# Patient Record
Sex: MALE | Race: WHITE | NOT HISPANIC OR LATINO | Employment: FULL TIME | URBAN - METROPOLITAN AREA
[De-identification: names, ages, dates, MRNs, and addresses within clinical notes are randomized per-mention and may not be internally consistent; named-entity substitution may affect disease eponyms.]

---

## 2017-02-06 ENCOUNTER — TELEPHONE (OUTPATIENT)
Dept: ORTHOPEDICS | Facility: CLINIC | Age: 37
End: 2017-02-06

## 2017-02-06 NOTE — TELEPHONE ENCOUNTER
"Spoke with pt who states he fell out of 4-curry and felt a "pop" to his left knee. Pt states that his left knee is swollen and that he cannot bear weight on his LLE. Pt states that he cannot fully extend LLE. Pt has been elevating and icing. Pt would like to be seen today. Appt scheduled today with NICO Juarez III, PA/Sports Med at 1:30pm with arrival at 1:00 pm. Message left on pt voicemail notifying him of appt and requesting that he return call to confirm appt. Noted pt has active MyChart.   "

## 2017-02-06 NOTE — TELEPHONE ENCOUNTER
----- Message from Chris Zamorano sent at 2/6/2017  7:43 AM CST -----  Contact: pt  The pt need an appointment for today, the pt felt a pop in his knee after he fell out of a 4 curry. The pt knee is swollen and would like to come in today. Please call the pt at [phone]

## 2019-02-04 ENCOUNTER — HOSPITAL ENCOUNTER (EMERGENCY)
Facility: HOSPITAL | Age: 39
Discharge: HOME | End: 2019-02-04
Attending: EMERGENCY MEDICINE
Payer: COMMERCIAL

## 2019-02-04 VITALS
HEART RATE: 69 BPM | WEIGHT: 315 LBS | TEMPERATURE: 99 F | RESPIRATION RATE: 18 BRPM | SYSTOLIC BLOOD PRESSURE: 146 MMHG | DIASTOLIC BLOOD PRESSURE: 78 MMHG | HEIGHT: 70 IN | BODY MASS INDEX: 45.1 KG/M2 | OXYGEN SATURATION: 94 %

## 2019-02-04 DIAGNOSIS — N23 RENAL COLIC ON RIGHT SIDE: ICD-10-CM

## 2019-02-04 DIAGNOSIS — N20.1 RIGHT URETERAL STONE: Primary | ICD-10-CM

## 2019-02-04 LAB
ALBUMIN SERPL BCP-MCNC: 3.4 G/DL
ALP SERPL-CCNC: 72 U/L
ALT SERPL W/O P-5'-P-CCNC: 27 U/L
ANION GAP SERPL CALC-SCNC: 9 MMOL/L
AST SERPL-CCNC: 19 U/L
BACTERIA #/AREA URNS AUTO: ABNORMAL /HPF
BASOPHILS # BLD AUTO: 0.06 K/UL
BASOPHILS NFR BLD: 0.8 %
BILIRUB SERPL-MCNC: 0.6 MG/DL
BILIRUB UR QL STRIP: NEGATIVE
BUN SERPL-MCNC: 13 MG/DL
CALCIUM SERPL-MCNC: 9.3 MG/DL
CHLORIDE SERPL-SCNC: 108 MMOL/L
CLARITY UR REFRACT.AUTO: CLEAR
CO2 SERPL-SCNC: 22 MMOL/L
COLOR UR AUTO: YELLOW
CREAT SERPL-MCNC: 0.8 MG/DL
DIFFERENTIAL METHOD: NORMAL
EOSINOPHIL # BLD AUTO: 0.2 K/UL
EOSINOPHIL NFR BLD: 2.2 %
ERYTHROCYTE [DISTWIDTH] IN BLOOD BY AUTOMATED COUNT: 13.2 %
EST. GFR  (AFRICAN AMERICAN): >60 ML/MIN/1.73 M^2
EST. GFR  (NON AFRICAN AMERICAN): >60 ML/MIN/1.73 M^2
GLUCOSE SERPL-MCNC: 109 MG/DL
GLUCOSE UR QL STRIP: NEGATIVE
HCT VFR BLD AUTO: 41.5 %
HGB BLD-MCNC: 14 G/DL
HGB UR QL STRIP: ABNORMAL
IMM GRANULOCYTES # BLD AUTO: 0.01 K/UL
IMM GRANULOCYTES NFR BLD AUTO: 0.1 %
KETONES UR QL STRIP: NEGATIVE
LEUKOCYTE ESTERASE UR QL STRIP: NEGATIVE
LYMPHOCYTES # BLD AUTO: 2.1 K/UL
LYMPHOCYTES NFR BLD: 28.4 %
MCH RBC QN AUTO: 30.3 PG
MCHC RBC AUTO-ENTMCNC: 33.7 G/DL
MCV RBC AUTO: 90 FL
MICROSCOPIC COMMENT: ABNORMAL
MONOCYTES # BLD AUTO: 0.5 K/UL
MONOCYTES NFR BLD: 7.2 %
NEUTROPHILS # BLD AUTO: 4.5 K/UL
NEUTROPHILS NFR BLD: 61.3 %
NITRITE UR QL STRIP: NEGATIVE
NRBC BLD-RTO: 0 /100 WBC
PH UR STRIP: 5 [PH] (ref 5–8)
PLATELET # BLD AUTO: 226 K/UL
PMV BLD AUTO: 11 FL
POTASSIUM SERPL-SCNC: 4.2 MMOL/L
PROT SERPL-MCNC: 7.2 G/DL
PROT UR QL STRIP: NEGATIVE
RBC # BLD AUTO: 4.62 M/UL
RBC #/AREA URNS AUTO: 43 /HPF (ref 0–4)
SODIUM SERPL-SCNC: 139 MMOL/L
SP GR UR STRIP: 1.02 (ref 1–1.03)
SQUAMOUS #/AREA URNS AUTO: 0 /HPF
URN SPEC COLLECT METH UR: ABNORMAL
WBC # BLD AUTO: 7.36 K/UL
WBC #/AREA URNS AUTO: 1 /HPF (ref 0–5)

## 2019-02-04 PROCEDURE — 81001 URINALYSIS AUTO W/SCOPE: CPT

## 2019-02-04 PROCEDURE — 63600175 PHARM REV CODE 636 W HCPCS: Performed by: EMERGENCY MEDICINE

## 2019-02-04 PROCEDURE — 96361 HYDRATE IV INFUSION ADD-ON: CPT

## 2019-02-04 PROCEDURE — 25000003 PHARM REV CODE 250: Performed by: EMERGENCY MEDICINE

## 2019-02-04 PROCEDURE — 96375 TX/PRO/DX INJ NEW DRUG ADDON: CPT

## 2019-02-04 PROCEDURE — 85025 COMPLETE CBC W/AUTO DIFF WBC: CPT

## 2019-02-04 PROCEDURE — 96376 TX/PRO/DX INJ SAME DRUG ADON: CPT

## 2019-02-04 PROCEDURE — 96374 THER/PROPH/DIAG INJ IV PUSH: CPT

## 2019-02-04 PROCEDURE — 99284 EMERGENCY DEPT VISIT MOD MDM: CPT | Mod: ,,, | Performed by: EMERGENCY MEDICINE

## 2019-02-04 PROCEDURE — 80053 COMPREHEN METABOLIC PANEL: CPT

## 2019-02-04 PROCEDURE — 99284 EMERGENCY DEPT VISIT MOD MDM: CPT | Mod: 25

## 2019-02-04 PROCEDURE — 99284 PR EMERGENCY DEPT VISIT,LEVEL IV: ICD-10-PCS | Mod: ,,, | Performed by: EMERGENCY MEDICINE

## 2019-02-04 RX ORDER — KETOROLAC TROMETHAMINE 30 MG/ML
15 INJECTION, SOLUTION INTRAMUSCULAR; INTRAVENOUS
Status: COMPLETED | OUTPATIENT
Start: 2019-02-04 | End: 2019-02-04

## 2019-02-04 RX ORDER — HYDROMORPHONE HYDROCHLORIDE 1 MG/ML
0.5 INJECTION, SOLUTION INTRAMUSCULAR; INTRAVENOUS; SUBCUTANEOUS
Status: COMPLETED | OUTPATIENT
Start: 2019-02-04 | End: 2019-02-04

## 2019-02-04 RX ORDER — ONDANSETRON 2 MG/ML
4 INJECTION INTRAMUSCULAR; INTRAVENOUS
Status: COMPLETED | OUTPATIENT
Start: 2019-02-04 | End: 2019-02-04

## 2019-02-04 RX ORDER — OXYCODONE AND ACETAMINOPHEN 5; 325 MG/1; MG/1
1 TABLET ORAL
Status: COMPLETED | OUTPATIENT
Start: 2019-02-04 | End: 2019-02-04

## 2019-02-04 RX ORDER — OXYCODONE AND ACETAMINOPHEN 5; 325 MG/1; MG/1
1 TABLET ORAL EVERY 6 HOURS PRN
Qty: 18 TABLET | Refills: 0 | Status: SHIPPED | OUTPATIENT
Start: 2019-02-04 | End: 2019-09-04

## 2019-02-04 RX ORDER — ONDANSETRON 4 MG/1
4 TABLET, ORALLY DISINTEGRATING ORAL EVERY 6 HOURS PRN
Qty: 12 TABLET | Refills: 0 | Status: SHIPPED | OUTPATIENT
Start: 2019-02-04 | End: 2019-10-06

## 2019-02-04 RX ORDER — TAMSULOSIN HYDROCHLORIDE 0.4 MG/1
0.4 CAPSULE ORAL DAILY
Qty: 10 CAPSULE | Refills: 0 | Status: ON HOLD | OUTPATIENT
Start: 2019-02-04 | End: 2019-10-06

## 2019-02-04 RX ORDER — TAMSULOSIN HYDROCHLORIDE 0.4 MG/1
0.4 CAPSULE ORAL
Status: COMPLETED | OUTPATIENT
Start: 2019-02-04 | End: 2019-02-04

## 2019-02-04 RX ADMIN — TAMSULOSIN HYDROCHLORIDE 0.4 MG: 0.4 CAPSULE ORAL at 03:02

## 2019-02-04 RX ADMIN — OXYCODONE HYDROCHLORIDE AND ACETAMINOPHEN 1 TABLET: 5; 325 TABLET ORAL at 03:02

## 2019-02-04 RX ADMIN — ONDANSETRON 4 MG: 2 INJECTION INTRAMUSCULAR; INTRAVENOUS at 12:02

## 2019-02-04 RX ADMIN — HYDROMORPHONE HYDROCHLORIDE 0.5 MG: 1 INJECTION, SOLUTION INTRAMUSCULAR; INTRAVENOUS; SUBCUTANEOUS at 01:02

## 2019-02-04 RX ADMIN — HYDROMORPHONE HYDROCHLORIDE 0.5 MG: 1 INJECTION, SOLUTION INTRAMUSCULAR; INTRAVENOUS; SUBCUTANEOUS at 12:02

## 2019-02-04 RX ADMIN — SODIUM CHLORIDE, SODIUM LACTATE, POTASSIUM CHLORIDE, AND CALCIUM CHLORIDE 1000 ML: .6; .31; .03; .02 INJECTION, SOLUTION INTRAVENOUS at 12:02

## 2019-02-04 RX ADMIN — ONDANSETRON 4 MG: 2 INJECTION INTRAMUSCULAR; INTRAVENOUS at 01:02

## 2019-02-04 RX ADMIN — KETOROLAC TROMETHAMINE 15 MG: 30 INJECTION, SOLUTION INTRAMUSCULAR at 12:02

## 2019-02-04 NOTE — ED TRIAGE NOTES
Pt presents to ed complaining of lower back pain started this morning. Hx of kidney stones. Pt denies burning or frequency with urination.

## 2019-02-04 NOTE — ED PROVIDER NOTES
"Encounter Date: 2/4/2019    SCRIBE #1 NOTE: I, La Nena Villegas, am scribing for, and in the presence of,  Dr. Mayes. I have scribed the entire note.       History     Chief Complaint   Patient presents with    Flank Pain     hx kidney stones     Time patient was seen by the provider: 11:47 AM      The patient is a 38 y.o. male with co-morbidities including: GAYLE, GERD, kidney stones, who presents to the ED with a complaint of flank pain that began this morning. He describes pain as a sharp shooting pain located on the right flank. He notes of nausea, epigastric pain. He had one episode of vomiting en route to ED. Denies any fever, chills, diarrhea, chest pain, SOB. Patient with history of kidney stones in the past.         The history is provided by the patient and medical records.     Review of patient's allergies indicates:  No Known Allergies  Past Medical History:   Diagnosis Date    GERD without esophagitis     takes OTC nexium PRN    History of kidney stones     Morbid obesity with body mass index of 60.0-69.9 in adult     GAYLE (obstructive sleep apnea)     did not tolerate CPAP     Past Surgical History:   Procedure Laterality Date    APPENDECTOMY  3/2008     Family History   Problem Relation Age of Onset    Heart disease Father     Stroke Father     Hypertension Father     Diabetes Maternal Grandmother     Heart disease Maternal Grandfather     Stroke Paternal Grandmother     Heart disease Paternal Grandfather     Diabetes Mother     Obesity Mother     Obesity Sister     Diabetes Brother     Obesity Brother      Social History     Tobacco Use    Smoking status: Never Smoker    Smokeless tobacco: Current User    Tobacco comment: 2 cans/ week   Substance Use Topics    Alcohol use: Yes     Alcohol/week: 1.8 oz     Types: 3 Cans of beer per week     Comment: "socially but not usually"    Drug use: No     Review of Systems   Constitutional: Negative for fever.   HENT: Negative for sore " throat.    Respiratory: Negative for shortness of breath.    Cardiovascular: Negative for chest pain.   Gastrointestinal: Positive for abdominal pain, nausea and vomiting.   Genitourinary: Positive for flank pain. Negative for dysuria.   Musculoskeletal: Negative for back pain.   Skin: Negative for rash.   Neurological: Negative for weakness.   Hematological: Does not bruise/bleed easily.       Physical Exam     Initial Vitals [02/04/19 1124]   BP Pulse Resp Temp SpO2   (!) 156/79 79 18 98 °F (36.7 °C) 98 %      MAP       --         Physical Exam    Vitals reviewed.  Constitutional: He appears well-developed and well-nourished. He is Obese . No distress.   38 y.o. obsese  in mild discomfort noted.     HENT:   Head: Normocephalic and atraumatic.   redundant soft palate noted without intraoral injury   Eyes: EOM are normal. Pupils are equal, round, and reactive to light.   Neck: No tracheal deviation present. No JVD present.   Cardiovascular: Normal rate, regular rhythm, normal heart sounds and intact distal pulses.   Pulmonary/Chest: Breath sounds normal. No stridor. No respiratory distress.   Abdominal: Soft. He exhibits no distension. There is tenderness.   Obese, mild right flank tenderness   Musculoskeletal: Normal range of motion. He exhibits no edema.   Moving all 4 extremities. No peripheral edema.    Neurological: He is alert and oriented to person, place, and time.   Psychiatric: His behavior is normal. Thought content normal.         ED Course   Procedures  Labs Reviewed   COMPREHENSIVE METABOLIC PANEL - Abnormal; Notable for the following components:       Result Value    CO2 22 (*)     Albumin 3.4 (*)     All other components within normal limits   URINALYSIS, REFLEX TO URINE CULTURE - Abnormal; Notable for the following components:    Occult Blood UA 3+ (*)     All other components within normal limits    Narrative:     Preferred Collection Type->Urine, Clean Catch  yellow and grey   URINALYSIS  MICROSCOPIC - Abnormal; Notable for the following components:    RBC, UA 43 (*)     All other components within normal limits    Narrative:     Preferred Collection Type->Urine, Clean Catch  yellow and grey   CBC W/ AUTO DIFFERENTIAL          Imaging Results          CT Renal Stone Study ABD Pelvis WO (Final result)  Result time 02/04/19 14:58:32    Final result by Oliver Perez MD (02/04/19 14:58:32)                 Impression:      1. Minimal right-sided hydronephrosis secondary to a 5 mm calculus within the proximal right ureter.  2. Additional bilateral nephrolithiasis as above.  3. Hepatomegaly.  4. Few additional findings as above.      Electronically signed by: Oliver Perez MD  Date:    02/04/2019  Time:    14:58             Narrative:    EXAMINATION:  CT RENAL STONE STUDY ABD PELVIS WO    CLINICAL HISTORY:  Flank pain, stone disease suspected;    TECHNIQUE:  Low dose axial images, sagittal and coronal reformations were obtained from the lung bases to the pubic symphysis.  Contrast was not administered.    COMPARISON:  Chest radiograph 10/16/2015 and lumbar spine series 02/15/2006    FINDINGS:  Beam hardening with streak artifact from close proximity to the scanner gantry as well as increased image noise from body habitus somewhat limits evaluation.    Imaged lung bases are clear.  Base of the heart is within normal limits.    Liver is enlarged without focal process seen.  Gallbladder, pancreas, spleen, stomach, duodenum and bilateral adrenal glands are within normal limits.  No biliary ductal dilatation.    Bilateral kidneys are normal in size, shape and location.  Minimal right-sided hydronephrosis secondary to a 5 mm calculus within the proximal right ureter.  Additional few scattered punctate nephroliths at the right interpolar region and lower pole.  6-7 mm calculi noted at the interpolar and lower pole of the left kidney.  No left hydronephrosis.  No significant perinephric stranding.  Urinary bladder  is suboptimally distended.  Prostate and seminal vesicles are within normal limits.    No ascites, free air or lymphadenopathy.  Aorta is nonaneurysmal.  No significant atherosclerosis.    A tiny fat containing umbilical hernia.  Postsurgical changes of appendectomy noted.  Terminal ileum is within normal limits.  No evidence of bowel obstruction or inflammation.  No pneumatosis or portal venous gas.    Included osseous structures show chronic appearing mild anterior wedge deformity of several lower thoracic vertebral bodies and mild degenerative change without acute or destructive process seen.                                 Medical Decision Making:   History:   Old Medical Records: I decided to obtain old medical records.  Differential Diagnosis:   Flank pain, renal colic, ureteral obstruction, UTI.   Clinical Tests:   Lab Tests: Ordered and Reviewed  Radiological Study: Ordered and Reviewed            Scribe Attestation:   Scribe #1: I performed the above scribed service and the documentation accurately describes the services I performed. I attest to the accuracy of the note.    Attending Attestation:             Attending ED Notes:   Emergency department evaluation today does not reveal any evidence of significant hematologic or metabolic derangements in this patient presenting with a right-sided flank/low back pain beginning this morning.  Urinalysis does reveal evidence of hematuria without significant pyuria or nitrite.  Renal stone study/CT abdomen pelvis without contrast does reveal evidence of a right sided 5 mm stone to the proximal ureter with mild hydronephrosis.  The patient reports improvement of his presenting symptoms with emergency department therapy, and he is tolerating oral fluids at the time of disposition.  He will be discharged home in improved condition with instructions to follow up with Urology within the next 10 days and return to the ED as needed for onset of fever, worsening of pain,  intractable vomiting, or other urgent concerns.  He has been provided prescriptions of Percocet 18.  Tablets, Zofran 4 mg ODT, as well as Flomax 0.4 mg to be taken as directed.             Clinical Impression:   The primary encounter diagnosis was Right ureteral stone. A diagnosis of Renal colic on right side was also pertinent to this visit.      Disposition:   Disposition: Discharged  Condition: Stable                        Esa Mayes MD  02/04/19 2489

## 2019-02-04 NOTE — ED NOTES
Discharge information and medications discussed.  IV removed per protocol and pt in NAD with VSS.  Pt understands to follow up with urology within a week.  Pt states no questions at this time.

## 2019-02-04 NOTE — ED NOTES
LOC: The patient is awake, alert, and aware of environment. The patient is oriented x 3 and speaking appropriately.   APPEARANCE: No acute distress noted.   PSYCHOSOCIAL: Patient is calm and cooperative.   SKIN: The skin is warm, dry.   RESPIRATORY: Airway is open and patent. Bilateral chest rise and fall. Respirations are spontaneous, even and unlabored. Normal effort and rate noted. No accessory muscle use noted.   CARDIAC:Denies chest pain or SOB.   ABDOMEN: Soft and non tender to palpation. No distention noted.   URINARY:  Voids independently.   NEUROLOGIC: Eyes open spontaneously. Speech clear. Tolerating saliva secretions well. Able to follow commands, demonstrating ability to actively and appropriately communicate within context of current conversation. Symmetrical facial muscles. Moving all extremities well. Movement is purposeful.   MUSCULOSKELETAL: No obvious deformities noted.

## 2019-02-05 ENCOUNTER — HOSPITAL ENCOUNTER (EMERGENCY)
Facility: HOSPITAL | Age: 39
Discharge: HOME | End: 2019-02-05
Attending: EMERGENCY MEDICINE
Payer: COMMERCIAL

## 2019-02-05 VITALS
OXYGEN SATURATION: 98 % | SYSTOLIC BLOOD PRESSURE: 135 MMHG | TEMPERATURE: 98 F | HEART RATE: 81 BPM | WEIGHT: 315 LBS | HEIGHT: 70 IN | DIASTOLIC BLOOD PRESSURE: 85 MMHG | BODY MASS INDEX: 45.1 KG/M2 | RESPIRATION RATE: 16 BRPM

## 2019-02-05 DIAGNOSIS — R10.9 FLANK PAIN: ICD-10-CM

## 2019-02-05 DIAGNOSIS — N20.1 URETEROLITHIASIS: Primary | ICD-10-CM

## 2019-02-05 LAB
ALBUMIN SERPL BCP-MCNC: 3.5 G/DL
ALP SERPL-CCNC: 69 U/L
ALT SERPL W/O P-5'-P-CCNC: 23 U/L
ANION GAP SERPL CALC-SCNC: 10 MMOL/L
AST SERPL-CCNC: 21 U/L
BACTERIA #/AREA URNS AUTO: ABNORMAL /HPF
BASOPHILS # BLD AUTO: 0.05 K/UL
BASOPHILS NFR BLD: 0.5 %
BILIRUB SERPL-MCNC: 0.5 MG/DL
BILIRUB UR QL STRIP: NEGATIVE
BUN SERPL-MCNC: 14 MG/DL
CALCIUM SERPL-MCNC: 9.3 MG/DL
CHLORIDE SERPL-SCNC: 106 MMOL/L
CLARITY UR REFRACT.AUTO: CLEAR
CO2 SERPL-SCNC: 21 MMOL/L
COLOR UR AUTO: ABNORMAL
CREAT SERPL-MCNC: 1.1 MG/DL
DIFFERENTIAL METHOD: ABNORMAL
EOSINOPHIL # BLD AUTO: 0.1 K/UL
EOSINOPHIL NFR BLD: 1.1 %
ERYTHROCYTE [DISTWIDTH] IN BLOOD BY AUTOMATED COUNT: 13.1 %
EST. GFR  (AFRICAN AMERICAN): >60 ML/MIN/1.73 M^2
EST. GFR  (NON AFRICAN AMERICAN): >60 ML/MIN/1.73 M^2
GLUCOSE SERPL-MCNC: 97 MG/DL
GLUCOSE UR QL STRIP: NEGATIVE
HCT VFR BLD AUTO: 38.9 %
HGB BLD-MCNC: 13 G/DL
HGB UR QL STRIP: ABNORMAL
IMM GRANULOCYTES # BLD AUTO: 0.04 K/UL
IMM GRANULOCYTES NFR BLD AUTO: 0.4 %
KETONES UR QL STRIP: NEGATIVE
LEUKOCYTE ESTERASE UR QL STRIP: NEGATIVE
LYMPHOCYTES # BLD AUTO: 1.8 K/UL
LYMPHOCYTES NFR BLD: 16.3 %
MCH RBC QN AUTO: 29.4 PG
MCHC RBC AUTO-ENTMCNC: 33.4 G/DL
MCV RBC AUTO: 88 FL
MICROSCOPIC COMMENT: ABNORMAL
MONOCYTES # BLD AUTO: 0.9 K/UL
MONOCYTES NFR BLD: 8.1 %
NEUTROPHILS # BLD AUTO: 8 K/UL
NEUTROPHILS NFR BLD: 73.6 %
NITRITE UR QL STRIP: NEGATIVE
NRBC BLD-RTO: 0 /100 WBC
PH UR STRIP: 5 [PH] (ref 5–8)
PLATELET # BLD AUTO: 187 K/UL
PMV BLD AUTO: 11.5 FL
POTASSIUM SERPL-SCNC: 4.2 MMOL/L
PROT SERPL-MCNC: 7.5 G/DL
PROT UR QL STRIP: NEGATIVE
RBC # BLD AUTO: 4.42 M/UL
RBC #/AREA URNS AUTO: 7 /HPF (ref 0–4)
SODIUM SERPL-SCNC: 137 MMOL/L
SP GR UR STRIP: 1.01 (ref 1–1.03)
SQUAMOUS #/AREA URNS AUTO: 0 /HPF
URN SPEC COLLECT METH UR: ABNORMAL
WBC # BLD AUTO: 10.89 K/UL

## 2019-02-05 PROCEDURE — 99284 EMERGENCY DEPT VISIT MOD MDM: CPT | Mod: 25

## 2019-02-05 PROCEDURE — 85025 COMPLETE CBC W/AUTO DIFF WBC: CPT

## 2019-02-05 PROCEDURE — 99285 PR EMERGENCY DEPT VISIT,LEVEL V: ICD-10-PCS | Mod: ,,, | Performed by: EMERGENCY MEDICINE

## 2019-02-05 PROCEDURE — 81001 URINALYSIS AUTO W/SCOPE: CPT

## 2019-02-05 PROCEDURE — 99285 EMERGENCY DEPT VISIT HI MDM: CPT | Mod: ,,, | Performed by: EMERGENCY MEDICINE

## 2019-02-05 PROCEDURE — 25000003 PHARM REV CODE 250: Performed by: EMERGENCY MEDICINE

## 2019-02-05 PROCEDURE — 96361 HYDRATE IV INFUSION ADD-ON: CPT

## 2019-02-05 PROCEDURE — 63600175 PHARM REV CODE 636 W HCPCS: Performed by: EMERGENCY MEDICINE

## 2019-02-05 PROCEDURE — 96375 TX/PRO/DX INJ NEW DRUG ADDON: CPT

## 2019-02-05 PROCEDURE — 96374 THER/PROPH/DIAG INJ IV PUSH: CPT

## 2019-02-05 PROCEDURE — 80053 COMPREHEN METABOLIC PANEL: CPT

## 2019-02-05 RX ORDER — MORPHINE SULFATE 4 MG/ML
4 INJECTION, SOLUTION INTRAMUSCULAR; INTRAVENOUS
Status: COMPLETED | OUTPATIENT
Start: 2019-02-05 | End: 2019-02-05

## 2019-02-05 RX ORDER — NAPROXEN 375 MG/1
375 TABLET ORAL 2 TIMES DAILY WITH MEALS
Qty: 10 TABLET | Refills: 0 | Status: SHIPPED | OUTPATIENT
Start: 2019-02-05 | End: 2019-02-05 | Stop reason: SDUPTHER

## 2019-02-05 RX ORDER — NAPROXEN 375 MG/1
375 TABLET ORAL 2 TIMES DAILY WITH MEALS
Qty: 10 TABLET | Refills: 0 | Status: SHIPPED | OUTPATIENT
Start: 2019-02-05 | End: 2019-02-10

## 2019-02-05 RX ORDER — KETOROLAC TROMETHAMINE 30 MG/ML
15 INJECTION, SOLUTION INTRAMUSCULAR; INTRAVENOUS
Status: COMPLETED | OUTPATIENT
Start: 2019-02-05 | End: 2019-02-05

## 2019-02-05 RX ADMIN — MORPHINE SULFATE 4 MG: 4 INJECTION INTRAVENOUS at 02:02

## 2019-02-05 RX ADMIN — KETOROLAC TROMETHAMINE 15 MG: 30 INJECTION, SOLUTION INTRAMUSCULAR at 02:02

## 2019-02-05 RX ADMIN — SODIUM CHLORIDE 1000 ML: 0.9 INJECTION, SOLUTION INTRAVENOUS at 03:02

## 2019-02-05 NOTE — ED PROVIDER NOTES
"Encounter Date: 2/5/2019    SCRIBE #1 NOTE: I, Kael Partida, am scribing for, and in the presence of,  Dimitrios Tsai DO. I have scribed the entire note.       History     Chief Complaint   Patient presents with    Nephrolithiasis     seen here last night vomiting     38 y.o. male patient who presents to the Emergency Department for right-sided flank pain with onset 1 day ago. Pt was seen yesterday in ED and diagnosed with a 5 mm kidney stone. Pt reports after being discharged he continued to have difficulty urinating and awoke this morning with worse pain. Associated sxs include nausea. Pt denies any fever, vomiting, diarrhea, chills, hematuria, and all other sxs at this time. No further complaints or concerns at this time.      The history is provided by the patient.     Review of patient's allergies indicates:  No Known Allergies  Past Medical History:   Diagnosis Date    GERD without esophagitis     takes OTC nexium PRN    History of kidney stones     Morbid obesity with body mass index of 60.0-69.9 in adult     GAYLE (obstructive sleep apnea)     did not tolerate CPAP     Past Surgical History:   Procedure Laterality Date    APPENDECTOMY  3/2008     Family History   Problem Relation Age of Onset    Heart disease Father     Stroke Father     Hypertension Father     Diabetes Maternal Grandmother     Heart disease Maternal Grandfather     Stroke Paternal Grandmother     Heart disease Paternal Grandfather     Diabetes Mother     Obesity Mother     Obesity Sister     Diabetes Brother     Obesity Brother      Social History     Tobacco Use    Smoking status: Never Smoker    Smokeless tobacco: Current User    Tobacco comment: 2 cans/ week   Substance Use Topics    Alcohol use: Yes     Alcohol/week: 1.8 oz     Types: 3 Cans of beer per week     Comment: "socially but not usually"    Drug use: No     Review of Systems   Constitutional: Negative for chills and fever.   HENT: Negative for congestion " and sore throat.    Eyes: Negative for discharge and itching.   Respiratory: Negative for cough and shortness of breath.    Cardiovascular: Negative for chest pain and leg swelling.   Gastrointestinal: Positive for nausea. Negative for diarrhea and vomiting.   Endocrine: Negative for cold intolerance and heat intolerance.   Genitourinary: Positive for difficulty urinating. Negative for dysuria.   Musculoskeletal: Positive for back pain (right flank). Negative for arthralgias.   Skin: Negative for color change and rash.   Allergic/Immunologic: Negative for environmental allergies and food allergies.   Neurological: Negative for weakness and numbness.   Hematological: Negative for adenopathy. Does not bruise/bleed easily.   Psychiatric/Behavioral: Negative for agitation and behavioral problems.       Physical Exam     Initial Vitals [02/05/19 1358]   BP Pulse Resp Temp SpO2   (!) 145/89 79 18 97.9 °F (36.6 °C) 97 %      MAP       --         Physical Exam    Nursing note and vitals reviewed.    Gen/Constitutional: Interactive. No acute distress  Head: Normocephalic, Atraumatic  Neck: supple, no masses or LAD  Eyes: PERRLA, conjunctiva clear  Ears, Nose and Throat: No rhinorrhea or stridor.  Cardiac: Reg Rhythm, No murmur  Pulmonary: CTA Bilat, no wheezes, rhonchi, rales.  GI: Abdomen soft, non-tender, non-distended; no rebound or guarding  : No CVA tenderness.  Musculoskeletal: Extremities warm, well perfused, no erythema, no edema  Skin: No rashes  Neuro: Alert and Oriented x 3; No focal motor or sensory deficits.    Psych: Normal affect    ED Course   Procedures  Labs Reviewed   CBC W/ AUTO DIFFERENTIAL - Abnormal; Notable for the following components:       Result Value    RBC 4.42 (*)     Hemoglobin 13.0 (*)     Hematocrit 38.9 (*)     Gran # (ANC) 8.0 (*)     Gran% 73.6 (*)     Lymph% 16.3 (*)     All other components within normal limits   COMPREHENSIVE METABOLIC PANEL - Abnormal; Notable for the following  components:    CO2 21 (*)     All other components within normal limits   URINALYSIS, REFLEX TO URINE CULTURE - Abnormal; Notable for the following components:    Occult Blood UA 3+ (*)     All other components within normal limits    Narrative:     Preferred Collection Type->Urine, Clean Catch   URINALYSIS MICROSCOPIC - Abnormal; Notable for the following components:    RBC, UA 7 (*)     All other components within normal limits    Narrative:     Preferred Collection Type->Urine, Clean Catch          Imaging Results          CT Renal Stone Study ABD Pelvis WO (Final result)  Result time 02/05/19 18:23:30    Final result by Messi King MD (02/05/19 18:23:30)                 Impression:      1. Mild to moderate right hydroureteronephrosis secondary to a 0.4 cm calculus within the distal right ureter.  In comparison to examination 02/04/2019, this likely reflects distal migration of previously identified proximal ureteral calculus.  Please note, there has been interval worsening of the hydronephrosis and perinephric/periureteral inflammatory changes.  Superimposed infection is not excluded.  2. The urinary bladder is decompressed, likely accounting for mild wall thickening however correlation with urinalysis is recommended.  3. Findings suggesting hepatic steatosis, correlation with LFTs recommended.  4. Possible gallbladder calculi or sludge.  5. Additional nonobstructive bilateral nephrolithiasis.  6. Additional findings above.      Electronically signed by: Messi King MD  Date:    02/05/2019  Time:    18:23             Narrative:    EXAMINATION:  CT RENAL STONE STUDY ABD PELVIS WO    CLINICAL HISTORY:  Kidney stone, known, follow up;    TECHNIQUE:  Low dose axial images, sagittal and coronal reformations were obtained from the lung bases to the pubic symphysis.  Contrast was not administered.    COMPARISON:  02/04/2019    FINDINGS:  Images of the lower thorax are remarkable for mild dependent  atelectasis.    The liver is hypoattenuating, may reflect steatosis, correlation with LFTs recommended.  The spleen, pancreas and adrenal glands are unremarkable.  There is high attenuating material layering posteriorly within the gallbladder, could reflect calculi or sludge.  There is no biliary dilation or ascites.  There is a prominent lymph node in the region of the right leora hepatis, unchanged since the previous examination.    There is bilateral nonobstructive nephrolithiasis.  There is no left hydronephrosis.  The left ureter is unremarkable without calculi seen.  There is right perinephric and periureteral inflammation.  There is mild to moderate right hydroureteronephrosis, noting a 0.4 cm calculus within the distal right ureter.  The urinary bladder is decompressed, limiting evaluation.  The prostate is not enlarged.    The large bowel is grossly unremarkable as is the terminal ileum.  Postsurgical changes are noted suggesting appendectomy.  The small bowel is grossly unremarkable.  Scattered shotty periaortic and paracaval lymph nodes are noted.  No focal organized pelvic fluid collection.    Degenerative changes are noted of the spine.  No significant inguinal lymphadenopathy.                                 Medical Decision Making:   History:   Old Medical Records: I decided to obtain old medical records.  Old Records Summarized: records from previous admission(s).  Initial Assessment:   38-year-old male with a history of nephrolithiasis presenting with flank pain on the right side.  Differential Diagnosis:   Differential diagnosis includes was not limited to:  Ureterolithiasis, nephrolithiasis, obstructive uropathy, UTI, aortic disease, pyelonephritis.   Independently Interpreted Test(s):   I have ordered and independently interpreted X-rays - see prior notes.  Clinical Tests:   Lab Tests: Ordered and Reviewed  Radiological Study: Ordered and Reviewed    Emergent evaluation of patient presenting with  right-sided flank pain in the setting of recent nephrolithiasis.  Patient states that he had a 5 mm kidney stone is unable to pass it.  Difficulty with urination, and subjective dysuria.  He is afebrile vital signs are stable. Physical exam findings unremarkable, with no CVA tenderness or systemic symptoms. On IV line placed, labs drawn, patient given IV pain medicine, antiemetic, IV fluids with a CT noncontrast flank stone study obtained. On repeat evaluation symptoms have resolved, and patient is able tolerate p.o..  CT scan reveals a now 4 mm stone that is no longer proximal but in the distal ureteral system near the bladder.  I suspect he will be able to pass the stone as he has already passed to the 24 hr from proximal to distal.  Pain patient was given instructions for pain management, anti-inflammatory medication as well as continuing medication that was already prescribed to include Flomax, and fluid intake.  Plan is to have patient follow up with PCP as needed, and strict ED precautions return instructions were discussed.  The remainder of exam unremarkable, no evidence of aortic disease on CT, doubt any obstructive uropathy, no evidence of UTI or pyelonephritis. Patient agreeable to discharge plan. Strict ED precautions and return instructions discussed at length and patient verbalized understanding. All questions were answered and ample time was given for questions.      Complexity:  High          Scribe Attestation:   Scribe #1: I performed the above scribed service and the documentation accurately describes the services I performed. I attest to the accuracy of the note.    I, Dr. Dimitrios Tsai, personally performed the services described in this documentation. All medical record entries made by the scribe were at my direction and in my presence.  I have reviewed the chart and agree that the record reflects my personal performance and is accurate and complete.                Clinical Impression:   The  primary encounter diagnosis was Ureterolithiasis. A diagnosis of Flank pain was also pertinent to this visit.      Disposition:   Disposition: Discharged  Condition: Stable         Dimitrios Tsai DO  Dept of Emergency Medicine   [facility]  Spectralink: [id number]                   Dimitrios Tsai DO  02/06/19 4121

## 2019-02-05 NOTE — ED TRIAGE NOTES
Pt dx with kidney stones yesterday-5mm on right side.  Pt states pain has worsened and is nauseated.  States he is not urinating much.

## 2019-05-22 ENCOUNTER — OCCUPATIONAL HEALTH (OUTPATIENT)
Dept: URGENT CARE | Facility: CLINIC | Age: 39
End: 2019-05-22

## 2019-05-22 DIAGNOSIS — Z02.83 ENCOUNTER FOR DRUG SCREENING: Primary | ICD-10-CM

## 2019-05-22 DIAGNOSIS — Z02.1 ENCOUNTER FOR PRE-EMPLOYMENT HEALTH SCREENING EXAMINATION: Primary | ICD-10-CM

## 2019-05-22 PROCEDURE — 99499 UNLISTED E&M SERVICE: CPT | Mod: S$GLB,,, | Performed by: NURSE PRACTITIONER

## 2019-05-22 PROCEDURE — 99199 OCC MED MRO FEE: ICD-10-PCS | Mod: S$GLB,,, | Performed by: PREVENTIVE MEDICINE

## 2019-05-22 PROCEDURE — 99199 UNLISTED SPECIAL SVC PX/RPRT: CPT | Mod: S$GLB,,, | Performed by: PREVENTIVE MEDICINE

## 2019-05-22 PROCEDURE — 99499 PHYSICAL, BASIC COMPLEXITY: ICD-10-PCS | Mod: S$GLB,,, | Performed by: NURSE PRACTITIONER

## 2019-05-22 PROCEDURE — 80305 OOH COLLECTION ONLY DRUG SCREEN: ICD-10-PCS | Mod: S$GLB,,, | Performed by: NURSE PRACTITIONER

## 2019-05-22 PROCEDURE — 80305 DRUG TEST PRSMV DIR OPT OBS: CPT | Mod: S$GLB,,, | Performed by: NURSE PRACTITIONER

## 2019-10-05 ENCOUNTER — HOSPITAL ENCOUNTER (OUTPATIENT)
Facility: HOSPITAL | Age: 39
Discharge: HOME | End: 2019-10-06
Attending: EMERGENCY MEDICINE | Admitting: UROLOGY

## 2019-10-05 DIAGNOSIS — N20.1 URETEROLITHIASIS: Primary | ICD-10-CM

## 2019-10-05 DIAGNOSIS — D72.829 LEUKOCYTOSIS, UNSPECIFIED TYPE: ICD-10-CM

## 2019-10-05 DIAGNOSIS — N13.30 HYDRONEPHROSIS, UNSPECIFIED HYDRONEPHROSIS TYPE: ICD-10-CM

## 2019-10-05 DIAGNOSIS — N20.0 KIDNEY STONE: ICD-10-CM

## 2019-10-05 LAB
ANION GAP SERPL CALC-SCNC: 11 MMOL/L (ref 8–16)
BACTERIA #/AREA URNS AUTO: NORMAL /HPF
BASOPHILS # BLD AUTO: 0.05 K/UL (ref 0–0.2)
BASOPHILS NFR BLD: 0.3 % (ref 0–1.9)
BILIRUB UR QL STRIP: NEGATIVE
BUN SERPL-MCNC: 17 MG/DL (ref 6–20)
CALCIUM SERPL-MCNC: 9.4 MG/DL (ref 8.7–10.5)
CHLORIDE SERPL-SCNC: 109 MMOL/L (ref 95–110)
CLARITY UR REFRACT.AUTO: CLEAR
CO2 SERPL-SCNC: 22 MMOL/L (ref 23–29)
COLOR UR AUTO: YELLOW
CREAT SERPL-MCNC: 1.2 MG/DL (ref 0.5–1.4)
DIFFERENTIAL METHOD: ABNORMAL
EOSINOPHIL # BLD AUTO: 0.2 K/UL (ref 0–0.5)
EOSINOPHIL NFR BLD: 1.1 % (ref 0–8)
ERYTHROCYTE [DISTWIDTH] IN BLOOD BY AUTOMATED COUNT: 12.9 % (ref 11.5–14.5)
EST. GFR  (AFRICAN AMERICAN): >60 ML/MIN/1.73 M^2
EST. GFR  (NON AFRICAN AMERICAN): >60 ML/MIN/1.73 M^2
GLUCOSE SERPL-MCNC: 114 MG/DL (ref 70–110)
GLUCOSE UR QL STRIP: NEGATIVE
HCT VFR BLD AUTO: 38.8 % (ref 40–54)
HGB BLD-MCNC: 13.6 G/DL (ref 14–18)
HGB UR QL STRIP: ABNORMAL
IMM GRANULOCYTES # BLD AUTO: 0.07 K/UL (ref 0–0.04)
IMM GRANULOCYTES NFR BLD AUTO: 0.5 % (ref 0–0.5)
KETONES UR QL STRIP: NEGATIVE
LEUKOCYTE ESTERASE UR QL STRIP: NEGATIVE
LYMPHOCYTES # BLD AUTO: 2.3 K/UL (ref 1–4.8)
LYMPHOCYTES NFR BLD: 15.7 % (ref 18–48)
MCH RBC QN AUTO: 30.9 PG (ref 27–31)
MCHC RBC AUTO-ENTMCNC: 35.1 G/DL (ref 32–36)
MCV RBC AUTO: 88 FL (ref 82–98)
MICROSCOPIC COMMENT: NORMAL
MONOCYTES # BLD AUTO: 1.2 K/UL (ref 0.3–1)
MONOCYTES NFR BLD: 8.1 % (ref 4–15)
NEUTROPHILS # BLD AUTO: 11 K/UL (ref 1.8–7.7)
NEUTROPHILS NFR BLD: 74.3 % (ref 38–73)
NITRITE UR QL STRIP: NEGATIVE
NRBC BLD-RTO: 0 /100 WBC
PH UR STRIP: 5 [PH] (ref 5–8)
PLATELET # BLD AUTO: 204 K/UL (ref 150–350)
PMV BLD AUTO: 10.8 FL (ref 9.2–12.9)
POTASSIUM SERPL-SCNC: 4.6 MMOL/L (ref 3.5–5.1)
PROT UR QL STRIP: NEGATIVE
RBC # BLD AUTO: 4.4 M/UL (ref 4.6–6.2)
RBC #/AREA URNS AUTO: 2 /HPF (ref 0–4)
SODIUM SERPL-SCNC: 142 MMOL/L (ref 136–145)
SP GR UR STRIP: 1.02 (ref 1–1.03)
SQUAMOUS #/AREA URNS AUTO: 0 /HPF
URN SPEC COLLECT METH UR: ABNORMAL
WBC # BLD AUTO: 14.8 K/UL (ref 3.9–12.7)
WBC #/AREA URNS AUTO: 0 /HPF (ref 0–5)

## 2019-10-05 PROCEDURE — 80048 BASIC METABOLIC PNL TOTAL CA: CPT

## 2019-10-05 PROCEDURE — 99285 EMERGENCY DEPT VISIT HI MDM: CPT | Mod: ,,, | Performed by: EMERGENCY MEDICINE

## 2019-10-05 PROCEDURE — 96375 TX/PRO/DX INJ NEW DRUG ADDON: CPT

## 2019-10-05 PROCEDURE — 81001 URINALYSIS AUTO W/SCOPE: CPT

## 2019-10-05 PROCEDURE — 63600175 PHARM REV CODE 636 W HCPCS: Performed by: EMERGENCY MEDICINE

## 2019-10-05 PROCEDURE — 85025 COMPLETE CBC W/AUTO DIFF WBC: CPT

## 2019-10-05 PROCEDURE — 99285 PR EMERGENCY DEPT VISIT,LEVEL V: ICD-10-PCS | Mod: ,,, | Performed by: EMERGENCY MEDICINE

## 2019-10-05 PROCEDURE — 96376 TX/PRO/DX INJ SAME DRUG ADON: CPT

## 2019-10-05 PROCEDURE — 99285 EMERGENCY DEPT VISIT HI MDM: CPT | Mod: 25

## 2019-10-05 PROCEDURE — 96365 THER/PROPH/DIAG IV INF INIT: CPT

## 2019-10-05 PROCEDURE — 96361 HYDRATE IV INFUSION ADD-ON: CPT

## 2019-10-05 RX ORDER — SCOLOPAMINE TRANSDERMAL SYSTEM 1 MG/1
1 PATCH, EXTENDED RELEASE TRANSDERMAL
Status: DISCONTINUED | OUTPATIENT
Start: 2019-10-06 | End: 2019-10-05

## 2019-10-05 RX ORDER — SCOLOPAMINE TRANSDERMAL SYSTEM 1 MG/1
1 PATCH, EXTENDED RELEASE TRANSDERMAL
Status: DISCONTINUED | OUTPATIENT
Start: 2019-10-05 | End: 2019-10-06 | Stop reason: HOSPADM

## 2019-10-05 RX ORDER — ONDANSETRON 2 MG/ML
4 INJECTION INTRAMUSCULAR; INTRAVENOUS
Status: COMPLETED | OUTPATIENT
Start: 2019-10-05 | End: 2019-10-05

## 2019-10-05 RX ORDER — KETOROLAC TROMETHAMINE 30 MG/ML
15 INJECTION, SOLUTION INTRAMUSCULAR; INTRAVENOUS
Status: COMPLETED | OUTPATIENT
Start: 2019-10-05 | End: 2019-10-05

## 2019-10-05 RX ORDER — MORPHINE SULFATE 2 MG/ML
10 INJECTION, SOLUTION INTRAMUSCULAR; INTRAVENOUS
Status: COMPLETED | OUTPATIENT
Start: 2019-10-05 | End: 2019-10-05

## 2019-10-05 RX ADMIN — SODIUM CHLORIDE 1000 ML: 0.9 INJECTION, SOLUTION INTRAVENOUS at 08:10

## 2019-10-05 RX ADMIN — MORPHINE SULFATE 10 MG: 2 INJECTION, SOLUTION INTRAMUSCULAR; INTRAVENOUS at 09:10

## 2019-10-05 RX ADMIN — ONDANSETRON 4 MG: 2 INJECTION INTRAMUSCULAR; INTRAVENOUS at 08:10

## 2019-10-05 RX ADMIN — ONDANSETRON 4 MG: 2 INJECTION INTRAMUSCULAR; INTRAVENOUS at 10:10

## 2019-10-05 RX ADMIN — KETOROLAC TROMETHAMINE 15 MG: 30 INJECTION, SOLUTION INTRAMUSCULAR; INTRAVENOUS at 08:10

## 2019-10-05 RX ADMIN — PROMETHAZINE HYDROCHLORIDE 12.5 MG: 25 INJECTION INTRAMUSCULAR; INTRAVENOUS at 11:10

## 2019-10-06 ENCOUNTER — ANESTHESIA EVENT (OUTPATIENT)
Dept: SURGERY | Facility: HOSPITAL | Age: 39
End: 2019-10-06

## 2019-10-06 ENCOUNTER — ANESTHESIA (OUTPATIENT)
Dept: SURGERY | Facility: HOSPITAL | Age: 39
End: 2019-10-06

## 2019-10-06 VITALS
DIASTOLIC BLOOD PRESSURE: 63 MMHG | RESPIRATION RATE: 18 BRPM | BODY MASS INDEX: 45.1 KG/M2 | WEIGHT: 315 LBS | OXYGEN SATURATION: 95 % | HEART RATE: 80 BPM | SYSTOLIC BLOOD PRESSURE: 139 MMHG | HEIGHT: 70 IN | TEMPERATURE: 98 F

## 2019-10-06 PROBLEM — N20.0 KIDNEY STONES: Status: ACTIVE | Noted: 2019-10-06

## 2019-10-06 PROBLEM — N20.1 URETEROLITHIASIS: Status: ACTIVE | Noted: 2019-10-06

## 2019-10-06 LAB
ANION GAP SERPL CALC-SCNC: 8 MMOL/L (ref 8–16)
BASOPHILS # BLD AUTO: 0.04 K/UL (ref 0–0.2)
BASOPHILS NFR BLD: 0.5 % (ref 0–1.9)
BUN SERPL-MCNC: 17 MG/DL (ref 6–20)
CALCIUM SERPL-MCNC: 8.7 MG/DL (ref 8.7–10.5)
CHLORIDE SERPL-SCNC: 107 MMOL/L (ref 95–110)
CO2 SERPL-SCNC: 25 MMOL/L (ref 23–29)
CREAT SERPL-MCNC: 1.3 MG/DL (ref 0.5–1.4)
DIFFERENTIAL METHOD: ABNORMAL
EOSINOPHIL # BLD AUTO: 0.2 K/UL (ref 0–0.5)
EOSINOPHIL NFR BLD: 2.1 % (ref 0–8)
ERYTHROCYTE [DISTWIDTH] IN BLOOD BY AUTOMATED COUNT: 13.1 % (ref 11.5–14.5)
EST. GFR  (AFRICAN AMERICAN): >60 ML/MIN/1.73 M^2
EST. GFR  (NON AFRICAN AMERICAN): >60 ML/MIN/1.73 M^2
GLUCOSE SERPL-MCNC: 99 MG/DL (ref 70–110)
HCT VFR BLD AUTO: 38.7 % (ref 40–54)
HGB BLD-MCNC: 12.7 G/DL (ref 14–18)
IMM GRANULOCYTES # BLD AUTO: 0.02 K/UL (ref 0–0.04)
IMM GRANULOCYTES NFR BLD AUTO: 0.2 % (ref 0–0.5)
LYMPHOCYTES # BLD AUTO: 2.5 K/UL (ref 1–4.8)
LYMPHOCYTES NFR BLD: 29.1 % (ref 18–48)
MCH RBC QN AUTO: 30.3 PG (ref 27–31)
MCHC RBC AUTO-ENTMCNC: 32.8 G/DL (ref 32–36)
MCV RBC AUTO: 92 FL (ref 82–98)
MONOCYTES # BLD AUTO: 0.9 K/UL (ref 0.3–1)
MONOCYTES NFR BLD: 10.5 % (ref 4–15)
NEUTROPHILS # BLD AUTO: 4.9 K/UL (ref 1.8–7.7)
NEUTROPHILS NFR BLD: 57.6 % (ref 38–73)
NRBC BLD-RTO: 0 /100 WBC
PLATELET # BLD AUTO: 177 K/UL (ref 150–350)
PMV BLD AUTO: 10.9 FL (ref 9.2–12.9)
POTASSIUM SERPL-SCNC: 4 MMOL/L (ref 3.5–5.1)
RBC # BLD AUTO: 4.19 M/UL (ref 4.6–6.2)
SODIUM SERPL-SCNC: 140 MMOL/L (ref 136–145)
WBC # BLD AUTO: 8.46 K/UL (ref 3.9–12.7)

## 2019-10-06 PROCEDURE — 99219 PR INITIAL OBSERVATION CARE,LEVL II: ICD-10-PCS | Mod: 25,,, | Performed by: UROLOGY

## 2019-10-06 PROCEDURE — 52351 PR CYSTO/URETERO/PYELOSCOPY, DX: ICD-10-PCS | Mod: LT,,, | Performed by: UROLOGY

## 2019-10-06 PROCEDURE — 52351 CYSTOURETERO & OR PYELOSCOPE: CPT | Mod: LT,,, | Performed by: UROLOGY

## 2019-10-06 PROCEDURE — 25000003 PHARM REV CODE 250: Performed by: STUDENT IN AN ORGANIZED HEALTH CARE EDUCATION/TRAINING PROGRAM

## 2019-10-06 PROCEDURE — 76000 PR  FLUOROSCOPE EXAMINATION: ICD-10-PCS | Mod: 26,59,, | Performed by: UROLOGY

## 2019-10-06 PROCEDURE — G0378 HOSPITAL OBSERVATION PER HR: HCPCS

## 2019-10-06 PROCEDURE — 36000707: Performed by: UROLOGY

## 2019-10-06 PROCEDURE — 52332 PR CYSTOSCOPY,INSERT URETERAL STENT: ICD-10-PCS | Mod: 51,LT,, | Performed by: UROLOGY

## 2019-10-06 PROCEDURE — 71000033 HC RECOVERY, INTIAL HOUR: Performed by: UROLOGY

## 2019-10-06 PROCEDURE — 76000 FLUOROSCOPY <1 HR PHYS/QHP: CPT | Mod: 26,59,, | Performed by: UROLOGY

## 2019-10-06 PROCEDURE — 80048 BASIC METABOLIC PNL TOTAL CA: CPT

## 2019-10-06 PROCEDURE — 96366 THER/PROPH/DIAG IV INF ADDON: CPT

## 2019-10-06 PROCEDURE — C1769 GUIDE WIRE: HCPCS | Performed by: UROLOGY

## 2019-10-06 PROCEDURE — 63600175 PHARM REV CODE 636 W HCPCS: Performed by: NURSE ANESTHETIST, CERTIFIED REGISTERED

## 2019-10-06 PROCEDURE — 52332 CYSTOSCOPY AND TREATMENT: CPT | Mod: 51,LT,, | Performed by: UROLOGY

## 2019-10-06 PROCEDURE — D9220A PRA ANESTHESIA: ICD-10-PCS | Mod: ,,, | Performed by: ANESTHESIOLOGY

## 2019-10-06 PROCEDURE — 37000009 HC ANESTHESIA EA ADD 15 MINS: Performed by: UROLOGY

## 2019-10-06 PROCEDURE — 85025 COMPLETE CBC W/AUTO DIFF WBC: CPT

## 2019-10-06 PROCEDURE — C2617 STENT, NON-COR, TEM W/O DEL: HCPCS | Performed by: UROLOGY

## 2019-10-06 PROCEDURE — D9220A PRA ANESTHESIA: Mod: ,,, | Performed by: ANESTHESIOLOGY

## 2019-10-06 PROCEDURE — 36000706: Performed by: UROLOGY

## 2019-10-06 PROCEDURE — 37000008 HC ANESTHESIA 1ST 15 MINUTES: Performed by: UROLOGY

## 2019-10-06 PROCEDURE — 99219 PR INITIAL OBSERVATION CARE,LEVL II: CPT | Mod: 25,,, | Performed by: UROLOGY

## 2019-10-06 PROCEDURE — 63600175 PHARM REV CODE 636 W HCPCS: Performed by: STUDENT IN AN ORGANIZED HEALTH CARE EDUCATION/TRAINING PROGRAM

## 2019-10-06 PROCEDURE — 25500020 PHARM REV CODE 255: Performed by: UROLOGY

## 2019-10-06 PROCEDURE — C1894 INTRO/SHEATH, NON-LASER: HCPCS | Performed by: UROLOGY

## 2019-10-06 PROCEDURE — 36415 COLL VENOUS BLD VENIPUNCTURE: CPT

## 2019-10-06 PROCEDURE — 25000003 PHARM REV CODE 250: Performed by: NURSE ANESTHETIST, CERTIFIED REGISTERED

## 2019-10-06 DEVICE — STENT URETERAL UNIV 6FR 28CM: Type: IMPLANTABLE DEVICE | Site: URETER | Status: FUNCTIONAL

## 2019-10-06 RX ORDER — SODIUM CHLORIDE 0.9 % (FLUSH) 0.9 %
3 SYRINGE (ML) INJECTION
Status: DISCONTINUED | OUTPATIENT
Start: 2019-10-06 | End: 2019-10-06 | Stop reason: HOSPADM

## 2019-10-06 RX ORDER — CEFAZOLIN SODIUM 1 G/3ML
INJECTION, POWDER, FOR SOLUTION INTRAMUSCULAR; INTRAVENOUS
Status: DISCONTINUED | OUTPATIENT
Start: 2019-10-06 | End: 2019-10-06

## 2019-10-06 RX ORDER — SUCCINYLCHOLINE CHLORIDE 20 MG/ML
INJECTION INTRAMUSCULAR; INTRAVENOUS
Status: DISCONTINUED | OUTPATIENT
Start: 2019-10-06 | End: 2019-10-06

## 2019-10-06 RX ORDER — MIDAZOLAM HYDROCHLORIDE 1 MG/ML
INJECTION, SOLUTION INTRAMUSCULAR; INTRAVENOUS
Status: DISCONTINUED | OUTPATIENT
Start: 2019-10-06 | End: 2019-10-06

## 2019-10-06 RX ORDER — ONDANSETRON 2 MG/ML
INJECTION INTRAMUSCULAR; INTRAVENOUS
Status: DISCONTINUED | OUTPATIENT
Start: 2019-10-06 | End: 2019-10-06

## 2019-10-06 RX ORDER — TAMSULOSIN HYDROCHLORIDE 0.4 MG/1
0.4 CAPSULE ORAL DAILY
Status: DISCONTINUED | OUTPATIENT
Start: 2019-10-06 | End: 2019-10-06 | Stop reason: HOSPADM

## 2019-10-06 RX ORDER — ROCURONIUM BROMIDE 10 MG/ML
INJECTION, SOLUTION INTRAVENOUS
Status: DISCONTINUED | OUTPATIENT
Start: 2019-10-06 | End: 2019-10-06

## 2019-10-06 RX ORDER — OXYCODONE HYDROCHLORIDE 5 MG/1
5 TABLET ORAL EVERY 4 HOURS PRN
Status: DISCONTINUED | OUTPATIENT
Start: 2019-10-06 | End: 2019-10-06 | Stop reason: HOSPADM

## 2019-10-06 RX ORDER — SODIUM CHLORIDE 9 MG/ML
INJECTION, SOLUTION INTRAVENOUS CONTINUOUS
Status: DISCONTINUED | OUTPATIENT
Start: 2019-10-06 | End: 2019-10-06 | Stop reason: HOSPADM

## 2019-10-06 RX ORDER — KETOROLAC TROMETHAMINE 30 MG/ML
15 INJECTION, SOLUTION INTRAMUSCULAR; INTRAVENOUS EVERY 6 HOURS
Status: DISCONTINUED | OUTPATIENT
Start: 2019-10-06 | End: 2019-10-06 | Stop reason: HOSPADM

## 2019-10-06 RX ORDER — HEPARIN SODIUM 5000 [USP'U]/ML
7500 INJECTION, SOLUTION INTRAVENOUS; SUBCUTANEOUS EVERY 8 HOURS
Status: DISCONTINUED | OUTPATIENT
Start: 2019-10-06 | End: 2019-10-06 | Stop reason: HOSPADM

## 2019-10-06 RX ORDER — ONDANSETRON 2 MG/ML
4 INJECTION INTRAMUSCULAR; INTRAVENOUS ONCE AS NEEDED
Status: DISCONTINUED | OUTPATIENT
Start: 2019-10-06 | End: 2019-10-06 | Stop reason: HOSPADM

## 2019-10-06 RX ORDER — FENTANYL CITRATE 50 UG/ML
INJECTION, SOLUTION INTRAMUSCULAR; INTRAVENOUS
Status: DISCONTINUED | OUTPATIENT
Start: 2019-10-06 | End: 2019-10-06

## 2019-10-06 RX ORDER — FENTANYL CITRATE 50 UG/ML
25 INJECTION, SOLUTION INTRAMUSCULAR; INTRAVENOUS EVERY 5 MIN PRN
Status: DISCONTINUED | OUTPATIENT
Start: 2019-10-06 | End: 2019-10-06 | Stop reason: HOSPADM

## 2019-10-06 RX ORDER — PROPOFOL 10 MG/ML
VIAL (ML) INTRAVENOUS
Status: DISCONTINUED | OUTPATIENT
Start: 2019-10-06 | End: 2019-10-06

## 2019-10-06 RX ORDER — KETAMINE HCL IN 0.9 % NACL 50 MG/5 ML
SYRINGE (ML) INTRAVENOUS
Status: DISCONTINUED | OUTPATIENT
Start: 2019-10-06 | End: 2019-10-06

## 2019-10-06 RX ORDER — LIDOCAINE HCL/PF 100 MG/5ML
SYRINGE (ML) INTRAVENOUS
Status: DISCONTINUED | OUTPATIENT
Start: 2019-10-06 | End: 2019-10-06

## 2019-10-06 RX ORDER — TAMSULOSIN HYDROCHLORIDE 0.4 MG/1
0.4 CAPSULE ORAL DAILY
Qty: 14 CAPSULE | Refills: 0 | Status: ON HOLD | OUTPATIENT
Start: 2019-10-06 | End: 2019-10-11 | Stop reason: SDUPTHER

## 2019-10-06 RX ORDER — HYDROCODONE BITARTRATE AND ACETAMINOPHEN 5; 325 MG/1; MG/1
1 TABLET ORAL EVERY 4 HOURS PRN
Qty: 10 TABLET | Refills: 0 | Status: ON HOLD | OUTPATIENT
Start: 2019-10-06 | End: 2020-12-06 | Stop reason: HOSPADM

## 2019-10-06 RX ORDER — ACETAMINOPHEN 500 MG
1000 TABLET ORAL
Status: DISCONTINUED | OUTPATIENT
Start: 2019-10-06 | End: 2019-10-06 | Stop reason: HOSPADM

## 2019-10-06 RX ORDER — PANTOPRAZOLE SODIUM 40 MG/1
40 TABLET, DELAYED RELEASE ORAL DAILY
Status: DISCONTINUED | OUTPATIENT
Start: 2019-10-06 | End: 2019-10-06 | Stop reason: HOSPADM

## 2019-10-06 RX ORDER — ONDANSETRON 2 MG/ML
8 INJECTION INTRAMUSCULAR; INTRAVENOUS EVERY 6 HOURS PRN
Status: DISCONTINUED | OUTPATIENT
Start: 2019-10-06 | End: 2019-10-06 | Stop reason: HOSPADM

## 2019-10-06 RX ADMIN — MIDAZOLAM HYDROCHLORIDE 1 MG: 1 INJECTION, SOLUTION INTRAMUSCULAR; INTRAVENOUS at 09:10

## 2019-10-06 RX ADMIN — TAMSULOSIN HYDROCHLORIDE 0.4 MG: 0.4 CAPSULE ORAL at 01:10

## 2019-10-06 RX ADMIN — SODIUM CHLORIDE, SODIUM LACTATE, POTASSIUM CHLORIDE, AND CALCIUM CHLORIDE 1000 ML: .6; .31; .03; .02 INJECTION, SOLUTION INTRAVENOUS at 01:10

## 2019-10-06 RX ADMIN — PROMETHAZINE HYDROCHLORIDE 12.5 MG: 25 INJECTION INTRAMUSCULAR; INTRAVENOUS at 12:10

## 2019-10-06 RX ADMIN — ACETAMINOPHEN 1000 MG: 500 TABLET ORAL at 12:10

## 2019-10-06 RX ADMIN — SUGAMMADEX 320 MG: 100 INJECTION, SOLUTION INTRAVENOUS at 09:10

## 2019-10-06 RX ADMIN — ACETAMINOPHEN 1000 MG: 500 TABLET ORAL at 01:10

## 2019-10-06 RX ADMIN — ONDANSETRON 4 MG: 2 INJECTION INTRAMUSCULAR; INTRAVENOUS at 10:10

## 2019-10-06 RX ADMIN — KETOROLAC TROMETHAMINE 15 MG: 30 INJECTION, SOLUTION INTRAMUSCULAR at 01:10

## 2019-10-06 RX ADMIN — SUCCINYLCHOLINE CHLORIDE 190 MG: 20 INJECTION, SOLUTION INTRAMUSCULAR; INTRAVENOUS at 09:10

## 2019-10-06 RX ADMIN — SCOPALAMINE 1 PATCH: 1 PATCH, EXTENDED RELEASE TRANSDERMAL at 12:10

## 2019-10-06 RX ADMIN — KETOROLAC TROMETHAMINE 15 MG: 30 INJECTION, SOLUTION INTRAMUSCULAR at 12:10

## 2019-10-06 RX ADMIN — KETOROLAC TROMETHAMINE 15 MG: 30 INJECTION, SOLUTION INTRAMUSCULAR at 05:10

## 2019-10-06 RX ADMIN — PANTOPRAZOLE SODIUM 40 MG: 40 TABLET, DELAYED RELEASE ORAL at 12:10

## 2019-10-06 RX ADMIN — HEPARIN SODIUM 7500 UNITS: 5000 INJECTION, SOLUTION INTRAVENOUS; SUBCUTANEOUS at 05:10

## 2019-10-06 RX ADMIN — SODIUM CHLORIDE: 0.9 INJECTION, SOLUTION INTRAVENOUS at 04:10

## 2019-10-06 RX ADMIN — Medication 30 MG: at 09:10

## 2019-10-06 RX ADMIN — ROCURONIUM BROMIDE 5 MG: 10 INJECTION, SOLUTION INTRAVENOUS at 09:10

## 2019-10-06 RX ADMIN — ROCURONIUM BROMIDE 35 MG: 10 INJECTION, SOLUTION INTRAVENOUS at 09:10

## 2019-10-06 RX ADMIN — LIDOCAINE HYDROCHLORIDE 100 MG: 20 INJECTION, SOLUTION INTRAVENOUS at 09:10

## 2019-10-06 RX ADMIN — FENTANYL CITRATE 50 MCG: 50 INJECTION, SOLUTION INTRAMUSCULAR; INTRAVENOUS at 09:10

## 2019-10-06 RX ADMIN — CEFAZOLIN 3 G: 330 INJECTION, POWDER, FOR SOLUTION INTRAMUSCULAR; INTRAVENOUS at 09:10

## 2019-10-06 RX ADMIN — PROPOFOL 200 MG: 10 INJECTION, EMULSION INTRAVENOUS at 09:10

## 2019-10-06 NOTE — NURSING
"Urology Dr Eric notified of patient output dark blood 75cc in urinal and also voided on floor in bathroom . B/p down 139/63. Dr Eric stated, " ok to d/c . Instructed patient to increase fluids,  pain medication and flomax at his pharmacy and follow up appt. With urology  . Nurse instructed patient on Dr Eric orders . Patient stated."ok"  "

## 2019-10-06 NOTE — PROGRESS NOTES
Patient admitted to recovery see [facility] for complete assessment pacu bcg's maintained safety measures verified patient sedated at this time will continue to monitor.

## 2019-10-06 NOTE — NURSING
Nurse instructed c/g and patient on  copy of discharged papers instructions. Pt denies pain at this time. Pt educated on signs and symptoms of when to call the doctor. All belongings with the patient . Pt verbalized understanding. Patient waiting for transport

## 2019-10-06 NOTE — PROGRESS NOTES
Called waiting room and no one there for patient so I called patient's room and his niece answered and I updated her on patient location.

## 2019-10-06 NOTE — ANESTHESIA POSTPROCEDURE EVALUATION
Anesthesia Post Evaluation    Patient: Surjit Valentine    Procedure(s) Performed: Procedure(s) (LRB):  CYSTOSCOPY, WITH URETERAL STENT INSERTION (Left)  URETEROSCOPY (Left)  PYELOGRAM, RETROGRADE (Left)    Final Anesthesia Type: general  Patient location during evaluation: PACU  Patient participation: Yes- Able to Participate  Level of consciousness: awake and alert and oriented  Post-procedure vital signs: reviewed and stable  Pain management: adequate  Airway patency: patent  PONV status at discharge: No PONV  Anesthetic complications: no      Cardiovascular status: blood pressure returned to baseline and hemodynamically stable  Respiratory status: unassisted, spontaneous ventilation and room air  Hydration status: euvolemic  Follow-up not needed.          Vitals Value Taken Time   /73 10/6/2019 10:47 AM   Temp 36.4 °C (97.6 °F) 10/6/2019 10:45 AM   Pulse 72 10/6/2019 10:47 AM   Resp 21 10/6/2019 10:47 AM   SpO2 94 % 10/6/2019 10:47 AM   Vitals shown include unvalidated device data.      No case tracking events are documented in the log.      Pain/Alma Delia Score: Pain Rating Prior to Med Admin: 6 (10/6/2019  5:39 AM)

## 2019-10-06 NOTE — ED PROVIDER NOTES
"Encounter Date: 10/5/2019    SCRIBE #1 NOTE: I, Song Quevedo, am scribing for, and in the presence of,  Dr. Greene. I have scribed the following portions of the note - Other sections scribed: HPI, ROS, PE.       History     Chief Complaint   Patient presents with    Flank Pain     Pt c/o left flank pain that started yesterday. 9/10 on the pain scale. Reports vomiting. Hx Kidney stones     The history is provided by the patient.     Mr. Valentine is a 38 y.o. male with GAYLE, GERD, kidney stones and morbid obesity here today with flank pain. This pain started yesterday and is rated at a 9/10. Pain will radiate to the front of his abdomen occasionally but not to testicles. He also endorses vomiting and decreased UOP. Taken motrin for pain that did not help much. Denies fevers, chills, hematuria, dysuria, diarrhea, constipation, chest pain, SOB.       Review of patient's allergies indicates:  No Known Allergies  Past Medical History:   Diagnosis Date    GERD without esophagitis     takes OTC nexium PRN    History of kidney stones     Morbid obesity with body mass index of 60.0-69.9 in adult     GAYLE (obstructive sleep apnea)     did not tolerate CPAP     Past Surgical History:   Procedure Laterality Date    APPENDECTOMY  3/2008     Family History   Problem Relation Age of Onset    Heart disease Father     Stroke Father     Hypertension Father     Diabetes Maternal Grandmother     Heart disease Maternal Grandfather     Stroke Paternal Grandmother     Heart disease Paternal Grandfather     Diabetes Mother     Obesity Mother     Obesity Sister     Diabetes Brother     Obesity Brother      Social History     Tobacco Use    Smoking status: Never Smoker    Smokeless tobacco: Current User    Tobacco comment: 2 cans/ week   Substance Use Topics    Alcohol use: Yes     Alcohol/week: 3.0 standard drinks     Types: 3 Cans of beer per week     Comment: "socially but not usually"    Drug use: No     Review of " Systems   Constitutional: Negative for chills and fever.   HENT: Negative for sore throat and trouble swallowing.    Respiratory: Negative for cough and shortness of breath.    Cardiovascular: Negative for chest pain and palpitations.   Gastrointestinal: Positive for abdominal pain, nausea and vomiting. Negative for abdominal distention, constipation and diarrhea.   Genitourinary: Positive for difficulty urinating and flank pain (left sided). Negative for dysuria.   Musculoskeletal: Negative for back pain and neck pain.   Skin: Negative for rash.   Neurological: Negative for weakness, light-headedness and headaches.   Hematological: Does not bruise/bleed easily.   Psychiatric/Behavioral: Negative for behavioral problems and confusion.       Physical Exam     Initial Vitals [10/05/19 1921]   BP Pulse Resp Temp SpO2   (!) 172/108 76 18 98.2 °F (36.8 °C) 98 %      MAP       --         Physical Exam    Nursing note and vitals reviewed.  Constitutional: He appears well-developed and well-nourished. He is not diaphoretic. No distress.   Morbidly obese   HENT:   Head: Normocephalic and atraumatic.   Right Ear: External ear normal.   Left Ear: External ear normal.   Neck: Neck supple.   Cardiovascular: Normal rate, regular rhythm, normal heart sounds and intact distal pulses.   Pulmonary/Chest: Breath sounds normal. No respiratory distress. He has no wheezes. He has no rhonchi. He has no rales.   Abdominal: Soft. He exhibits no distension. There is no tenderness. There is no rebound and no guarding.   No CVA TTP. abd exam limited due to habitus.   Neurological: He is alert and oriented to person, place, and time. GCS score is 15. GCS eye subscore is 4. GCS verbal subscore is 5. GCS motor subscore is 6.   Skin: Skin is warm. Capillary refill takes less than 2 seconds. No rash noted.   Psychiatric: He has a normal mood and affect.         ED Course   Procedures  Labs Reviewed   CBC W/ AUTO DIFFERENTIAL - Abnormal; Notable for  the following components:       Result Value    WBC 14.80 (*)     RBC 4.40 (*)     Hemoglobin 13.6 (*)     Hematocrit 38.8 (*)     Gran # (ANC) 11.0 (*)     Immature Grans (Abs) 0.07 (*)     Mono # 1.2 (*)     Gran% 74.3 (*)     Lymph% 15.7 (*)     All other components within normal limits   BASIC METABOLIC PANEL - Abnormal; Notable for the following components:    CO2 22 (*)     Glucose 114 (*)     All other components within normal limits   URINALYSIS, REFLEX TO URINE CULTURE - Abnormal; Notable for the following components:    Occult Blood UA 2+ (*)     All other components within normal limits    Narrative:     Preferred Collection Type->Urine, Clean Catch   URINALYSIS MICROSCOPIC    Narrative:     Preferred Collection Type->Urine, Clean Catch          Imaging Results           CT Renal Stone Study ABD Pelvis WO (Final result)  Result time 10/05/19 21:25:17    Final result by Jeramie Tripp MD (10/05/19 21:25:17)                 Impression:      6 mm left proximal ureteral stone with mild obstructive uropathy.    Bilateral nephrolithiasis, left side greater than right.    Hepatosplenomegaly.    This report was flagged in Epic as abnormal.    Electronically signed by resident: Bety Matos MD  Date:    10/05/2019  Time:    21:01    Electronically signed by: Jeramie Tripp MD  Date:    10/05/2019  Time:    21:25             Narrative:    EXAMINATION:  CT RENAL STONE STUDY ABD PELVIS WO    CLINICAL HISTORY:  Flank pain, stone disease suspected;left flank pain;    TECHNIQUE:  Low dose axial images, sagittal and coronal reformations were obtained from the lung bases to the pubic symphysis.  Contrast was not administered.    COMPARISON:  CT renal stone study 02/05/2019, 02/04/2019.    FINDINGS:  Exam quality is limited by extensive beam hardening and truncation artifact related to body habitus and soft tissue attenuation of the x-ray beam.    Heart: Normal in size. No pericardial effusion.    Lung Bases: Well  aerated, without consolidation or pleural fluid.    Liver: Enlarged in size, measuring 23 cm in craniocaudal diameter.  No focal hepatic lesions.    Gallbladder: No calcified gallstones.    Bile Ducts: No evidence of dilated ducts.    Pancreas: No mass or peripancreatic fat stranding.    Spleen: Enlarged, measuring 16 cm in length.    Adrenals: Unremarkable.    Kidneys/ Ureters: Both kidneys are normal in size and location.  Punctate right renal nonobstructing stone.    7 mm nonobstructing stone in the lower pole of the left kidney.  Punctate nonobstructing stone in the mid left kidney.  8 mm left proximal ureteral stone resulting in mild left hydronephrosis.  Mild asymmetric left perinephric and periureteral fat stranding.    Bladder: No evidence of wall thickening.    Reproductive organs: Unremarkable.    GI Tract/Mesentery: No evidence of bowel obstruction or inflammation.  The appendix is surgically absent.    Peritoneal Space: No ascites. No free air.    Retroperitoneum: No significant adenopathy.    Abdominal wall: Unremarkable.    Vasculature: No significant atherosclerosis or aneurysm.    Bones: No acute fracture.                                 Medical Decision Making:   History:   Old Medical Records: I decided to obtain old medical records.  Clinical Tests:   Lab Tests: Ordered and Reviewed  Radiological Study: Ordered and Reviewed  ED Management:  Vitals normal except hypertensive. Afebrile. Here w/ left flank pain, similar to prior renal stones with associated n/v. Will check CBC, BMP, UA and CT renal stone protocol. Will give IV toradol for pain, NS bolus and IV zofran for nausea.    Labs reviewed; grossly WNL w/o actionable values except WBC 14K. Creatinine at baseline. UA w/o evidence of infection.  CT w/ 6 mm mildly obstructing stone with mild hydronephrosis.  Given repeat dose of zofran; pt vomited after.  Given phenergan with similar result.     Discussed with urology who evaluated pt as he has  failed PO challenge.    Patient signed out to Dr. Webster at shift change to follow up urology dispo and overall disposition. If he continues to not tolerate PO, I anticipate admission.    Other:   I have discussed this case with another health care provider.       <> Summary of the Discussion: Urology and oncoming ED physician            Scribe Attestation:   Scribe #1: I performed the above scribed service and the documentation accurately describes the services I performed. I attest to the accuracy of the note.               Clinical Impression:       ICD-10-CM ICD-9-CM   1. Ureterolithiasis N20.1 592.1   2. Leukocytosis, unspecified type D72.829 288.60   3. Hydronephrosis, unspecified hydronephrosis type N13.30 591                                Drew Greene MD  10/06/19 0014

## 2019-10-06 NOTE — ANESTHESIA PREPROCEDURE EVALUATION
[facility]  Anesthesia Pre-Operative Evaluation         Patient Name: Surjit Valentine  YOB: 1980  MRN: [id number]    SUBJECTIVE:     Pre-operative evaluation for Procedure(s) (LRB):  CYSTOSCOPY, WITH URETERAL STENT INSERTION (Left)     10/06/2019    Surjit Valentine is a 38 y.o. male w/ a significant PMHx GERD and kidney stones who presents to [facility] with left flank pain and nausea since yesterda.    Patient now presents for the above procedure(s).      LDA: None documented.       Prev airway: None documented    Drips:    sodium chloride 0.9% 125 mL/hr at 10/06/19 0417       Patient Active Problem List   Diagnosis    GERD without esophagitis    GAYLE (obstructive sleep apnea)    Morbid obesity with body mass index of 60.0-69.9 in adult    Kidney stones    Ureterolithiasis       Review of patient's allergies indicates:  No Known Allergies    Current Inpatient Medications:   acetaminophen  1,000 mg Oral Q6H    heparin (porcine)  7,500 Units Subcutaneous Q8H    ketorolac  15 mg Intravenous Q6H    pantoprazole  40 mg Oral Daily    scopolamine  1 patch Transdermal Q3 Days    tamsulosin  0.4 mg Oral Daily       No current facility-administered medications on file prior to encounter.      Current Outpatient Medications on File Prior to Encounter   Medication Sig Dispense Refill    esomeprazole (NEXIUM) 20 MG capsule Take 20 mg by mouth before breakfast.      ergocalciferol (VITAMIN D2) 50,000 unit Cap Take 1 capsule (50,000 Units total) by mouth twice a week. 8 capsule 3    tamsulosin (FLOMAX) 0.4 mg Cap Take 1 capsule (0.4 mg total) by mouth once daily. for 10 doses 10 capsule 0       Past Surgical History:   Procedure Laterality Date    APPENDECTOMY  3/2008       Social History     Socioeconomic History    Marital status: Single     Spouse name: Not on file    Number of children: Not on file    Years of education: Not on file    Highest education level: Not on file  "  Occupational History    Not on file   Social Needs    Financial resource strain: Not on file    Food insecurity:     Worry: Not on file     Inability: Not on file    Transportation needs:     Medical: Not on file     Non-medical: Not on file   Tobacco Use    Smoking status: Never Smoker    Smokeless tobacco: Current User    Tobacco comment: 2 cans/ week   Substance and Sexual Activity    Alcohol use: Yes     Alcohol/week: 3.0 standard drinks     Types: 3 Cans of beer per week     Comment: "socially but not usually"    Drug use: No    Sexual activity: Not on file   Lifestyle    Physical activity:     Days per week: Not on file     Minutes per session: Not on file    Stress: Not on file   Relationships    Social connections:     Talks on phone: Not on file     Gets together: Not on file     Attends [employer] service: Not on file     Active member of club or organization: Not on file     Attends meetings of clubs or organizations: Not on file     Relationship status: Not on file   Other Topics Concern    Not on file   Social History Narrative    Works full time: [employer] : [employer].  .  1 son, 14 years old.        OBJECTIVE:     Vital Signs Range (Last 24H):  Temp:  [36.4 °C (97.5 °F)-37.3 °C (99.1 °F)]   Pulse:  [65-78]   Resp:  [18-20]   BP: (118-172)/()   SpO2:  [94 %-98 %]       Significant Labs:  Lab Results   Component Value Date    WBC 14.80 (H) 10/05/2019    HGB 13.6 (L) 10/05/2019    HCT 38.8 (L) 10/05/2019     10/05/2019    CHOL 174 10/14/2015    TRIG 84 10/14/2015    HDL 54 10/14/2015    ALT 23 02/05/2019    AST 21 02/05/2019     10/05/2019    K 4.6 10/05/2019     10/05/2019    CREATININE 1.2 10/05/2019    BUN 17 10/05/2019    CO2 22 (L) 10/05/2019    TSH 3.163 10/14/2015    INR 1.0 04/02/2015    HGBA1C 5.2 10/14/2015       Diagnostic Studies: No relevant studies.    EKG:   Normal sinus rhythm  Normal ECG    2D " ECHO:  CONCLUSIONS     1 - Normal left ventricular systolic function (EF 55-60%).     2 - Normal left ventricular diastolic function.     3 - Right ventricle is upper limit of normal in size with normal systolic function.   [place]:  No results found for this or any previous visit.    ASSESSMENT/PLAN:         Anesthesia Evaluation    I have reviewed the Patient Summary Reports.     I have reviewed the Medications.     Review of Systems  Anesthesia Hx:  Denies Family Hx of Anesthesia complications.    Social:  Alcohol Use, Non-Smoker    Hematology/Oncology:  Hematology Normal   Oncology Normal     EENT/Dental:EENT/Dental Normal   Cardiovascular:  Cardiovascular Normal     Pulmonary:   Sleep Apnea    Renal/:   renal calculi    Hepatic/GI:   GERD    Neurological:  Neurology Normal    Endocrine:  Endocrine Normal        Physical Exam  General:  Well nourished, Morbid Obesity    Airway/Jaw/Neck:  Airway Findings: Mouth Opening: Normal Tongue: Normal  General Airway Assessment: Adult, Possible difficult mask airway, Possible difficult intubation, Average  Mallampati: III  Improves to II with phonation.  TM Distance: Normal, at least 6 cm  Jaw/Neck Findings:  Neck ROM: Normal ROM  Neck Findings:  Girth Increased, Short Neck      Dental:  Dental Findings: In tact   Chest/Lungs:  Chest/Lungs Clear    Heart/Vascular:  Heart Findings: Normal Heart murmur: negative       Mental Status:  Mental Status Findings:  Cooperative, Alert and Oriented         Anesthesia Plan  Type of Anesthesia, risks & benefits discussed:  Anesthesia Type:  general, MAC  Patient's Preference:   Intra-op Monitoring Plan: standard ASA monitors  Intra-op Monitoring Plan Comments:   Post Op Pain Control Plan: per primary service following discharge from PACU, IV/PO Opioids PRN and multimodal analgesia  Post Op Pain Control Plan Comments:   Induction:   IV  Beta Blocker:         Informed Consent: Patient understands risks and agrees with Anesthesia plan.   Questions answered. Anesthesia consent signed with patient.  ASA Score: 3     Day of Surgery Review of History & Physical:            Ready For Surgery From Anesthesia Perspective.

## 2019-10-06 NOTE — SUBJECTIVE & OBJECTIVE
Interval History:   Pain and nausea has improved. Creatinine is 1.3 from 1.2. Has not passed his stone    Review of Systems  Objective:     Temp:  [97.5 °F (36.4 °C)-99.1 °F (37.3 °C)] 97.6 °F (36.4 °C)  Pulse:  [62-78] 62  Resp:  [18-20] 18  SpO2:  [94 %-98 %] 94 %  BP: (118-172)/() 122/64     Body mass index is 50.22 kg/m².           Drains     None                 Physical Exam   Nursing note and vitals reviewed.  Constitutional: He is oriented to person, place, and time. He appears well-developed and well-nourished.  Non-toxic appearance. He does not have a sickly appearance. He does not appear ill. No distress.   Morbidly obese   Neck: No tracheal deviation present. No thyromegaly present.   Cardiovascular: Normal rate and intact distal pulses.    Pulmonary/Chest: Effort normal. No accessory muscle usage. No respiratory distress.   Abdominal: Soft. He exhibits no distension and no mass. There is no splenomegaly or hepatomegaly. There is no tenderness. There is CVA tenderness (left). No hernia.   Musculoskeletal: He exhibits no edema or tenderness.   Neurological: He is alert and oriented to person, place, and time.   Skin: Skin is warm and dry. No lesion and no rash noted. No cyanosis.     Psychiatric: He has a normal mood and affect.       Significant Labs:    BMP:  Recent Labs   Lab 10/05/19  2010 10/06/19  0640    140   K 4.6 4.0    107   CO2 22* 25   BUN 17 17   CREATININE 1.2 1.3   CALCIUM 9.4 8.7       CBC:   Recent Labs   Lab 10/05/19  2010 10/06/19  0641   WBC 14.80* 8.46   HGB 13.6* 12.7*   HCT 38.8* 38.7*    177       All pertinent labs results from the past 24 hours have been reviewed.    Significant Imaging:  All pertinent imaging results/findings from the past 24 hours have been reviewed.

## 2019-10-06 NOTE — PROGRESS NOTES
[facility]  Urology  Progress Note    Patient Name: Surjit Valentine  MRN: [id number]  Admission Date: 10/5/2019  Hospital Length of Stay: 0 days  Code Status: Full Code   Attending Provider: Gamal Sosa MD   Primary Care Physician: BHAVESH Ramos    Subjective:     HPI:  Surjit Valentine is a 38 y.o. male with history of kidney stones who [place] [facility] [place] and nausea since yesterday. The pain comes and goes up to 9/10 pain, currently 6/10. He has had nausea since yesterday with emesis which has progressively worsened. He has had 5 episodes of emesis since arrival to the ED despite zofran and phenergan. He denies fevers or chills. He reports decreased urine output over the last 24 hours. He has had 2 episodes of stones in the past but have passed them spontaneously without intervention.  Vitals are stable; he is hypertensive. WBC count is 14.8 and creatinine is 1.2 from baseline of 0.9. UA 2+blood otherwise negative.  CTRSS shows a left 8.6 x 7 mm proximal ureteral stone with minimal hydronephrosis and a 7 mm left lower pole. No right sided stones.    Interval History:   Pain and nausea has improved. Creatinine is 1.3 from 1.2. Has not passed his stone    Review of Systems  Objective:     Temp:  [97.5 °F (36.4 °C)-99.1 °F (37.3 °C)] 97.6 °F (36.4 °C)  Pulse:  [62-78] 62  Resp:  [18-20] 18  SpO2:  [94 %-98 %] 94 %  BP: (118-172)/() 122/64     Body mass index is 50.22 kg/m².           Drains     None                 Physical Exam   Nursing note and vitals reviewed.  Constitutional: He is oriented to person, place, and time. He appears well-developed and well-nourished.  Non-toxic appearance. He does not have a sickly appearance. He does not appear ill. No distress.   Morbidly obese   Neck: No tracheal deviation present. No thyromegaly present.   Cardiovascular: Normal rate and intact distal pulses.    Pulmonary/Chest: Effort normal. No accessory muscle  usage. No respiratory distress.   Abdominal: Soft. He exhibits no distension and no mass. There is no splenomegaly or hepatomegaly. There is no tenderness. There is CVA tenderness (left). No hernia.   Musculoskeletal: He exhibits no edema or tenderness.   Neurological: He is alert and oriented to person, place, and time.   Skin: Skin is warm and dry. No lesion and no rash noted. No cyanosis.     Psychiatric: He has a normal mood and affect.       Significant Labs:    BMP:  Recent Labs   Lab 10/05/19  2010 10/06/19  0640    140   K 4.6 4.0    107   CO2 22* 25   BUN 17 17   CREATININE 1.2 1.3   CALCIUM 9.4 8.7       CBC:   Recent Labs   Lab 10/05/19  2010 10/06/19  0641   WBC 14.80* 8.46   HGB 13.6* 12.7*   HCT 38.8* 38.7*    177       All pertinent labs results from the past 24 hours have been reviewed.    Significant Imaging:  All pertinent imaging results/findings from the past 24 hours have been reviewed.                  Assessment/Plan:     * Kidney stones  38M with left ureteral and renal stones with persistent pain and emesis    NPO  MIVF  To OR today for left ureteroscopy with laser lithotripsy, cystoscopy with left ureteral stent, possible retrograde pyelogram  Pain control  Zofran, phenergan, scopolamine for nausea        VTE Risk Mitigation (From admission, onward)         Ordered     heparin (porcine) injection 7,500 Units  Every 8 hours      10/06/19 0104     IP VTE HIGH RISK PATIENT  Once      10/06/19 0104     Place CHAPARRO hose  Until discontinued      10/06/19 0104     Place sequential compression device  Until discontinued      10/06/19 0104                Sourav Eric MD  Urology  [facility]

## 2019-10-06 NOTE — ASSESSMENT & PLAN NOTE
38M with left ureteral and renal stones with persistent pain and emesis    Place in observation for urology  NPO now  Bolus and MIVF  BMP in the AM  Tentatively add on for cystoscopy with left ureteral stent later this morning pending persistence/resolution of symptoms  Pain control  Zofran, phenergan, scopolamine for nausea  Strain all urine

## 2019-10-06 NOTE — TRANSFER OF CARE
"Anesthesia Transfer of Care Note    Patient: Surjit Valentine    Procedure(s) Performed: Procedure(s) (LRB):  CYSTOSCOPY, WITH URETERAL STENT INSERTION (Left)  URETEROSCOPY (Left)  PYELOGRAM, RETROGRADE (Left)    Patient location: PACU    Anesthesia Type: general    Transport from OR: Transported from OR on 6-10 L/min O2 by face mask with adequate spontaneous ventilation    Post pain: adequate analgesia    Post assessment: no apparent anesthetic complications    Post vital signs: stable    Level of consciousness: awake    Nausea/Vomiting: no nausea/vomiting    Complications: none    Transfer of care protocol was followed      Last vitals:   Visit Vitals  BP (!) 154/73 (BP Location: Left arm, Patient Position: Lying)   Pulse 80   Temp 36.5 °C (97.7 °F) (Temporal)   Resp (!) 22   Ht 5' 10" (1.778 m)   Wt (!) 158.8 kg (350 lb)   SpO2 98%   BMI 50.22 kg/m²     "

## 2019-10-06 NOTE — CONSULTS
[facility]  Urology  Consult Note    Patient Name: Surjit Valentine  MRN: [id number]  Admission Date: 10/5/2019  Hospital Length of Stay: 0   Code Status: No Order   Attending Provider: Drew Greene MD   Consulting Provider: Sourav Eric MD  Primary Care Physician: BHAVESH Ramos  Principal Problem:<principal problem not specified>    Inpatient consult to Urology  Consult performed by: Sourav Eric MD  Consult ordered by: Drew Greene MD          Subjective:     HPI:  Surjit Valentine is a 38 y.o. male with history of kidney stones who presents to [facility] with left flank pain and nausea since yesterday. The pain comes and goes up to 9/10 pain, currently 6/10. He has had nausea since yesterday with emesis which has progressively worsened. He has had 5 episodes of emesis since arrival to the ED despite zofran and phenergan. He denies fevers or chills. He reports decreased urine output over the last 24 hours. He has had 2 episodes of stones in the past but have passed them spontaneously without intervention.  Vitals are stable; he is hypertensive. WBC count is 14.8 and creatinine is 1.2 from baseline of 0.9. UA 2+blood otherwise negative.  CTRSS shows a left 8.6 x 7 mm proximal ureteral stone with minimal hydronephrosis and a 7 mm left lower pole. No right sided stones.    Past Medical History:   Diagnosis Date    GERD without esophagitis     takes OTC nexium PRN    History of kidney stones     Morbid obesity with body mass index of 60.0-69.9 in adult     GAYLE (obstructive sleep apnea)     did not tolerate CPAP       Past Surgical History:   Procedure Laterality Date    APPENDECTOMY  3/2008       Review of patient's allergies indicates:  No Known Allergies    Family History     Problem Relation (Age of Onset)    Diabetes Maternal Grandmother, Mother, Brother    Heart disease Father, Maternal Grandfather, Paternal Grandfather    Hypertension Father    Obesity Mother,  "Sister, Brother    Stroke Father, Paternal Grandmother          Tobacco Use    Smoking status: Never Smoker    Smokeless tobacco: Current User    Tobacco comment: 2 cans/ week   Substance and Sexual Activity    Alcohol use: Yes     Alcohol/week: 3.0 standard drinks     Types: 3 Cans of beer per week     Comment: "socially but not usually"    Drug use: No    Sexual activity: Not on file       Review of Systems   Constitutional: Negative for activity change, fatigue, fever and unexpected weight change.   HENT: Negative for sore throat and trouble swallowing.    Eyes: Negative for pain and discharge.   Respiratory: Negative for chest tightness and shortness of breath.    Cardiovascular: Negative for chest pain and palpitations.   Gastrointestinal: Positive for nausea and vomiting. Negative for abdominal pain, constipation and diarrhea.   Genitourinary:        As per HPI   Musculoskeletal: Negative for back pain and gait problem.   Skin: Negative for rash and wound.   Neurological: Negative for seizures and numbness.   Psychiatric/Behavioral: Negative for hallucinations. The patient is not nervous/anxious.        Objective:     Temp:  [98.2 °F (36.8 °C)-99.1 °F (37.3 °C)] 99 °F (37.2 °C)  Pulse:  [74-78] 78  Resp:  [18-20] 18  SpO2:  [98 %] 98 %  BP: (132-172)/() 132/73     Body mass index is 57.78 kg/m².           Drains     None                 Physical Exam   Nursing note and vitals reviewed.  Constitutional: He is oriented to person, place, and time. He appears well-developed and well-nourished.  Non-toxic appearance. He does not have a sickly appearance. He does not appear ill. No distress.   Morbidly obese   Neck: No tracheal deviation present. No thyromegaly present.   Cardiovascular: Normal rate and intact distal pulses.    Pulmonary/Chest: Effort normal. No accessory muscle usage. No respiratory distress.   Abdominal: Soft. He exhibits no distension and no mass. There is no splenomegaly or " hepatomegaly. There is no tenderness. There is CVA tenderness (left). No hernia.   Musculoskeletal: He exhibits no edema or tenderness.   Neurological: He is alert and oriented to person, place, and time.   Skin: Skin is warm and dry. No lesion and no rash noted. No cyanosis.     Psychiatric: He has a normal mood and affect.       Significant Labs:    BMP:  Recent Labs   Lab 10/05/19  2010      K 4.6      CO2 22*   BUN 17   CREATININE 1.2   CALCIUM 9.4       CBC:  Recent Labs   Lab 10/05/19  2010   WBC 14.80*   HGB 13.6*   HCT 38.8*          All pertinent labs results from the past 24 hours have been reviewed.    Significant Imaging:  All pertinent imaging results/findings from the past 24 hours have been reviewed.                    Assessment and Plan:     Kidney stones  38M with left ureteral and renal stones with persistent pain and emesis    Place in observation for urology  NPO now  Bolus and MIVF  BMP in the AM  Tentatively add on for cystoscopy with left ureteral stent later this morning pending persistence/resolution of symptoms  Pain control  Zofran, phenergan, scopolamine for nausea  Strain all urine        VTE Risk Mitigation (From admission, onward)    None          Thank you for your consult. I will follow-up with patient. Please contact us if you have any additional questions.    Sourav Eric MD  Urology  [facility]    I have reviewed the notes, assessments, and/or procedures performed by Dr. Eric, I concur with her/his documentation of Surjit Valentine.  I have explained the indication, risks, benefits, and alternatives of the procedure in detail.  Risks including but not limited to bleeding, infection, injury to the urethra, bladder, ureter, need for additional treatments, inability or incomplete removal of kidney stones, pain, and discomfort related to the stent were discussed in depth with the patient.  The patient voices understanding and all  questions have been answered.  The patient agrees to proceed as planned with left ureteroscopy, laser lithotripsy, and ureteral stent placement.

## 2019-10-06 NOTE — SUBJECTIVE & OBJECTIVE
"Past Medical History:   Diagnosis Date    GERD without esophagitis     takes OTC nexium PRN    History of kidney stones     Morbid obesity with body mass index of 60.0-69.9 in adult     GAYLE (obstructive sleep apnea)     did not tolerate CPAP       Past Surgical History:   Procedure Laterality Date    APPENDECTOMY  3/2008       Review of patient's allergies indicates:  No Known Allergies    Family History     Problem Relation (Age of Onset)    Diabetes Maternal Grandmother, Mother, Brother    Heart disease Father, Maternal Grandfather, Paternal Grandfather    Hypertension Father    Obesity Mother, Sister, Brother    Stroke Father, Paternal Grandmother          Tobacco Use    Smoking status: Never Smoker    Smokeless tobacco: Current User    Tobacco comment: 2 cans/ week   Substance and Sexual Activity    Alcohol use: Yes     Alcohol/week: 3.0 standard drinks     Types: 3 Cans of beer per week     Comment: "socially but not usually"    Drug use: No    Sexual activity: Not on file       Review of Systems   Constitutional: Negative for activity change, fatigue, fever and unexpected weight change.   HENT: Negative for sore throat and trouble swallowing.    Eyes: Negative for pain and discharge.   Respiratory: Negative for chest tightness and shortness of breath.    Cardiovascular: Negative for chest pain and palpitations.   Gastrointestinal: Positive for nausea and vomiting. Negative for abdominal pain, constipation and diarrhea.   Genitourinary:        As per HPI   Musculoskeletal: Negative for back pain and gait problem.   Skin: Negative for rash and wound.   Neurological: Negative for seizures and numbness.   Psychiatric/Behavioral: Negative for hallucinations. The patient is not nervous/anxious.        Objective:     Temp:  [98.2 °F (36.8 °C)-99.1 °F (37.3 °C)] 99 °F (37.2 °C)  Pulse:  [74-78] 78  Resp:  [18-20] 18  SpO2:  [98 %] 98 %  BP: (132-172)/() 132/73     Body mass index is 57.78 kg/m².       "     Drains     None                 Physical Exam   Nursing note and vitals reviewed.  Constitutional: He is oriented to person, place, and time. He appears well-developed and well-nourished.  Non-toxic appearance. He does not have a sickly appearance. He does not appear ill. No distress.   Morbidly obese   Neck: No tracheal deviation present. No thyromegaly present.   Cardiovascular: Normal rate and intact distal pulses.    Pulmonary/Chest: Effort normal. No accessory muscle usage. No respiratory distress.   Abdominal: Soft. He exhibits no distension and no mass. There is no splenomegaly or hepatomegaly. There is no tenderness. There is CVA tenderness (left). No hernia.   Musculoskeletal: He exhibits no edema or tenderness.   Neurological: He is alert and oriented to person, place, and time.   Skin: Skin is warm and dry. No lesion and no rash noted. No cyanosis.     Psychiatric: He has a normal mood and affect.       Significant Labs:    BMP:  Recent Labs   Lab 10/05/19  2010      K 4.6      CO2 22*   BUN 17   CREATININE 1.2   CALCIUM 9.4       CBC:  Recent Labs   Lab 10/05/19  2010   WBC 14.80*   HGB 13.6*   HCT 38.8*          All pertinent labs results from the past 24 hours have been reviewed.    Significant Imaging:  All pertinent imaging results/findings from the past 24 hours have been reviewed.

## 2019-10-06 NOTE — HPI
Surjit Valentine is a 38 y.o. male with history of kidney stones who presents to [facility] with left flank pain and nausea since yesterday. The pain comes and goes up to 9/10 pain, currently 6/10. He has had nausea since yesterday with emesis which has progressively worsened. He has had 5 episodes of emesis since arrival to the ED despite zofran and phenergan. He denies fevers or chills. He reports decreased urine output over the last 24 hours. He has had 2 episodes of stones in the past but have passed them spontaneously without intervention.  Vitals are stable; he is hypertensive. WBC count is 14.8 and creatinine is 1.2 from baseline of 0.9. UA 2+blood otherwise negative.  CTRSS shows a left 8.6 x 7 mm proximal ureteral stone with minimal hydronephrosis and a 7 mm left lower pole. No right sided stones.

## 2019-10-06 NOTE — H&P (VIEW-ONLY)
[facility]  Urology  Progress Note    Patient Name: Surjit Valentine  MRN: [id number]  Admission Date: 10/5/2019  Hospital Length of Stay: 0 days  Code Status: Full Code   Attending Provider: Gamal Sosa MD   Primary Care Physician: BHAVESH Ramos    Subjective:     HPI:  Surjit Valentine is a 38 y.o. male with history of kidney stones who presents to [facility] with left flank pain and nausea since yesterday. The pain comes and goes up to 9/10 pain, currently 6/10. He has had nausea since yesterday with emesis which has progressively worsened. He has had 5 episodes of emesis since arrival to the ED despite zofran and phenergan. He denies fevers or chills. He reports decreased urine output over the last 24 hours. He has had 2 episodes of stones in the past but have passed them spontaneously without intervention.  Vitals are stable; he is hypertensive. WBC count is 14.8 and creatinine is 1.2 from baseline of 0.9. UA 2+blood otherwise negative.  CTRSS shows a left 8.6 x 7 mm proximal ureteral stone with minimal hydronephrosis and a 7 mm left lower pole. No right sided stones.    Interval History:   Pain and nausea has improved. Creatinine is 1.3 from 1.2. Has not passed his stone    Review of Systems  Objective:     Temp:  [97.5 °F (36.4 °C)-99.1 °F (37.3 °C)] 97.6 °F (36.4 °C)  Pulse:  [62-78] 62  Resp:  [18-20] 18  SpO2:  [94 %-98 %] 94 %  BP: (118-172)/() 122/64     Body mass index is 50.22 kg/m².           Drains     None                 Physical Exam   Nursing note and vitals reviewed.  Constitutional: He is oriented to person, place, and time. He appears well-developed and well-nourished.  Non-toxic appearance. He does not have a sickly appearance. He does not appear ill. No distress.   Morbidly obese   Neck: No tracheal deviation present. No thyromegaly present.   Cardiovascular: Normal rate and intact distal pulses.    Pulmonary/Chest: Effort normal. No accessory muscle  usage. No respiratory distress.   Abdominal: Soft. He exhibits no distension and no mass. There is no splenomegaly or hepatomegaly. There is no tenderness. There is CVA tenderness (left). No hernia.   Musculoskeletal: He exhibits no edema or tenderness.   Neurological: He is alert and oriented to person, place, and time.   Skin: Skin is warm and dry. No lesion and no rash noted. No cyanosis.     Psychiatric: He has a normal mood and affect.       Significant Labs:    BMP:  Recent Labs   Lab 10/05/19  2010 10/06/19  0640    140   K 4.6 4.0    107   CO2 22* 25   BUN 17 17   CREATININE 1.2 1.3   CALCIUM 9.4 8.7       CBC:   Recent Labs   Lab 10/05/19  2010 10/06/19  0641   WBC 14.80* 8.46   HGB 13.6* 12.7*   HCT 38.8* 38.7*    177       All pertinent labs results from the past 24 hours have been reviewed.    Significant Imaging:  All pertinent imaging results/findings from the past 24 hours have been reviewed.                  Assessment/Plan:     * Kidney stones  38M with left ureteral and renal stones with persistent pain and emesis    NPO  MIVF  To OR today for left ureteroscopy with laser lithotripsy, cystoscopy with left ureteral stent, possible retrograde pyelogram  Pain control  Zofran, phenergan, scopolamine for nausea        VTE Risk Mitigation (From admission, onward)         Ordered     heparin (porcine) injection 7,500 Units  Every 8 hours      10/06/19 0104     IP VTE HIGH RISK PATIENT  Once      10/06/19 0104     Place CHAPARRO hose  Until discontinued      10/06/19 0104     Place sequential compression device  Until discontinued      10/06/19 0104                Sourav Eric MD  Urology  [facility]

## 2019-10-06 NOTE — PLAN OF CARE
Pt AAOx4, VSS, No falls noted, fall precautions remain. Pain assessed, no pain noted, pt comfortable, frequent rounds made for safety and patient care. Call light within reach, bed wheels locked, bed in lowest position, side rails ^x2, safety maintained. NADN, Will continue monitor.

## 2019-10-06 NOTE — NURSING TRANSFER
Nursing Transfer Note      10/6/2019     Transfer To: 525a    Transfer via stretcher    Transfer with ticket to ride    Transported by escort with ticket to ride    Medicines sent: none    Chart send with patient: Yes    Notified: reported to floor nurse also patient sent with his bag of belongings that came with him    Patient reassessed at: see epic (date, time)    Upon arrival to floor: to room no complaints no distress noted.

## 2019-10-06 NOTE — DISCHARGE SUMMARY
[facility]  Discharge Note  Short Stay    Admit Date: 10/5/2019    Discharge Date and Time: 10/06/2019 9:49 AM      Attending Physician: Gmaal Sosa MD     Discharge Provider: Sourav Eric MD    Diagnoses:  Active Hospital Problems    Diagnosis  POA    *Kidney stones [N20.0]  Yes    Ureterolithiasis [N20.1]  Yes    GAYLE (obstructive sleep apnea) [G47.33]  Yes     Chronic     did not tolerate CPAP      Morbid obesity with body mass index of 60.0-69.9 in adult [E66.01, Z68.44]  Not Applicable     Chronic      Resolved Hospital Problems   No resolved problems to display.       Discharged Condition: stable    Hospital Course: Patient was admitted for left ureteral stone with mild ANU and inability to tolerate PO. He underwent left ureteral stent insertion on 10/6 and tolerated the procedure well with no complications. The patient was discharged home in good condition on the same day.       Final Diagnoses: Same as principal problem.    Disposition: Home or Self Care    Follow up/Patient Instructions:    Medications:  Reconciled Home Medications:   Current Discharge Medication List      START taking these medications    Details   HYDROcodone-acetaminophen (NORCO) 5-325 mg per tablet Take 1 tablet by mouth every 4 (four) hours as needed for Pain.  Qty: 10 tablet, Refills: 0         CONTINUE these medications which have CHANGED    Details   tamsulosin (FLOMAX) 0.4 mg Cap Take 1 capsule (0.4 mg total) by mouth once daily. for 14 doses  Qty: 14 capsule, Refills: 0         CONTINUE these medications which have NOT CHANGED    Details   esomeprazole (NEXIUM) 20 MG capsule Take 20 mg by mouth before breakfast.      ergocalciferol (VITAMIN D2) 50,000 unit Cap Take 1 capsule (50,000 Units total) by mouth twice a week.  Qty: 8 capsule, Refills: 3    Associated Diagnoses: Vitamin D deficiency         STOP taking these medications       ondansetron (ZOFRAN-ODT) 4 MG TbDL Comments:   Reason for Stopping:              Discharge Procedure Orders   Call MD for:  temperature >100.4     Call MD for:  persistent nausea and vomiting or diarrhea     Call MD for:  severe uncontrolled pain     Call MD for:  difficulty breathing or increased cough     Call MD for:  severe persistent headache     Call MD for:  persistent dizziness, light-headedness, or visual disturbances     Call MD for:  increased confusion or weakness     No dressing needed     Activity as tolerated     Follow-up Information     Gamal Sosa MD.    Specialty:  Urology  Why:  For surgery  Contact information:  [address]  [facility] [id number] [phone]

## 2019-10-06 NOTE — OP NOTE
[facility]  Operative Note    Date: 10/06/2019    Pre-Op Diagnosis:   - left ureteral and renal stones    Patient Active Problem List    Diagnosis Date Noted    Kidney stones 10/06/2019    Ureterolithiasis 10/06/2019    GERD without esophagitis     GAYLE (obstructive sleep apnea)     Morbid obesity with body mass index of 60.0-69.9 in adult      Post-Op Diagnosis: same    Procedure(s) Performed:   1.  Left ureteroscopy  2.  Cystoscopy with left JJ ureteral stent insertion  3.  Left retrograde pyelogram  4.  Fluoro < 1 h    Specimen(s): none    Staff Surgeon: Gamal Sosa MD    Assistant Surgeon: Sourav Eric MD    Anesthesia: General endotracheal anesthesia    Indications: Surjit Valentine is a 38 y.o. male with a left ureteral stone, presenting for definitive stone management.  He currently does not have a JJ ureteral stent in place.    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  SCDs were applied and working.  Anesthesia was administered.  The patient was then placed in the dorsal lithotomy position and prepped and draped in the usual sterile fashion.      A rigid cystoscope in a 22 Fr sheath was introduced into the patient's urethra.  This passed easily.  The entire urethra was visualized which showed a mild bulbar stricture which was passively dilated with the scope. No urethral masses. Formal cystoscopy was performed which revealed no masses or lesions suspicious for malignancy, no bladder stones, no bladder diverticuli, no trabeculations.  The ureteral orifices were visualized  in the normal anatomic position bilaterally.     A motion wire was passed up the left ureteral orifice and up into the kidney.  This passed easily and placement was confirmed using fluoro.  The cystoscope was removed keeping the wire in place.     A semirigid ureteroscope was passed into the patient's bladder alongside the wire under direct vision. It was then passed through the left ureteral orifice alongside the wire. The was significant narrowing in the distal ureter. A stiff glide wire was inserted through the ureteroscope into the renal pelvis. The ureteroscope was removed, leaving both wires in place.     A 10.7/12.7 ureteral access sheath was then passed over the stiff glide wire first with the inner dilator then both dilator and sheath. Neither would pass proximal to the narrow distal ureter. The access sheath and stiff glide were then removed.    The cystoscope was then re-inserted over the motion wire. A 6 Fr x 28 cm JJ ureteral stent without strings was passed over the wire and up into the renal pelvis using fluoro.  When the coil appeared to be in good position in the kidney, the wire was removed under continuous fluoro.  Good coils were seen in the kidney and the bladder using fluoro.      The patient tolerated the procedure well and was transferred to the recovery room in stable condition.      Disposition:  The patient will follow up with Dr. Sosa in 1 week for ureteroscopy.     Sourav Eric MD

## 2019-10-06 NOTE — ASSESSMENT & PLAN NOTE
38M with left ureteral and renal stones with persistent pain and emesis    NPO  MIVF  To OR today for left ureteroscopy with laser lithotripsy, cystoscopy with left ureteral stent, possible retrograde pyelogram  Pain control  Zofran, phenergan, scopolamine for nausea

## 2019-10-06 NOTE — ED TRIAGE NOTES
Surjit Valentine, a 38 y.o. male presents to the ED w/ complaint of L flank pain. Hx kidney stones. States has not urinated at all today - does not feel like he needs to. Actively vomiting at this time.   Triage note:  Chief Complaint   Patient presents with    Flank Pain     Pt c/o left flank pain that started yesterday. 9/10 on the pain scale. Reports vomiting. Hx Kidney stones     Review of patient's allergies indicates:  No Known Allergies  Past Medical History:   Diagnosis Date    GERD without esophagitis     takes OTC nexium PRN    History of kidney stones     Morbid obesity with body mass index of 60.0-69.9 in adult     GAYLE (obstructive sleep apnea)     did not tolerate CPAP     Pt identifiers Surjit Valentine checked and correct  LOC: The patient is awake, alert, aware of environment with an appropriate affect. Oriented x4, speaking appropriately  APPEARANCE: pt is clean and well groomed, clothing properly fastened  SKIN: Skin warm, dry and intact, normal skin turgor, moist mucus membranes  RESPIRATORY: Airway is open and patent, respirations are spontaneous, even and unlabored, normal effort and rate  CARDIAC: Normal rate and rhythm, no peripheral edema noted, capillary refill < 3 seconds, bilateral radial pulses 2+  ABDOMEN: Soft, nontender, nondistended. + vomiting   NEUROLOGIC: PERRL, facial expression is symmetrical, patient moving all extremities spontaneously, normal sensation in all extremities when touched with a finger.  Follows all commands appropriately  MUSCULOSKELETAL: No obvious deformities.

## 2019-10-07 ENCOUNTER — TELEPHONE (OUTPATIENT)
Dept: UROLOGY | Facility: CLINIC | Age: 39
End: 2019-10-07

## 2019-10-07 DIAGNOSIS — N20.0 KIDNEY STONES: Primary | ICD-10-CM

## 2019-10-10 ENCOUNTER — TELEPHONE (OUTPATIENT)
Dept: UROLOGY | Facility: CLINIC | Age: 39
End: 2019-10-10

## 2019-10-10 NOTE — TELEPHONE ENCOUNTER
Called pt to confirm arrival time of 515a for procedure on 10/11/2019. Gave pt NPO instructions and gave pt opportunity to ask questions. Pt verbalized understanding.

## 2019-10-11 ENCOUNTER — HOSPITAL ENCOUNTER (OUTPATIENT)
Facility: HOSPITAL | Age: 39
Discharge: HOME | End: 2019-10-11
Attending: UROLOGY | Admitting: UROLOGY

## 2019-10-11 ENCOUNTER — ANESTHESIA (OUTPATIENT)
Dept: SURGERY | Facility: HOSPITAL | Age: 39
End: 2019-10-11

## 2019-10-11 ENCOUNTER — ANESTHESIA EVENT (OUTPATIENT)
Dept: SURGERY | Facility: HOSPITAL | Age: 39
End: 2019-10-11

## 2019-10-11 VITALS
SYSTOLIC BLOOD PRESSURE: 154 MMHG | HEART RATE: 71 BPM | TEMPERATURE: 98 F | BODY MASS INDEX: 45.1 KG/M2 | HEIGHT: 70 IN | OXYGEN SATURATION: 95 % | WEIGHT: 315 LBS | RESPIRATION RATE: 18 BRPM | DIASTOLIC BLOOD PRESSURE: 98 MMHG

## 2019-10-11 DIAGNOSIS — N20.9 UROLITHIASIS: Primary | ICD-10-CM

## 2019-10-11 DIAGNOSIS — N20.0 KIDNEY STONE: ICD-10-CM

## 2019-10-11 PROCEDURE — 52356 CYSTO/URETERO W/LITHOTRIPSY: CPT | Mod: LT,,, | Performed by: UROLOGY

## 2019-10-11 PROCEDURE — 36000707: Performed by: UROLOGY

## 2019-10-11 PROCEDURE — 74420 UROGRAPHY RTRGR +-KUB: CPT | Mod: 26,,, | Performed by: UROLOGY

## 2019-10-11 PROCEDURE — D9220A PRA ANESTHESIA: ICD-10-PCS | Mod: ,,, | Performed by: ANESTHESIOLOGY

## 2019-10-11 PROCEDURE — C1758 CATHETER, URETERAL: HCPCS | Performed by: UROLOGY

## 2019-10-11 PROCEDURE — 74420 PR  X-RAY RETROGRADE PYELOGRAM: ICD-10-PCS | Mod: 26,,, | Performed by: UROLOGY

## 2019-10-11 PROCEDURE — 71000015 HC POSTOP RECOV 1ST HR: Performed by: UROLOGY

## 2019-10-11 PROCEDURE — 25000003 PHARM REV CODE 250: Performed by: NURSE ANESTHETIST, CERTIFIED REGISTERED

## 2019-10-11 PROCEDURE — C2617 STENT, NON-COR, TEM W/O DEL: HCPCS | Performed by: UROLOGY

## 2019-10-11 PROCEDURE — 71000044 HC DOSC ROUTINE RECOVERY FIRST HOUR: Performed by: UROLOGY

## 2019-10-11 PROCEDURE — 63600175 PHARM REV CODE 636 W HCPCS

## 2019-10-11 PROCEDURE — 82365 CALCULUS SPECTROSCOPY: CPT

## 2019-10-11 PROCEDURE — 36000706: Performed by: UROLOGY

## 2019-10-11 PROCEDURE — 37000009 HC ANESTHESIA EA ADD 15 MINS: Performed by: UROLOGY

## 2019-10-11 PROCEDURE — C1894 INTRO/SHEATH, NON-LASER: HCPCS | Performed by: UROLOGY

## 2019-10-11 PROCEDURE — 63600175 PHARM REV CODE 636 W HCPCS: Performed by: NURSE ANESTHETIST, CERTIFIED REGISTERED

## 2019-10-11 PROCEDURE — 25000003 PHARM REV CODE 250: Performed by: STUDENT IN AN ORGANIZED HEALTH CARE EDUCATION/TRAINING PROGRAM

## 2019-10-11 PROCEDURE — 52356 PR CYSTO/URETERO W/LITHOTRIPSY: ICD-10-PCS | Mod: LT,,, | Performed by: UROLOGY

## 2019-10-11 PROCEDURE — 37000008 HC ANESTHESIA 1ST 15 MINUTES: Performed by: UROLOGY

## 2019-10-11 PROCEDURE — 63600175 PHARM REV CODE 636 W HCPCS: Performed by: ANESTHESIOLOGY

## 2019-10-11 PROCEDURE — 25500020 PHARM REV CODE 255: Performed by: UROLOGY

## 2019-10-11 PROCEDURE — 71000045 HC DOSC ROUTINE RECOVERY EA ADD'L HR: Performed by: UROLOGY

## 2019-10-11 PROCEDURE — 63600175 PHARM REV CODE 636 W HCPCS: Performed by: STUDENT IN AN ORGANIZED HEALTH CARE EDUCATION/TRAINING PROGRAM

## 2019-10-11 PROCEDURE — 27201423 OPTIME MED/SURG SUP & DEVICES STERILE SUPPLY: Performed by: UROLOGY

## 2019-10-11 PROCEDURE — D9220A PRA ANESTHESIA: Mod: ,,, | Performed by: ANESTHESIOLOGY

## 2019-10-11 PROCEDURE — C1769 GUIDE WIRE: HCPCS | Performed by: UROLOGY

## 2019-10-11 DEVICE — STENT URETERAL UNIV 6FR 28CM: Type: IMPLANTABLE DEVICE | Site: URETER | Status: FUNCTIONAL

## 2019-10-11 RX ORDER — ONDANSETRON 2 MG/ML
INJECTION INTRAMUSCULAR; INTRAVENOUS
Status: DISCONTINUED | OUTPATIENT
Start: 2019-10-11 | End: 2019-10-11

## 2019-10-11 RX ORDER — TAMSULOSIN HYDROCHLORIDE 0.4 MG/1
0.4 CAPSULE ORAL DAILY
Qty: 30 CAPSULE | Refills: 0 | Status: SHIPPED | OUTPATIENT
Start: 2019-10-11 | End: 2023-03-23

## 2019-10-11 RX ORDER — SUCCINYLCHOLINE CHLORIDE 20 MG/ML
INJECTION INTRAMUSCULAR; INTRAVENOUS
Status: DISCONTINUED | OUTPATIENT
Start: 2019-10-11 | End: 2019-10-11

## 2019-10-11 RX ORDER — HYDROCODONE BITARTRATE AND ACETAMINOPHEN 5; 325 MG/1; MG/1
1 TABLET ORAL EVERY 4 HOURS PRN
Status: DISCONTINUED | OUTPATIENT
Start: 2019-10-11 | End: 2019-10-11 | Stop reason: HOSPADM

## 2019-10-11 RX ORDER — MIDAZOLAM HYDROCHLORIDE 1 MG/ML
INJECTION, SOLUTION INTRAMUSCULAR; INTRAVENOUS
Status: DISCONTINUED | OUTPATIENT
Start: 2019-10-11 | End: 2019-10-11

## 2019-10-11 RX ORDER — PROPOFOL 10 MG/ML
VIAL (ML) INTRAVENOUS
Status: DISCONTINUED | OUTPATIENT
Start: 2019-10-11 | End: 2019-10-11

## 2019-10-11 RX ORDER — ROCURONIUM BROMIDE 10 MG/ML
INJECTION, SOLUTION INTRAVENOUS
Status: DISCONTINUED | OUTPATIENT
Start: 2019-10-11 | End: 2019-10-11

## 2019-10-11 RX ORDER — LIDOCAINE HYDROCHLORIDE 20 MG/ML
INJECTION, SOLUTION EPIDURAL; INFILTRATION; INTRACAUDAL; PERINEURAL
Status: DISCONTINUED | OUTPATIENT
Start: 2019-10-11 | End: 2019-10-11

## 2019-10-11 RX ORDER — OXYBUTYNIN CHLORIDE 5 MG/1
5 TABLET ORAL 3 TIMES DAILY PRN
Qty: 90 TABLET | Refills: 0 | Status: SHIPPED | OUTPATIENT
Start: 2019-10-11 | End: 2023-03-23

## 2019-10-11 RX ORDER — SODIUM CHLORIDE 9 MG/ML
INJECTION, SOLUTION INTRAVENOUS CONTINUOUS
Status: DISCONTINUED | OUTPATIENT
Start: 2019-10-11 | End: 2019-10-11 | Stop reason: HOSPADM

## 2019-10-11 RX ORDER — CEFAZOLIN SODIUM 1 G/3ML
2 INJECTION, POWDER, FOR SOLUTION INTRAMUSCULAR; INTRAVENOUS
Status: COMPLETED | OUTPATIENT
Start: 2019-10-11 | End: 2019-10-11

## 2019-10-11 RX ORDER — FENTANYL CITRATE 50 UG/ML
INJECTION, SOLUTION INTRAMUSCULAR; INTRAVENOUS
Status: DISCONTINUED | OUTPATIENT
Start: 2019-10-11 | End: 2019-10-11

## 2019-10-11 RX ORDER — HYDROMORPHONE HYDROCHLORIDE 1 MG/ML
0.2 INJECTION, SOLUTION INTRAMUSCULAR; INTRAVENOUS; SUBCUTANEOUS EVERY 5 MIN PRN
Status: DISCONTINUED | OUTPATIENT
Start: 2019-10-11 | End: 2019-10-11 | Stop reason: HOSPADM

## 2019-10-11 RX ORDER — FENTANYL CITRATE 50 UG/ML
25 INJECTION, SOLUTION INTRAMUSCULAR; INTRAVENOUS EVERY 5 MIN PRN
Status: COMPLETED | OUTPATIENT
Start: 2019-10-11 | End: 2019-10-11

## 2019-10-11 RX ORDER — FENTANYL CITRATE 50 UG/ML
INJECTION, SOLUTION INTRAMUSCULAR; INTRAVENOUS
Status: COMPLETED
Start: 2019-10-11 | End: 2019-10-11

## 2019-10-11 RX ORDER — OXYCODONE AND ACETAMINOPHEN 5; 325 MG/1; MG/1
1 TABLET ORAL EVERY 4 HOURS PRN
Qty: 10 TABLET | Refills: 0 | Status: ON HOLD | OUTPATIENT
Start: 2019-10-11 | End: 2020-12-06 | Stop reason: HOSPADM

## 2019-10-11 RX ORDER — DEXMEDETOMIDINE HYDROCHLORIDE 100 UG/ML
INJECTION, SOLUTION INTRAVENOUS
Status: DISCONTINUED | OUTPATIENT
Start: 2019-10-11 | End: 2019-10-11

## 2019-10-11 RX ADMIN — SODIUM CHLORIDE: 0.9 INJECTION, SOLUTION INTRAVENOUS at 05:10

## 2019-10-11 RX ADMIN — DEXMEDETOMIDINE HYDROCHLORIDE 8 MCG: 100 INJECTION, SOLUTION, CONCENTRATE INTRAVENOUS at 08:10

## 2019-10-11 RX ADMIN — FENTANYL CITRATE 25 MCG: 50 INJECTION INTRAMUSCULAR; INTRAVENOUS at 09:10

## 2019-10-11 RX ADMIN — HYDROCODONE BITARTRATE AND ACETAMINOPHEN 1 TABLET: 5; 325 TABLET ORAL at 08:10

## 2019-10-11 RX ADMIN — SODIUM CHLORIDE: 0.9 INJECTION, SOLUTION INTRAVENOUS at 08:10

## 2019-10-11 RX ADMIN — MIDAZOLAM HYDROCHLORIDE 2 MG: 1 INJECTION, SOLUTION INTRAMUSCULAR; INTRAVENOUS at 07:10

## 2019-10-11 RX ADMIN — ONDANSETRON 4 MG: 2 INJECTION INTRAMUSCULAR; INTRAVENOUS at 08:10

## 2019-10-11 RX ADMIN — FENTANYL CITRATE 100 MCG: 50 INJECTION, SOLUTION INTRAMUSCULAR; INTRAVENOUS at 07:10

## 2019-10-11 RX ADMIN — CEFAZOLIN 3 G: 330 INJECTION, POWDER, FOR SOLUTION INTRAMUSCULAR; INTRAVENOUS at 07:10

## 2019-10-11 RX ADMIN — DEXMEDETOMIDINE HYDROCHLORIDE 4 MCG: 100 INJECTION, SOLUTION, CONCENTRATE INTRAVENOUS at 08:10

## 2019-10-11 RX ADMIN — PROPOFOL 50 MG: 10 INJECTION, EMULSION INTRAVENOUS at 08:10

## 2019-10-11 RX ADMIN — SUCCINYLCHOLINE CHLORIDE 120 MG: 20 INJECTION, SOLUTION INTRAMUSCULAR; INTRAVENOUS at 07:10

## 2019-10-11 RX ADMIN — PROPOFOL 250 MG: 10 INJECTION, EMULSION INTRAVENOUS at 07:10

## 2019-10-11 RX ADMIN — ROCURONIUM BROMIDE 50 MG: 10 INJECTION, SOLUTION INTRAVENOUS at 07:10

## 2019-10-11 RX ADMIN — SUGAMMADEX 100 MG: 100 INJECTION, SOLUTION INTRAVENOUS at 08:10

## 2019-10-11 RX ADMIN — LIDOCAINE HYDROCHLORIDE 100 MG: 20 INJECTION, SOLUTION EPIDURAL; INFILTRATION; INTRACAUDAL; PERINEURAL at 07:10

## 2019-10-11 NOTE — DISCHARGE SUMMARY
[facility]  Discharge Note  Short Stay    Admit Date: 10/11/2019    Discharge Date and Time: 10/11/2019 8:27 AM      Attending Physician: Gamal Sosa MD     Discharge Provider: Camacho Ulrich    Diagnoses:  Active Hospital Problems    Diagnosis  POA    *Urolithiasis [N20.9]  Yes      Resolved Hospital Problems   No resolved problems to display.       Discharged Condition: good    Hospital Course: Patient was admitted for left ureteroscopy, stone extraction and ureteral stent exchange and tolerated the procedure well with no complications. The patient was discharged home in good condition on the same day.   He was given instructions to pull his ureteral stent with strings on Wednesday, 10/16. He will follow up in 1 month with renal US and KUB.    Final Diagnoses: Same as principal problem.    Disposition: Home or Self Care    Follow up/Patient Instructions:    Medications:  Reconciled Home Medications:   Current Discharge Medication List      START taking these medications    Details   oxybutynin (DITROPAN) 5 MG Tab Take 1 tablet (5 mg total) by mouth 3 (three) times daily as needed (bladder spasms).  Qty: 90 tablet, Refills: 0      oxyCODONE-acetaminophen (PERCOCET) 5-325 mg per tablet Take 1 tablet by mouth every 4 (four) hours as needed for Pain.  Qty: 10 tablet, Refills: 0    Comments: Quantity prescribed more than 7 day supply? No         CONTINUE these medications which have CHANGED    Details   tamsulosin (FLOMAX) 0.4 mg Cap Take 1 capsule (0.4 mg total) by mouth once daily. for 30 doses  Qty: 30 capsule, Refills: 0         CONTINUE these medications which have NOT CHANGED    Details   HYDROcodone-acetaminophen (NORCO) 5-325 mg per tablet Take 1 tablet by mouth every 4 (four) hours as needed for Pain.  Qty: 10 tablet, Refills: 0      ergocalciferol (VITAMIN D2) 50,000 unit Cap Take 1 capsule (50,000 Units total) by mouth twice a week.  Qty: 8 capsule, Refills: 3    Associated Diagnoses:  Vitamin D deficiency      esomeprazole (NEXIUM) 20 MG capsule Take 20 mg by mouth before breakfast.           Discharge Procedure Orders   X-Ray KUB   Standing Status: Future Standing Exp. Date: 10/11/20     Order Specific Question Answer Comments   May the Radiologist modify the order per protocol to meet the clinical needs of the patient? Yes      US Kidney   Standing Status: Future Standing Exp. Date: 10/11/20     Order Specific Question Answer Comments   May the Radiologist modify the order per protocol to meet the clinical needs of the patient? Yes      Diet general     Call MD for:  extreme fatigue     Call MD for:  persistent dizziness or light-headedness     Call MD for:  hives     Call MD for:  redness, tenderness, or signs of infection (pain, swelling, redness, odor or green/yellow discharge around incision site)     Call MD for:  difficulty breathing, headache or visual disturbances     Call MD for:  severe uncontrolled pain     Call MD for:  persistent nausea and vomiting     Call MD for:  temperature >100.4     Follow-up Information     Gamal Sosa MD In 1 month.    Specialty:  Urology  Contact information:  [address] [place] [id number]  [phone]                   Discharge Procedure Orders (must include Diet, Follow-up, Activity):   Discharge Procedure Orders (must include Diet, Follow-up, Activity)   X-Ray KUB   Standing Status: Future Standing Exp. Date: 10/11/20     Order Specific Question Answer Comments   May the Radiologist modify the order per protocol to meet the clinical needs of the patient? Yes      US Kidney   Standing Status: Future Standing Exp. Date: 10/11/20     Order Specific Question Answer Comments   May the Radiologist modify the order per protocol to meet the clinical needs of the patient? Yes      Diet general     Call MD for:  extreme fatigue     Call MD for:  persistent dizziness or light-headedness     Call MD for:  hives     Call MD for:  redness,  tenderness, or signs of infection (pain, swelling, redness, odor or green/yellow discharge around incision site)     Call MD for:  difficulty breathing, headache or visual disturbances     Call MD for:  severe uncontrolled pain     Call MD for:  persistent nausea and vomiting     Call MD for:  temperature >100.4

## 2019-10-11 NOTE — TRANSFER OF CARE
"Anesthesia Transfer of Care Note    Patient: Surjit Valentine    Procedure(s) Performed: Procedure(s) (LRB):  URETEROSCOPY (Left)  LITHOTRIPSY, USING LASER (Left)  PLACEMENT-STENT (Left)  PYELOGRAM, RETROGRADE (Left)  CYSTOSCOPY  REMOVAL, CALCULUS, URETER, URETEROSCOPIC  REMOVAL-STENT (Left)    Patient location: PACU    Anesthesia Type: general    Transport from OR: Transported from OR on 6-10 L/min O2 by face mask with adequate spontaneous ventilation    Post pain: adequate analgesia    Post assessment: no apparent anesthetic complications and tolerated procedure well    Post vital signs: stable    Level of consciousness: awake    Nausea/Vomiting: no nausea/vomiting    Complications: none    Transfer of care protocol was followed      Last vitals:   Visit Vitals  /76 (BP Location: Left arm, Patient Position: Lying)   Pulse 77   Temp 36.5 °C (97.7 °F) (Oral)   Resp 20   Ht 5' 10" (1.778 m)   Wt (!) 172.4 kg (380 lb)   SpO2 97%   BMI 54.52 kg/m²     "

## 2019-10-11 NOTE — PLAN OF CARE
Pt stable, tolerating po liquids, pain is tolerable at a 4.  Discharge instructions given to pt.  Pt ready for discharge.

## 2019-10-11 NOTE — INTERVAL H&P NOTE
The patient has been examined and the H&P has been reviewed:    I concur with the findings and changes have been noted since the H&P was written: Attempted ureteroscopy on 10/6; unable to advance past ureteral stricture. Stent placed. Urine dipstick today positive for blood, negative for nitrites and LE, positive for blood.    Anesthesia/Surgery risks, benefits and alternative options discussed and understood by patient/family.          Active Hospital Problems    Diagnosis  POA    Urolithiasis [N20.9]  Yes      Resolved Hospital Problems   No resolved problems to display.

## 2019-10-11 NOTE — DISCHARGE INSTRUCTIONS
Treating Kidney Stones: Ureteroscopic Stone Removal    Ureteroscopic stone removal may be done before, after, or instead of other treatments. If you need this procedure, your healthcare provider will discuss its risks and possible complications. You will be told how to prepare. And you will be told about anesthesia that will keep you pain-free during treatment.     A ureteroscope lets your doctor see your stone before removing it.   Removing the stone through the ureter  Ureteroscopic stone removal extracts a small stone in your ureter without an incision. Your doctor places a viewing tube (ureteroscope) in your ureter. A wire basket inserted through the tube removes the stone. Sometimes, a laser or a mechanical device is used to break up the stone. A soft tube may be left in your ureter briefly to drain urine.     The stone may be fragmented. The stone is then withdrawn or passed.   Your recovery  This is an outpatient or overnight procedure. For a few days after surgery, you may feel some pain when you urinate. Or you may need to urinate more often, or have bloody urine. You may have a ureteral stent. This is a soft tube that prevents blockage from swelling after the procedure. The stent is removed when the swelling goes down, often within days. Follow up as instructed to check for any new stones.  When to call your healthcare provider  Call your healthcare provider right away if:  · You have sudden pain or flank pain  · You have a fever over 100.4°F (38°C)  · You have nausea that lasts for days  · You have heavy bleeding when you urinate  · You have heavy bleeding through your drainage tube  · You have swelling or redness around your incision   Date Last Reviewed: 2/1/2017 © 2000-2017 [employer] [address] [place] [address], [place] [address]. All rights reserved. This information is not intended as a substitute for professional medical care. Always follow your healthcare professional's  instructions.      Use the strings on your ureteral stent to remove the stent on Wednesday, 10/16.

## 2019-10-11 NOTE — ANESTHESIA PREPROCEDURE EVALUATION
[facility]  Anesthesia Pre-Operative Evaluation         Patient Name: Surjit Valentine  YOB: 1980  MRN: [id number]  [facility]: [id number]        Date of Procedure: 10/11/2019  Procedure: Procedure(s) (LRB):  URETEROSCOPY (Left)  LITHOTRIPSY, USING LASER (Left)  PLACEMENT-STENT (Left)  PYELOGRAM, RETROGRADE (Left)  Anesthesia: General  Pre-Operative Diagnosis: <principal problem not specified>   Proceduralist/Surgeon(s) and Role:     * Gamal Sosa MD - Primary    SUBJECTIVE:     Surjit Valentine is a 38 y.o. male with history of kidney stones   and significant PMHx listed below.   Patient now presents for the above procedure(s). Pt appropriately NPO.        Anesthesia Evaluation      Airway   Mallampati: III  TM distance: Normal, at least 6 cm  Neck ROM: Normal ROM  Dental    (+) In tact    Pulmonary    (+) sleep apnea,   Cardiovascular     Neuro/Psych      GI/Hepatic/Renal    (+) GERD, chronic renal disease,     Endo/Other    Abdominal                   Relevant Problems   No relevant active problems      ALLERGIES:   Review of patient's allergies indicates:  No Known Allergies  MEDICATIONS:     Prior to Admission medications    Medication Sig Start Date End Date Taking? Authorizing Provider   HYDROcodone-acetaminophen (NORCO) 5-325 mg per tablet Take 1 tablet by mouth every 4 (four) hours as needed for Pain. 10/6/19  Yes Sourav Eric MD   ergocalciferol (VITAMIN D2) 50,000 unit Cap Take 1 capsule (50,000 Units total) by mouth twice a week. 3/7/16   Jenny Cody PA-C   esomeprazole (NEXIUM) 20 MG capsule Take 20 mg by mouth before breakfast.    Historical Provider, MD   tamsulosin (FLOMAX) 0.4 mg Cap Take 1 capsule (0.4 mg total) by mouth once daily. for 14 doses 10/6/19 10/20/19  Sourav Eric MD     History:     Patient Active Problem List   Diagnosis    GERD without esophagitis    GAYLE (obstructive sleep apnea)    Morbid obesity with body mass index of 60.0-69.9  "in adult    Kidney stones    Ureterolithiasis    Urolithiasis      Past Medical History:   Diagnosis Date    GERD without esophagitis     takes OTC nexium PRN    History of kidney stones     Morbid obesity with body mass index of 60.0-69.9 in adult     GAYLE (obstructive sleep apnea)     did not tolerate CPAP     Past Surgical History:   Procedure Laterality Date    APPENDECTOMY  3/2008    CYSTOSCOPY W/ URETERAL STENT PLACEMENT Left 10/6/2019    Procedure: CYSTOSCOPY, WITH URETERAL STENT INSERTION;  Surgeon: Gamal Sosa MD;  Location: [facility] OR [address] [place] [address];  Service: Urology;  Laterality: Left;    RETROGRADE PYELOGRAPHY Left 10/6/2019    Procedure: PYELOGRAM, RETROGRADE;  Surgeon: Gamal Sosa MD;  [facility];  Service: Urology;  Laterality: Left;    URETEROSCOPY Left 10/6/2019    Procedure: URETEROSCOPY;  Surgeon: Gamal Sosa MD;  Location: [facility] OR [address] [place] [address];  Service: Urology;  Laterality: Left;     OBJECTIVE:   Last 3 sets of Vitals    Vitals - 1 value per visit 2/5/2019 10/6/2019 10/11/2019   SYSTOLIC 135 139 121   DIASTOLIC 85 63 76   PULSE 81 80 77   TEMPERATURE 97.9 97.5 97.7   RESPIRATIONS 16 18 20   SPO2 98 95 97   Weight (lb) 390 350 380   Weight (kg) 176.903 158.759 172.367   HEIGHT 5' 10" 5' 10" 5' 10"   BODY MASS INDEX 55.96 50.22 54.52   VISIT REPORT - - -   Pain Score  - - -       Significant Labs:  Lab Results   Component Value Date    WBC 8.46 10/06/2019    HGB 12.7 (L) 10/06/2019    HCT 38.7 (L) 10/06/2019     10/06/2019    CHOL 174 10/14/2015    TRIG 84 10/14/2015    HDL 54 10/14/2015    ALT 23 02/05/2019    AST 21 02/05/2019     10/06/2019    K 4.0 10/06/2019     10/06/2019    CREATININE 1.3 10/06/2019    BUN 17 10/06/2019    CO2 25 10/06/2019    TSH 3.163 10/14/2015    INR 1.0 04/02/2015    HGBA1C 5.2 10/14/2015     No results found for: PREGTESTUR, PREGSERUM, HCG, HCGQUANT   No LMP for male patient.    EKG:   Normal sinus rhythm  Normal " ECG    2D ECHO:  CONCLUSIONS     1 - Normal left ventricular systolic function (EF 55-60%).     2 - Normal left ventricular diastolic function.     3 - Right ventricle is upper limit of normal in size with normal systolic function.   [place]:  No results found for this or any previous visit.    ASSESSMENT/PLAN:         Pre-op Assessment    I have reviewed the Patient Summary Reports.     I have reviewed the Medications.     Review of Systems  Anesthesia Hx:  Denies Family Hx of Anesthesia complications.    Social:  Alcohol Use, Non-Smoker    Hematology/Oncology:  Hematology Normal   Oncology Normal     EENT/Dental:EENT/Dental Normal   Cardiovascular:  Cardiovascular Normal     Pulmonary:   Sleep Apnea    Renal/:   Chronic Renal Disease renal calculi    Hepatic/GI:   GERD    Neurological:  Neurology Normal    Endocrine:  Endocrine Normal        Physical Exam  General:  Well nourished, Morbid Obesity    Airway/Jaw/Neck:  Airway Findings: Mouth Opening: Normal Tongue: Normal  General Airway Assessment: Adult, Possible difficult mask airway, Possible difficult intubation, Average  Mallampati: III  Improves to II with phonation.  TM Distance: Normal, at least 6 cm  Jaw/Neck Findings:  Neck ROM: Normal ROM  Neck Findings:  Girth Increased, Short Neck      Dental:  Dental Findings: In tact   Chest/Lungs:  Chest/Lungs Clear    Heart/Vascular:  Heart Findings: Normal Heart murmur: negative       Mental Status:  Mental Status Findings:  Cooperative, Alert and Oriented         Anesthesia Plan  Type of Anesthesia, risks & benefits discussed:  Anesthesia Type:  general, MAC  Patient's Preference:   Intra-op Monitoring Plan: standard ASA monitors  Intra-op Monitoring Plan Comments:   Post Op Pain Control Plan: per primary service following discharge from PACU, IV/PO Opioids PRN and multimodal analgesia  Post Op Pain Control Plan Comments:   Induction:   IV  Beta Blocker:  Patient is not currently on a Beta-Blocker (No further  documentation required).       Informed Consent: Patient understands risks and agrees with Anesthesia plan.  Questions answered. Anesthesia consent signed with patient.  ASA Score: 3     Day of Surgery Review of History & Physical:     H&P completed by Anesthesiologist.   Anesthesia Plan Notes: Chart reviewed, patient interviewed and examined.  The anesthetic plan was explained.  Risks, benefits, and alternatives were discussed. Questions were answered and the consent was signed.        JENNI Mccabe M.D.         Ready For Surgery From Anesthesia Perspective.

## 2019-10-11 NOTE — ANESTHESIA POSTPROCEDURE EVALUATION
Anesthesia Post Evaluation    Patient: Surjit Valentine    Procedure(s) Performed: Procedure(s) (LRB):  URETEROSCOPY (Left)  LITHOTRIPSY, USING LASER (Left)  PLACEMENT-STENT (Left)  PYELOGRAM, RETROGRADE (Left)  CYSTOSCOPY  REMOVAL, CALCULUS, URETER, URETEROSCOPIC  REMOVAL-STENT (Left)    Final Anesthesia Type: general  Patient location during evaluation: PACU  Patient participation: Yes- Able to Participate  Level of consciousness: awake and alert  Post-procedure vital signs: reviewed and stable  Pain management: adequate  Airway patency: patent  PONV status at discharge: No PONV  Anesthetic complications: no      Cardiovascular status: blood pressure returned to baseline  Respiratory status: unassisted  Hydration status: euvolemic  Follow-up not needed.          Vitals Value Taken Time   /94 10/11/2019  9:52 AM   Temp 36.6 °C (97.9 °F) 10/11/2019  9:45 AM   Pulse 69 10/11/2019  9:52 AM   Resp 18 10/11/2019  9:45 AM   SpO2 93 % 10/11/2019  9:52 AM   Vitals shown include unvalidated device data.      No case tracking events are documented in the log.      Pain/Alma Delia Score: Pain Rating Prior to Med Admin: 6 (10/11/2019  9:55 AM)  Pain Rating Post Med Admin: 4 (10/11/2019  9:55 AM)  Alma Delia Score: 10 (10/11/2019  9:55 AM)  Modified Alma Delia Score: 19 (10/11/2019  9:55 AM)

## 2019-10-11 NOTE — OP NOTE
[facility]  Operative Note    Date: 10/11/2019    Pre-Op Diagnosis: left ureteral and renal stones  Patient Active Problem List    Diagnosis Date Noted    Urolithiasis 10/11/2019    Kidney stones 10/06/2019    Ureterolithiasis 10/06/2019    GERD without esophagitis     GAYLE (obstructive sleep apnea)     Morbid obesity with body mass index of 60.0-69.9 in adult         Post-Op Diagnosis: same    Procedure(s) Performed:   1.  Left ureteroscopy  2.  Cystoscopy  3.  Left retrograde pyelogram  4.  Laser lithotripsy and stone basket extraction  5. Left ureteral stent exchange  6. Fluoro < 1 hr     Specimen(s): renal stone for analysis    Staff Surgeon:  Gamal Sosa MD     Assistant Surgeon: Camacho Ulrich MD     Anesthesia: General endotracheal anesthesia    Indications: Surjit Valentine is a 38 y.o. male  with a left ureteral stone and left renal stones, presenting for definitive stone management.  He currently does have a JJ ureteral stent in place.          Findings:     Multiple stones encountered in renal collecting system, dusted with Vamsi laser fiber and removed with basket.    1 baskets were used throughout the case.      Estimated Blood Loss: min    Drains: 6 Fr x 28 cm JJ ureteral stent with strings    Procedure in detail:  After informed consent was obtained, the patient was brought the the cystoscopy suite and placed in the supine position.  SCDs were applied and working.  Anesthesia was administered.  The patient was then placed in the dorsal lithotomy position and prepped and draped in the usual sterile fashion.      A rigid cystoscope in a 22 Fr sheath was introduced into the patient's urethra.  This passed easily.  The entire urethra was visualized which showed no strictures or masses.  Formal cystoscopy was performed which revealed no masses or lesions suspicious for malignancy, no bladder stones, no bladder diverticuli, no trabeculations.  The ureteral orifices were visualized  in the normal anatomic position bilaterally.      A motion wire was passed up the left ureteral orifice and up into the kidney alongside the stent.  This passed easily and placement was confirmed using fluoro.  The stent was grasped with a pair of stent graspers and removed to the level of the urethral meatus. A second stiff glide wire was then passed up the stent to the kidney,  again confirmed using fluoro.      A 10.7-12.7-45 Fr ureteral access sheath was then passed over the free wire under fluoroscopic guidance.  Subsequently, the flexible ureteroscope was passed into the patient's ureter through the access sheath under direct vision. Flexible pyeloscopy was performed systematically.  Stones was encountered at the level of the mid and lower poles.      A 272 micron Gemidis laser fiber was passed through the ureteroscope.  The stone was dusted using the laser.  The laser fiber was removed and a Nitinol tipless basket was introduced through the ureteroscope.  Stone fragments were removed and collected for specimen analysis.  A retrograde pyelogram was performed through the ureteroscope.  There were no filling defects noted and the renal pelvis was opacified.  The ureteroscope was removed keeping the motionwire in place.  The entire course of the ureter was visualized as the ureteroscope and access sheath were simultaneously removed.  There were no significant ureteral fragments left behind.     A 6 Fr x 28 cm JJ ureteral stent with strings was passed over the wire and up into the renal pelvis using fluoro.  When the coil appeared to be in good position in the kidney and the radio-opaque marker of the pusher was at the inferior pubis, the wire was removed under continuous fluoro.  Good coils were seen in the kidney and the bladder using fluoro.      The patient tolerated the procedure well and was transferred to the recovery room in stable condition.      Disposition:  The patient will follow up with Dr. Sosa in 4  weeks with a KUB and renal US. He was given prescriptions for Flomax, Ditropan, and Percocet, and instructed to pull his ureteral stent with strings on 10/16.    Camacho Ulrich

## 2019-10-17 LAB
COMPN STONE: NORMAL
SPECIMEN SOURCE: NORMAL
STONE ANALYSIS IR-IMP: NORMAL

## 2020-12-01 ENCOUNTER — OFFICE VISIT (OUTPATIENT)
Dept: URGENT CARE | Facility: CLINIC | Age: 40
End: 2020-12-01
Payer: OTHER GOVERNMENT

## 2020-12-01 VITALS
HEIGHT: 70 IN | SYSTOLIC BLOOD PRESSURE: 123 MMHG | RESPIRATION RATE: 14 BRPM | OXYGEN SATURATION: 95 % | WEIGHT: 315 LBS | BODY MASS INDEX: 45.1 KG/M2 | TEMPERATURE: 97 F | DIASTOLIC BLOOD PRESSURE: 84 MMHG | HEART RATE: 83 BPM

## 2020-12-01 DIAGNOSIS — U07.1 COVID-19 VIRUS DETECTED: ICD-10-CM

## 2020-12-01 DIAGNOSIS — U07.1 COVID-19: Primary | ICD-10-CM

## 2020-12-01 LAB
CTP QC/QA: YES
SARS-COV-2 RDRP RESP QL NAA+PROBE: POSITIVE

## 2020-12-01 PROCEDURE — 99203 OFFICE O/P NEW LOW 30 MIN: CPT | Mod: S$GLB,,, | Performed by: PHYSICIAN ASSISTANT

## 2020-12-01 PROCEDURE — U0002: ICD-10-PCS | Mod: QW,S$GLB,, | Performed by: PHYSICIAN ASSISTANT

## 2020-12-01 PROCEDURE — U0002 COVID-19 LAB TEST NON-CDC: HCPCS | Mod: QW,S$GLB,, | Performed by: PHYSICIAN ASSISTANT

## 2020-12-01 PROCEDURE — 99203 PR OFFICE/OUTPT VISIT, NEW, LEVL III, 30-44 MIN: ICD-10-PCS | Mod: S$GLB,,, | Performed by: PHYSICIAN ASSISTANT

## 2020-12-01 NOTE — PROGRESS NOTES
"Subjective:       Patient ID: Surjit Valentine is a 40 y.o. male.    Vitals:  height is 5' 10" (1.778 m) and weight is 172.4 kg (380 lb) (abnormal). His temperature is 97.2 °F (36.2 °C). His blood pressure is 123/84 and his pulse is 83. His respiration is 14 and oxygen saturation is 95%.     Chief Complaint: Sinusitis and COVID-19 Concerns (before patient goes back to work)    Sinusitis  This is a new problem. Episode onset: 4 days ago. The problem has been gradually worsening since onset. There has been no fever. His pain is at a severity of 10/10. Associated symptoms include congestion, coughing and sinus pressure. Pertinent negatives include no chills, diaphoresis, ear pain, shortness of breath or sore throat. Past treatments include nothing. The treatment provided no relief.       Constitution: Negative for chills, sweating, fatigue and fever.   HENT: Positive for congestion, sinus pain and sinus pressure. Negative for ear pain, sore throat and voice change.    Neck: Negative for painful lymph nodes.   Eyes: Negative for eye redness.   Respiratory: Positive for cough. Negative for chest tightness, sputum production, bloody sputum, COPD, shortness of breath, stridor, wheezing and asthma.    Gastrointestinal: Negative for nausea and vomiting.   Musculoskeletal: Negative for muscle ache.   Skin: Negative for rash.   Allergic/Immunologic: Negative for seasonal allergies and asthma.   Hematologic/Lymphatic: Negative for swollen lymph nodes.       Objective:      Physical Exam   Constitutional: He is oriented to person, place, and time. He appears well-developed. He is cooperative. He does not appear ill. No distress.   HENT:   Head: Normocephalic and atraumatic.   Ears:   Right Ear: External ear normal.   Left Ear: External ear normal.   Nose: Nose normal.   Mouth/Throat: Oropharynx is clear and moist.   Eyes: Conjunctivae, EOM and lids are normal.   Neck: Trachea normal, full passive range of motion without pain " and phonation normal. Neck supple.   Cardiovascular: Normal rate.   Pulmonary/Chest: Effort normal. No respiratory distress.   Musculoskeletal: Normal range of motion.   Neurological: He is alert and oriented to person, place, and time.   Skin: Skin is warm, dry, intact and not diaphoretic. Psychiatric: His speech is normal and behavior is normal. Judgment and thought content normal.   Nursing note and vitals reviewed.        Assessment:       1. COVID-19        Office Visit on 12/01/2020   Component Date Value Ref Range Status    POC Rapid COVID 12/01/2020 Positive* Negative Final     Acceptable 12/01/2020 Yes   Final     Plan:       Called patient with positive COVID results. Patient denies shortness of breath. Offered to examine patient using PPE in exam room or to complete visit over the phone. Patient agrees with visit over the phone to limit exposure. Observed patient from afar when walking in and out of the clinic. ER and quarantine precautions discussed.    COVID-19  -     POCT COVID-19 Rapid Screening      Patient Instructions   You have tested positive for COVID-19 today.  Per the CDC, you are now in a 10 day isolation.      This isolation starts from the day you first developed symptoms, not the day of your positive test. For example, if your symptoms began on a Monday, and you waited until Friday of the same week to get tested, and it was positive, your 10 day isolation begins from that Monday, not the Friday you tested positive.       However, if you are asymptomatic (a person who does not have any symptoms), and received a COVID-19 test that was positive, your 10 day isolation begins on the day you tested positive.  This is regardless if you were exposed to a known positive days earlier.  So for example, if you test positive as an asymptomatic on day 7 from exposure, you have now extended your 14 day quarantine to a 17 day quarantine.        Also, per the CDC guidelines, once your 10  days have passed, and you have not had fever greater than 100.4F in the last 24 hours without taking any fever reducers such as Tylenol (Acetaminophen) or Motrin (Ibuprofen), you may return to your normal activities including social distancing, wearing masks, and frequent handwashing - YOU DO NOT NEED ANOTHER TEST, OR TO TEST NEGATIVE, IN ORDER TO END YOUR QUARANTINE!

## 2020-12-01 NOTE — PATIENT INSTRUCTIONS
You have tested positive for COVID-19 today.  Per the CDC, you are now in a 10 day isolation.      This isolation starts from the day you first developed symptoms, not the day of your positive test. For example, if your symptoms began on a Monday, and you waited until Friday of the same week to get tested, and it was positive, your 10 day isolation begins from that Monday, not the Friday you tested positive.       However, if you are asymptomatic (a person who does not have any symptoms), and received a COVID-19 test that was positive, your 10 day isolation begins on the day you tested positive.  This is regardless if you were exposed to a known positive days earlier.  So for example, if you test positive as an asymptomatic on day 7 from exposure, you have now extended your 14 day quarantine to a 17 day quarantine.        Also, per the CDC guidelines, once your 10 days have passed, and you have not had fever greater than 100.4F in the last 24 hours without taking any fever reducers such as Tylenol (Acetaminophen) or Motrin (Ibuprofen), you may return to your normal activities including social distancing, wearing masks, and frequent handwashing - YOU DO NOT NEED ANOTHER TEST, OR TO TEST NEGATIVE, IN ORDER TO END YOUR QUARANTINE!

## 2020-12-02 ENCOUNTER — HOSPITAL ENCOUNTER (INPATIENT)
Facility: HOSPITAL | Age: 40
LOS: 3 days | Discharge: HOME | DRG: 177 | End: 2020-12-06
Attending: EMERGENCY MEDICINE | Admitting: INTERNAL MEDICINE
Payer: OTHER GOVERNMENT

## 2020-12-02 DIAGNOSIS — U07.1 ACUTE RESPIRATORY DISEASE DUE TO COVID-19 VIRUS: ICD-10-CM

## 2020-12-02 DIAGNOSIS — J96.01 ACUTE HYPOXEMIC RESPIRATORY FAILURE DUE TO COVID-19: ICD-10-CM

## 2020-12-02 DIAGNOSIS — U07.1 COVID-19: Primary | ICD-10-CM

## 2020-12-02 DIAGNOSIS — U07.1 ACUTE HYPOXEMIC RESPIRATORY FAILURE DUE TO COVID-19: ICD-10-CM

## 2020-12-02 DIAGNOSIS — J06.9 ACUTE RESPIRATORY DISEASE DUE TO COVID-19 VIRUS: ICD-10-CM

## 2020-12-02 LAB
25(OH)D3+25(OH)D2 SERPL-MCNC: 16 NG/ML (ref 30–96)
ALBUMIN SERPL BCP-MCNC: 3.4 G/DL (ref 3.5–5.2)
ALP SERPL-CCNC: 62 U/L (ref 55–135)
ALT SERPL W/O P-5'-P-CCNC: 31 U/L (ref 10–44)
ANION GAP SERPL CALC-SCNC: 11 MMOL/L (ref 8–16)
AST SERPL-CCNC: 34 U/L (ref 10–40)
BASOPHILS # BLD AUTO: 0.01 K/UL (ref 0–0.2)
BASOPHILS NFR BLD: 0.2 % (ref 0–1.9)
BILIRUB SERPL-MCNC: 0.3 MG/DL (ref 0.1–1)
BNP SERPL-MCNC: <10 PG/ML (ref 0–99)
BUN SERPL-MCNC: 9 MG/DL (ref 6–20)
CALCIUM SERPL-MCNC: 8.4 MG/DL (ref 8.7–10.5)
CHLORIDE SERPL-SCNC: 103 MMOL/L (ref 95–110)
CHOLEST SERPL-MCNC: 138 MG/DL (ref 120–199)
CHOLEST/HDLC SERPL: 2.9 {RATIO} (ref 2–5)
CK SERPL-CCNC: 114 U/L (ref 20–200)
CO2 SERPL-SCNC: 24 MMOL/L (ref 23–29)
CREAT SERPL-MCNC: 0.9 MG/DL (ref 0.5–1.4)
CRP SERPL-MCNC: 66 MG/L (ref 0–8.2)
D DIMER PPP IA.FEU-MCNC: 0.28 MG/L FEU
DIFFERENTIAL METHOD: ABNORMAL
EOSINOPHIL # BLD AUTO: 0 K/UL (ref 0–0.5)
EOSINOPHIL NFR BLD: 0 % (ref 0–8)
ERYTHROCYTE [DISTWIDTH] IN BLOOD BY AUTOMATED COUNT: 12.7 % (ref 11.5–14.5)
ERYTHROCYTE [SEDIMENTATION RATE] IN BLOOD BY WESTERGREN METHOD: 33 MM/HR (ref 0–23)
EST. GFR  (AFRICAN AMERICAN): >60 ML/MIN/1.73 M^2
EST. GFR  (NON AFRICAN AMERICAN): >60 ML/MIN/1.73 M^2
FERRITIN SERPL-MCNC: 1724 NG/ML (ref 20–300)
GLUCOSE SERPL-MCNC: 130 MG/DL (ref 70–110)
HCT VFR BLD AUTO: 41.7 % (ref 40–54)
HDLC SERPL-MCNC: 47 MG/DL (ref 40–75)
HDLC SERPL: 34.1 % (ref 20–50)
HGB BLD-MCNC: 14.1 G/DL (ref 14–18)
IMM GRANULOCYTES # BLD AUTO: 0.02 K/UL (ref 0–0.04)
IMM GRANULOCYTES NFR BLD AUTO: 0.4 % (ref 0–0.5)
LACTATE SERPL-SCNC: 0.9 MMOL/L (ref 0.5–2.2)
LDH SERPL L TO P-CCNC: 304 U/L (ref 110–260)
LDLC SERPL CALC-MCNC: 77.4 MG/DL (ref 63–159)
LYMPHOCYTES # BLD AUTO: 1.2 K/UL (ref 1–4.8)
LYMPHOCYTES NFR BLD: 24.4 % (ref 18–48)
MCH RBC QN AUTO: 30.3 PG (ref 27–31)
MCHC RBC AUTO-ENTMCNC: 33.8 G/DL (ref 32–36)
MCV RBC AUTO: 90 FL (ref 82–98)
MONOCYTES # BLD AUTO: 0.5 K/UL (ref 0.3–1)
MONOCYTES NFR BLD: 10 % (ref 4–15)
NEUTROPHILS # BLD AUTO: 3.2 K/UL (ref 1.8–7.7)
NEUTROPHILS NFR BLD: 65 % (ref 38–73)
NONHDLC SERPL-MCNC: 91 MG/DL
NRBC BLD-RTO: 0 /100 WBC
PLATELET # BLD AUTO: 130 K/UL (ref 150–350)
PMV BLD AUTO: 11.4 FL (ref 9.2–12.9)
POTASSIUM SERPL-SCNC: 3.9 MMOL/L (ref 3.5–5.1)
PROCALCITONIN SERPL IA-MCNC: 0.07 NG/ML
PROT SERPL-MCNC: 7.5 G/DL (ref 6–8.4)
RBC # BLD AUTO: 4.66 M/UL (ref 4.6–6.2)
SODIUM SERPL-SCNC: 138 MMOL/L (ref 136–145)
TRIGL SERPL-MCNC: 68 MG/DL (ref 30–150)
TROPONIN I SERPL DL<=0.01 NG/ML-MCNC: <0.006 NG/ML (ref 0–0.03)
WBC # BLD AUTO: 4.88 K/UL (ref 3.9–12.7)

## 2020-12-02 PROCEDURE — 99291 CRITICAL CARE FIRST HOUR: CPT

## 2020-12-02 PROCEDURE — G0378 HOSPITAL OBSERVATION PER HR: HCPCS

## 2020-12-02 PROCEDURE — 85025 COMPLETE CBC W/AUTO DIFF WBC: CPT

## 2020-12-02 PROCEDURE — 99291 PR CRITICAL CARE, E/M 30-74 MINUTES: ICD-10-PCS | Mod: ,,, | Performed by: EMERGENCY MEDICINE

## 2020-12-02 PROCEDURE — 83880 ASSAY OF NATRIURETIC PEPTIDE: CPT

## 2020-12-02 PROCEDURE — 27100098 HC SPACER

## 2020-12-02 PROCEDURE — 99291 CRITICAL CARE FIRST HOUR: CPT | Mod: ,,, | Performed by: EMERGENCY MEDICINE

## 2020-12-02 PROCEDURE — 99223 1ST HOSP IP/OBS HIGH 75: CPT | Mod: ,,, | Performed by: INTERNAL MEDICINE

## 2020-12-02 PROCEDURE — 93005 ELECTROCARDIOGRAM TRACING: CPT

## 2020-12-02 PROCEDURE — 84145 PROCALCITONIN (PCT): CPT

## 2020-12-02 PROCEDURE — 25000242 PHARM REV CODE 250 ALT 637 W/ HCPCS: Performed by: PHYSICIAN ASSISTANT

## 2020-12-02 PROCEDURE — 99223 PR INITIAL HOSPITAL CARE,LEVL III: ICD-10-PCS | Mod: ,,, | Performed by: INTERNAL MEDICINE

## 2020-12-02 PROCEDURE — 85379 FIBRIN DEGRADATION QUANT: CPT

## 2020-12-02 PROCEDURE — 36415 COLL VENOUS BLD VENIPUNCTURE: CPT

## 2020-12-02 PROCEDURE — 86140 C-REACTIVE PROTEIN: CPT

## 2020-12-02 PROCEDURE — 94761 N-INVAS EAR/PLS OXIMETRY MLT: CPT

## 2020-12-02 PROCEDURE — 93010 ELECTROCARDIOGRAM REPORT: CPT | Mod: CS,,, | Performed by: STUDENT IN AN ORGANIZED HEALTH CARE EDUCATION/TRAINING PROGRAM

## 2020-12-02 PROCEDURE — 94640 AIRWAY INHALATION TREATMENT: CPT

## 2020-12-02 PROCEDURE — 63600175 PHARM REV CODE 636 W HCPCS: Performed by: STUDENT IN AN ORGANIZED HEALTH CARE EDUCATION/TRAINING PROGRAM

## 2020-12-02 PROCEDURE — 83605 ASSAY OF LACTIC ACID: CPT

## 2020-12-02 PROCEDURE — 25000003 PHARM REV CODE 250: Performed by: STUDENT IN AN ORGANIZED HEALTH CARE EDUCATION/TRAINING PROGRAM

## 2020-12-02 PROCEDURE — 83615 LACTATE (LD) (LDH) ENZYME: CPT

## 2020-12-02 PROCEDURE — 93010 EKG 12-LEAD: ICD-10-PCS | Mod: CS,,, | Performed by: STUDENT IN AN ORGANIZED HEALTH CARE EDUCATION/TRAINING PROGRAM

## 2020-12-02 PROCEDURE — 96375 TX/PRO/DX INJ NEW DRUG ADDON: CPT | Performed by: EMERGENCY MEDICINE

## 2020-12-02 PROCEDURE — 84484 ASSAY OF TROPONIN QUANT: CPT

## 2020-12-02 PROCEDURE — 87040 BLOOD CULTURE FOR BACTERIA: CPT

## 2020-12-02 PROCEDURE — 82306 VITAMIN D 25 HYDROXY: CPT

## 2020-12-02 PROCEDURE — 82728 ASSAY OF FERRITIN: CPT

## 2020-12-02 PROCEDURE — 25000003 PHARM REV CODE 250: Performed by: PHYSICIAN ASSISTANT

## 2020-12-02 PROCEDURE — 80053 COMPREHEN METABOLIC PANEL: CPT

## 2020-12-02 PROCEDURE — 63600175 PHARM REV CODE 636 W HCPCS: Performed by: EMERGENCY MEDICINE

## 2020-12-02 PROCEDURE — 82550 ASSAY OF CK (CPK): CPT

## 2020-12-02 PROCEDURE — 96374 THER/PROPH/DIAG INJ IV PUSH: CPT | Performed by: EMERGENCY MEDICINE

## 2020-12-02 PROCEDURE — 96372 THER/PROPH/DIAG INJ SC/IM: CPT | Mod: 59 | Performed by: EMERGENCY MEDICINE

## 2020-12-02 PROCEDURE — 85652 RBC SED RATE AUTOMATED: CPT

## 2020-12-02 PROCEDURE — 63600175 PHARM REV CODE 636 W HCPCS: Performed by: PHYSICIAN ASSISTANT

## 2020-12-02 PROCEDURE — 80061 LIPID PANEL: CPT

## 2020-12-02 RX ORDER — ACETAMINOPHEN 500 MG
1000 TABLET ORAL
Status: COMPLETED | OUTPATIENT
Start: 2020-12-02 | End: 2020-12-02

## 2020-12-02 RX ORDER — SODIUM CHLORIDE 0.9 % (FLUSH) 0.9 %
10 SYRINGE (ML) INJECTION
Status: CANCELLED | OUTPATIENT
Start: 2020-12-02

## 2020-12-02 RX ORDER — ASCORBIC ACID 250 MG
500 TABLET ORAL 2 TIMES DAILY
Status: CANCELLED | OUTPATIENT
Start: 2020-12-02

## 2020-12-02 RX ORDER — DEXAMETHASONE SODIUM PHOSPHATE 4 MG/ML
6 INJECTION, SOLUTION INTRA-ARTICULAR; INTRALESIONAL; INTRAMUSCULAR; INTRAVENOUS; SOFT TISSUE
Status: COMPLETED | OUTPATIENT
Start: 2020-12-02 | End: 2020-12-02

## 2020-12-02 RX ORDER — LOPERAMIDE HYDROCHLORIDE 2 MG/1
2 CAPSULE ORAL EVERY 6 HOURS PRN
Status: DISCONTINUED | OUTPATIENT
Start: 2020-12-02 | End: 2020-12-06 | Stop reason: HOSPADM

## 2020-12-02 RX ORDER — ACETAMINOPHEN 325 MG/1
650 TABLET ORAL EVERY 4 HOURS PRN
Status: CANCELLED | OUTPATIENT
Start: 2020-12-02

## 2020-12-02 RX ORDER — KETOROLAC TROMETHAMINE 30 MG/ML
10 INJECTION, SOLUTION INTRAMUSCULAR; INTRAVENOUS
Status: COMPLETED | OUTPATIENT
Start: 2020-12-02 | End: 2020-12-02

## 2020-12-02 RX ORDER — SODIUM CHLORIDE 0.9 % (FLUSH) 0.9 %
10 SYRINGE (ML) INJECTION
Status: DISCONTINUED | OUTPATIENT
Start: 2020-12-02 | End: 2020-12-06 | Stop reason: HOSPADM

## 2020-12-02 RX ORDER — POLYETHYLENE GLYCOL 3350 17 G/17G
17 POWDER, FOR SOLUTION ORAL DAILY
Status: CANCELLED | OUTPATIENT
Start: 2020-12-03

## 2020-12-02 RX ORDER — ONDANSETRON 8 MG/1
8 TABLET, ORALLY DISINTEGRATING ORAL EVERY 8 HOURS PRN
Status: CANCELLED | OUTPATIENT
Start: 2020-12-02

## 2020-12-02 RX ORDER — IBUPROFEN 200 MG
24 TABLET ORAL
Status: CANCELLED | OUTPATIENT
Start: 2020-12-02

## 2020-12-02 RX ORDER — GLUCAGON 1 MG
1 KIT INJECTION
Status: CANCELLED | OUTPATIENT
Start: 2020-12-02

## 2020-12-02 RX ORDER — ONDANSETRON 2 MG/ML
4 INJECTION INTRAMUSCULAR; INTRAVENOUS EVERY 8 HOURS PRN
Status: DISCONTINUED | OUTPATIENT
Start: 2020-12-02 | End: 2020-12-06 | Stop reason: HOSPADM

## 2020-12-02 RX ORDER — TAMSULOSIN HYDROCHLORIDE 0.4 MG/1
0.4 CAPSULE ORAL DAILY
Status: CANCELLED | OUTPATIENT
Start: 2020-12-03

## 2020-12-02 RX ORDER — ENOXAPARIN SODIUM 100 MG/ML
40 INJECTION SUBCUTANEOUS EVERY 12 HOURS
Status: CANCELLED | OUTPATIENT
Start: 2020-12-02

## 2020-12-02 RX ORDER — GLUCAGON 1 MG
1 KIT INJECTION
Status: DISCONTINUED | OUTPATIENT
Start: 2020-12-02 | End: 2020-12-06 | Stop reason: HOSPADM

## 2020-12-02 RX ORDER — ALBUTEROL SULFATE 90 UG/1
6 AEROSOL, METERED RESPIRATORY (INHALATION)
Status: COMPLETED | OUTPATIENT
Start: 2020-12-02 | End: 2020-12-02

## 2020-12-02 RX ORDER — ASCORBIC ACID 500 MG
500 TABLET ORAL 2 TIMES DAILY
Status: DISCONTINUED | OUTPATIENT
Start: 2020-12-02 | End: 2020-12-06 | Stop reason: HOSPADM

## 2020-12-02 RX ORDER — TALC
6 POWDER (GRAM) TOPICAL NIGHTLY PRN
Status: DISCONTINUED | OUTPATIENT
Start: 2020-12-02 | End: 2020-12-06 | Stop reason: HOSPADM

## 2020-12-02 RX ORDER — CHOLECALCIFEROL (VITAMIN D3) 25 MCG
1000 TABLET ORAL DAILY
Status: CANCELLED | OUTPATIENT
Start: 2020-12-02

## 2020-12-02 RX ORDER — IBUPROFEN 200 MG
16 TABLET ORAL
Status: DISCONTINUED | OUTPATIENT
Start: 2020-12-02 | End: 2020-12-06 | Stop reason: HOSPADM

## 2020-12-02 RX ORDER — LOPERAMIDE HYDROCHLORIDE 2 MG/1
2 CAPSULE ORAL EVERY 6 HOURS PRN
Status: CANCELLED | OUTPATIENT
Start: 2020-12-02

## 2020-12-02 RX ORDER — IBUPROFEN 200 MG
24 TABLET ORAL
Status: DISCONTINUED | OUTPATIENT
Start: 2020-12-02 | End: 2020-12-06 | Stop reason: HOSPADM

## 2020-12-02 RX ORDER — GUAIFENESIN/DEXTROMETHORPHAN 100-10MG/5
10 SYRUP ORAL EVERY 4 HOURS PRN
Status: CANCELLED | OUTPATIENT
Start: 2020-12-02

## 2020-12-02 RX ORDER — OXYBUTYNIN CHLORIDE 5 MG/1
5 TABLET ORAL 3 TIMES DAILY PRN
Status: CANCELLED | OUTPATIENT
Start: 2020-12-02

## 2020-12-02 RX ORDER — IBUPROFEN 200 MG
16 TABLET ORAL
Status: CANCELLED | OUTPATIENT
Start: 2020-12-02

## 2020-12-02 RX ORDER — ALBUTEROL SULFATE 90 UG/1
2 AEROSOL, METERED RESPIRATORY (INHALATION) EVERY 6 HOURS
Status: CANCELLED | OUTPATIENT
Start: 2020-12-02

## 2020-12-02 RX ORDER — TALC
6 POWDER (GRAM) TOPICAL NIGHTLY PRN
Status: CANCELLED | OUTPATIENT
Start: 2020-12-02

## 2020-12-02 RX ORDER — ACETAMINOPHEN 325 MG/1
650 TABLET ORAL EVERY 4 HOURS PRN
Status: DISCONTINUED | OUTPATIENT
Start: 2020-12-02 | End: 2020-12-06 | Stop reason: HOSPADM

## 2020-12-02 RX ORDER — GUAIFENESIN/DEXTROMETHORPHAN 100-10MG/5
10 SYRUP ORAL EVERY 4 HOURS PRN
Status: DISCONTINUED | OUTPATIENT
Start: 2020-12-02 | End: 2020-12-06 | Stop reason: HOSPADM

## 2020-12-02 RX ORDER — ENOXAPARIN SODIUM 100 MG/ML
40 INJECTION SUBCUTANEOUS EVERY 12 HOURS
Status: DISCONTINUED | OUTPATIENT
Start: 2020-12-02 | End: 2020-12-03

## 2020-12-02 RX ADMIN — ACETAMINOPHEN 1000 MG: 500 TABLET ORAL at 08:12

## 2020-12-02 RX ADMIN — THERA TABS 1 TABLET: TAB at 03:12

## 2020-12-02 RX ADMIN — GUAIFENESIN AND DEXTROMETHORPHAN 10 ML: 100; 10 SYRUP ORAL at 10:12

## 2020-12-02 RX ADMIN — DEXAMETHASONE SODIUM PHOSPHATE 6 MG: 4 INJECTION, SOLUTION INTRA-ARTICULAR; INTRALESIONAL; INTRAMUSCULAR; INTRAVENOUS; SOFT TISSUE at 03:12

## 2020-12-02 RX ADMIN — MELATONIN TAB 3 MG 6 MG: 3 TAB at 08:12

## 2020-12-02 RX ADMIN — REMDESIVIR 200 MG: 100 INJECTION, POWDER, LYOPHILIZED, FOR SOLUTION INTRAVENOUS at 04:12

## 2020-12-02 RX ADMIN — ALBUTEROL SULFATE 6 PUFF: 90 AEROSOL, METERED RESPIRATORY (INHALATION) at 10:12

## 2020-12-02 RX ADMIN — OXYCODONE HYDROCHLORIDE AND ACETAMINOPHEN 500 MG: 500 TABLET ORAL at 08:12

## 2020-12-02 RX ADMIN — ENOXAPARIN SODIUM 40 MG: 40 INJECTION SUBCUTANEOUS at 08:12

## 2020-12-02 RX ADMIN — KETOROLAC TROMETHAMINE 10 MG: 30 INJECTION, SOLUTION INTRAMUSCULAR at 11:12

## 2020-12-02 NOTE — ASSESSMENT & PLAN NOTE
COVID-19 Virus Infection  Viral Pneumonia due to COVID-19  - COVID-19 testing:   - Isolation: Airborne/Droplet. Surgical mask on patient. Notify Infection Control  - Diagnostics: Trend Q48hrs if stable, more frequently if patient decompensating        - CBC (q24)       - CMP (q24)       - Mg  (q24)       - D-dimer        - Ferritin       - CRP       - LDH       - Vitamin D (once)       - BNP (once)       - Troponin (negative)       - Procalcitonin (negative)       - ECG       - CXR  Lymphopenia, hyponatremia, hyperferritinemia, elevated troponin, elevated d-dimer, age, and medical comorbidities are significant predictors of poor clinical outcome    - Management: Per [facility] COVID Treatment Protocol    - Telemetry & Continuous Pulse Oximetry    - Nutrition:        - Multivitamin PO daily       - Vitamin D 1000IU daily if deficient       - Ascorbic acid 500mg PO bid    - Supportive Care:       - acetaminophen 650mg PO Q6hr PRN fever/headache       - loperamide PRN viral diarrhea       - IVF if indicated, restrictive strategy preferred, no maintenance IV if able       - VTE PPx: enoxaparin or heparin SQ unless contraindicated    - Antibiotics: none, negative procal, clear chest x-ray     - Remdesivir ordered     - Dexamethasone 6 mg PO     - Convalescent Plasma none    Acute Hypoxemic Respiratory Failure    - Order RT consult via Respiratory Communication for COVID Protocols    - Incentive Spirometer Q4h, Flutter Valve Q4h    - Continuous Pulse Oximetry, goal SpO2 92-96%    - Supplemental O2 via LFNC, VentiMask, or HFNC (see Respiratory Support Oxygen Therapies)    - If wheezing, albuterol INH Q6h scheduled & PRN    - Proning Protocol if patient is a candidate (see  Proning Protocol)   - GCS >13, able to self-prone    - If deterioration, may warrant trial of NIPPV in neg pressure room or immediate ICU consult

## 2020-12-02 NOTE — SUBJECTIVE & OBJECTIVE
Past Medical History:   Diagnosis Date    GERD without esophagitis     takes OTC nexium PRN    History of kidney stones     Morbid obesity with body mass index of 60.0-69.9 in adult     GAYLE (obstructive sleep apnea)     did not tolerate CPAP       Past Surgical History:   Procedure Laterality Date    APPENDECTOMY  3/2008    CYSTOSCOPY  10/11/2019    Procedure: CYSTOSCOPY;  Surgeon: Gamal Sosa MD;  Location: [facility] OR [address] [place] [address];  Service: Urology;;    CYSTOSCOPY W/ URETERAL STENT PLACEMENT Left 10/6/2019    Procedure: CYSTOSCOPY, WITH URETERAL STENT INSERTION;  Surgeon: Gamal Sosa MD;  Location: [facility] OR [address] [place] [address];  Service: Urology;  Laterality: Left;    LASER LITHOTRIPSY Left 10/11/2019    Procedure: LITHOTRIPSY, USING LASER;  Surgeon: Gamal Sosa MD;  Location: [facility] OR [address] [place] [address];  Service: Urology;  Laterality: Left;    RETROGRADE PYELOGRAPHY Left 10/6/2019    Procedure: PYELOGRAM, RETROGRADE;  Surgeon: Gamal Sosa MD;  Location: [facility] OR [address] [place] [address];  Service: Urology;  Laterality: Left;    RETROGRADE PYELOGRAPHY Left 10/11/2019    Procedure: PYELOGRAM, RETROGRADE;  Surgeon: Gamal Sosa MD;  Location: [facility] OR [address] [place] [address];  Service: Urology;  Laterality: Left;    URETEROSCOPIC REMOVAL OF URETERIC CALCULUS  10/11/2019    Procedure: REMOVAL, CALCULUS, URETER, URETEROSCOPIC;  Surgeon: Gamal Sosa MD;  Location: [facility] OR [address] [place] [address];  Service: Urology;;    URETEROSCOPY Left 10/6/2019    Procedure: URETEROSCOPY;  Surgeon: Gamal Sosa MD;  Location: [address];  Service: Urology;  Laterality: Left;    URETEROSCOPY Left 10/11/2019    Procedure: URETEROSCOPY;  Surgeon: Gamal Sosa MD;  Location: [facility] OR [address] [place] [address];  Service: Urology;  Laterality: Left;  1hr       Review of patient's allergies indicates:  No Known Allergies    No current facility-administered medications on file prior to encounter.      Current Outpatient Medications on File Prior to Encounter   Medication  "Sig    ergocalciferol (VITAMIN D2) 50,000 unit Cap Take 1 capsule (50,000 Units total) by mouth twice a week. (Patient not taking: Reported on 12/1/2020)    esomeprazole (NEXIUM) 20 MG capsule Take 20 mg by mouth before breakfast.    HYDROcodone-acetaminophen (NORCO) 5-325 mg per tablet Take 1 tablet by mouth every 4 (four) hours as needed for Pain. (Patient not taking: Reported on 12/1/2020)    oxybutynin (DITROPAN) 5 MG Tab Take 1 tablet (5 mg total) by mouth 3 (three) times daily as needed (bladder spasms).    oxyCODONE-acetaminophen (PERCOCET) 5-325 mg per tablet Take 1 tablet by mouth every 4 (four) hours as needed for Pain. (Patient not taking: Reported on 12/1/2020)    tamsulosin (FLOMAX) 0.4 mg Cap Take 1 capsule (0.4 mg total) by mouth once daily. for 30 doses     Family History     Problem Relation (Age of Onset)    Diabetes Maternal Grandmother, Mother, Brother    Heart disease Father, Maternal Grandfather, Paternal Grandfather    Hypertension Father    Obesity Mother, Sister, Brother    Stroke Father, Paternal Grandmother        Tobacco Use    Smoking status: Never Smoker    Smokeless tobacco: Current User     Types: Chew    Tobacco comment: 2 cans/ week   Substance and Sexual Activity    Alcohol use: Yes     Alcohol/week: 3.0 standard drinks     Types: 3 Cans of beer per week     Comment: "socially but not usually"    Drug use: No    Sexual activity: Not on file     Review of Systems   Constitutional: Positive for activity change, chills, fatigue and fever. Negative for appetite change.   HENT: Positive for congestion and sore throat.    Eyes: Negative for visual disturbance.   Respiratory: Positive for cough and shortness of breath. Negative for chest tightness and wheezing.    Cardiovascular: Positive for chest pain. Negative for palpitations and leg swelling.   Gastrointestinal: Negative for abdominal pain, constipation, diarrhea, nausea and vomiting.   Genitourinary: Negative for dysuria " and flank pain.   Musculoskeletal: Negative for arthralgias, back pain and myalgias.   Skin: Negative for color change and pallor.   Neurological: Positive for headaches. Negative for dizziness and weakness.   Psychiatric/Behavioral: Negative for agitation and confusion.     Objective:     Vital Signs (Most Recent):  Temp: 99.1 °F (37.3 °C) (12/02/20 1340)  Pulse: 88 (12/02/20 1655)  Resp: (!) 27 (12/02/20 1655)  BP: 134/81 (12/02/20 1340)  SpO2: (!) 93 % (12/02/20 1655) Vital Signs (24h Range):  Temp:  [99.1 °F (37.3 °C)-101.5 °F (38.6 °C)] 99.1 °F (37.3 °C)  Pulse:  [85-98] 88  Resp:  [16-27] 27  SpO2:  [90 %-95 %] 93 %  BP: (120-167)/(72-81) 134/81     Weight: (!) 172.4 kg (380 lb)  Body mass index is 54.52 kg/m².    Physical Exam  Vitals signs reviewed.   Constitutional:       Appearance: Normal appearance. He is well-developed. He is obese. He is not toxic-appearing.   HENT:      Head: Normocephalic and atraumatic.      Right Ear: External ear normal.      Left Ear: External ear normal.      Nose: Congestion present.      Mouth/Throat:      Mouth: Mucous membranes are moist.      Pharynx: Oropharynx is clear. No oropharyngeal exudate.   Eyes:      General: No scleral icterus.     Conjunctiva/sclera: Conjunctivae normal.   Neck:      Musculoskeletal: Normal range of motion and neck supple.   Cardiovascular:      Rate and Rhythm: Normal rate and regular rhythm.      Heart sounds: Normal heart sounds.   Pulmonary:      Effort: Pulmonary effort is normal.      Breath sounds: Rales (minor, bilateral lung base) present.   Abdominal:      General: Bowel sounds are normal.      Palpations: Abdomen is soft.      Tenderness: There is no abdominal tenderness.   Musculoskeletal: Normal range of motion.      Right lower leg: No edema.      Left lower leg: No edema.   Skin:     General: Skin is warm and dry.   Neurological:      Mental Status: He is alert and oriented to person, place, and time.   Psychiatric:          Behavior: Behavior normal.             Significant Labs: All pertinent labs within the past 24 hours have been reviewed.    Significant Imaging: I have reviewed all pertinent imaging results/findings within the past 24 hours.

## 2020-12-02 NOTE — PROVIDER PROGRESS NOTES - EMERGENCY DEPT.
Encounter Date: 12/2/2020    ED Physician Progress Notes         EKG - STEMI Decision  Initial Reading: No STEMI present.

## 2020-12-02 NOTE — ED PROVIDER NOTES
Encounter Date: 12/2/2020       History     Chief Complaint   Patient presents with    COVID-19 Concerns     Patient is covid + as of yesterday.  Patient having worse symptoms today!     40-year-old male with past medical history of GERD, GAYLE presents to the emergency department with complaints of headache, nasal congestion, shortness of breath, dry cough, nausea, vomiting that began 5 days ago.  He has evaluated yesterday at urgent care where he tested positive for COVID-19.  He reports shortness of breath has progressively worsened which is what prompted his visit today.  He denies vision changes, sore throat, anosmia, chest pain, abdominal pain, diarrhea, dysuria. Reports taking ibuprofen at home with some relief. Denies other worsening or alleviating factors.         Review of patient's allergies indicates:  No Known Allergies  Past Medical History:   Diagnosis Date    GERD without esophagitis     takes OTC nexium PRN    History of kidney stones     Morbid obesity with body mass index of 60.0-69.9 in adult     GAYLE (obstructive sleep apnea)     did not tolerate CPAP     Past Surgical History:   Procedure Laterality Date    APPENDECTOMY  3/2008    CYSTOSCOPY  10/11/2019    Procedure: CYSTOSCOPY;  Surgeon: Gamal Sosa MD;  Location: [address];  Service: Urology;;    CYSTOSCOPY W/ URETERAL STENT PLACEMENT Left 10/6/2019    Procedure: CYSTOSCOPY, WITH URETERAL STENT INSERTION;  Surgeon: Gamal Sosa MD;  Location: [facility] OR [address] [place] [address];  Service: Urology;  Laterality: Left;    LASER LITHOTRIPSY Left 10/11/2019    Procedure: LITHOTRIPSY, USING LASER;  Surgeon: Gamal Sosa MD;  Location: [address];  Service: Urology;  Laterality: Left;    RETROGRADE PYELOGRAPHY Left 10/6/2019    Procedure: PYELOGRAM, RETROGRADE;  Surgeon: Gamal Sosa MD;  Location: [address];  Service: Urology;  Laterality: Left;    RETROGRADE PYELOGRAPHY Left 10/11/2019    Procedure: PYELOGRAM,  "RETROGRADE;  Surgeon: Gamal Sosa MD;  [facility];  Service: Urology;  Laterality: Left;    URETEROSCOPIC REMOVAL OF URETERIC CALCULUS  10/11/2019    Procedure: REMOVAL, CALCULUS, URETER, URETEROSCOPIC;  Surgeon: Gamal Sosa MD;  Location: [facility];  Service: Urology;;    URETEROSCOPY Left 10/6/2019    Procedure: URETEROSCOPY;  Surgeon: Gamal Sosa MD;  [facility];  Service: Urology;  Laterality: Left;    URETEROSCOPY Left 10/11/2019    Procedure: URETEROSCOPY;  Surgeon: Gamal Sosa MD;  [facility];  Service: Urology;  Laterality: Left;  1hr     Family History   Problem Relation Age of Onset    Heart disease Father     Stroke Father     Hypertension Father     Diabetes Maternal Grandmother     Heart disease Maternal Grandfather     Stroke Paternal Grandmother     Heart disease Paternal Grandfather     Diabetes Mother     Obesity Mother     Obesity Sister     Diabetes Brother     Obesity Brother      Social History     Tobacco Use    Smoking status: Never Smoker    Smokeless tobacco: Current User     Types: Chew    Tobacco comment: 2 cans/ week   Substance Use Topics    Alcohol use: Yes     Alcohol/week: 3.0 standard drinks     Types: 3 Cans of beer per week     Comment: "socially but not usually"    Drug use: No     Review of Systems   Constitutional: Positive for fatigue and fever.   HENT: Positive for congestion. Negative for sore throat.    Respiratory: Positive for cough and shortness of breath.    Cardiovascular: Negative for chest pain.   Gastrointestinal: Positive for nausea and vomiting.   Genitourinary: Negative for dysuria.   Musculoskeletal: Negative for back pain.   Skin: Negative for rash.   Neurological: Positive for headaches. Negative for weakness.   Hematological: Does not bruise/bleed easily.       Physical Exam     Initial Vitals [12/02/20 0758]   BP Pulse Resp Temp SpO2   (!) 167/78 98 16 (!) 101.5 " °F (38.6 °C) (!) 94 %      MAP       --         Physical Exam    Nursing note and vitals reviewed.  Constitutional: He appears well-developed and well-nourished. He is not diaphoretic. No distress.   Morbidly obese    HENT:   Head: Normocephalic and atraumatic.   Mouth/Throat: Oropharynx is clear and moist.   Eyes: EOM are normal. Pupils are equal, round, and reactive to light.   Neck: Normal range of motion. Neck supple.   Cardiovascular: Normal rate, regular rhythm, normal heart sounds and intact distal pulses. Exam reveals no gallop and no friction rub.    No murmur heard.  Pulmonary/Chest: Breath sounds normal. Tachypnea noted. He has no wheezes. He has no rhonchi. He has no rales.   Speaking in short sentences  Auscultation limited to body habitus.    Abdominal: Soft. Bowel sounds are normal. There is no abdominal tenderness.   Musculoskeletal: Normal range of motion.   Neurological: He is alert and oriented to person, place, and time. He has normal strength. GCS score is 15. GCS eye subscore is 4. GCS verbal subscore is 5. GCS motor subscore is 6.   Skin: Skin is warm and dry. Capillary refill takes less than 2 seconds.   Psychiatric: He has a normal mood and affect. His behavior is normal. Judgment and thought content normal.         ED Course   Critical Care    Date/Time: 12/2/2020 9:12 AM  Performed by: Dimitrios Tsai DO  Authorized by: Dimitrios Tsai DO   Direct patient critical care time: 10 minutes  Additional history critical care time: 10 minutes  Ordering / reviewing critical care time: 15 minutes  Documentation critical care time: 5 minutes  Consulting other physicians critical care time: 5 minutes  Total critical care time (exclusive of procedural time) : 45 minutes  Critical care was necessary to treat or prevent imminent or life-threatening deterioration of the following conditions: respiratory failure.  Critical care was time spent personally by me on the following activities: discussions  with consultants, ordering and performing treatments and interventions, ordering and review of laboratory studies, ordering and review of radiographic studies, pulse oximetry, re-evaluation of patient's condition, review of old charts, obtaining history from patient or surrogate, examination of patient, evaluation of patient's response to treatment and development of treatment plan with patient or surrogate.        Labs Reviewed   CBC W/ AUTO DIFFERENTIAL - Abnormal; Notable for the following components:       Result Value    Platelets 130 (*)     All other components within normal limits   COMPREHENSIVE METABOLIC PANEL - Abnormal; Notable for the following components:    Glucose 130 (*)     Calcium 8.4 (*)     Albumin 3.4 (*)     All other components within normal limits   C-REACTIVE PROTEIN - Abnormal; Notable for the following components:    CRP 66.0 (*)     All other components within normal limits   FERRITIN - Abnormal; Notable for the following components:    Ferritin 1,724 (*)     All other components within normal limits   LACTATE DEHYDROGENASE - Abnormal; Notable for the following components:     (*)     All other components within normal limits   CK   LACTIC ACID, PLASMA   TROPONIN I     EKG Readings: (Independently Interpreted)   Initial Reading: No STEMI. Rhythm: Normal Sinus Rhythm. Heart Rate: 88.     ECG Results          EKG 12-lead (Final result)  Result time 12/02/20 11:44:56    Final result by Interface, Lab In [facility] (12/02/20 11:44:56)                 Narrative:    Test Reason : Z20.828,    Vent. Rate : 088 BPM     Atrial Rate : 088 BPM     P-R Int : 152 ms          QRS Dur : 086 ms      QT Int : 352 ms       P-R-T Axes : 035 -01 032 degrees     QTc Int : 425 ms    Normal sinus rhythm  Normal ECG  When compared with ECG of 16-OCT-2015 07:53,  No significant change was found  Confirmed by Gricelda Richey MD ([id number]) on 12/2/2020 11:44:44 AM    Referred By: ALEX   SELF           Confirmed  By:Gricelda Richey MD                            Imaging Results          X-Ray Chest AP Portable (Final result)  Result time 12/02/20 09:44:48    Final result by Shae Pradhan MD (12/02/20 09:44:48)                 Impression:      No pulmonary disease definitely identified in this 40-year-old man with recent positive test for COVID-19 via rapid screening study.  However the examination is limited by bedside technique and the patient's large body habitus.      Electronically signed by: Shae Pradhan MD  Date:    12/02/2020  Time:    09:44             Narrative:    EXAMINATION:  XR CHEST AP PORTABLE    CLINICAL HISTORY:  Suspected Covid-19 Virus Infection;    TECHNIQUE:  Single frontal view of the chest was performed.    COMPARISON:  10/16/2015.  04/02/2015.  05/15/2011.  11/25/2007.    FINDINGS:  Additional history: Positive for COVID-19 infection by rapid screening test on 12/01/2020 at 1557 hours.    Lung volumes are small, likely related to large body habitus and bedside technique.  Mediastinal structures are midline.  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    Allowing for technical limitations I detect no convincing evidence of pulmonary disease, pleural fluid, cardiac decompensation, pneumothorax, pneumomediastinum, pneumoperitoneum or significant osseous abnormality.                              X-Rays:   Independently Interpreted Readings:   Chest X-Ray: Normal heart size.  No infiltrates.  No acute abnormalities.     Medical Decision Making:   History:   Old Medical Records: I decided to obtain old medical records.  Old Records Summarized: records from clinic visits.       <> Summary of Records: Tested positive for COVID at urgent care 12/1  Initial Assessment:   Emergent evaluation of a 40-year-old male who presents the emergency department with complaints of headache, nasal congestion, dry cough, shortness of breath, nausea, vomiting that began 5 days ago.  He tested positive for COVID-19 at an urgent  care yesterday.  Patient is febrile, hypoxic, and nontoxic appearing.  Will order labs, EKG, chest x-ray, ambulatory saturation.   Differential Diagnosis:   DDx includes but is not limited to COVID, pneumonia, hypoxia, URI.   Independently Interpreted Test(s):   I have ordered and independently interpreted X-rays - see prior notes.  I have ordered and independently interpreted EKG Reading(s) - see prior notes  Clinical Tests:   Lab Tests: Ordered and Reviewed  Radiological Study: Ordered and Reviewed  Medical Tests: Ordered and Reviewed  ED Management:  Pulse oximetry decreases to 90% on room air.  No severe metabolic or hematologic derangements.  Hyperglycemia of 130.  CRP, ferritin, LDH elevated.  Normal lactate.  Negative troponin.  Chest x-ray without acute abnormality.  Given Tylenol, albuterol, Toradol.  Will admit to Hospital Medicine for hypoxia in the setting on known COVID 19 infection.  I have discussed this plan with the patient who is agreeable.  All questions answered. The patient's history, physical exam, and plan of care was discussed with and agreed upon with my supervising physician.     Other:   I have discussed this case with another health care provider.       <> Summary of the Discussion: Hosp Med              Attending Attestation:     Physician Attestation Statement for NP/PA:   I have conducted a face to face encounter with this patient in addition to the NP/PA, due to          I have reviewed and concur with the advanced practice clinician's history, physical, assessment, and plan.  I have personally interviewed and examined the patient at bedside.  See below addendum for my evaluation and additional findings. I did supervise any and all procedures and was present for any critical portion, and was always immediately available for help and as a resource.     Briefly,  this is a 40 y.o. male with a medical history of GERD, GAYLE, obesity presenting with headache, nasal congestion shortness of  breath and vomiting where he was diagnosed COVID positive yesterday.  He is now hypoxemic to 90% at rest and with ambulation.  He is ill-appearing on my exam with increased work of breathing and tachypnea with any ambulation and movement.  He has a BMI a greater than 60 with a diagnosed obstructive sleep apnea and he does not use CPAP.  The remainder of his physical exam with diminished breath sounds, increased work of breathing and tachypnea with exertion.  Placed on oxygen therapy for support.  Please see critical care note for hypoxemia and respiratory failure secondary to COVID-19 requiring oxygen support.  Patient was given dexamethasone 6 mg in the ED, in case was discussed with hospital medicine will place in observation for COVID-19 management and treatment.  Will defer remdesivir to admitting team.  Patient agreeable with observation plan.    Complexity:  Critical care level    Final diagnoses:  [U07.1] COVID-19 (Primary)  [U07.1, J96.01] Acute hypoxemic respiratory failure due to COVID-19  [U07.1, J06.9] Acute respiratory disease due to COVID-19 virus      Dimitrios Tsai DO, FAAEM    Emergency Medicine Physician  Dept of Emergency Medicine   [facility]  Spectralink: [id number]          ED Course as of Dec 03 0826   Wed Dec 02, 2020   0954 WBC: 4.88 [JM]   0954 Hemoglobin: 14.1 [JM]   0954 Hematocrit: 41.7 [JM]   0954 Platelets(!): 130 [JM]   1020 Lactate, Wilber: 0.9 [JM]   1021 LD(!): 304 [JM]   1029 Sodium: 138 [JM]   1029 Potassium: 3.9 [JM]   1029 Glucose(!): 130 [JM]   1029 BUN: 9 [JM]   1029 Creatinine: 0.9 [JM]   1029 CRP(!): 66.0 [JM]   1029 CPK: 114 [JM]      ED Course User Index  [JM] Celia Cabrera PA-C            Clinical Impression:       ICD-10-CM ICD-9-CM   1. COVID-19  U07.1 079.89   2. Acute hypoxemic respiratory failure due to COVID-19  U07.1 518.81    J96.01 079.89     799.02   3. Acute respiratory disease due to COVID-19 virus  U07.1 465.9    J06.9 079.89                       Disposition:   Disposition: Placed in Observation  Condition: Fair     ED Disposition Condition    Observation                             Celia Cabrera PA-C  12/02/20 1601       Dimitrios Tsai DO  12/03/20 0853

## 2020-12-02 NOTE — HPI
Patient is a 40 year old male with history of GERD, GAYLE, and kidney stones who presents to hospital via emergency department for SOB, cough, nausea, vomiting, and congestion. He reports that about 5 days ago he started experiencing symptoms of congestion and runny nose. 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 He went to urgent care yesterday where he tested positive for the virus. He tried going home and self isolating but felt that his symptoms were not manageable at home as nothing he did alleviated his symptoms. 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 At the time of my exam, patient endorses mild headache, chills, dyspnea on exertion, and persistent cough. He denies any further chest pain, abdominal pain, anosmia, diarrhea, or dysuria.

## 2020-12-02 NOTE — H&P
[facility] - ICU 14 [facility] Medicine  History & Physical    Patient Name: Surjit Valentine  MRN: [id number]  Admission Date: 12/2/2020  Attending Physician: Yamilet Mcknight MD   Primary Care Provider: BHAVESH Ramos         Patient information was obtained from patient and ER records.     Subjective:     Principal Problem:COVID-19    Chief Complaint:   Chief Complaint   Patient presents with    COVID-19 Concerns     Patient is covid + as of yesterday.  Patient having worse symptoms today!        HPI: Patient is a 40 year old male with history of GERD, GAYLE, and kidney stones who presents to hospital via emergency department for SOB, cough, nausea, vomiting, and congestion. He reports that about 5 days ago he started experiencing symptoms of congestion and runny nose. 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 He went to urgent care yesterday where he tested positive for the virus. He tried going home and self isolating but felt that his symptoms were not manageable at home as nothing he did alleviated his symptoms. 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 At the time of my exam, patient endorses mild headache, chills, dyspnea on exertion, and persistent cough. He denies any further chest pain, abdominal pain, anosmia, diarrhea, or dysuria.       Past Medical History:   Diagnosis Date    GERD without esophagitis     takes OTC nexium PRN    History of kidney stones     Morbid obesity with body mass index of 60.0-69.9 in adult     GAYLE (obstructive sleep apnea)     did not tolerate CPAP       Past Surgical History:   Procedure Laterality  Date    APPENDECTOMY  3/2008    CYSTOSCOPY  10/11/2019    Procedure: CYSTOSCOPY;  Surgeon: Gamal Sosa MD;  Location: [facility] OR [facility];  Service: Urology;;    CYSTOSCOPY W/ URETERAL STENT PLACEMENT Left 10/6/2019    Procedure: CYSTOSCOPY, WITH URETERAL STENT INSERTION;  Surgeon: Gamal Sosa MD;  [facility];  Service: Urology;  Laterality: Left;    LASER LITHOTRIPSY Left 10/11/2019    Procedure: LITHOTRIPSY, USING LASER;  Surgeon: Gamal Sosa MD;  Location: [facility] OR [facility] FLR;  Service: Urology;  Laterality: Left;    RETROGRADE PYELOGRAPHY Left 10/6/2019    Procedure: PYELOGRAM, RETROGRADE;  Surgeon: Gamal Sosa MD;  Location: [facility] OR [facility] FLR;  Service: Urology;  Laterality: Left;    RETROGRADE PYELOGRAPHY Left 10/11/2019    Procedure: PYELOGRAM, RETROGRADE;  Surgeon: Gamal Sosa MD;  [facility];  Service: Urology;  Laterality: Left;    URETEROSCOPIC REMOVAL OF URETERIC CALCULUS  10/11/2019    Procedure: REMOVAL, CALCULUS, URETER, URETEROSCOPIC;  Surgeon: Gamal Sosa MD;  [facility];  Service: Urology;;    URETEROSCOPY Left 10/6/2019    Procedure: URETEROSCOPY;  Surgeon: Gamal Sosa MD;  [facility];  Service: Urology;  Laterality: Left;    URETEROSCOPY Left 10/11/2019    Procedure: URETEROSCOPY;  Surgeon: Gamal Sosa MD;  [facility];  Service: Urology;  Laterality: Left;  1hr       Review of patient's allergies indicates:  No Known Allergies    No current facility-administered medications on file prior to encounter.      Current Outpatient Medications on File Prior to Encounter   Medication Sig    ergocalciferol (VITAMIN D2) 50,000 unit Cap Take 1 capsule (50,000 Units total) by mouth twice a week. (Patient not taking: Reported on 12/1/2020)    esomeprazole (NEXIUM) 20 MG capsule Take 20 mg by mouth before breakfast.    HYDROcodone-acetaminophen (NORCO) 5-325 mg per tablet Take 1  "tablet by mouth every 4 (four) hours as needed for Pain. (Patient not taking: Reported on 12/1/2020)    oxybutynin (DITROPAN) 5 MG Tab Take 1 tablet (5 mg total) by mouth 3 (three) times daily as needed (bladder spasms).    oxyCODONE-acetaminophen (PERCOCET) 5-325 mg per tablet Take 1 tablet by mouth every 4 (four) hours as needed for Pain. (Patient not taking: Reported on 12/1/2020)    tamsulosin (FLOMAX) 0.4 mg Cap Take 1 capsule (0.4 mg total) by mouth once daily. for 30 doses     Family History     Problem Relation (Age of Onset)    Diabetes Maternal Grandmother, Mother, Brother    Heart disease Father, Maternal Grandfather, Paternal Grandfather    Hypertension Father    Obesity Mother, Sister, Brother    Stroke Father, Paternal Grandmother        Tobacco Use    Smoking status: Never Smoker    Smokeless tobacco: Current User     Types: Chew    Tobacco comment: 2 cans/ week   Substance and Sexual Activity    Alcohol use: Yes     Alcohol/week: 3.0 standard drinks     Types: 3 Cans of beer per week     Comment: "socially but not usually"    Drug use: No    Sexual activity: Not on file     Review of Systems   Constitutional: Positive for activity change, chills, fatigue and fever. Negative for appetite change.   HENT: Positive for congestion and sore throat.    Eyes: Negative for visual disturbance.   Respiratory: Positive for cough and shortness of breath. Negative for chest tightness and wheezing.    Cardiovascular: Positive for chest pain. Negative for palpitations and leg swelling.   Gastrointestinal: Negative for abdominal pain, constipation, diarrhea, nausea and vomiting.   Genitourinary: Negative for dysuria and flank pain.   Musculoskeletal: Negative for arthralgias, back pain and myalgias.   Skin: Negative for color change and pallor.   Neurological: Positive for headaches. Negative for dizziness and weakness.   Psychiatric/Behavioral: Negative for agitation and confusion.     Objective:     Vital " Signs (Most Recent):  Temp: 99.1 °F (37.3 °C) (12/02/20 1340)  Pulse: 88 (12/02/20 1655)  Resp: (!) 27 (12/02/20 1655)  BP: 134/81 (12/02/20 1340)  SpO2: (!) 93 % (12/02/20 1655) Vital Signs (24h Range):  Temp:  [99.1 °F (37.3 °C)-101.5 °F (38.6 °C)] 99.1 °F (37.3 °C)  Pulse:  [85-98] 88  Resp:  [16-27] 27  SpO2:  [90 %-95 %] 93 %  BP: (120-167)/(72-81) 134/81     Weight: (!) 172.4 kg (380 lb)  Body mass index is 54.52 kg/m².    Physical Exam  Vitals signs reviewed.   Constitutional:       Appearance: Normal appearance. He is well-developed. He is obese. He is not toxic-appearing.   HENT:      Head: Normocephalic and atraumatic.      Right Ear: External ear normal.      Left Ear: External ear normal.      Nose: Congestion present.      Mouth/Throat:      Mouth: Mucous membranes are moist.      Pharynx: Oropharynx is clear. No oropharyngeal exudate.   Eyes:      General: No scleral icterus.     Conjunctiva/sclera: Conjunctivae normal.   Neck:      Musculoskeletal: Normal range of motion and neck supple.   Cardiovascular:      Rate and Rhythm: Normal rate and regular rhythm.      Heart sounds: Normal heart sounds.   Pulmonary:      Effort: Pulmonary effort is normal.      Breath sounds: Rales (minor, bilateral lung base) present. (on room air at time of exam)   Abdominal:      General: Bowel sounds are normal.      Palpations: Abdomen is soft.      Tenderness: There is no abdominal tenderness.   Musculoskeletal: Normal range of motion.      Right lower leg: No edema.      Left lower leg: No edema.   Skin:     General: Skin is warm and dry.   Neurological:      Mental Status: He is alert and oriented to person, place, and time.   Psychiatric:         Behavior: Behavior normal.             Significant Labs: All pertinent labs within the past 24 hours have been reviewed.    Significant Imaging: I have reviewed all pertinent imaging results/findings within the past 24 hours.    Assessment/Plan:     * COVID-19  COVID-19  Virus Infection  Viral Pneumonia due to COVID-19  - COVID-19 testing:   - Isolation: Airborne/Droplet. Surgical mask on patient. Notify Infection Control  - Diagnostics: Trend Q48hrs if stable, more frequently if patient decompensating        - CBC (q24)       - CMP (q24)       - Mg  (q24)       - D-dimer        - Ferritin       - CRP       - LDH       - Vitamin D (once)       - BNP (once)       - Troponin (negative)       - Procalcitonin (negative)       - ECG       - CXR  Lymphopenia, hyponatremia, hyperferritinemia, elevated troponin, elevated d-dimer, age, and medical comorbidities are significant predictors of poor clinical outcome    - Management: [facility]    - Telemetry & Continuous Pulse Oximetry    - Nutrition:        - Multivitamin PO daily       - Vitamin D 1000IU daily if deficient       - Ascorbic acid 500mg PO bid    - Supportive Care:       - acetaminophen 650mg PO Q6hr PRN fever/headache       - loperamide PRN viral diarrhea       - IVF if indicated, restrictive strategy preferred, no maintenance IV if able       - VTE PPx: enoxaparin or heparin SQ unless contraindicated    - Antibiotics: none, negative procal, clear chest x-ray     - Remdesivir ordered     - Dexamethasone 6 mg PO     - Convalescent Plasma none    Acute Hypoxemic Respiratory Failure    - Order RT consult via Respiratory Communication for COVID Protocols    - Incentive Spirometer Q4h, Flutter Valve Q4h    - Continuous Pulse Oximetry, goal SpO2 92-96%    - Supplemental O2 via LFNC, VentiMask, or HFNC (see Respiratory Support Oxygen Therapies)    - If wheezing, albuterol INH Q6h scheduled & PRN    - Proning Protocol if patient is a candidate (see  Proning Protocol)   - GCS >13, able to self-prone    - If deterioration, may warrant trial of NIPPV in neg pressure room or immediate ICU consult          Morbid obesity with body mass index of 60.0-69.9 in adult  - Enoxaparin BID for increased BMI      GAYLE  (obstructive sleep apnea)  - continue to monitor      GERD without esophagitis  - History of GERD, will add nexium if needed        VTE Risk Mitigation (From admission, onward)         Ordered     enoxaparin injection 40 mg  Every 12 hours      12/02/20 1305     IP VTE HIGH RISK PATIENT  Once      12/02/20 1305     Place sequential compression device  Until discontinued      12/02/20 1305                   Deven Joe MD  Department of Hospital Medicine   [facility] - ICU 14 [facility]

## 2020-12-02 NOTE — ED TRIAGE NOTES
Surjit Valentine, a 40 y.o. male presents to the ED COVID+ since yesterday complaint of worsening COVID+ symptoms, cough, fever, shortness of breath.    Triage note:  Chief Complaint   Patient presents with    COVID-19 Concerns     Patient is covid + as of yesterday.  Patient having worse symptoms today!     Review of patient's allergies indicates:  No Known Allergies  Past Medical History:   Diagnosis Date    GERD without esophagitis     takes OTC nexium PRN    History of kidney stones     Morbid obesity with body mass index of 60.0-69.9 in adult     GAYLE (obstructive sleep apnea)     did not tolerate CPAP

## 2020-12-03 LAB
ALBUMIN SERPL BCP-MCNC: 3.3 G/DL (ref 3.5–5.2)
ALP SERPL-CCNC: 62 U/L (ref 55–135)
ALT SERPL W/O P-5'-P-CCNC: 29 U/L (ref 10–44)
ANION GAP SERPL CALC-SCNC: 9 MMOL/L (ref 8–16)
APTT BLDCRRT: 33.4 SEC (ref 21–32)
AST SERPL-CCNC: 31 U/L (ref 10–40)
BASOPHILS # BLD AUTO: 0 K/UL (ref 0–0.2)
BASOPHILS NFR BLD: 0 % (ref 0–1.9)
BILIRUB SERPL-MCNC: 0.3 MG/DL (ref 0.1–1)
BUN SERPL-MCNC: 12 MG/DL (ref 6–20)
CALCIUM SERPL-MCNC: 8.9 MG/DL (ref 8.7–10.5)
CHLORIDE SERPL-SCNC: 104 MMOL/L (ref 95–110)
CO2 SERPL-SCNC: 25 MMOL/L (ref 23–29)
CREAT SERPL-MCNC: 0.8 MG/DL (ref 0.5–1.4)
DIFFERENTIAL METHOD: ABNORMAL
EOSINOPHIL # BLD AUTO: 0 K/UL (ref 0–0.5)
EOSINOPHIL NFR BLD: 0 % (ref 0–8)
ERYTHROCYTE [DISTWIDTH] IN BLOOD BY AUTOMATED COUNT: 12.3 % (ref 11.5–14.5)
EST. GFR  (AFRICAN AMERICAN): >60 ML/MIN/1.73 M^2
EST. GFR  (NON AFRICAN AMERICAN): >60 ML/MIN/1.73 M^2
GLUCOSE SERPL-MCNC: 124 MG/DL (ref 70–110)
HCT VFR BLD AUTO: 41.4 % (ref 40–54)
HGB BLD-MCNC: 13.6 G/DL (ref 14–18)
IMM GRANULOCYTES # BLD AUTO: 0.01 K/UL (ref 0–0.04)
IMM GRANULOCYTES NFR BLD AUTO: 0.3 % (ref 0–0.5)
INR PPP: 1 (ref 0.8–1.2)
LYMPHOCYTES # BLD AUTO: 0.9 K/UL (ref 1–4.8)
LYMPHOCYTES NFR BLD: 25.9 % (ref 18–48)
MAGNESIUM SERPL-MCNC: 2 MG/DL (ref 1.6–2.6)
MCH RBC QN AUTO: 29.7 PG (ref 27–31)
MCHC RBC AUTO-ENTMCNC: 32.9 G/DL (ref 32–36)
MCV RBC AUTO: 90 FL (ref 82–98)
MONOCYTES # BLD AUTO: 0.4 K/UL (ref 0.3–1)
MONOCYTES NFR BLD: 10.4 % (ref 4–15)
NEUTROPHILS # BLD AUTO: 2.1 K/UL (ref 1.8–7.7)
NEUTROPHILS NFR BLD: 63.4 % (ref 38–73)
NRBC BLD-RTO: 0 /100 WBC
PHOSPHATE SERPL-MCNC: 3.4 MG/DL (ref 2.7–4.5)
PLATELET # BLD AUTO: 148 K/UL (ref 150–350)
PMV BLD AUTO: 11.6 FL (ref 9.2–12.9)
POTASSIUM SERPL-SCNC: 4 MMOL/L (ref 3.5–5.1)
PROT SERPL-MCNC: 7.3 G/DL (ref 6–8.4)
PROTHROMBIN TIME: 11.1 SEC (ref 9–12.5)
RBC # BLD AUTO: 4.58 M/UL (ref 4.6–6.2)
SODIUM SERPL-SCNC: 138 MMOL/L (ref 136–145)
WBC # BLD AUTO: 3.36 K/UL (ref 3.9–12.7)

## 2020-12-03 PROCEDURE — 99232 SBSQ HOSP IP/OBS MODERATE 35: CPT | Mod: ,,, | Performed by: INTERNAL MEDICINE

## 2020-12-03 PROCEDURE — 85025 COMPLETE CBC W/AUTO DIFF WBC: CPT

## 2020-12-03 PROCEDURE — 99232 PR SUBSEQUENT HOSPITAL CARE,LEVL II: ICD-10-PCS | Mod: ,,, | Performed by: INTERNAL MEDICINE

## 2020-12-03 PROCEDURE — 94761 N-INVAS EAR/PLS OXIMETRY MLT: CPT

## 2020-12-03 PROCEDURE — 63600175 PHARM REV CODE 636 W HCPCS: Performed by: STUDENT IN AN ORGANIZED HEALTH CARE EDUCATION/TRAINING PROGRAM

## 2020-12-03 PROCEDURE — 85610 PROTHROMBIN TIME: CPT

## 2020-12-03 PROCEDURE — 25000003 PHARM REV CODE 250: Performed by: STUDENT IN AN ORGANIZED HEALTH CARE EDUCATION/TRAINING PROGRAM

## 2020-12-03 PROCEDURE — 25000003 PHARM REV CODE 250: Performed by: INTERNAL MEDICINE

## 2020-12-03 PROCEDURE — 84100 ASSAY OF PHOSPHORUS: CPT

## 2020-12-03 PROCEDURE — 11000001 HC ACUTE MED/SURG PRIVATE ROOM

## 2020-12-03 PROCEDURE — 36415 COLL VENOUS BLD VENIPUNCTURE: CPT

## 2020-12-03 PROCEDURE — 85730 THROMBOPLASTIN TIME PARTIAL: CPT

## 2020-12-03 PROCEDURE — 96372 THER/PROPH/DIAG INJ SC/IM: CPT | Mod: 59 | Performed by: EMERGENCY MEDICINE

## 2020-12-03 PROCEDURE — 80053 COMPREHEN METABOLIC PANEL: CPT

## 2020-12-03 PROCEDURE — 83735 ASSAY OF MAGNESIUM: CPT

## 2020-12-03 RX ORDER — ENOXAPARIN SODIUM 100 MG/ML
1.5 INJECTION SUBCUTANEOUS EVERY 24 HOURS
Status: DISCONTINUED | OUTPATIENT
Start: 2020-12-04 | End: 2020-12-03

## 2020-12-03 RX ORDER — MUPIROCIN 20 MG/G
OINTMENT TOPICAL 2 TIMES DAILY
Status: DISCONTINUED | OUTPATIENT
Start: 2020-12-03 | End: 2020-12-06 | Stop reason: HOSPADM

## 2020-12-03 RX ORDER — ENOXAPARIN SODIUM 100 MG/ML
1 INJECTION SUBCUTANEOUS EVERY 12 HOURS
Status: DISCONTINUED | OUTPATIENT
Start: 2020-12-03 | End: 2020-12-06 | Stop reason: HOSPADM

## 2020-12-03 RX ADMIN — MELATONIN TAB 3 MG 6 MG: 3 TAB at 09:12

## 2020-12-03 RX ADMIN — GUAIFENESIN AND DEXTROMETHORPHAN 10 ML: 100; 10 SYRUP ORAL at 09:12

## 2020-12-03 RX ADMIN — REMDESIVIR 100 MG: 100 INJECTION, POWDER, LYOPHILIZED, FOR SOLUTION INTRAVENOUS at 05:12

## 2020-12-03 RX ADMIN — THERA TABS 1 TABLET: TAB at 08:12

## 2020-12-03 RX ADMIN — DEXAMETHASONE 6 MG: 4 TABLET ORAL at 08:12

## 2020-12-03 RX ADMIN — ENOXAPARIN SODIUM 170 MG: 100 INJECTION SUBCUTANEOUS at 10:12

## 2020-12-03 RX ADMIN — ENOXAPARIN SODIUM 40 MG: 40 INJECTION SUBCUTANEOUS at 08:12

## 2020-12-03 RX ADMIN — OXYCODONE HYDROCHLORIDE AND ACETAMINOPHEN 500 MG: 500 TABLET ORAL at 08:12

## 2020-12-03 RX ADMIN — ACETAMINOPHEN 650 MG: 325 TABLET ORAL at 09:12

## 2020-12-03 RX ADMIN — MUPIROCIN: 20 OINTMENT TOPICAL at 09:12

## 2020-12-03 RX ADMIN — OXYCODONE HYDROCHLORIDE AND ACETAMINOPHEN 500 MG: 500 TABLET ORAL at 09:12

## 2020-12-03 NOTE — PLAN OF CARE
12/2/2020  8:00 AM   COVID-19 [U07.1]  COVID-19 [U07.1]   Covid+ 12/1    PMHX: GERD, GAYLE, and kidney stones who presents to hospital via emergency department for SOB, cough, nausea, vomiting, and congestion. He reports that about 5 days ago he started experiencing symptoms of congestion and runny nose.    Due to Covid precautions, CM contacted patient via his room phone to complete discharge assessment.   Pt lives alone in a single story home, 1 step entry.  He reports he will have assistance at home with errands, grocery shopping and his son will check in on in but he is adamant he does not want anyone in the home since he has Covid.  Equipment Used at Home: None.  Independent functional status, still working and driving.      Not a dialysis patient and is not on COUMADIN.  Contact phone numbers provided.  All questions answered.    When medically stable, anticipate discharge home - no needs.  Will need 6 minute walk test at discharge to determine if he requires home oxygen.  Patient is self pay.    CM to follow for discharge planning needs.  RENAE Donnelly RN  Case Management  [phone]       BHAVESH Ramos     Payor: /  SELF PAY      [id number] - [place] - [address]  [address]  [place] [address]  Phone: [phone] Fax: [phone]       Extended Emergency Contact Information  Primary Emergency Contact: ALYX CH  Mobile Phone: [phone]  Relation: Sister        12/03/20 0241   Discharge Assessment   Assessment Type Discharge Planning Assessment   Confirmed/corrected address and phone number on facesheet? Yes   Assessment information obtained from? Patient   Expected Length of Stay (days) 1   Communicated expected length of stay with patient/caregiver yes   Prior to hospitilization cognitive status: Alert/Oriented   Prior to hospitalization functional status: Independent   Current cognitive status: Alert/Oriented   Current Functional Status: Independent   Facility  Arrived From: NA   Lives With alone   Able to Return to Prior Arrangements yes   Is patient able to care for self after discharge? Yes   Who are your caregiver(s) and their phone number(s)? Sister Bárbara Valentine [phone]   Patient's perception of discharge disposition home or selfcare   Readmission Within the Last 30 Days no previous admission in last 30 days   Patient currently being followed by outpatient case management? No   Patient currently receives any other outside agency services? No   Equipment Currently Used at Home none   Do you have any problems affording any of your prescribed medications? TBD   Is the patient taking medications as prescribed? yes   Does the patient have transportation home? Yes   Transportation Anticipated family or friend will provide  (Patient reports he has someone that can provide transportation)   Dialysis Name and Scheduled days NA   Does the patient receive services at the Coumadin Clinic? No   Discharge Plan A Home   Discharge Plan B Home   DME Needed Upon Discharge  other (see comments)  (TBD)   Patient/Family in Agreement with Plan yes

## 2020-12-03 NOTE — PLAN OF CARE
Neuro:     AAOx4              Resp:     RA sat 92%, during sleep sats down to 87-88%, pt requested O2, placed on 2L/min/NC- sats remain mid-high 90s%, lungs dim throughout  Cardiac:     WNL, NSR-SB on tele  GI:      obese, denies N/V/D, no BM this shift  :     voids w/out difficutly in urinal  Musculoskeletal:    gen weakness/fatigue but ambulatory in room independently  Integumentary:      intact   Plan:     COVID protocol, resp support, monitor labs  Other:      d/c home when medically appropriate

## 2020-12-03 NOTE — RESEARCH
Date Consent signed: 12/03/2020    Sponsor: [facility]    Study Title/IRB Number: [id number] [facility]     Principle Investigator: Dr Marco A Manriquez    Present for Discussion: Mr Valentine, CRC Jacey Duke, CRC Jade Woods (witness)    Is LAR Consenting for Subject: no    Prior to the Informed Consent (IC) being signed, or any study protocol required data collection, testing, procedure, or intervention being performed, the following was done and/or discussed:   Patient was given a copy of the IC for review    Purpose of the study and qualifications to participate    Study design, Follow up schedule, and tests or procedures done at each visit   Confidentiality and HIPAA Authorization for Release of Medical Records for the research trial/ subject's rights/research related injury   Risk, Benefits, Alternative Treatments, Compensation and Costs   Participation in the research trial is voluntary and patient may withdraw at anytime   Contact information for study related questions    Patient verbalizes understanding of the above: Yes  Contact information for CRC and PI given to patient: Yes  Patient able to adequately summarize: the purpose of the study, the risks associated with the study, and all procedures, testing, and follow-ups associated with the study: Yes    Mr Valentine signed the informed consent form for the [facility] research study.  Each page of the consent form was reviewed with him and all questions answered satisfactorily. He signed the consent form and received a copy of same. The original consent was scanned into electronic medical records (EPIC) and filed into the subject's research study binder.    This study randomizes patients between therapeutic dose heparin (enoxaparin preferred) and usual care (including prophylactic doses).  Dr. Mcknight, the attending, and Dr. Joe, her resident, agree that the patient is a good candidate for the study. After the consent, Mr. Valentine was randomized to  therapeutic dose enoxaparin. The study recommends either 1.0mg/kg Q12 or 1.5mg/kg Q24, which his doctors were made aware of. His current prophylactic dose will be changing. His study ID is [id number].    Thank you for the help in enrolling Mr. Valentine. Please contact [phone] Jacey Duke with questions.

## 2020-12-03 NOTE — HOSPITAL COURSE
Patient admitted to hospital medicine on 12/2, given steroids and remdesivir. Although doing well on room air at rest he tends to desaturate to low 90's- high 80's with any exertion. He also becomes hypoxic while sleeping due to GAYLE. Will continue to trend inflammatory markers (trending down as of 12/4) and provide supportive care. Inflammatory markers trending down on 12/6. Patient has completed last dose of remdesivir. Passed 6 minute walk test with 90-92% O2 on room air, stable for discharge

## 2020-12-03 NOTE — PLAN OF CARE
12/03/20 1556   Post-Acute Status   Post-Acute Authorization Other   Other Status No Post-Acute Service Needs   Discharge Delays None known at this time   Discharge Plan   Discharge Plan A Home   Discharge Plan B Home     CM to follow for discharge planning needs.  RENAE Donnelly RN  Case Management  [id number]

## 2020-12-03 NOTE — PROGRESS NOTES
[facility] - ICU 14 [facility] Medicine  Progress Note    Patient Name: Surjit Valentine  MRN: [id number]  Patient Class: IP- Inpatient   Admission Date: 12/2/2020  Length of Stay: 0 days  Attending Physician: Yamilet Mcknight MD  Primary Care Provider: BHAVESH Ramos        Subjective:     Principal Problem:COVID-19        HPI:  Patient is a 40 year old male with history of GERD, GAYLE, and kidney stones who presents to hospital via emergency department for SOB, cough, nausea, vomiting, and congestion. He reports that about 5 days ago he started experiencing symptoms of congestion and runny nose. 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 He went to urgent care yesterday where he tested positive for the virus. He tried going home and self isolating but felt that his symptoms were not manageable at home as nothing he did alleviated his symptoms. 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 At the time of my exam, patient endorses mild headache, chills, dyspnea on exertion, and persistent cough. He denies any further chest pain, abdominal pain, anosmia, diarrhea, or dysuria.       Overview/Hospital Course:  Patient admitted to hospital medicine on 12/2, given steroids and remdesivir. Although doing well on room air at rest he tends to desaturate to low 90's- high 80's with any exertion. He also becomes hypoxic while sleeping due to GAYLE. Will continue to trend inflammatory markers and provide supportive care.     Interval History: No acute events overnight, patient hypoxic on exertion but is otherwise comfortable, continue supportive care.      Review of Systems   Constitutional: Positive for activity change, chills, fatigue and fever. Negative for appetite change.   HENT: Positive for congestion and sore throat.    Eyes: Negative for visual disturbance.   Respiratory: Positive for cough and shortness of breath. Negative for chest tightness and wheezing.    Cardiovascular: Positive for chest pain. Negative for palpitations and leg swelling.   Gastrointestinal: Negative for abdominal pain, constipation, diarrhea, nausea and vomiting.   Genitourinary: Negative for dysuria and flank pain.   Musculoskeletal: Negative for arthralgias, back pain and myalgias.   Skin: Negative for color change and pallor.   Neurological: Positive for headaches. Negative for dizziness and weakness.   Psychiatric/Behavioral: Negative for agitation and confusion.     Objective:     Vital Signs (Most Recent):  Temp: 98.3 °F (36.8 °C) (12/03/20 0749)  Pulse: 74 (12/03/20 0757)  Resp: (!) 21 (12/03/20 0757)  BP: 113/63 (12/03/20 0749)  SpO2: 96 % (12/03/20 1039) Vital Signs (24h Range):  Temp:  [98.3 °F (36.8 °C)-100.5 °F (38.1 °C)] 98.3 °F (36.8 °C)  Pulse:  [55-89] 74  Resp:  [21-29] 21  SpO2:  [88 %-96 %] 96 %  BP: (113-143)/(63-82) 113/63     Weight: (!) 172.4 kg (380 lb)  Body mass index is 54.52 kg/m².    Intake/Output Summary (Last 24 hours) at 12/3/2020 1130  Last data filed at 12/3/2020 0600  Gross per 24 hour   Intake 360 ml   Output --   Net 360 ml      Physical Exam  Vitals signs reviewed.   Constitutional:       Appearance: Normal appearance. He is well-developed. He is obese. He is not toxic-appearing.   HENT:      Head: Normocephalic and atraumatic.      Right Ear: External ear normal.      Left Ear: External ear normal.      Nose: Congestion present.      Mouth/Throat:      Mouth: Mucous membranes are moist.      Pharynx: Oropharynx is clear. No oropharyngeal exudate.   Eyes:      General: No scleral icterus.     Conjunctiva/sclera: Conjunctivae normal.   Neck:       Musculoskeletal: Normal range of motion and neck supple.   Cardiovascular:      Rate and Rhythm: Normal rate and regular rhythm.      Heart sounds: Normal heart sounds.   Pulmonary:      Effort: Pulmonary effort is normal.      Breath sounds: Rales (minor, bilateral lung base) present.   Abdominal:      General: Bowel sounds are normal.      Palpations: Abdomen is soft.      Tenderness: There is no abdominal tenderness.   Musculoskeletal: Normal range of motion.      Right lower leg: No edema.      Left lower leg: No edema.   Skin:     General: Skin is warm and dry.   Neurological:      Mental Status: He is alert and oriented to person, place, and time.   Psychiatric:         Behavior: Behavior normal.         Significant Labs: All pertinent labs within the past 24 hours have been reviewed.    Significant Imaging: I have reviewed all pertinent imaging results/findings within the past 24 hours.      Assessment/Plan:      * COVID-19  COVID-19 Virus Infection  Viral Pneumonia due to COVID-19  - COVID-19 testing:   - Isolation: Airborne/Droplet. Surgical mask on patient. Notify Infection Control  - Diagnostics: Trend Q48hrs if stable, more frequently if patient decompensating        - CBC (q24)       - CMP (q24)       - Mg  (q24)       - D-dimer        - Ferritin       - CRP       - LDH       - Vitamin D (once)       - BNP (once)       - Troponin (negative)       - Procalcitonin (negative)       - ECG       - CXR  Lymphopenia, hyponatremia, hyperferritinemia, elevated troponin, elevated d-dimer, age, and medical comorbidities are significant predictors of poor clinical outcome    - Management: Per [facility] COVID Treatment Protocol    - Telemetry & Continuous Pulse Oximetry    - Nutrition:        - Multivitamin PO daily       - Vitamin D 1000IU daily if deficient       - Ascorbic acid 500mg PO bid    - Supportive Care:       - acetaminophen 650mg PO Q6hr PRN fever/headache       - loperamide PRN viral diarrhea       - IVF  if indicated, restrictive strategy preferred, no maintenance IV if able       - VTE PPx: enoxaparin or heparin SQ unless contraindicated    - Antibiotics: none, negative procal, clear chest x-ray     - Remdesivir ordered     - Dexamethasone 6 mg PO     - Convalescent Plasma none    Acute Hypoxemic Respiratory Failure    - Order RT consult via Respiratory Communication for COVID Protocols    - Incentive Spirometer Q4h, Flutter Valve Q4h    - Continuous Pulse Oximetry, goal SpO2 92-96%    - Supplemental O2 via LFNC, VentiMask, or HFNC (see Respiratory Support Oxygen Therapies)    - If wheezing, albuterol INH Q6h scheduled & PRN    - Proning Protocol if patient is a candidate (see  Proning Protocol)   - GCS >13, able to self-prone    - If deterioration, may warrant trial of NIPPV in neg pressure room or immediate ICU consult          Morbid obesity with body mass index of 60.0-69.9 in adult  - Enoxaparin BID for increased BMI      GAYLE (obstructive sleep apnea)  - continue to monitor      GERD without esophagitis  - History of GERD, will add nexium if needed        VTE Risk Mitigation (From admission, onward)         Ordered     enoxaparin injection 40 mg  Every 12 hours      12/02/20 1305     IP VTE HIGH RISK PATIENT  Once      12/02/20 1305     Place sequential compression device  Until discontinued      12/02/20 1305                Discharge Planning   VIKRAM:      Code Status: Full Code   Is the patient medically ready for discharge?:     Reason for patient still in hospital (select all that apply): Patient trending condition and Treatment                     Deven Joe MD  Department of Hospital Medicine   [facility] - ICU [facility]

## 2020-12-03 NOTE — SUBJECTIVE & OBJECTIVE
Interval History: No acute events overnight, patient hypoxic on exertion but is otherwise comfortable, continue supportive care.     Review of Systems   Constitutional: Positive for activity change, chills, fatigue and fever. Negative for appetite change.   HENT: Positive for congestion and sore throat.    Eyes: Negative for visual disturbance.   Respiratory: Positive for cough and shortness of breath. Negative for chest tightness and wheezing.    Cardiovascular: Positive for chest pain. Negative for palpitations and leg swelling.   Gastrointestinal: Negative for abdominal pain, constipation, diarrhea, nausea and vomiting.   Genitourinary: Negative for dysuria and flank pain.   Musculoskeletal: Negative for arthralgias, back pain and myalgias.   Skin: Negative for color change and pallor.   Neurological: Positive for headaches. Negative for dizziness and weakness.   Psychiatric/Behavioral: Negative for agitation and confusion.     Objective:     Vital Signs (Most Recent):  Temp: 98.3 °F (36.8 °C) (12/03/20 0749)  Pulse: 74 (12/03/20 0757)  Resp: (!) 21 (12/03/20 0757)  BP: 113/63 (12/03/20 0749)  SpO2: 96 % (12/03/20 1039) Vital Signs (24h Range):  Temp:  [98.3 °F (36.8 °C)-100.5 °F (38.1 °C)] 98.3 °F (36.8 °C)  Pulse:  [55-89] 74  Resp:  [21-29] 21  SpO2:  [88 %-96 %] 96 %  BP: (113-143)/(63-82) 113/63     Weight: (!) 172.4 kg (380 lb)  Body mass index is 54.52 kg/m².    Intake/Output Summary (Last 24 hours) at 12/3/2020 1130  Last data filed at 12/3/2020 0600  Gross per 24 hour   Intake 360 ml   Output --   Net 360 ml      Physical Exam  Vitals signs reviewed.   Constitutional:       Appearance: Normal appearance. He is well-developed. He is obese. He is not toxic-appearing.   HENT:      Head: Normocephalic and atraumatic.      Right Ear: External ear normal.      Left Ear: External ear normal.      Nose: Congestion present.      Mouth/Throat:      Mouth: Mucous membranes are moist.      Pharynx: Oropharynx is  clear. No oropharyngeal exudate.   Eyes:      General: No scleral icterus.     Conjunctiva/sclera: Conjunctivae normal.   Neck:      Musculoskeletal: Normal range of motion and neck supple.   Cardiovascular:      Rate and Rhythm: Normal rate and regular rhythm.      Heart sounds: Normal heart sounds.   Pulmonary:      Effort: Pulmonary effort is normal.      Breath sounds: Rales (minor, bilateral lung base) present.   Abdominal:      General: Bowel sounds are normal.      Palpations: Abdomen is soft.      Tenderness: There is no abdominal tenderness.   Musculoskeletal: Normal range of motion.      Right lower leg: No edema.      Left lower leg: No edema.   Skin:     General: Skin is warm and dry.   Neurological:      Mental Status: He is alert and oriented to person, place, and time.   Psychiatric:         Behavior: Behavior normal.         Significant Labs: All pertinent labs within the past 24 hours have been reviewed.    Significant Imaging: I have reviewed all pertinent imaging results/findings within the past 24 hours.

## 2020-12-04 LAB
ALBUMIN SERPL BCP-MCNC: 3.1 G/DL (ref 3.5–5.2)
ALP SERPL-CCNC: 55 U/L (ref 55–135)
ALT SERPL W/O P-5'-P-CCNC: 40 U/L (ref 10–44)
ANION GAP SERPL CALC-SCNC: 8 MMOL/L (ref 8–16)
AST SERPL-CCNC: 37 U/L (ref 10–40)
BASOPHILS # BLD AUTO: 0.01 K/UL (ref 0–0.2)
BASOPHILS NFR BLD: 0.2 % (ref 0–1.9)
BILIRUB SERPL-MCNC: 0.3 MG/DL (ref 0.1–1)
BUN SERPL-MCNC: 16 MG/DL (ref 6–20)
CALCIUM SERPL-MCNC: 8.4 MG/DL (ref 8.7–10.5)
CHLORIDE SERPL-SCNC: 106 MMOL/L (ref 95–110)
CO2 SERPL-SCNC: 27 MMOL/L (ref 23–29)
CREAT SERPL-MCNC: 0.8 MG/DL (ref 0.5–1.4)
CRP SERPL-MCNC: 20.6 MG/L (ref 0–8.2)
D DIMER PPP IA.FEU-MCNC: <0.19 MG/L FEU
DIFFERENTIAL METHOD: ABNORMAL
EOSINOPHIL # BLD AUTO: 0 K/UL (ref 0–0.5)
EOSINOPHIL NFR BLD: 0.2 % (ref 0–8)
ERYTHROCYTE [DISTWIDTH] IN BLOOD BY AUTOMATED COUNT: 12.5 % (ref 11.5–14.5)
EST. GFR  (AFRICAN AMERICAN): >60 ML/MIN/1.73 M^2
EST. GFR  (NON AFRICAN AMERICAN): >60 ML/MIN/1.73 M^2
FERRITIN SERPL-MCNC: 1443 NG/ML (ref 20–300)
GLUCOSE SERPL-MCNC: 161 MG/DL (ref 70–110)
HCT VFR BLD AUTO: 42.1 % (ref 40–54)
HGB BLD-MCNC: 14.1 G/DL (ref 14–18)
IMM GRANULOCYTES # BLD AUTO: 0.03 K/UL (ref 0–0.04)
IMM GRANULOCYTES NFR BLD AUTO: 0.5 % (ref 0–0.5)
LYMPHOCYTES # BLD AUTO: 1.2 K/UL (ref 1–4.8)
LYMPHOCYTES NFR BLD: 20.1 % (ref 18–48)
MAGNESIUM SERPL-MCNC: 1.9 MG/DL (ref 1.6–2.6)
MCH RBC QN AUTO: 31.1 PG (ref 27–31)
MCHC RBC AUTO-ENTMCNC: 33.5 G/DL (ref 32–36)
MCV RBC AUTO: 93 FL (ref 82–98)
MONOCYTES # BLD AUTO: 0.5 K/UL (ref 0.3–1)
MONOCYTES NFR BLD: 8.9 % (ref 4–15)
NEUTROPHILS # BLD AUTO: 4.3 K/UL (ref 1.8–7.7)
NEUTROPHILS NFR BLD: 70.1 % (ref 38–73)
NRBC BLD-RTO: 0 /100 WBC
PHOSPHATE SERPL-MCNC: 3.1 MG/DL (ref 2.7–4.5)
PLATELET # BLD AUTO: 130 K/UL (ref 150–350)
PLATELET BLD QL SMEAR: ABNORMAL
PMV BLD AUTO: 11.4 FL (ref 9.2–12.9)
POTASSIUM SERPL-SCNC: 4.1 MMOL/L (ref 3.5–5.1)
PROT SERPL-MCNC: 7.1 G/DL (ref 6–8.4)
RBC # BLD AUTO: 4.54 M/UL (ref 4.6–6.2)
SODIUM SERPL-SCNC: 141 MMOL/L (ref 136–145)
WBC # BLD AUTO: 6.07 K/UL (ref 3.9–12.7)

## 2020-12-04 PROCEDURE — 25000003 PHARM REV CODE 250: Performed by: INTERNAL MEDICINE

## 2020-12-04 PROCEDURE — 25000003 PHARM REV CODE 250: Performed by: STUDENT IN AN ORGANIZED HEALTH CARE EDUCATION/TRAINING PROGRAM

## 2020-12-04 PROCEDURE — 85025 COMPLETE CBC W/AUTO DIFF WBC: CPT

## 2020-12-04 PROCEDURE — 99232 PR SUBSEQUENT HOSPITAL CARE,LEVL II: ICD-10-PCS | Mod: ,,, | Performed by: INTERNAL MEDICINE

## 2020-12-04 PROCEDURE — 99232 SBSQ HOSP IP/OBS MODERATE 35: CPT | Mod: ,,, | Performed by: INTERNAL MEDICINE

## 2020-12-04 PROCEDURE — 84100 ASSAY OF PHOSPHORUS: CPT

## 2020-12-04 PROCEDURE — 82728 ASSAY OF FERRITIN: CPT

## 2020-12-04 PROCEDURE — 36415 COLL VENOUS BLD VENIPUNCTURE: CPT

## 2020-12-04 PROCEDURE — 83735 ASSAY OF MAGNESIUM: CPT

## 2020-12-04 PROCEDURE — 11000001 HC ACUTE MED/SURG PRIVATE ROOM

## 2020-12-04 PROCEDURE — 63600175 PHARM REV CODE 636 W HCPCS: Performed by: STUDENT IN AN ORGANIZED HEALTH CARE EDUCATION/TRAINING PROGRAM

## 2020-12-04 PROCEDURE — 86140 C-REACTIVE PROTEIN: CPT

## 2020-12-04 PROCEDURE — 85379 FIBRIN DEGRADATION QUANT: CPT

## 2020-12-04 PROCEDURE — 80053 COMPREHEN METABOLIC PANEL: CPT

## 2020-12-04 RX ORDER — BENZONATATE 100 MG/1
100 CAPSULE ORAL 3 TIMES DAILY PRN
Status: DISCONTINUED | OUTPATIENT
Start: 2020-12-04 | End: 2020-12-06 | Stop reason: HOSPADM

## 2020-12-04 RX ADMIN — THERA TABS 1 TABLET: TAB at 08:12

## 2020-12-04 RX ADMIN — REMDESIVIR 100 MG: 100 INJECTION, POWDER, LYOPHILIZED, FOR SOLUTION INTRAVENOUS at 05:12

## 2020-12-04 RX ADMIN — ENOXAPARIN SODIUM 170 MG: 100 INJECTION SUBCUTANEOUS at 08:12

## 2020-12-04 RX ADMIN — DEXAMETHASONE 6 MG: 4 TABLET ORAL at 08:12

## 2020-12-04 RX ADMIN — ENOXAPARIN SODIUM 170 MG: 100 INJECTION SUBCUTANEOUS at 09:12

## 2020-12-04 RX ADMIN — OXYCODONE HYDROCHLORIDE AND ACETAMINOPHEN 500 MG: 500 TABLET ORAL at 08:12

## 2020-12-04 RX ADMIN — OXYCODONE HYDROCHLORIDE AND ACETAMINOPHEN 500 MG: 500 TABLET ORAL at 09:12

## 2020-12-04 RX ADMIN — MUPIROCIN: 20 OINTMENT TOPICAL at 08:12

## 2020-12-04 NOTE — PLAN OF CARE
Maintaining o2 sats on room air.  Instruct to notify staff if he begins to experience any dyspnea.  Continuous pulse ox, to monitor oxygen levels.  Covid Isolation continues, sign on door.  Hand hygiene, appropriate PPE, and aseptic techniques when managing any invasive lines per protocol.  Plan of care continues and updated as needed.

## 2020-12-04 NOTE — PROGRESS NOTES
[facility] - ICU 14 [facility] Medicine  Progress Note    Patient Name: Surjit Valentine  MRN: [id number]  Patient Class: IP- Inpatient   Admission Date: 12/2/2020  Length of Stay: 1 days  Attending Physician: Yamilet Mcknight MD  Primary Care Provider: BHAVESH Ramos        Subjective:     Principal Problem:COVID-19        HPI:  Patient is a 40 year old male with history of GERD, GAYLE, and kidney stones who presents to hospital via emergency department for SOB, cough, nausea, vomiting, and congestion. He reports that about 5 days ago he started experiencing symptoms of congestion and runny nose. 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 He went to urgent care yesterday where he tested positive for the virus. He tried going home and self isolating but felt that his symptoms were not manageable at home as nothing he did alleviated his symptoms. 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 At the time of my exam, patient endorses mild headache, chills, dyspnea on exertion, and persistent cough. He denies any further chest pain, abdominal pain, anosmia, diarrhea, or dysuria.       Overview/Hospital Course:  Patient admitted to hospital medicine on 12/2, given steroids and remdesivir. Although doing well on room air at rest he tends to desaturate to low 90's- high 80's with any exertion. He also becomes hypoxic while sleeping due to GAYLE. Will continue to trend inflammatory markers (trending down as of 12/4) and provide supportive care.     Interval History: No acute events overnight, patient with concerns about his persistent cough (non-productive).  Still intermittently requiring 2-3 L O2.      Review of Systems   Constitutional: Positive for activity change, chills and fatigue. Negative for appetite change and fever.   HENT: Positive for sore throat. Negative for congestion.    Eyes: Negative for visual disturbance.   Respiratory: Positive for cough and shortness of breath. Negative for chest tightness and wheezing.    Cardiovascular: Positive for chest pain. Negative for palpitations and leg swelling.   Gastrointestinal: Negative for abdominal pain, constipation, diarrhea, nausea and vomiting.   Genitourinary: Negative for dysuria and flank pain.   Musculoskeletal: Negative for arthralgias, back pain and myalgias.   Skin: Negative for color change and pallor.   Neurological: Negative for dizziness, weakness and headaches.   Psychiatric/Behavioral: Negative for agitation and confusion.     Objective:     Vital Signs (Most Recent):  Temp: 98 °F (36.7 °C) (12/04/20 0815)  Pulse: 63 (12/04/20 1054)  Resp: 12 (12/04/20 0815)  BP: (!) 145/75 (12/04/20 0815)  SpO2: 95 % (12/04/20 1057) Vital Signs (24h Range):  Temp:  [98 °F (36.7 °C)-98.8 °F (37.1 °C)] 98 °F (36.7 °C)  Pulse:  [55-72] 63  Resp:  [12-27] 12  SpO2:  [92 %-96 %] 95 %  BP: (104-145)/(63-83) 145/75     Weight: (!) 172.4 kg (380 lb)  Body mass index is 54.52 kg/m².    Intake/Output Summary (Last 24 hours) at 12/4/2020 1126  Last data filed at 12/3/2020 2200  Gross per 24 hour   Intake 960 ml   Output --   Net 960 ml      Physical Exam  Vitals signs reviewed.   Constitutional:       Appearance: Normal appearance. He is well-developed. He is obese. He is not toxic-appearing.   HENT:      Head: Normocephalic and atraumatic.      Right Ear: External ear normal.      Left Ear: External ear normal.      Nose: No congestion.      Mouth/Throat:      Mouth: Mucous membranes are moist.      Pharynx: Oropharynx is clear. No oropharyngeal exudate.   Eyes:      General: No scleral icterus.     Conjunctiva/sclera:  Conjunctivae normal.   Neck:      Musculoskeletal: Normal range of motion and neck supple.   Cardiovascular:      Rate and Rhythm: Normal rate and regular rhythm.      Heart sounds: Normal heart sounds.   Pulmonary:      Effort: Pulmonary effort is normal.      Breath sounds: Rales (minor, bilateral lung base) present.   Abdominal:      General: Bowel sounds are normal.      Palpations: Abdomen is soft.      Tenderness: There is no abdominal tenderness.   Musculoskeletal: Normal range of motion.      Right lower leg: No edema.      Left lower leg: No edema.   Skin:     General: Skin is warm and dry.   Neurological:      Mental Status: He is alert and oriented to person, place, and time.   Psychiatric:         Behavior: Behavior normal.         Significant Labs: All pertinent labs within the past 24 hours have been reviewed.    Significant Imaging: I have reviewed all pertinent imaging results/findings within the past 24 hours.      Assessment/Plan:      * COVID-19  COVID-19 Virus Infection  Viral Pneumonia due to COVID-19  - COVID-19 testing:   - Isolation: Airborne/Droplet. Surgical mask on patient. Notify Infection Control  - Diagnostics: Trend Q48hrs if stable, more frequently if patient decompensating        - CBC (q24)       - CMP (q24)       - Mg  (q24)       - D-dimer        - Ferritin       - CRP       - LDH       - Vitamin D (once)       - BNP (once)       - Troponin (negative)       - Procalcitonin (negative)       - ECG       - CXR  Lymphopenia, hyponatremia, hyperferritinemia, elevated troponin, elevated d-dimer, age, and medical comorbidities are significant predictors of poor clinical outcome    - Management: Per [facility] COVID Treatment Protocol    - Telemetry & Continuous Pulse Oximetry    - Nutrition:        - Multivitamin PO daily       - Vitamin D 1000IU daily if deficient       - Ascorbic acid 500mg PO bid    - Supportive Care:       - acetaminophen 650mg PO Q6hr PRN fever/headache       -  loperamide PRN viral diarrhea       - IVF if indicated, restrictive strategy preferred, no maintenance IV if able       - VTE PPx: enoxaparin or heparin SQ unless contraindicated    - Antibiotics: none, negative procal, clear chest x-ray     - Remdesivir ordered     - Dexamethasone 6 mg PO     - Convalescent Plasma none    Acute Hypoxemic Respiratory Failure    - Order RT consult via Respiratory Communication for COVID Protocols    - Incentive Spirometer Q4h, Flutter Valve Q4h    - Continuous Pulse Oximetry, goal SpO2 92-96%    - Supplemental O2 via LFNC, VentiMask, or HFNC (see Respiratory Support Oxygen Therapies)    - If wheezing, albuterol INH Q6h scheduled & PRN    - Proning Protocol if patient is a candidate (see  Proning Protocol)   - GCS >13, able to self-prone    - If deterioration, may warrant trial of NIPPV in neg pressure room or immediate ICU consult          Morbid obesity with body mass index of 60.0-69.9 in adult  - Enoxaparin BID for increased BMI      GAYLE (obstructive sleep apnea)  - continue to monitor      GERD without esophagitis  - History of GERD, will add nexium if needed        VTE Risk Mitigation (From admission, onward)         Ordered     enoxaparin injection 170 mg  Every 12 hours      12/03/20 1537     IP VTE HIGH RISK PATIENT  Once      12/02/20 1305     Place sequential compression device  Until discontinued      12/02/20 1305                Discharge Planning   VIKRAM: 12/7/2020     Code Status: Full Code   Is the patient medically ready for discharge?: No    Reason for patient still in hospital (select all that apply): Patient trending condition and Treatment  Discharge Plan A: Home   Discharge Delays: None known at this time              Deven Joe MD  Department of Hospital Medicine   [facility] - ICU [facility]

## 2020-12-04 NOTE — SUBJECTIVE & OBJECTIVE
Interval History: No acute events overnight, patient with concerns about his persistent cough (non-productive). Still intermittently requiring 2-3 L O2.      Review of Systems   Constitutional: Positive for activity change, chills and fatigue. Negative for appetite change and fever.   HENT: Positive for sore throat. Negative for congestion.    Eyes: Negative for visual disturbance.   Respiratory: Positive for cough and shortness of breath. Negative for chest tightness and wheezing.    Cardiovascular: Positive for chest pain. Negative for palpitations and leg swelling.   Gastrointestinal: Negative for abdominal pain, constipation, diarrhea, nausea and vomiting.   Genitourinary: Negative for dysuria and flank pain.   Musculoskeletal: Negative for arthralgias, back pain and myalgias.   Skin: Negative for color change and pallor.   Neurological: Negative for dizziness, weakness and headaches.   Psychiatric/Behavioral: Negative for agitation and confusion.     Objective:     Vital Signs (Most Recent):  Temp: 98 °F (36.7 °C) (12/04/20 0815)  Pulse: 63 (12/04/20 1054)  Resp: 12 (12/04/20 0815)  BP: (!) 145/75 (12/04/20 0815)  SpO2: 95 % (12/04/20 1057) Vital Signs (24h Range):  Temp:  [98 °F (36.7 °C)-98.8 °F (37.1 °C)] 98 °F (36.7 °C)  Pulse:  [55-72] 63  Resp:  [12-27] 12  SpO2:  [92 %-96 %] 95 %  BP: (104-145)/(63-83) 145/75     Weight: (!) 172.4 kg (380 lb)  Body mass index is 54.52 kg/m².    Intake/Output Summary (Last 24 hours) at 12/4/2020 1126  Last data filed at 12/3/2020 2200  Gross per 24 hour   Intake 960 ml   Output --   Net 960 ml      Physical Exam  Vitals signs reviewed.   Constitutional:       Appearance: Normal appearance. He is well-developed. He is obese. He is not toxic-appearing.   HENT:      Head: Normocephalic and atraumatic.      Right Ear: External ear normal.      Left Ear: External ear normal.      Nose: No congestion.      Mouth/Throat:      Mouth: Mucous membranes are moist.      Pharynx:  Oropharynx is clear. No oropharyngeal exudate.   Eyes:      General: No scleral icterus.     Conjunctiva/sclera: Conjunctivae normal.   Neck:      Musculoskeletal: Normal range of motion and neck supple.   Cardiovascular:      Rate and Rhythm: Normal rate and regular rhythm.      Heart sounds: Normal heart sounds.   Pulmonary:      Effort: Pulmonary effort is normal.      Breath sounds: Rales (minor, bilateral lung base) present.   Abdominal:      General: Bowel sounds are normal.      Palpations: Abdomen is soft.      Tenderness: There is no abdominal tenderness.   Musculoskeletal: Normal range of motion.      Right lower leg: No edema.      Left lower leg: No edema.   Skin:     General: Skin is warm and dry.   Neurological:      Mental Status: He is alert and oriented to person, place, and time.   Psychiatric:         Behavior: Behavior normal.         Significant Labs: All pertinent labs within the past 24 hours have been reviewed.    Significant Imaging: I have reviewed all pertinent imaging results/findings within the past 24 hours.

## 2020-12-04 NOTE — PLAN OF CARE
Neuro:  AAOx4                 Resp:   RA while awake, 2L/NC @ HS, (+) NP cough w/chest discomfort  Cardiac:   NSR/SB on tele, VSS  GI:     obese, no BM this shift, denies N/V/D  :     voids w/out difficulty in urinal  Musculoskeletal:    gen weakness/fatigue but independent w/ADLs  Integumentary:    intact  Plan:      resp support, monitor, COVID protocol  Other:      hopefully d/c home soon- when medically appropriate

## 2020-12-05 LAB
ALBUMIN SERPL BCP-MCNC: 3.1 G/DL (ref 3.5–5.2)
ALP SERPL-CCNC: 57 U/L (ref 55–135)
ALT SERPL W/O P-5'-P-CCNC: 47 U/L (ref 10–44)
ANION GAP SERPL CALC-SCNC: 10 MMOL/L (ref 8–16)
AST SERPL-CCNC: 31 U/L (ref 10–40)
BASOPHILS # BLD AUTO: 0.01 K/UL (ref 0–0.2)
BASOPHILS NFR BLD: 0.1 % (ref 0–1.9)
BILIRUB SERPL-MCNC: 0.4 MG/DL (ref 0.1–1)
BUN SERPL-MCNC: 14 MG/DL (ref 6–20)
CALCIUM SERPL-MCNC: 8.7 MG/DL (ref 8.7–10.5)
CHLORIDE SERPL-SCNC: 105 MMOL/L (ref 95–110)
CO2 SERPL-SCNC: 28 MMOL/L (ref 23–29)
CREAT SERPL-MCNC: 0.7 MG/DL (ref 0.5–1.4)
DIFFERENTIAL METHOD: ABNORMAL
EOSINOPHIL # BLD AUTO: 0 K/UL (ref 0–0.5)
EOSINOPHIL NFR BLD: 0 % (ref 0–8)
ERYTHROCYTE [DISTWIDTH] IN BLOOD BY AUTOMATED COUNT: 12.4 % (ref 11.5–14.5)
EST. GFR  (AFRICAN AMERICAN): >60 ML/MIN/1.73 M^2
EST. GFR  (NON AFRICAN AMERICAN): >60 ML/MIN/1.73 M^2
GLUCOSE SERPL-MCNC: 112 MG/DL (ref 70–110)
HCT VFR BLD AUTO: 41.7 % (ref 40–54)
HGB BLD-MCNC: 13.5 G/DL (ref 14–18)
IMM GRANULOCYTES # BLD AUTO: 0.03 K/UL (ref 0–0.04)
IMM GRANULOCYTES NFR BLD AUTO: 0.3 % (ref 0–0.5)
LYMPHOCYTES # BLD AUTO: 2 K/UL (ref 1–4.8)
LYMPHOCYTES NFR BLD: 22.3 % (ref 18–48)
MAGNESIUM SERPL-MCNC: 1.9 MG/DL (ref 1.6–2.6)
MCH RBC QN AUTO: 30.1 PG (ref 27–31)
MCHC RBC AUTO-ENTMCNC: 32.4 G/DL (ref 32–36)
MCV RBC AUTO: 93 FL (ref 82–98)
MONOCYTES # BLD AUTO: 0.7 K/UL (ref 0.3–1)
MONOCYTES NFR BLD: 8.5 % (ref 4–15)
NEUTROPHILS # BLD AUTO: 6 K/UL (ref 1.8–7.7)
NEUTROPHILS NFR BLD: 68.8 % (ref 38–73)
NRBC BLD-RTO: 0 /100 WBC
PHOSPHATE SERPL-MCNC: 3.2 MG/DL (ref 2.7–4.5)
PLATELET # BLD AUTO: 160 K/UL (ref 150–350)
PMV BLD AUTO: 12.1 FL (ref 9.2–12.9)
POTASSIUM SERPL-SCNC: 4 MMOL/L (ref 3.5–5.1)
PROT SERPL-MCNC: 6.7 G/DL (ref 6–8.4)
RBC # BLD AUTO: 4.48 M/UL (ref 4.6–6.2)
SODIUM SERPL-SCNC: 143 MMOL/L (ref 136–145)
WBC # BLD AUTO: 8.73 K/UL (ref 3.9–12.7)

## 2020-12-05 PROCEDURE — 25000003 PHARM REV CODE 250: Performed by: STUDENT IN AN ORGANIZED HEALTH CARE EDUCATION/TRAINING PROGRAM

## 2020-12-05 PROCEDURE — 84100 ASSAY OF PHOSPHORUS: CPT

## 2020-12-05 PROCEDURE — 80053 COMPREHEN METABOLIC PANEL: CPT

## 2020-12-05 PROCEDURE — 85025 COMPLETE CBC W/AUTO DIFF WBC: CPT

## 2020-12-05 PROCEDURE — 99232 SBSQ HOSP IP/OBS MODERATE 35: CPT | Mod: ,,, | Performed by: INTERNAL MEDICINE

## 2020-12-05 PROCEDURE — 63600175 PHARM REV CODE 636 W HCPCS: Performed by: STUDENT IN AN ORGANIZED HEALTH CARE EDUCATION/TRAINING PROGRAM

## 2020-12-05 PROCEDURE — 83735 ASSAY OF MAGNESIUM: CPT

## 2020-12-05 PROCEDURE — 11000001 HC ACUTE MED/SURG PRIVATE ROOM

## 2020-12-05 PROCEDURE — 94761 N-INVAS EAR/PLS OXIMETRY MLT: CPT

## 2020-12-05 PROCEDURE — 99232 PR SUBSEQUENT HOSPITAL CARE,LEVL II: ICD-10-PCS | Mod: ,,, | Performed by: INTERNAL MEDICINE

## 2020-12-05 PROCEDURE — 36415 COLL VENOUS BLD VENIPUNCTURE: CPT

## 2020-12-05 RX ADMIN — ENOXAPARIN SODIUM 170 MG: 100 INJECTION SUBCUTANEOUS at 09:12

## 2020-12-05 RX ADMIN — BENZONATATE 100 MG: 100 CAPSULE ORAL at 09:12

## 2020-12-05 RX ADMIN — ENOXAPARIN SODIUM 170 MG: 100 INJECTION SUBCUTANEOUS at 08:12

## 2020-12-05 RX ADMIN — MUPIROCIN: 20 OINTMENT TOPICAL at 08:12

## 2020-12-05 RX ADMIN — OXYCODONE HYDROCHLORIDE AND ACETAMINOPHEN 500 MG: 500 TABLET ORAL at 08:12

## 2020-12-05 RX ADMIN — OXYCODONE HYDROCHLORIDE AND ACETAMINOPHEN 500 MG: 500 TABLET ORAL at 09:12

## 2020-12-05 RX ADMIN — MUPIROCIN: 20 OINTMENT TOPICAL at 09:12

## 2020-12-05 RX ADMIN — REMDESIVIR 100 MG: 100 INJECTION, POWDER, LYOPHILIZED, FOR SOLUTION INTRAVENOUS at 03:12

## 2020-12-05 RX ADMIN — DEXAMETHASONE 6 MG: 4 TABLET ORAL at 08:12

## 2020-12-05 RX ADMIN — MELATONIN TAB 3 MG 6 MG: 3 TAB at 09:12

## 2020-12-05 RX ADMIN — THERA TABS 1 TABLET: TAB at 08:12

## 2020-12-05 NOTE — PROGRESS NOTES
[facility] - ICU 14 [facility] Medicine  Progress Note    Patient Name: Surjit Valentine  MRN: [id number]  Patient Class: IP- Inpatient   Admission Date: 12/2/2020  Length of Stay: 2 days  Attending Physician: Yamilet Mcknight MD  Primary Care Provider: BHAVESH Ramos        Subjective:     Principal Problem:COVID-19        HPI:  Patient is a 40 year old male with history of GERD, GAYLE, and kidney stones who presents to hospital via emergency department for SOB, cough, nausea, vomiting, and congestion. He reports that about 5 days ago he started experiencing symptoms of congestion and runny nose. 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 He went to urgent care yesterday where he tested positive for the virus. He tried going home and self isolating but felt that his symptoms were not manageable at home as nothing he did alleviated his symptoms. 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 At the time of my exam, patient endorses mild headache, chills, dyspnea on exertion, and persistent cough. He denies any further chest pain, abdominal pain, anosmia, diarrhea, or dysuria.       Overview/Hospital Course:  Patient admitted to hospital medicine on 12/2, given steroids and remdesivir. Although doing well on room air at rest he tends to desaturate to low 90's- high 80's with any exertion. He also becomes hypoxic while sleeping due to GAYLE. Will continue to trend inflammatory markers (trending down as of 12/4) and provide supportive care.     Interval History: No acute events overnight, myalgias improved however still has persistent cough despite  tessalon    Review of Systems   Constitutional: Positive for activity change and fatigue. Negative for appetite change, chills and fever.   HENT: Negative for congestion and sore throat.    Eyes: Negative for visual disturbance.   Respiratory: Positive for cough and shortness of breath. Negative for chest tightness and wheezing.    Cardiovascular: Positive for chest pain. Negative for palpitations and leg swelling.   Gastrointestinal: Negative for abdominal pain, constipation, diarrhea, nausea and vomiting.   Genitourinary: Negative for dysuria and flank pain.   Musculoskeletal: Negative for arthralgias, back pain and myalgias.   Skin: Negative for color change and pallor.   Neurological: Negative for dizziness, weakness and headaches.   Psychiatric/Behavioral: Negative for agitation and confusion.     Objective:     Vital Signs (Most Recent):  Temp: 98.3 °F (36.8 °C) (12/05/20 0838)  Pulse: 60 (12/05/20 0838)  Resp: (!) 25 (12/05/20 0838)  BP: (!) 118/59 (12/05/20 0838)  SpO2: 95 % (12/05/20 0838) Vital Signs (24h Range):  Temp:  [98.3 °F (36.8 °C)-98.7 °F (37.1 °C)] 98.3 °F (36.8 °C)  Pulse:  [47-63] 60  Resp:  [16-25] 25  SpO2:  [94 %-97 %] 95 %  BP: (118-145)/(59-90) 118/59     Weight: (!) 172.4 kg (380 lb)  Body mass index is 54.52 kg/m².    Intake/Output Summary (Last 24 hours) at 12/5/2020 0929  Last data filed at 12/4/2020 1827  Gross per 24 hour   Intake 1460 ml   Output --   Net 1460 ml      Physical Exam  Vitals signs reviewed.   Constitutional:       Appearance: Normal appearance. He is well-developed. He is obese. He is not toxic-appearing.   HENT:      Head: Normocephalic and atraumatic.      Right Ear: External ear normal.      Left Ear: External ear normal.      Nose: No congestion.      Mouth/Throat:      Mouth: Mucous membranes are moist.      Pharynx: Oropharynx is clear. No oropharyngeal exudate.   Eyes:      General: No scleral icterus.     Conjunctiva/sclera: Conjunctivae normal.   Neck:       Musculoskeletal: Normal range of motion and neck supple.   Cardiovascular:      Rate and Rhythm: Normal rate and regular rhythm.      Heart sounds: Normal heart sounds.   Pulmonary:      Effort: Pulmonary effort is normal.      Breath sounds: Wheezing and rales (minor, bilateral lung base) present.   Abdominal:      General: Bowel sounds are normal.      Palpations: Abdomen is soft.      Tenderness: There is no abdominal tenderness.   Musculoskeletal: Normal range of motion.      Right lower leg: No edema.      Left lower leg: No edema.   Skin:     General: Skin is warm and dry.   Neurological:      Mental Status: He is alert and oriented to person, place, and time.   Psychiatric:         Behavior: Behavior normal.         Significant Labs: All pertinent labs within the past 24 hours have been reviewed.    Significant Imaging: I have reviewed all pertinent imaging results/findings within the past 24 hours.      Assessment/Plan:      * COVID-19  COVID-19 Virus Infection  Viral Pneumonia due to COVID-19  - COVID-19 testing:   - Isolation: Airborne/Droplet. Surgical mask on patient. Notify Infection Control  - Diagnostics: Trend Q48hrs if stable, more frequently if patient decompensating        - CBC (q24)       - CMP (q24)       - Mg  (q24)       - D-dimer        - Ferritin       - CRP       - LDH       - Vitamin D (once)       - BNP (once)       - Troponin (negative)       - Procalcitonin (negative)       - ECG       - CXR  Lymphopenia, hyponatremia, hyperferritinemia, elevated troponin, elevated d-dimer, age, and medical comorbidities are significant predictors of poor clinical outcome    - Management: Per [facility] COVID Treatment Protocol    - Telemetry & Continuous Pulse Oximetry    - Nutrition:        - Multivitamin PO daily       - Vitamin D 1000IU daily if deficient       - Ascorbic acid 500mg PO bid    - Supportive Care:       - acetaminophen 650mg PO Q6hr PRN fever/headache       - loperamide PRN viral  diarrhea       - IVF if indicated, restrictive strategy preferred, no maintenance IV if able       - VTE PPx: enoxaparin or heparin SQ unless contraindicated    - Antibiotics: none, negative procal, clear chest x-ray     - Remdesivir ordered     - Dexamethasone 6 mg PO     - Convalescent Plasma none    Acute Hypoxemic Respiratory Failure    - Order RT consult via Respiratory Communication for COVID Protocols    - Incentive Spirometer Q4h, Flutter Valve Q4h    - Continuous Pulse Oximetry, goal SpO2 92-96%    - Supplemental O2 via LFNC, VentiMask, or HFNC (see Respiratory Support Oxygen Therapies)    - If wheezing, albuterol INH Q6h scheduled & PRN    - Proning Protocol if patient is a candidate (see  Proning Protocol)   - GCS >13, able to self-prone    - If deterioration, may warrant trial of NIPPV in neg pressure room or immediate ICU consult          Morbid obesity with body mass index of 60.0-69.9 in adult  - Enoxaparin BID for increased BMI      GAYLE (obstructive sleep apnea)  - continue to monitor      GERD without esophagitis  - History of GERD, will add nexium if needed        VTE Risk Mitigation (From admission, onward)         Ordered     enoxaparin injection 170 mg  Every 12 hours      12/03/20 1537     IP VTE HIGH RISK PATIENT  Once      12/02/20 1305     Place sequential compression device  Until discontinued      12/02/20 1305                Discharge Planning   VIKRAM: 12/7/2020     Code Status: Full Code   Is the patient medically ready for discharge?: No    Reason for patient still in hospital (select all that apply): Patient trending condition and Treatment  Discharge Plan A: Home   Discharge Delays: None known at this time              Deven Joe MD  Department of Hospital Medicine   [facility] - ICU [facility]

## 2020-12-05 NOTE — SUBJECTIVE & OBJECTIVE
Interval History: No acute events overnight, myalgias improved however still has persistent cough despite tessalon    Review of Systems   Constitutional: Positive for activity change and fatigue. Negative for appetite change, chills and fever.   HENT: Negative for congestion and sore throat.    Eyes: Negative for visual disturbance.   Respiratory: Positive for cough and shortness of breath. Negative for chest tightness and wheezing.    Cardiovascular: Positive for chest pain. Negative for palpitations and leg swelling.   Gastrointestinal: Negative for abdominal pain, constipation, diarrhea, nausea and vomiting.   Genitourinary: Negative for dysuria and flank pain.   Musculoskeletal: Negative for arthralgias, back pain and myalgias.   Skin: Negative for color change and pallor.   Neurological: Negative for dizziness, weakness and headaches.   Psychiatric/Behavioral: Negative for agitation and confusion.     Objective:     Vital Signs (Most Recent):  Temp: 98.3 °F (36.8 °C) (12/05/20 0838)  Pulse: 60 (12/05/20 0838)  Resp: (!) 25 (12/05/20 0838)  BP: (!) 118/59 (12/05/20 0838)  SpO2: 95 % (12/05/20 0838) Vital Signs (24h Range):  Temp:  [98.3 °F (36.8 °C)-98.7 °F (37.1 °C)] 98.3 °F (36.8 °C)  Pulse:  [47-63] 60  Resp:  [16-25] 25  SpO2:  [94 %-97 %] 95 %  BP: (118-145)/(59-90) 118/59     Weight: (!) 172.4 kg (380 lb)  Body mass index is 54.52 kg/m².    Intake/Output Summary (Last 24 hours) at 12/5/2020 0929  Last data filed at 12/4/2020 1827  Gross per 24 hour   Intake 1460 ml   Output --   Net 1460 ml      Physical Exam  Vitals signs reviewed.   Constitutional:       Appearance: Normal appearance. He is well-developed. He is obese. He is not toxic-appearing.   HENT:      Head: Normocephalic and atraumatic.      Right Ear: External ear normal.      Left Ear: External ear normal.      Nose: No congestion.      Mouth/Throat:      Mouth: Mucous membranes are moist.      Pharynx: Oropharynx is clear. No oropharyngeal  exudate.   Eyes:      General: No scleral icterus.     Conjunctiva/sclera: Conjunctivae normal.   Neck:      Musculoskeletal: Normal range of motion and neck supple.   Cardiovascular:      Rate and Rhythm: Normal rate and regular rhythm.      Heart sounds: Normal heart sounds.   Pulmonary:      Effort: Pulmonary effort is normal.      Breath sounds: Wheezing and rales (minor, bilateral lung base) present.   Abdominal:      General: Bowel sounds are normal.      Palpations: Abdomen is soft.      Tenderness: There is no abdominal tenderness.   Musculoskeletal: Normal range of motion.      Right lower leg: No edema.      Left lower leg: No edema.   Skin:     General: Skin is warm and dry.   Neurological:      Mental Status: He is alert and oriented to person, place, and time.   Psychiatric:         Behavior: Behavior normal.         Significant Labs: All pertinent labs within the past 24 hours have been reviewed.    Significant Imaging: I have reviewed all pertinent imaging results/findings within the past 24 hours.

## 2020-12-06 VITALS
DIASTOLIC BLOOD PRESSURE: 57 MMHG | BODY MASS INDEX: 45.1 KG/M2 | RESPIRATION RATE: 25 BRPM | WEIGHT: 315 LBS | SYSTOLIC BLOOD PRESSURE: 114 MMHG | HEIGHT: 70 IN | OXYGEN SATURATION: 94 % | HEART RATE: 53 BPM | TEMPERATURE: 99 F

## 2020-12-06 LAB
ALBUMIN SERPL BCP-MCNC: 2.9 G/DL (ref 3.5–5.2)
ALP SERPL-CCNC: 56 U/L (ref 55–135)
ALT SERPL W/O P-5'-P-CCNC: 46 U/L (ref 10–44)
ANION GAP SERPL CALC-SCNC: 9 MMOL/L (ref 8–16)
AST SERPL-CCNC: 32 U/L (ref 10–40)
BASOPHILS # BLD AUTO: 0.02 K/UL (ref 0–0.2)
BASOPHILS NFR BLD: 0.2 % (ref 0–1.9)
BILIRUB SERPL-MCNC: 0.4 MG/DL (ref 0.1–1)
BUN SERPL-MCNC: 13 MG/DL (ref 6–20)
CALCIUM SERPL-MCNC: 8.7 MG/DL (ref 8.7–10.5)
CHLORIDE SERPL-SCNC: 106 MMOL/L (ref 95–110)
CO2 SERPL-SCNC: 26 MMOL/L (ref 23–29)
CREAT SERPL-MCNC: 0.7 MG/DL (ref 0.5–1.4)
CRP SERPL-MCNC: 26.1 MG/L (ref 0–8.2)
D DIMER PPP IA.FEU-MCNC: <0.19 MG/L FEU
DIFFERENTIAL METHOD: NORMAL
EOSINOPHIL # BLD AUTO: 0 K/UL (ref 0–0.5)
EOSINOPHIL NFR BLD: 0.1 % (ref 0–8)
ERYTHROCYTE [DISTWIDTH] IN BLOOD BY AUTOMATED COUNT: 12.2 % (ref 11.5–14.5)
EST. GFR  (AFRICAN AMERICAN): >60 ML/MIN/1.73 M^2
EST. GFR  (NON AFRICAN AMERICAN): >60 ML/MIN/1.73 M^2
FERRITIN SERPL-MCNC: 896 NG/ML (ref 20–300)
GLUCOSE SERPL-MCNC: 109 MG/DL (ref 70–110)
HCT VFR BLD AUTO: 42.6 % (ref 40–54)
HGB BLD-MCNC: 14.4 G/DL (ref 14–18)
IMM GRANULOCYTES # BLD AUTO: 0.03 K/UL (ref 0–0.04)
IMM GRANULOCYTES NFR BLD AUTO: 0.4 % (ref 0–0.5)
LYMPHOCYTES # BLD AUTO: 2 K/UL (ref 1–4.8)
LYMPHOCYTES NFR BLD: 23.5 % (ref 18–48)
MAGNESIUM SERPL-MCNC: 1.9 MG/DL (ref 1.6–2.6)
MCH RBC QN AUTO: 30.3 PG (ref 27–31)
MCHC RBC AUTO-ENTMCNC: 33.8 G/DL (ref 32–36)
MCV RBC AUTO: 90 FL (ref 82–98)
MONOCYTES # BLD AUTO: 0.8 K/UL (ref 0.3–1)
MONOCYTES NFR BLD: 8.8 % (ref 4–15)
NEUTROPHILS # BLD AUTO: 5.7 K/UL (ref 1.8–7.7)
NEUTROPHILS NFR BLD: 67 % (ref 38–73)
NRBC BLD-RTO: 0 /100 WBC
PHOSPHATE SERPL-MCNC: 3.3 MG/DL (ref 2.7–4.5)
PLATELET # BLD AUTO: 150 K/UL (ref 150–350)
PMV BLD AUTO: 11.9 FL (ref 9.2–12.9)
POTASSIUM SERPL-SCNC: 3.9 MMOL/L (ref 3.5–5.1)
PROT SERPL-MCNC: 6.8 G/DL (ref 6–8.4)
RBC # BLD AUTO: 4.76 M/UL (ref 4.6–6.2)
SODIUM SERPL-SCNC: 141 MMOL/L (ref 136–145)
WBC # BLD AUTO: 8.55 K/UL (ref 3.9–12.7)

## 2020-12-06 PROCEDURE — 85379 FIBRIN DEGRADATION QUANT: CPT

## 2020-12-06 PROCEDURE — 80053 COMPREHEN METABOLIC PANEL: CPT

## 2020-12-06 PROCEDURE — 36415 COLL VENOUS BLD VENIPUNCTURE: CPT

## 2020-12-06 PROCEDURE — 63600175 PHARM REV CODE 636 W HCPCS: Performed by: STUDENT IN AN ORGANIZED HEALTH CARE EDUCATION/TRAINING PROGRAM

## 2020-12-06 PROCEDURE — 83735 ASSAY OF MAGNESIUM: CPT

## 2020-12-06 PROCEDURE — 86140 C-REACTIVE PROTEIN: CPT

## 2020-12-06 PROCEDURE — 94761 N-INVAS EAR/PLS OXIMETRY MLT: CPT

## 2020-12-06 PROCEDURE — 82728 ASSAY OF FERRITIN: CPT

## 2020-12-06 PROCEDURE — 99239 HOSP IP/OBS DSCHRG MGMT >30: CPT | Mod: ,,, | Performed by: INTERNAL MEDICINE

## 2020-12-06 PROCEDURE — 99239 PR HOSPITAL DISCHARGE DAY,>30 MIN: ICD-10-PCS | Mod: ,,, | Performed by: INTERNAL MEDICINE

## 2020-12-06 PROCEDURE — 85025 COMPLETE CBC W/AUTO DIFF WBC: CPT

## 2020-12-06 PROCEDURE — 84100 ASSAY OF PHOSPHORUS: CPT

## 2020-12-06 PROCEDURE — 25000003 PHARM REV CODE 250: Performed by: STUDENT IN AN ORGANIZED HEALTH CARE EDUCATION/TRAINING PROGRAM

## 2020-12-06 RX ADMIN — MUPIROCIN: 20 OINTMENT TOPICAL at 08:12

## 2020-12-06 RX ADMIN — REMDESIVIR 100 MG: 100 INJECTION, POWDER, LYOPHILIZED, FOR SOLUTION INTRAVENOUS at 04:12

## 2020-12-06 RX ADMIN — ENOXAPARIN SODIUM 170 MG: 100 INJECTION SUBCUTANEOUS at 08:12

## 2020-12-06 RX ADMIN — OXYCODONE HYDROCHLORIDE AND ACETAMINOPHEN 500 MG: 500 TABLET ORAL at 08:12

## 2020-12-06 RX ADMIN — THERA TABS 1 TABLET: TAB at 08:12

## 2020-12-06 RX ADMIN — DEXAMETHASONE 6 MG: 4 TABLET ORAL at 09:12

## 2020-12-06 RX ADMIN — ACETAMINOPHEN 650 MG: 325 TABLET ORAL at 08:12

## 2020-12-06 NOTE — PLAN OF CARE
Problem: Adult Inpatient Plan of Care  Goal: Plan of Care Review  Outcome: Ongoing, Progressing  Flowsheets (Taken 12/6/2020 0514)  Plan of Care Reviewed With: patient  Goal: Optimal Comfort and Wellbeing  Outcome: Ongoing, Progressing     Problem: Bariatric Environmental Safety  Goal: Safety Maintained with Care  Outcome: Ongoing, Progressing   Plan of care reviewed with pt., all questions answered. Will continue to monitor.

## 2020-12-06 NOTE — PROGRESS NOTES
[facility] - ICU 14 [facility] Medicine  Progress Note    Patient Name: Surjit Valentine  MRN: [id number]  Patient Class: IP- Inpatient   Admission Date: 12/2/2020  Length of Stay: 3 days  Attending Physician: Yamilet Mcknight MD  Primary Care Provider: BHAVESH Ramos        Subjective:     Principal Problem:COVID-19        HPI:  Patient is a 40 year old male with history of GERD, GAYLE, and kidney stones who presents to hospital via emergency department for SOB, cough, nausea, vomiting, and congestion. He reports that about 5 days ago he started experiencing symptoms of congestion and runny nose. 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 He went to urgent care yesterday where he tested positive for the virus. He tried going home and self isolating but felt that his symptoms were not manageable at home as nothing he did alleviated his symptoms. 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 At the time of my exam, patient endorses mild headache, chills, dyspnea on exertion, and persistent cough. He denies any further chest pain, abdominal pain, anosmia, diarrhea, or dysuria.       Overview/Hospital Course:  Patient admitted to hospital medicine on 12/2, given steroids and remdesivir. Although doing well on room air at rest he tends to desaturate to low 90's- high 80's with any exertion. He also becomes hypoxic while sleeping due to GAYLE. Will continue to trend inflammatory markers (trending down as of 12/4) and provide supportive care.     Interval History: No acute events overnight, patient to finish remdesivir today. Will have him undergo 6 minute  walk test and plan for discharge later today or early tomorrow. Less complaint of myalgia but complaint of headache today.     Review of Systems   Constitutional: Positive for activity change and fatigue. Negative for appetite change, chills and fever.   HENT: Negative for congestion and sore throat.    Eyes: Negative for visual disturbance.   Respiratory: Positive for cough and shortness of breath. Negative for chest tightness and wheezing.    Cardiovascular: Positive for chest pain. Negative for palpitations and leg swelling.   Gastrointestinal: Negative for abdominal pain, constipation, diarrhea, nausea and vomiting.   Genitourinary: Negative for dysuria and flank pain.   Musculoskeletal: Positive for myalgias (improved). Negative for arthralgias and back pain.   Skin: Negative for color change and pallor.   Neurological: Positive for headaches. Negative for dizziness and weakness.   Psychiatric/Behavioral: Negative for agitation and confusion.     Objective:     Vital Signs (Most Recent):  Temp: 97.7 °F (36.5 °C) (12/06/20 0819)  Pulse: 72 (12/06/20 0819)  Resp: (!) 25 (12/06/20 0819)  BP: (!) 114/57 (12/06/20 0819)  SpO2: (!) 92 % (12/06/20 0819) Vital Signs (24h Range):  Temp:  [97.7 °F (36.5 °C)-99.4 °F (37.4 °C)] 97.7 °F (36.5 °C)  Pulse:  [46-84] 72  Resp:  [22-31] 25  SpO2:  [92 %-96 %] 92 %  BP: (103-131)/(56-65) 114/57     Weight: (!) 172.4 kg (380 lb)  Body mass index is 54.52 kg/m².    Intake/Output Summary (Last 24 hours) at 12/6/2020 0996  Last data filed at 12/6/2020 0448  Gross per 24 hour   Intake 450 ml   Output --   Net 450 ml      Physical Exam  Vitals signs reviewed.   Constitutional:       Appearance: Normal appearance. He is well-developed. He is obese. He is not toxic-appearing.   HENT:      Head: Normocephalic and atraumatic.      Right Ear: External ear normal.      Left Ear: External ear normal.      Nose: No congestion.      Mouth/Throat:      Mouth: Mucous membranes are moist.       Pharynx: Oropharynx is clear. No oropharyngeal exudate.   Eyes:      General: No scleral icterus.     Conjunctiva/sclera: Conjunctivae normal.   Neck:      Musculoskeletal: Normal range of motion and neck supple.   Cardiovascular:      Rate and Rhythm: Normal rate and regular rhythm.      Heart sounds: Normal heart sounds.   Pulmonary:      Effort: Pulmonary effort is normal.      Breath sounds: Wheezing and rales (minor, bilateral lung base) present.   Abdominal:      General: Bowel sounds are normal.      Palpations: Abdomen is soft.      Tenderness: There is no abdominal tenderness.   Musculoskeletal: Normal range of motion.      Right lower leg: No edema.      Left lower leg: No edema.   Skin:     General: Skin is warm and dry.   Neurological:      Mental Status: He is alert and oriented to person, place, and time.   Psychiatric:         Behavior: Behavior normal.         Significant Labs: All pertinent labs within the past 24 hours have been reviewed.    Significant Imaging: I have reviewed all pertinent imaging results/findings within the past 24 hours.      Assessment/Plan:      * COVID-19  COVID-19 Virus Infection  Viral Pneumonia due to COVID-19  - COVID-19 testing:   - Isolation: Airborne/Droplet. Surgical mask on patient. Notify Infection Control  - Diagnostics: Trend Q48hrs if stable, more frequently if patient decompensating        - CBC (q24)       - CMP (q24)       - Mg  (q24)       - D-dimer        - Ferritin       - CRP       - LDH       - Vitamin D (once)       - BNP (once)       - Troponin (negative)       - Procalcitonin (negative)       - ECG       - CXR  Lymphopenia, hyponatremia, hyperferritinemia, elevated troponin, elevated d-dimer, age, and medical comorbidities are significant predictors of poor clinical outcome    - Management: Per [facility] COVID Treatment Protocol    - Telemetry & Continuous Pulse Oximetry    - Nutrition:        - Multivitamin PO daily       - Vitamin D 1000IU daily if  deficient       - Ascorbic acid 500mg PO bid    - Supportive Care:       - acetaminophen 650mg PO Q6hr PRN fever/headache       - loperamide PRN viral diarrhea       - IVF if indicated, restrictive strategy preferred, no maintenance IV if able       - VTE PPx: enoxaparin or heparin SQ unless contraindicated    - Antibiotics: none, negative procal, clear chest x-ray     - Remdesivir ordered     - Dexamethasone 6 mg PO     - Convalescent Plasma none    Acute Hypoxemic Respiratory Failure    - Order RT consult via Respiratory Communication for COVID Protocols    - Incentive Spirometer Q4h, Flutter Valve Q4h    - Continuous Pulse Oximetry, goal SpO2 92-96%    - Supplemental O2 via LFNC, VentiMask, or HFNC (see Respiratory Support Oxygen Therapies)    - If wheezing, albuterol INH Q6h scheduled & PRN    - Proning Protocol if patient is a candidate (see  Proning Protocol)   - GCS >13, able to self-prone    - If deterioration, may warrant trial of NIPPV in neg pressure room or immediate ICU consult          Morbid obesity with body mass index of 60.0-69.9 in adult  - Enoxaparin BID for increased BMI      GAYLE (obstructive sleep apnea)  - continue to monitor      GERD without esophagitis  - History of GERD, will add nexium if needed        VTE Risk Mitigation (From admission, onward)         Ordered     enoxaparin injection 170 mg  Every 12 hours      12/03/20 1537     IP VTE HIGH RISK PATIENT  Once      12/02/20 1305     Place sequential compression device  Until discontinued      12/02/20 1305                Discharge Planning   VIKRAM: 12/7/2020     Code Status: Full Code   Is the patient medically ready for discharge?: No    Reason for patient still in hospital (select all that apply): Treatment and Pending disposition  Discharge Plan A: Home   Discharge Delays: None known at this time              Deven Joe MD  Department of Hospital Medicine   [facility] - ICU 14 [facility]

## 2020-12-06 NOTE — SUBJECTIVE & OBJECTIVE
Interval History: No acute events overnight, patient to finish remdesivir today. Will have him undergo 6 minute walk test and plan for discharge later today or early tomorrow. Less complaint of myalgia but complaint of headache today.     Review of Systems   Constitutional: Positive for activity change and fatigue. Negative for appetite change, chills and fever.   HENT: Negative for congestion and sore throat.    Eyes: Negative for visual disturbance.   Respiratory: Positive for cough and shortness of breath. Negative for chest tightness and wheezing.    Cardiovascular: Positive for chest pain. Negative for palpitations and leg swelling.   Gastrointestinal: Negative for abdominal pain, constipation, diarrhea, nausea and vomiting.   Genitourinary: Negative for dysuria and flank pain.   Musculoskeletal: Positive for myalgias (improved). Negative for arthralgias and back pain.   Skin: Negative for color change and pallor.   Neurological: Positive for headaches. Negative for dizziness and weakness.   Psychiatric/Behavioral: Negative for agitation and confusion.     Objective:     Vital Signs (Most Recent):  Temp: 97.7 °F (36.5 °C) (12/06/20 0819)  Pulse: 72 (12/06/20 0819)  Resp: (!) 25 (12/06/20 0819)  BP: (!) 114/57 (12/06/20 0819)  SpO2: (!) 92 % (12/06/20 0819) Vital Signs (24h Range):  Temp:  [97.7 °F (36.5 °C)-99.4 °F (37.4 °C)] 97.7 °F (36.5 °C)  Pulse:  [46-84] 72  Resp:  [22-31] 25  SpO2:  [92 %-96 %] 92 %  BP: (103-131)/(56-65) 114/57     Weight: (!) 172.4 kg (380 lb)  Body mass index is 54.52 kg/m².    Intake/Output Summary (Last 24 hours) at 12/6/2020 0908  Last data filed at 12/6/2020 0449  Gross per 24 hour   Intake 450 ml   Output --   Net 450 ml      Physical Exam  Vitals signs reviewed.   Constitutional:       Appearance: Normal appearance. He is well-developed. He is obese. He is not toxic-appearing.   HENT:      Head: Normocephalic and atraumatic.      Right Ear: External ear normal.      Left Ear:  External ear normal.      Nose: No congestion.      Mouth/Throat:      Mouth: Mucous membranes are moist.      Pharynx: Oropharynx is clear. No oropharyngeal exudate.   Eyes:      General: No scleral icterus.     Conjunctiva/sclera: Conjunctivae normal.   Neck:      Musculoskeletal: Normal range of motion and neck supple.   Cardiovascular:      Rate and Rhythm: Normal rate and regular rhythm.      Heart sounds: Normal heart sounds.   Pulmonary:      Effort: Pulmonary effort is normal.      Breath sounds: Wheezing and rales (minor, bilateral lung base) present.   Abdominal:      General: Bowel sounds are normal.      Palpations: Abdomen is soft.      Tenderness: There is no abdominal tenderness.   Musculoskeletal: Normal range of motion.      Right lower leg: No edema.      Left lower leg: No edema.   Skin:     General: Skin is warm and dry.   Neurological:      Mental Status: He is alert and oriented to person, place, and time.   Psychiatric:         Behavior: Behavior normal.         Significant Labs: All pertinent labs within the past 24 hours have been reviewed.    Significant Imaging: I have reviewed all pertinent imaging results/findings within the past 24 hours.

## 2020-12-06 NOTE — DISCHARGE SUMMARY
[facility] - ICU 14 [facility] Medicine  Discharge Summary      Patient Name: Surjit Valentine  MRN: [id number]  Admission Date: 12/2/2020  Hospital Length of Stay: 3 days  Discharge Date and Time:  12/06/2020 5:04 PM  Attending Physician: Yamilet Mcknight MD   Discharging Provider: Deven Joe MD  Primary Care Provider: BHAVESH Ramos      HPI:   Patient is a 40 year old male with history of GERD, GAYLE, and kidney stones who presents to hospital via emergency department for SOB, cough, nausea, vomiting, and congestion. He reports that about 5 days ago he started experiencing symptoms of congestion and runny nose. 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 He went to urgent care yesterday where he tested positive for the virus. He tried going home and self isolating but felt that his symptoms were not manageable at home as nothing he did alleviated his symptoms. 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 At the time of my exam, patient endorses mild headache, chills, dyspnea on exertion, and persistent cough. He denies any further chest pain, abdominal pain, anosmia, diarrhea, or dysuria.       * No surgery found *      Hospital Course:   Patient admitted to hospital medicine on 12/2, given steroids and remdesivir. Although doing well on room air at rest he tends to desaturate to low 90's- high 80's with any exertion. He also becomes hypoxic while sleeping due to GAYLE. Will continue to trend inflammatory markers (trending down as of 12/4) and provide supportive care. Inflammatory markers trending down on 12/6. Patient has completed last  dose of remdesivir. Passed 6 minute walk test with 90-92% O2 on room air, stable for discharge     Consults:   Consults (From admission, onward)        Status Ordering Provider     Pharmacy Remdesivir Consult  Once     Provider:  (Not yet assigned)    Acknowledged MANUEL ABREU          * COVID-19  COVID-19 Virus Infection  Viral Pneumonia due to COVID-19  - COVID-19 testing:   - Isolation: Airborne/Droplet. Surgical mask on patient. Notify Infection Control  - Diagnostics: Trend Q48hrs if stable, more frequently if patient decompensating        - CBC (q24)       - CMP (q24)       - Mg  (q24)       - D-dimer        - Ferritin       - CRP       - LDH       - Vitamin D (once)       - BNP (once)       - Troponin (negative)       - Procalcitonin (negative)       - ECG       - CXR  Lymphopenia, hyponatremia, hyperferritinemia, elevated troponin, elevated d-dimer, age, and medical comorbidities are significant predictors of poor clinical outcome    - Management: Per [facility] COVID Treatment Protocol    - Telemetry & Continuous Pulse Oximetry    - Nutrition:        - Multivitamin PO daily       - Vitamin D 1000IU daily if deficient       - Ascorbic acid 500mg PO bid    - Supportive Care:       - acetaminophen 650mg PO Q6hr PRN fever/headache       - loperamide PRN viral diarrhea       - IVF if indicated, restrictive strategy preferred, no maintenance IV if able       - VTE PPx: enoxaparin or heparin SQ unless contraindicated    - Antibiotics: none, negative procal, clear chest x-ray     - Remdesivir ordered     - Dexamethasone 6 mg PO     - Convalescent Plasma none    Acute Hypoxemic Respiratory Failure    - Order RT consult via Respiratory Communication for COVID Protocols    - Incentive Spirometer Q4h, Flutter Valve Q4h    - Continuous Pulse Oximetry, goal SpO2 92-96%    - Supplemental O2 via LFNC, VentiMask, or HFNC (see Respiratory Support Oxygen Therapies)    - If wheezing, albuterol INH Q6h scheduled & PRN    -  Proning Protocol if patient is a candidate (see  Proning Protocol)   - GCS >13, able to self-prone    - If deterioration, may warrant trial of NIPPV in neg pressure room or immediate ICU consult          Morbid obesity with body mass index of 60.0-69.9 in adult  - Enoxaparin BID for increased BMI      GAYLE (obstructive sleep apnea)  - continue to monitor      GERD without esophagitis  - History of GERD, will add nexium if needed        Final Active Diagnoses:    Diagnosis Date Noted POA    PRINCIPAL PROBLEM:  COVID-19 [U07.1] 12/02/2020 Yes    Acute hypoxemic respiratory failure due to COVID-19 [U07.1, J96.01] 12/02/2020 Yes    GAYLE (obstructive sleep apnea) [G47.33]  Yes     Chronic    Morbid obesity with body mass index of 60.0-69.9 in adult [E66.01, Z68.44]  Not Applicable     Chronic    GERD without esophagitis [K21.9]  Yes     Chronic      Problems Resolved During this Admission:       Discharged Condition: stable    Disposition: Home or Self Care    Follow Up:    Patient Instructions:   No discharge procedures on file.    Significant Diagnostic Studies: Labs:   CMP   Recent Labs   Lab 12/05/20  0557 12/06/20  0658    141   K 4.0 3.9    106   CO2 28 26   * 109   BUN 14 13   CREATININE 0.7 0.7   CALCIUM 8.7 8.7   PROT 6.7 6.8   ALBUMIN 3.1* 2.9*   BILITOT 0.4 0.4   ALKPHOS 57 56   AST 31 32   ALT 47* 46*   ANIONGAP 10 9   ESTGFRAFRICA >60.0 >60.0   EGFRNONAA >60.0 >60.0    and CBC   Recent Labs   Lab 12/05/20  0557 12/06/20  0658   WBC 8.73 8.55   HGB 13.5* 14.4   HCT 41.7 42.6    150       Pending Diagnostic Studies:     None         Medications:  Reconciled Home Medications:      Medication List      CONTINUE taking these medications    ergocalciferol 50,000 unit Cap  Commonly known as: VITAMIN D2  Take 1 capsule (50,000 Units total) by mouth twice a week.     esomeprazole 20 MG capsule  Commonly known as: NEXIUM  Take 20 mg by mouth before breakfast.     oxybutynin 5 MG  Tab  Commonly known as: DITROPAN  Take 1 tablet (5 mg total) by mouth 3 (three) times daily as needed (bladder spasms).     tamsulosin 0.4 mg Cap  Commonly known as: FLOMAX  Take 1 capsule (0.4 mg total) by mouth once daily. for 30 doses        STOP taking these medications    HYDROcodone-acetaminophen 5-325 mg per tablet  Commonly known as: NORCO     oxyCODONE-acetaminophen 5-325 mg per tablet  Commonly known as: PERCOCET            Indwelling Lines/Drains at time of discharge:   Lines/Drains/Airways     None                 Time spent on the discharge of patient: 45 minutes  Patient was seen and examined on the date of discharge and determined to be suitable for discharge.         Deven Joe MD  Department of Hospital Medicine  [facility] - ICU 14 [facility]

## 2020-12-07 LAB
BACTERIA BLD CULT: NORMAL
BACTERIA BLD CULT: NORMAL

## 2020-12-07 NOTE — PLAN OF CARE
12/7 1030AM - CM contacted [facility] [phone] to schedule new patient post hospital follow up appointment.    Appointment:  Dr. Lorelei Badillo - Thursday 12/10 at 9:30am.  [address]  [place] [address]  [phone]    10:35am CM attempted to contact patient [phone] - left voice message with call back phone number regarding new appointment.    CM to follow for discharge planning needs.  RENAE Donnelly RN  Case Management  [phone]

## 2020-12-08 NOTE — PLAN OF CARE
Patient discharged home - no needs.    CM scheduled Follow up appointment with Nicole  Thursday Dec 10, 2020  Hospital Follow Up - New patient appointment scheduled with Dr. Lorelei Badillo Thurs Dec 10 at 9:30am [address] [place] [address]  [phone]    DANELLE to follow for discharge planning needs.  RENAE Donnelly RN  Case Management  [phone]        12/08/20 1024   Final Note   Assessment Type Final Discharge Note   Anticipated Discharge Disposition Home   Hospital Follow Up  Appt(s) scheduled? Yes   Discharge plans and expectations educations in teach back method with documentation complete? Yes  (provided by bedside nurse)   Post-Acute Status   Post-Acute Authorization Other   Other Status No Post-Acute Service Needs   Discharge Delays None known at this time

## 2020-12-08 NOTE — PLAN OF CARE
12/8/2020 10:09am - CM contacted the patient [phone] regarding his follow up appointment: wear mask, arrive 15 minutes prior to appt, come alone, bring ID and current medications.    [facility] [phone]  Appointment:  Dr. Lorelei Badillo - Thursday 12/10 at 9:30am.  [facility]  [place] [address]  [phone]    Patient verbalized understanding.    CM to follow for discharge planning needs.  MANI DonnellyN RN  Case Management  [phone]

## 2020-12-10 NOTE — PHYSICIAN QUERY
PT Name: Surjit Valentine  MR #: [id number]     Diagnosis Clarification      CDS: Beatrice Pierce RN, CCDS         Contact information :ext [phone] [email or web address]       This form is a permanent document in the medical record.     Query Date: December 10, 2020    Dear Provider,  By submitting this query, we are merely seeking further clarification of documentation.  Please utilize your independent clinical judgment when addressing the question(s) below.     The medical record contains the following:    Supporting Clinical Information Location in Medical Record     COVID-19 Virus Infection  Viral Pneumonia due to COVID-19  5 days ago he started experiencing symptoms of congestion and runny nose. He has had allergies in the past and thought nothing of it but his symptoms continued to progress in the form of increased shortness of breath, cough productive of yellow mucus, and non-radiating chest pain,  HENT: Positive for congestion and sore throat.    Respiratory: Positive for cough and shortness of breath. Negative for chest tightness and wheezing.    Cardiovascular: Positive for chest pain  Breath sounds: Rales (minor, bilateral lung base) present    - Antibiotics: none, negative procal, clear chest x-ray  - Remdesivir ordered     - Dexamethasone 6 mg PO    No pulmonary disease definitely identified in this 40-year-old man with recent positive test for COVID-19 via rapid screening study.  However the examination is limited by bedside technique and the patient's large body habitus.   H&P 12/2/20                                    CXR 12/1/20     Please clarify if the Viral Pneumonia due to COVID-19 diagnosis has been:    [ x  ] Ruled In   [   ] Ruled In, Now Resolved   [   ] Ruled Out   [   ] Other/Clarification of findings (please specify)_______________    [   ] Clinically undetermined

## 2020-12-31 ENCOUNTER — RESEARCH ENCOUNTER (OUTPATIENT)
Dept: RESEARCH | Facility: HOSPITAL | Age: 40
End: 2020-12-31

## 2020-12-31 NOTE — PROGRESS NOTES
Study: ATTACC / 2020.169     Sponsor: [facility]     GLADYS Manriquez     Follow-up Visit: 30 day follow up phone call     Patient was consented on 12/03/2020     Date of Visit: 12/31/2020     Patient wishes to continue in study: Yes     All study protocol required CRFs completed: Yes     Surjit Valentine was called for his 30 day phone call follow up visit. Current list of medications obtained and verified; Surjit Valentine denies any hospitalizations, ED visits, or any other adverse events since previous study follow-up. All questions answered to Surjit Valentine satisfaction and Surjit Valentine will contact research staff if he has any questions or concerns. Patient stated he has a cough that is getting better every day . It is mostly during the mornings when he gets up or evenings right before he goes to bed when he experiences it. When before he could not hold a conversation without coughing. As of now he says he feels well and does not havehave any concerns since his discharge from the hospital. He is feeling well. Next follow up visit is a 90 day follow up phone call. Patient was thanked for his time and reminded we would speak with him again for the 90 day follow up phone call.

## 2021-02-04 NOTE — ASSESSMENT & PLAN NOTE
- continue to monitor     occult + in ED  -Hb stable  -Cont Protonix bid  - trend H/H  -GI consult Dr Ayoub

## 2021-03-03 ENCOUNTER — TELEPHONE (OUTPATIENT)
Dept: RESEARCH | Facility: HOSPITAL | Age: 41
End: 2021-03-03

## 2021-03-08 ENCOUNTER — TELEPHONE (OUTPATIENT)
Dept: RESEARCH | Facility: HOSPITAL | Age: 41
End: 2021-03-08

## 2021-03-10 ENCOUNTER — RESEARCH ENCOUNTER (OUTPATIENT)
Dept: RESEARCH | Facility: HOSPITAL | Age: 41
End: 2021-03-10

## 2021-04-16 ENCOUNTER — PATIENT MESSAGE (OUTPATIENT)
Dept: RESEARCH | Facility: HOSPITAL | Age: 41
End: 2021-04-16

## 2021-08-24 ENCOUNTER — TELEPHONE (OUTPATIENT)
Dept: SLEEP MEDICINE | Facility: CLINIC | Age: 41
End: 2021-08-24

## 2021-08-25 ENCOUNTER — OFFICE VISIT (OUTPATIENT)
Dept: SLEEP MEDICINE | Facility: CLINIC | Age: 41
End: 2021-08-25
Payer: COMMERCIAL

## 2021-08-25 DIAGNOSIS — G47.10 PERSISTENT DISORDER OF INITIATING OR MAINTAINING WAKEFULNESS: ICD-10-CM

## 2021-08-25 DIAGNOSIS — R06.83 SNORING: Primary | ICD-10-CM

## 2021-08-25 PROCEDURE — 99204 PR OFFICE/OUTPT VISIT, NEW, LEVL IV, 45-59 MIN: ICD-10-PCS | Mod: 95,,, | Performed by: INTERNAL MEDICINE

## 2021-08-25 PROCEDURE — 99204 OFFICE O/P NEW MOD 45 MIN: CPT | Mod: 95,,, | Performed by: INTERNAL MEDICINE

## 2021-08-27 ENCOUNTER — TELEPHONE (OUTPATIENT)
Dept: SLEEP MEDICINE | Facility: OTHER | Age: 41
End: 2021-08-27

## 2021-09-08 ENCOUNTER — TELEPHONE (OUTPATIENT)
Dept: SLEEP MEDICINE | Facility: OTHER | Age: 41
End: 2021-09-08

## 2021-09-13 ENCOUNTER — TELEPHONE (OUTPATIENT)
Dept: SLEEP MEDICINE | Facility: OTHER | Age: 41
End: 2021-09-13

## 2021-09-24 ENCOUNTER — TELEPHONE (OUTPATIENT)
Dept: SLEEP MEDICINE | Facility: OTHER | Age: 41
End: 2021-09-24

## 2021-09-28 ENCOUNTER — TELEPHONE (OUTPATIENT)
Dept: SLEEP MEDICINE | Facility: OTHER | Age: 41
End: 2021-09-28

## 2021-10-05 ENCOUNTER — TELEPHONE (OUTPATIENT)
Dept: SLEEP MEDICINE | Facility: OTHER | Age: 41
End: 2021-10-05

## 2021-10-08 ENCOUNTER — TELEPHONE (OUTPATIENT)
Dept: SLEEP MEDICINE | Facility: OTHER | Age: 41
End: 2021-10-08

## 2021-10-14 ENCOUNTER — TELEPHONE (OUTPATIENT)
Dept: SLEEP MEDICINE | Facility: OTHER | Age: 41
End: 2021-10-14

## 2021-10-24 ENCOUNTER — HOSPITAL ENCOUNTER (EMERGENCY)
Facility: HOSPITAL | Age: 41
Discharge: HOME | End: 2021-10-25
Attending: EMERGENCY MEDICINE
Payer: COMMERCIAL

## 2021-10-24 VITALS
HEIGHT: 68 IN | HEART RATE: 74 BPM | RESPIRATION RATE: 18 BRPM | TEMPERATURE: 98 F | OXYGEN SATURATION: 98 % | SYSTOLIC BLOOD PRESSURE: 139 MMHG | BODY MASS INDEX: 47.74 KG/M2 | WEIGHT: 315 LBS | DIASTOLIC BLOOD PRESSURE: 73 MMHG

## 2021-10-24 DIAGNOSIS — R42 DIZZINESS: ICD-10-CM

## 2021-10-24 LAB
BUN SERPL-MCNC: 17 MG/DL (ref 6–30)
CHLORIDE SERPL-SCNC: 105 MMOL/L (ref 95–110)
CREAT SERPL-MCNC: 1 MG/DL (ref 0.5–1.4)
GLUCOSE SERPL-MCNC: 106 MG/DL (ref 70–110)
HCT VFR BLD CALC: 39 %PCV (ref 36–54)
POC IONIZED CALCIUM: 1.12 MMOL/L (ref 1.06–1.42)
POC TCO2 (MEASURED): 24 MMOL/L (ref 23–29)
POTASSIUM BLD-SCNC: 4.7 MMOL/L (ref 3.5–5.1)
SAMPLE: NORMAL
SODIUM BLD-SCNC: 141 MMOL/L (ref 136–145)

## 2021-10-24 PROCEDURE — 96361 HYDRATE IV INFUSION ADD-ON: CPT

## 2021-10-24 PROCEDURE — 87389 HIV-1 AG W/HIV-1&-2 AB AG IA: CPT | Performed by: EMERGENCY MEDICINE

## 2021-10-24 PROCEDURE — 93005 ELECTROCARDIOGRAM TRACING: CPT

## 2021-10-24 PROCEDURE — 93010 EKG 12-LEAD: ICD-10-PCS | Mod: ,,, | Performed by: INTERNAL MEDICINE

## 2021-10-24 PROCEDURE — 86803 HEPATITIS C AB TEST: CPT | Performed by: EMERGENCY MEDICINE

## 2021-10-24 PROCEDURE — 93010 ELECTROCARDIOGRAM REPORT: CPT | Mod: ,,, | Performed by: INTERNAL MEDICINE

## 2021-10-24 PROCEDURE — 99285 PR EMERGENCY DEPT VISIT,LEVEL V: ICD-10-PCS | Mod: ,,, | Performed by: EMERGENCY MEDICINE

## 2021-10-24 PROCEDURE — 96374 THER/PROPH/DIAG INJ IV PUSH: CPT

## 2021-10-24 PROCEDURE — 99285 EMERGENCY DEPT VISIT HI MDM: CPT | Mod: 25

## 2021-10-24 PROCEDURE — 25000003 PHARM REV CODE 250: Performed by: EMERGENCY MEDICINE

## 2021-10-24 PROCEDURE — 80047 BASIC METABLC PNL IONIZED CA: CPT

## 2021-10-24 PROCEDURE — 99285 EMERGENCY DEPT VISIT HI MDM: CPT | Mod: ,,, | Performed by: EMERGENCY MEDICINE

## 2021-10-24 RX ORDER — ACETAMINOPHEN 500 MG
1000 TABLET ORAL
Status: COMPLETED | OUTPATIENT
Start: 2021-10-24 | End: 2021-10-24

## 2021-10-24 RX ORDER — MECLIZINE HCL 12.5 MG 12.5 MG/1
50 TABLET ORAL
Status: COMPLETED | OUTPATIENT
Start: 2021-10-24 | End: 2021-10-24

## 2021-10-24 RX ORDER — LORAZEPAM 2 MG/ML
1 INJECTION INTRAMUSCULAR
Status: DISCONTINUED | OUTPATIENT
Start: 2021-10-24 | End: 2021-10-25 | Stop reason: HOSPADM

## 2021-10-24 RX ADMIN — SODIUM CHLORIDE 1000 ML: 0.9 INJECTION, SOLUTION INTRAVENOUS at 10:10

## 2021-10-24 RX ADMIN — ACETAMINOPHEN 1000 MG: 500 TABLET ORAL at 10:10

## 2021-10-24 RX ADMIN — MECLIZINE 50 MG: 12.5 TABLET ORAL at 10:10

## 2021-10-25 LAB
HCV AB SERPL QL IA: NEGATIVE
HIV 1+2 AB+HIV1 P24 AG SERPL QL IA: NEGATIVE

## 2021-10-25 PROCEDURE — 96374 THER/PROPH/DIAG INJ IV PUSH: CPT

## 2021-10-25 PROCEDURE — 63600175 PHARM REV CODE 636 W HCPCS: Performed by: EMERGENCY MEDICINE

## 2021-10-25 PROCEDURE — 25500020 PHARM REV CODE 255: Performed by: EMERGENCY MEDICINE

## 2021-10-25 RX ORDER — KETOROLAC TROMETHAMINE 30 MG/ML
30 INJECTION, SOLUTION INTRAMUSCULAR; INTRAVENOUS
Status: COMPLETED | OUTPATIENT
Start: 2021-10-25 | End: 2021-10-25

## 2021-10-25 RX ORDER — MECLIZINE HYDROCHLORIDE 25 MG/1
25 TABLET ORAL 3 TIMES DAILY PRN
Qty: 20 TABLET | Refills: 0 | Status: SHIPPED | OUTPATIENT
Start: 2021-10-25 | End: 2022-06-28

## 2021-10-25 RX ADMIN — IOHEXOL 100 ML: 350 INJECTION, SOLUTION INTRAVENOUS at 12:10

## 2021-10-25 RX ADMIN — KETOROLAC TROMETHAMINE 30 MG: 30 INJECTION, SOLUTION INTRAMUSCULAR at 12:10

## 2022-02-14 ENCOUNTER — PATIENT MESSAGE (OUTPATIENT)
Dept: RESEARCH | Facility: HOSPITAL | Age: 42
End: 2022-02-14
Payer: COMMERCIAL

## 2022-02-16 ENCOUNTER — PATIENT MESSAGE (OUTPATIENT)
Dept: RESEARCH | Facility: HOSPITAL | Age: 42
End: 2022-02-16
Payer: COMMERCIAL

## 2022-03-24 NOTE — PROGRESS NOTES
CC: Right knee pain    Patient is a 41 y.o. male who presents today for initial evaluation of right knee pain.  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  Patient reports that last Sunday he was walking in a parade when he slipped and fell directly onto his right knee.  He feels he may have potentially twisted his knee in the process.  Since then, he has had significant pain of the right knee, especially with ambulation and bending the knee.  He also reports associated mechanical clicking and catching within the right knee.  There is associated subjective instability of the right knee as well, especially when going up and down stairs.  No prior history of surgeries or injuries to the right knee.  He denies any associated numbness or tingling of the right lower extremity.  Thus far treatment has included relative rest and icing the right knee with little relief.    + mechanical symptoms, + instability    Is affecting ADLs.  Pain is 4/10 at it's worst.    Walking in parade on Sunday slipped and fell directly onto knee. Limp. Subjective instability, tried tylenol w/ little relief.    REVIEW OF SYSTEMS:   Constitution: Negative. Negative for chills, fever and night sweats.   HENT: Negative for congestion and headaches.    Eyes: Negative for blurred vision, left vision loss and right vision loss.   Cardiovascular: Negative for chest pain and syncope.   Respiratory: Negative for cough and shortness of breath.    Endocrine: Negative for polydipsia, polyphagia and polyuria.   Hematologic/Lymphatic: Negative for bleeding problem. Does not bruise/bleed easily.   Skin: Negative for dry skin, itching and rash.   Musculoskeletal: Negative for falls. Positive for right knee pain and  muscle weakness.   Gastrointestinal: Negative for abdominal pain and bowel incontinence.   Genitourinary: Negative for bladder incontinence and nocturia.   Neurological: Negative for disturbances in  coordination, loss of balance and seizures.   Psychiatric/Behavioral: Negative for depression. The patient does not have insomnia.    Allergic/Immunologic: Negative for hives and persistent infections.     PAST MEDICAL HISTORY:   Past Medical History:   Diagnosis Date    GERD without esophagitis     takes OTC nexium PRN    History of kidney stones     Morbid obesity with body mass index of 60.0-69.9 in adult     GAYLE (obstructive sleep apnea)     did not tolerate CPAP       PAST SURGICAL HISTORY:   Past Surgical History:   Procedure Laterality Date    APPENDECTOMY  3/2008    CYSTOSCOPY  10/11/2019    Procedure: CYSTOSCOPY;  Surgeon: Gamal Sosa MD;  Location[facility] OR [address] [place] [address];  Service: Urology;;    CYSTOSCOPY W/ URETERAL STENT PLACEMENT Left 10/6/2019    Procedure: CYSTOSCOPY, WITH URETERAL STENT INSERTION;  Surgeon: Gamal Sosa MD;  Location: [facility] OR [address] [place] [address];  Service: Urology;  Laterality: Left;    LASER LITHOTRIPSY Left 10/11/2019    Procedure: LITHOTRIPSY, USING LASER;  Surgeon: Gamal Sosa MD;  Location: [facility] OR [address] [place] [address];  Service: Urology;  Laterality: Left;    RETROGRADE PYELOGRAPHY Left 10/6/2019    Procedure: PYELOGRAM, RETROGRADE;  Surgeon: Gamal Sosa MD;  Location: [facility] OR [address] [place] [address];  Service: Urology;  Laterality: Left;    RETROGRADE PYELOGRAPHY Left 10/11/2019    Procedure: PYELOGRAM, RETROGRADE;  Surgeon: Gamal Sosa MD;  Location: [facility] OR [address] [place] [address];  Service: Urology;  Laterality: Left;    URETEROSCOPIC REMOVAL OF URETERIC CALCULUS  10/11/2019    Procedure: REMOVAL, CALCULUS, URETER, URETEROSCOPIC;  Surgeon: Gamal Sosa MD;  Location: [address];  Service: Urology;;    URETEROSCOPY Left 10/6/2019    Procedure: URETEROSCOPY;  Surgeon: Gamal Sosa MD;  Location: [facility] OR [address] [place] [address];  Service: Urology;  Laterality: Left;    URETEROSCOPY Left 10/11/2019    Procedure: URETEROSCOPY;  Surgeon: Gamal Sosa MD;  Location: [facility] OR [address] [place] [address];   "Service: Urology;  Laterality: Left;  1hr       FAMILY HISTORY:   Family History   Problem Relation Age of Onset    Heart disease Father     Stroke Father     Hypertension Father     Diabetes Maternal Grandmother     Heart disease Maternal Grandfather     Stroke Paternal Grandmother     Heart disease Paternal Grandfather     Diabetes Mother     Obesity Mother     Obesity Sister     Diabetes Brother     Obesity Brother        SOCIAL HISTORY:   Social History     Socioeconomic History    Marital status: Single   Tobacco Use    Smoking status: Never Smoker    Smokeless tobacco: Current User     Types: Chew    Tobacco comment: 2 cans/ week   Substance and Sexual Activity    Alcohol use: Yes     Alcohol/week: 3.0 standard drinks     Types: 3 Cans of beer per week     Comment: "socially but not usually"    Drug use: No   Social History Narrative    Works full time: [employer].  .  1 son, 14 years old.        MEDICATIONS:     Current Outpatient Medications:     ergocalciferol (VITAMIN D2) 50,000 unit Cap, Take 1 capsule (50,000 Units total) by mouth twice a week. (Patient not taking: Reported on 12/1/2020), Disp: 8 capsule, Rfl: 3    esomeprazole (NEXIUM) 20 MG capsule, Take 20 mg by mouth before breakfast., Disp: , Rfl:     meclizine (ANTIVERT) 25 mg tablet, Take 1 tablet (25 mg total) by mouth 3 (three) times daily as needed for Dizziness., Disp: 20 tablet, Rfl: 0    oxybutynin (DITROPAN) 5 MG Tab, Take 1 tablet (5 mg total) by mouth 3 (three) times daily as needed (bladder spasms)., Disp: 90 tablet, Rfl: 0    tamsulosin (FLOMAX) 0.4 mg Cap, Take 1 capsule (0.4 mg total) by mouth once daily. for 30 doses, Disp: 30 capsule, Rfl: 0    ALLERGIES:   Review of patient's allergies indicates:  No Known Allergies    VITAL SIGNS:   There were no vitals taken for this visit.     PHYSICAL EXAMINATION:  General:  The patient is alert and oriented x 3.  Mood is " pleasant.  Observation of ears, eyes and nose reveal no gross abnormalities.  No labored breathing observed.    RIGHT KNEE EXAMINATION     OBSERVATION / INSPECTION   Gait:   Antalgic   Alignment:  Neutral   Scars:   None   Muscle atrophy: Mild  Effusion:  +  Warmth:  None   Discoloration:   none     TENDERNESS / CREPITUS (T / C):          T / C      T / C   Patella   + / -   Lateral joint line   - / -   Peripatellar medial  +  Medial joint line    + / -   Peripatellar lateral -  Medial plica   - / -   Patellar tendon -   Popliteal fossa   - / -   Quad tendon   -   Gastrocnemius   -   Prepatellar Bursa - / -   Quadricep   -   Tibial tubercle  -  Thigh/hamstring  -   Pes anserine/HS -  Fibula    -   ITB   - / -  Tibia     -   Tib/fib joint  - / -  LCL    -     MFC   - / -   MCL: Proximal  +    LFC   - / -    Distal   +          ROM: (* = pain)  PASSIVE   ACTIVE    Left :   5 / 0 / 120   5 / 0 / 120     Right :    5 / 0 / 80*   5 / 0 / 80*    Patellofemoral examination:  See above noted areas of tenderness.   Patella position    Subluxation / dislocation: Centered           Sup. / Inf;   Normal   Crepitus (PF):    Absent   Patellar Mobility:       Medial-lateral:   Normal    Superior-inferior:  Normal    Inferior tilt   Normal    Patellar tendon:  Normal   Lateral tilt:    +   J-sign:     None   Patellofemoral grind:   + pain       MENISCAL SIGNS:     Pain on terminal extension:  +  Pain on terminal flexion:  +  Carols maneuver:  + (for pain)  Squat     deferred    LIGAMENT EXAMINATION:  ACL / Lachman:  normal (-1 to 2mm)    PCL-Post.  drawer: normal 0 to 2mm  MCL- Valgus:  normal 0 to 2mm  LCL- Varus:  normal 0 to 2mm  Pivot shift: normal (Equal)   Dial Test: difference c/w other side   At 30° flexion: normal (< 5°)    At 90° flexion: normal (< 5°)   Reverse Pivot Shift:   normal (Equal)     STRENGTH: (* = with pain) PAINFUL SIDE   Quadricep   4/5   Hamstrin/5    EXTREMITY NEURO-VASCULAR EXAMINATION:    Sensation:  Grossly intact to light touch all dermatomal regions.   Motor Function:  Fully intact motor function at hip, knee, foot and ankle    DTRs;  quadriceps and  achilles 2+.  No clonus and downgoing Babinski.    Vascular status:  DP and PT pulses 2+, brisk capillary refill, symmetric.     OTHER FINDINGS:  N/A    X-rays bilateral knees (3/25/2022):  FINDINGS:  Knee ortho bilateral four views.     Right knee: No fracture dislocation bone destruction or OCD seen.  There are spurs on the patella.     Left: No fracture dislocation bone destruction or OCD seen.  There are spurs on the patella.        ASSESSMENT:    Right Knee Pain, possible medial meniscus tear  MCL sprain, right  Morbid obesity - BMI 66.10    PLAN:   1. I made the decision to obtain old records of the patient including previous notes and imaging. New imaging was ordered today of the extremity or extremities evaluated. I independently reviewed and interpreted the radiographs and/or MRIs today as well as prior imaging, if available.    2. Orders placed for MRI without contrast of right knee to evaluate for possible medial meniscus tear, MCL tear.    3.  Patient fitted for and provided with a Visco skin knee brace today in clinic for added stability/support.    4. Prescription for naproxen 500 mg 1 p.o. b.i.d. provided today.  Advised patient to refrain from taking over-the-counter anti-inflammatories while taking naproxen, however may alternate/augment with acetaminophen if needed.    5. Advised patient to rest, ice, elevate the right knee to reduce swelling/pain.    6. Follow up in clinic with MRI results.      All questions were answered, pt will contact us for questions or concerns in the interim.        Medical Dictation software was used during the dictation of portions or the entirety of this medical record.  Phonetic or grammatic errors may exist due to the use of this software. For clarification, refer to the author of the document.

## 2022-03-25 ENCOUNTER — OFFICE VISIT (OUTPATIENT)
Dept: SPORTS MEDICINE | Facility: CLINIC | Age: 42
End: 2022-03-25
Payer: COMMERCIAL

## 2022-03-25 ENCOUNTER — HOSPITAL ENCOUNTER (OUTPATIENT)
Dept: RADIOLOGY | Facility: HOSPITAL | Age: 42
Discharge: HOME | End: 2022-03-25
Attending: PHYSICIAN ASSISTANT
Payer: COMMERCIAL

## 2022-03-25 VITALS
BODY MASS INDEX: 47.74 KG/M2 | WEIGHT: 315 LBS | HEART RATE: 74 BPM | HEIGHT: 68 IN | SYSTOLIC BLOOD PRESSURE: 138 MMHG | DIASTOLIC BLOOD PRESSURE: 84 MMHG

## 2022-03-25 DIAGNOSIS — M25.561 ACUTE PAIN OF RIGHT KNEE: ICD-10-CM

## 2022-03-25 DIAGNOSIS — E66.01 MORBID OBESITY WITH BMI OF 60.0-69.9, ADULT: ICD-10-CM

## 2022-03-25 DIAGNOSIS — S83.411A SPRAIN OF MEDIAL COLLATERAL LIGAMENT OF RIGHT KNEE, INITIAL ENCOUNTER: ICD-10-CM

## 2022-03-25 DIAGNOSIS — M25.561 ACUTE PAIN OF RIGHT KNEE: Primary | ICD-10-CM

## 2022-03-25 PROCEDURE — 1160F RVW MEDS BY RX/DR IN RCRD: CPT | Mod: CPTII,S$GLB,, | Performed by: PHYSICIAN ASSISTANT

## 2022-03-25 PROCEDURE — 1159F PR MEDICATION LIST DOCUMENTED IN MEDICAL RECORD: ICD-10-PCS | Mod: CPTII,S$GLB,, | Performed by: PHYSICIAN ASSISTANT

## 2022-03-25 PROCEDURE — 3079F DIAST BP 80-89 MM HG: CPT | Mod: CPTII,S$GLB,, | Performed by: PHYSICIAN ASSISTANT

## 2022-03-25 PROCEDURE — 3008F BODY MASS INDEX DOCD: CPT | Mod: CPTII,S$GLB,, | Performed by: PHYSICIAN ASSISTANT

## 2022-03-25 PROCEDURE — 73564 X-RAY EXAM KNEE 4 OR MORE: CPT | Mod: TC,50

## 2022-03-25 PROCEDURE — 3075F PR MOST RECENT SYSTOLIC BLOOD PRESS GE 130-139MM HG: ICD-10-PCS | Mod: CPTII,S$GLB,, | Performed by: PHYSICIAN ASSISTANT

## 2022-03-25 PROCEDURE — 73564 X-RAY EXAM KNEE 4 OR MORE: CPT | Mod: 26,,, | Performed by: RADIOLOGY

## 2022-03-25 PROCEDURE — 1160F PR REVIEW ALL MEDS BY PRESCRIBER/CLIN PHARMACIST DOCUMENTED: ICD-10-PCS | Mod: CPTII,S$GLB,, | Performed by: PHYSICIAN ASSISTANT

## 2022-03-25 PROCEDURE — 1159F MED LIST DOCD IN RCRD: CPT | Mod: CPTII,S$GLB,, | Performed by: PHYSICIAN ASSISTANT

## 2022-03-25 PROCEDURE — 3008F PR BODY MASS INDEX (BMI) DOCUMENTED: ICD-10-PCS | Mod: CPTII,S$GLB,, | Performed by: PHYSICIAN ASSISTANT

## 2022-03-25 PROCEDURE — 99204 OFFICE O/P NEW MOD 45 MIN: CPT | Mod: S$GLB,,, | Performed by: PHYSICIAN ASSISTANT

## 2022-03-25 PROCEDURE — 99204 PR OFFICE/OUTPT VISIT, NEW, LEVL IV, 45-59 MIN: ICD-10-PCS | Mod: S$GLB,,, | Performed by: PHYSICIAN ASSISTANT

## 2022-03-25 PROCEDURE — 73564 XR KNEE ORTHO BILAT WITH FLEXION: ICD-10-PCS | Mod: 26,,, | Performed by: RADIOLOGY

## 2022-03-25 PROCEDURE — 3075F SYST BP GE 130 - 139MM HG: CPT | Mod: CPTII,S$GLB,, | Performed by: PHYSICIAN ASSISTANT

## 2022-03-25 PROCEDURE — 3079F PR MOST RECENT DIASTOLIC BLOOD PRESSURE 80-89 MM HG: ICD-10-PCS | Mod: CPTII,S$GLB,, | Performed by: PHYSICIAN ASSISTANT

## 2022-03-25 PROCEDURE — 99999 PR PBB SHADOW E&M-EST. PATIENT-LVL IV: CPT | Mod: PBBFAC,,, | Performed by: PHYSICIAN ASSISTANT

## 2022-03-25 PROCEDURE — 99999 PR PBB SHADOW E&M-EST. PATIENT-LVL IV: ICD-10-PCS | Mod: PBBFAC,,, | Performed by: PHYSICIAN ASSISTANT

## 2022-03-25 RX ORDER — NAPROXEN 500 MG/1
500 TABLET ORAL 2 TIMES DAILY WITH MEALS
Qty: 60 TABLET | Refills: 2 | Status: SHIPPED | OUTPATIENT
Start: 2022-03-25 | End: 2023-06-30 | Stop reason: CLARIF

## 2022-04-05 ENCOUNTER — HOSPITAL ENCOUNTER (OUTPATIENT)
Dept: RADIOLOGY | Facility: HOSPITAL | Age: 42
Discharge: HOME | End: 2022-04-05
Attending: PHYSICIAN ASSISTANT
Payer: COMMERCIAL

## 2022-04-05 DIAGNOSIS — M25.561 ACUTE PAIN OF RIGHT KNEE: ICD-10-CM

## 2022-04-05 DIAGNOSIS — S83.411A SPRAIN OF MEDIAL COLLATERAL LIGAMENT OF RIGHT KNEE, INITIAL ENCOUNTER: ICD-10-CM

## 2022-04-05 PROCEDURE — 73721 MRI JNT OF LWR EXTRE W/O DYE: CPT | Mod: 26,RT,, | Performed by: RADIOLOGY

## 2022-04-05 PROCEDURE — 73721 MRI JNT OF LWR EXTRE W/O DYE: CPT | Mod: TC,RT

## 2022-04-05 PROCEDURE — 73721 MRI KNEE WITHOUT CONTRAST RIGHT: ICD-10-PCS | Mod: 26,RT,, | Performed by: RADIOLOGY

## 2022-04-08 ENCOUNTER — OFFICE VISIT (OUTPATIENT)
Dept: SPORTS MEDICINE | Facility: CLINIC | Age: 42
End: 2022-04-08
Payer: COMMERCIAL

## 2022-04-08 VITALS
SYSTOLIC BLOOD PRESSURE: 145 MMHG | WEIGHT: 315 LBS | HEART RATE: 71 BPM | DIASTOLIC BLOOD PRESSURE: 94 MMHG | HEIGHT: 68 IN | BODY MASS INDEX: 47.74 KG/M2

## 2022-04-08 DIAGNOSIS — S83.231D COMPLEX TEAR OF MEDIAL MENISCUS OF RIGHT KNEE AS CURRENT INJURY, SUBSEQUENT ENCOUNTER: ICD-10-CM

## 2022-04-08 DIAGNOSIS — M25.561 ACUTE PAIN OF RIGHT KNEE: Primary | ICD-10-CM

## 2022-04-08 DIAGNOSIS — E66.01 MORBID OBESITY WITH BMI OF 60.0-69.9, ADULT: ICD-10-CM

## 2022-04-08 PROCEDURE — 3077F SYST BP >= 140 MM HG: CPT | Mod: CPTII,S$GLB,, | Performed by: PHYSICIAN ASSISTANT

## 2022-04-08 PROCEDURE — 3008F BODY MASS INDEX DOCD: CPT | Mod: CPTII,S$GLB,, | Performed by: PHYSICIAN ASSISTANT

## 2022-04-08 PROCEDURE — 1159F PR MEDICATION LIST DOCUMENTED IN MEDICAL RECORD: ICD-10-PCS | Mod: CPTII,S$GLB,, | Performed by: PHYSICIAN ASSISTANT

## 2022-04-08 PROCEDURE — 1160F PR REVIEW ALL MEDS BY PRESCRIBER/CLIN PHARMACIST DOCUMENTED: ICD-10-PCS | Mod: CPTII,S$GLB,, | Performed by: PHYSICIAN ASSISTANT

## 2022-04-08 PROCEDURE — 1159F MED LIST DOCD IN RCRD: CPT | Mod: CPTII,S$GLB,, | Performed by: PHYSICIAN ASSISTANT

## 2022-04-08 PROCEDURE — 99999 PR PBB SHADOW E&M-EST. PATIENT-LVL III: CPT | Mod: PBBFAC,,, | Performed by: PHYSICIAN ASSISTANT

## 2022-04-08 PROCEDURE — 3008F PR BODY MASS INDEX (BMI) DOCUMENTED: ICD-10-PCS | Mod: CPTII,S$GLB,, | Performed by: PHYSICIAN ASSISTANT

## 2022-04-08 PROCEDURE — 99999 PR PBB SHADOW E&M-EST. PATIENT-LVL III: ICD-10-PCS | Mod: PBBFAC,,, | Performed by: PHYSICIAN ASSISTANT

## 2022-04-08 PROCEDURE — 99214 OFFICE O/P EST MOD 30 MIN: CPT | Mod: S$GLB,,, | Performed by: PHYSICIAN ASSISTANT

## 2022-04-08 PROCEDURE — 3080F PR MOST RECENT DIASTOLIC BLOOD PRESSURE >= 90 MM HG: ICD-10-PCS | Mod: CPTII,S$GLB,, | Performed by: PHYSICIAN ASSISTANT

## 2022-04-08 PROCEDURE — 99214 PR OFFICE/OUTPT VISIT, EST, LEVL IV, 30-39 MIN: ICD-10-PCS | Mod: S$GLB,,, | Performed by: PHYSICIAN ASSISTANT

## 2022-04-08 PROCEDURE — 1160F RVW MEDS BY RX/DR IN RCRD: CPT | Mod: CPTII,S$GLB,, | Performed by: PHYSICIAN ASSISTANT

## 2022-04-08 PROCEDURE — 3077F PR MOST RECENT SYSTOLIC BLOOD PRESSURE >= 140 MM HG: ICD-10-PCS | Mod: CPTII,S$GLB,, | Performed by: PHYSICIAN ASSISTANT

## 2022-04-08 PROCEDURE — 3080F DIAST BP >= 90 MM HG: CPT | Mod: CPTII,S$GLB,, | Performed by: PHYSICIAN ASSISTANT

## 2022-04-08 NOTE — PROGRESS NOTES
CC: Right knee pain    Patient is a 41-year-old male who presents today for follow-up evaluation of right knee pain.  He continues to experience subjective instability and pain of the right knee.  Pain is worse with bending the knee and going up and down stairs.  No new falls, injuries, or trauma.  He has been occasionally taking anti-inflammatories and wearing a knee brace which only provides slight relief.  He is here today to discuss MRI results and treatment options moving forward.    Previous HPI (3/25/2022):  Patient is a 41 y.o. male who presents today for initial evaluation of right knee pain.  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  Patient reports that last Sunday he was walking in a parade when he slipped and fell directly onto his right knee.  He feels he may have potentially twisted his knee in the process.  Since then, he has had significant pain of the right knee, especially with ambulation and bending the knee.  He also reports associated mechanical clicking and catching within the right knee.  There is associated subjective instability of the right knee as well, especially when going up and down stairs.  No prior history of surgeries or injuries to the right knee.  He denies any associated numbness or tingling of the right lower extremity.  Thus far treatment has included relative rest and icing the right knee with little relief.    + mechanical symptoms, + instability    Is affecting ADLs.  Pain is 4/10 at it's worst.      REVIEW OF SYSTEMS:   Constitution: Negative. Negative for chills, fever and night sweats.   HENT: Negative for congestion and headaches.    Eyes: Negative for blurred vision, left vision loss and right vision loss.   Cardiovascular: Negative for chest pain and syncope.   Respiratory: Negative for cough and shortness of breath.    Endocrine: Negative for polydipsia, polyphagia and polyuria.   Hematologic/Lymphatic: Negative for  bleeding problem. Does not bruise/bleed easily.   Skin: Negative for dry skin, itching and rash.   Musculoskeletal: Negative for falls. Positive for right knee pain and  muscle weakness.   Gastrointestinal: Negative for abdominal pain and bowel incontinence.   Genitourinary: Negative for bladder incontinence and nocturia.   Neurological: Negative for disturbances in coordination, loss of balance and seizures.   Psychiatric/Behavioral: Negative for depression. The patient does not have insomnia.    Allergic/Immunologic: Negative for hives and persistent infections.     PAST MEDICAL HISTORY:   Past Medical History:   Diagnosis Date    GERD without esophagitis     takes OTC nexium PRN    History of kidney stones     Morbid obesity with body mass index of 60.0-69.9 in adult     GAYLE (obstructive sleep apnea)     did not tolerate CPAP       PAST SURGICAL HISTORY:   Past Surgical History:   Procedure Laterality Date    APPENDECTOMY  3/2008    CYSTOSCOPY  10/11/2019    Procedure: CYSTOSCOPY;  Surgeon: Gamal Sosa MD;  Location: [address];  Service: Urology;;    CYSTOSCOPY W/ URETERAL STENT PLACEMENT Left 10/6/2019    Procedure: CYSTOSCOPY, WITH URETERAL STENT INSERTION;  Surgeon: Gamal Sosa MD;  Location: [address];  Service: Urology;  Laterality: Left;    LASER LITHOTRIPSY Left 10/11/2019    Procedure: LITHOTRIPSY, USING LASER;  Surgeon: Gamal Sosa MD;  Location: [facility] OR [address] [place] [address];  Service: Urology;  Laterality: Left;    RETROGRADE PYELOGRAPHY Left 10/6/2019    Procedure: PYELOGRAM, RETROGRADE;  Surgeon: Gamal Sosa MD;  Location: [address];  Service: Urology;  Laterality: Left;    RETROGRADE PYELOGRAPHY Left 10/11/2019    Procedure: PYELOGRAM, RETROGRADE;  Surgeon: Gamal Sosa MD;  Location: [address];  Service: Urology;  Laterality: Left;    URETEROSCOPIC REMOVAL OF URETERIC CALCULUS  10/11/2019    Procedure: REMOVAL, CALCULUS, URETER, URETEROSCOPIC;   "Surgeon: Gamal Sosa MD;  Location: [facility] OR 1ST FLR;  Service: Urology;;    URETEROSCOPY Left 10/6/2019    Procedure: URETEROSCOPY;  Surgeon: Gamal Sosa MD;  Location: [facility] OR 1ST FLR;  Service: Urology;  Laterality: Left;    URETEROSCOPY Left 10/11/2019    Procedure: URETEROSCOPY;  Surgeon: Gamal Sosa MD;  Location: [facility] OR 1ST FLR;  Service: Urology;  Laterality: Left;  1hr       FAMILY HISTORY:   Family History   Problem Relation Age of Onset    Heart disease Father     Stroke Father     Hypertension Father     Diabetes Maternal Grandmother     Heart disease Maternal Grandfather     Stroke Paternal Grandmother     Heart disease Paternal Grandfather     Diabetes Mother     Obesity Mother     Obesity Sister     Diabetes Brother     Obesity Brother        SOCIAL HISTORY:   Social History     Socioeconomic History    Marital status: Single   Tobacco Use    Smoking status: Never Smoker    Smokeless tobacco: Current User     Types: Chew    Tobacco comment: 2 cans/ week   Substance and Sexual Activity    Alcohol use: Yes     Alcohol/week: 3.0 standard drinks     Types: 3 Cans of beer per week     Comment: "socially but not usually"    Drug use: No   Social History Narrative    Works full time: [employer]  .  1 son, 14 years old.        MEDICATIONS:     Current Outpatient Medications:     ergocalciferol (VITAMIN D2) 50,000 unit Cap, Take 1 capsule (50,000 Units total) by mouth twice a week., Disp: 8 capsule, Rfl: 3    esomeprazole (NEXIUM) 20 MG capsule, Take 20 mg by mouth before breakfast., Disp: , Rfl:     meclizine (ANTIVERT) 25 mg tablet, Take 1 tablet (25 mg total) by mouth 3 (three) times daily as needed for Dizziness., Disp: 20 tablet, Rfl: 0    naproxen (NAPROSYN) 500 MG tablet, Take 1 tablet (500 mg total) by mouth 2 (two) times daily with meals., Disp: 60 tablet, Rfl: 2    oxybutynin (DITROPAN) 5 MG Tab, Take 1 " "tablet (5 mg total) by mouth 3 (three) times daily as needed (bladder spasms)., Disp: 90 tablet, Rfl: 0    tamsulosin (FLOMAX) 0.4 mg Cap, Take 1 capsule (0.4 mg total) by mouth once daily. for 30 doses, Disp: 30 capsule, Rfl: 0    ALLERGIES:   Review of patient's allergies indicates:  No Known Allergies    VITAL SIGNS:   BP (!) 145/94   Pulse 71   Ht 5' 8" (1.727 m)   Wt (!) 194.6 kg (429 lb)   BMI 65.23 kg/m²      PHYSICAL EXAMINATION:  General:  The patient is alert and oriented x 3.  Mood is pleasant.  Observation of ears, eyes and nose reveal no gross abnormalities.  No labored breathing observed.    RIGHT KNEE EXAMINATION     OBSERVATION / INSPECTION   Gait:   Antalgic   Alignment:  Neutral   Scars:   None   Muscle atrophy: Mild  Effusion:  +  Warmth:  None   Discoloration:   none     TENDERNESS / CREPITUS (T / C):          T / C      T / C   Patella   + / -   Lateral joint line   - / -   Peripatellar medial  +  Medial joint line    + / -   Peripatellar lateral -  Medial plica   - / -   Patellar tendon -   Popliteal fossa   - / -   Quad tendon   -   Gastrocnemius   -   Prepatellar Bursa - / -   Quadricep   -   Tibial tubercle  -  Thigh/hamstring  -   Pes anserine/HS -  Fibula    -   ITB   - / -  Tibia     -   Tib/fib joint  - / -  LCL    -     MFC   - / -   MCL: Proximal  -    LFC   - / -    Distal   -          ROM: (* = pain)  PASSIVE   ACTIVE    Left :   5 / 0 / 120   5 / 0 / 120     Right :    5 / 0 / 90*   5 / 0 / 90*    Patellofemoral examination:  See above noted areas of tenderness.   Patella position    Subluxation / dislocation: Centered           Sup. / Inf;   Normal   Crepitus (PF):    Absent   Patellar Mobility:       Medial-lateral:   Normal    Superior-inferior:  Normal    Inferior tilt   Normal    Patellar tendon:  Normal   Lateral tilt:    +   J-sign:     None   Patellofemoral grind:   + pain       MENISCAL SIGNS:     Pain on terminal extension:  +  Pain on terminal " flexion:  +  Carols maneuver:  + (for pain)  Squat     deferred    LIGAMENT EXAMINATION:  ACL / Lachman:  normal (-1 to 2mm)    PCL-Post.  drawer: normal 0 to 2mm  MCL- Valgus:  normal 0 to 2mm  LCL- Varus:  normal 0 to 2mm  Pivot shift: normal (Equal)   Dial Test: difference c/w other side   At 30° flexion: normal (< 5°)    At 90° flexion: normal (< 5°)   Reverse Pivot Shift:   normal (Equal)     STRENGTH: (* = with pain) PAINFUL SIDE   Quadricep   4/5   Hamstrin/5    EXTREMITY NEURO-VASCULAR EXAMINATION:   Sensation:  Grossly intact to light touch all dermatomal regions.   Motor Function:  Fully intact motor function at hip, knee, foot and ankle    DTRs;  quadriceps and  achilles 2+.  No clonus and downgoing Babinski.    Vascular status:  DP and PT pulses 2+, brisk capillary refill, symmetric.     OTHER FINDINGS:  N/A    X-rays bilateral knees (3/25/2022):  FINDINGS:  Knee ortho bilateral four views.     Right knee: No fracture dislocation bone destruction or OCD seen.  There are spurs on the patella.     Left: No fracture dislocation bone destruction or OCD seen.  There are spurs on the patella.     MRI right knee (2022):  Impression:     1. Complex tear involving the posterior root medial meniscus with parameniscal cyst and possible displaced fragment.     ASSESSMENT:    Right Knee Pain  Complex medial meniscus tear, right knee  Morbid obesity - BMI > 65    PLAN:   1. I made the decision to obtain old records of the patient including previous notes and imaging. I independently reviewed and interpreted the radiographs and/or MRIs today as well as prior imaging. Reviewed MRI and radiograph results with patient in detail.    2. Treatment options were discussed with the patient about his knee.  I reviewed the MRI images with his, including the relevant anatomy, and what this means for his knee.    We discussed both non-operative and operative options for his knee and the risks and benefits of  each.    I feel like he would benefit from surgery for his knee.  After a discussion of specific risks and benefits, he would like to proceed with setting this up.    These risks include: bleeding, infection, scarring, damage to neurovascular structures, damage to cartilage, stiffness, blood clots, pulmonary embolism, swelling, compartment syndrome, need for further surgery, and the risks of anesthesia.      He verbalized his understanding of these risks and wished to proceed with surgery.    A total of 25 minutes were spent face-to-face with the patient during this encounter and over half of that time was spent on counseling about treatment options including his choice for surgery and coordination of his care for his preoperative visits, surgery and post-operative rehab.  We also had a detailed discussion of his expectation level for this procedure as well as any limitations at home or work and with exercising following surgery.     The operative plan is:    right   1. Arthroscopic partial meniscectomy versus repair, possible root repair  2. Possible arthroscopic synovectomy  3. Possible arthroscopic loose body removal  4. Possible arthroscopic chondroplasty    Position: Supine    Patient will  need medical clearance prior to the pre-operative appointment.    If he wishes to proceed, we'll get his scheduled for this at his convenience around his work schedule.        All questions were answered, pt will contact us for questions or concerns in the interim.        Medical Dictation software was used during the dictation of portions or the entirety of this medical record.  Phonetic or grammatic errors may exist due to the use of this software. For clarification, refer to the author of the document.

## 2022-04-11 DIAGNOSIS — M94.261 CHONDROMALACIA OF RIGHT KNEE: ICD-10-CM

## 2022-04-11 DIAGNOSIS — M23.203 OLD TEAR OF MEDIAL MENISCUS OF RIGHT KNEE, UNSPECIFIED TEAR TYPE: Primary | ICD-10-CM

## 2022-04-22 ENCOUNTER — PATIENT MESSAGE (OUTPATIENT)
Dept: SPORTS MEDICINE | Facility: CLINIC | Age: 42
End: 2022-04-22
Payer: COMMERCIAL

## 2022-04-26 ENCOUNTER — TELEPHONE (OUTPATIENT)
Dept: INTERNAL MEDICINE | Facility: CLINIC | Age: 42
End: 2022-04-26
Payer: COMMERCIAL

## 2022-04-26 NOTE — TELEPHONE ENCOUNTER
Spoke with pt and notified of appt cancellation (penfold) and pt requested a reschedule in the morning with another provider. Pt also requested that [email or web address]

## 2022-04-29 ENCOUNTER — TELEPHONE (OUTPATIENT)
Dept: SPORTS MEDICINE | Facility: CLINIC | Age: 42
End: 2022-04-29
Payer: COMMERCIAL

## 2022-04-29 NOTE — TELEPHONE ENCOUNTER
Called patient to let him know that we can scheduled his surgery on Friday 05/20. 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 Stated that he is available the next week. I informed patient that dr. Martinez doesn't always has block time at [facility] and we will have to call him when we contact [facility] and  to see when he will be able to scheduled another date for him.

## 2022-04-29 NOTE — TELEPHONE ENCOUNTER
----- Message from Eugenio Duffy sent at 4/29/2022  8:24 AM CDT -----  Regarding: FW: Knee surgery  Main campus day - 5/20.   ----- Message -----  From: Warner Vallejo PA-C  Sent: 4/8/2022   8:28 AM CDT  To: Eugenio Duffy  Subject: Knee surgery                                     [employer] and . Complex medial meniscus and meniscus root tear, wants to proceed with surgery in May. Would need to be a main campus case due to BMI. We can review with Michael next week.

## 2022-05-11 ENCOUNTER — TELEPHONE (OUTPATIENT)
Dept: SPORTS MEDICINE | Facility: CLINIC | Age: 42
End: 2022-05-11
Payer: COMMERCIAL

## 2022-05-11 NOTE — TELEPHONE ENCOUNTER
----- Message from Eugenio Duffy sent at 5/10/2022 10:01 AM CDT -----  Regarding: FW: Knee surgery  Main campus case   ----- Message -----  From: Warner Vallejo PA-C  Sent: 4/8/2022   8:28 AM CDT  To: Eugenio Duffy  Subject: Knee surgery                                     [place] [employer] and . Complex medial meniscus and meniscus root tear, wants to proceed with surgery in May. Would need to be a main campus case due to BMI. We can review with Michael next week.

## 2022-06-27 PROCEDURE — 96361 HYDRATE IV INFUSION ADD-ON: CPT

## 2022-06-27 PROCEDURE — 96375 TX/PRO/DX INJ NEW DRUG ADDON: CPT

## 2022-06-27 PROCEDURE — 96374 THER/PROPH/DIAG INJ IV PUSH: CPT

## 2022-06-27 PROCEDURE — 99285 EMERGENCY DEPT VISIT HI MDM: CPT | Mod: 25

## 2022-06-27 PROCEDURE — 99284 PR EMERGENCY DEPT VISIT,LEVEL IV: ICD-10-PCS | Mod: ,,, | Performed by: EMERGENCY MEDICINE

## 2022-06-27 PROCEDURE — 99284 EMERGENCY DEPT VISIT MOD MDM: CPT | Mod: ,,, | Performed by: EMERGENCY MEDICINE

## 2022-06-28 ENCOUNTER — HOSPITAL ENCOUNTER (EMERGENCY)
Facility: HOSPITAL | Age: 42
Discharge: HOME | End: 2022-06-28
Attending: EMERGENCY MEDICINE
Payer: COMMERCIAL

## 2022-06-28 VITALS
HEIGHT: 70 IN | WEIGHT: 315 LBS | DIASTOLIC BLOOD PRESSURE: 76 MMHG | BODY MASS INDEX: 45.1 KG/M2 | TEMPERATURE: 99 F | SYSTOLIC BLOOD PRESSURE: 120 MMHG | OXYGEN SATURATION: 97 % | HEART RATE: 63 BPM | RESPIRATION RATE: 18 BRPM

## 2022-06-28 DIAGNOSIS — R10.9 FLANK PAIN: Primary | ICD-10-CM

## 2022-06-28 LAB
ALBUMIN SERPL BCP-MCNC: 3.5 G/DL (ref 3.5–5.2)
ALP SERPL-CCNC: 66 U/L (ref 55–135)
ALT SERPL W/O P-5'-P-CCNC: 20 U/L (ref 10–44)
ANION GAP SERPL CALC-SCNC: 11 MMOL/L (ref 8–16)
AST SERPL-CCNC: 16 U/L (ref 10–40)
BASOPHILS # BLD AUTO: 0.05 K/UL (ref 0–0.2)
BASOPHILS NFR BLD: 0.6 % (ref 0–1.9)
BILIRUB SERPL-MCNC: 0.4 MG/DL (ref 0.1–1)
BILIRUB UR QL STRIP: NEGATIVE
BUN SERPL-MCNC: 13 MG/DL (ref 6–20)
CALCIUM SERPL-MCNC: 9.4 MG/DL (ref 8.7–10.5)
CHLORIDE SERPL-SCNC: 108 MMOL/L (ref 95–110)
CLARITY UR REFRACT.AUTO: CLEAR
CO2 SERPL-SCNC: 22 MMOL/L (ref 23–29)
COLOR UR AUTO: YELLOW
CREAT SERPL-MCNC: 0.9 MG/DL (ref 0.5–1.4)
DIFFERENTIAL METHOD: ABNORMAL
EOSINOPHIL # BLD AUTO: 0.3 K/UL (ref 0–0.5)
EOSINOPHIL NFR BLD: 3.1 % (ref 0–8)
ERYTHROCYTE [DISTWIDTH] IN BLOOD BY AUTOMATED COUNT: 12.9 % (ref 11.5–14.5)
EST. GFR  (AFRICAN AMERICAN): >60 ML/MIN/1.73 M^2
EST. GFR  (NON AFRICAN AMERICAN): >60 ML/MIN/1.73 M^2
GLUCOSE SERPL-MCNC: 97 MG/DL (ref 70–110)
GLUCOSE UR QL STRIP: NEGATIVE
HCT VFR BLD AUTO: 38.8 % (ref 40–54)
HCV AB SERPL QL IA: NEGATIVE
HGB BLD-MCNC: 13.4 G/DL (ref 14–18)
HGB UR QL STRIP: NEGATIVE
HIV 1+2 AB+HIV1 P24 AG SERPL QL IA: NEGATIVE
IMM GRANULOCYTES # BLD AUTO: 0.02 K/UL (ref 0–0.04)
IMM GRANULOCYTES NFR BLD AUTO: 0.2 % (ref 0–0.5)
KETONES UR QL STRIP: NEGATIVE
LEUKOCYTE ESTERASE UR QL STRIP: NEGATIVE
LYMPHOCYTES # BLD AUTO: 3.3 K/UL (ref 1–4.8)
LYMPHOCYTES NFR BLD: 38 % (ref 18–48)
MCH RBC QN AUTO: 30.9 PG (ref 27–31)
MCHC RBC AUTO-ENTMCNC: 34.5 G/DL (ref 32–36)
MCV RBC AUTO: 90 FL (ref 82–98)
MONOCYTES # BLD AUTO: 0.7 K/UL (ref 0.3–1)
MONOCYTES NFR BLD: 8 % (ref 4–15)
NEUTROPHILS # BLD AUTO: 4.4 K/UL (ref 1.8–7.7)
NEUTROPHILS NFR BLD: 50.1 % (ref 38–73)
NITRITE UR QL STRIP: NEGATIVE
NRBC BLD-RTO: 0 /100 WBC
PH UR STRIP: 5 [PH] (ref 5–8)
PLATELET # BLD AUTO: 207 K/UL (ref 150–450)
PMV BLD AUTO: 10.7 FL (ref 9.2–12.9)
POTASSIUM SERPL-SCNC: 4 MMOL/L (ref 3.5–5.1)
PROT SERPL-MCNC: 6.9 G/DL (ref 6–8.4)
PROT UR QL STRIP: NEGATIVE
RBC # BLD AUTO: 4.33 M/UL (ref 4.6–6.2)
SODIUM SERPL-SCNC: 141 MMOL/L (ref 136–145)
SP GR UR STRIP: 1.02 (ref 1–1.03)
URN SPEC COLLECT METH UR: NORMAL
WBC # BLD AUTO: 8.77 K/UL (ref 3.9–12.7)

## 2022-06-28 PROCEDURE — 25000003 PHARM REV CODE 250: Performed by: EMERGENCY MEDICINE

## 2022-06-28 PROCEDURE — 85025 COMPLETE CBC W/AUTO DIFF WBC: CPT | Performed by: PHYSICIAN ASSISTANT

## 2022-06-28 PROCEDURE — 81003 URINALYSIS AUTO W/O SCOPE: CPT | Performed by: PHYSICIAN ASSISTANT

## 2022-06-28 PROCEDURE — 63600175 PHARM REV CODE 636 W HCPCS: Performed by: EMERGENCY MEDICINE

## 2022-06-28 PROCEDURE — 80053 COMPREHEN METABOLIC PANEL: CPT | Performed by: PHYSICIAN ASSISTANT

## 2022-06-28 PROCEDURE — 86803 HEPATITIS C AB TEST: CPT | Performed by: EMERGENCY MEDICINE

## 2022-06-28 PROCEDURE — 87389 HIV-1 AG W/HIV-1&-2 AB AG IA: CPT | Performed by: EMERGENCY MEDICINE

## 2022-06-28 RX ORDER — KETOROLAC TROMETHAMINE 30 MG/ML
10 INJECTION, SOLUTION INTRAMUSCULAR; INTRAVENOUS
Status: COMPLETED | OUTPATIENT
Start: 2022-06-28 | End: 2022-06-28

## 2022-06-28 RX ORDER — ONDANSETRON 2 MG/ML
4 INJECTION INTRAMUSCULAR; INTRAVENOUS
Status: COMPLETED | OUTPATIENT
Start: 2022-06-28 | End: 2022-06-28

## 2022-06-28 RX ADMIN — KETOROLAC TROMETHAMINE 10 MG: 30 INJECTION, SOLUTION INTRAMUSCULAR; INTRAVENOUS at 01:06

## 2022-06-28 RX ADMIN — ONDANSETRON 4 MG: 2 INJECTION INTRAMUSCULAR; INTRAVENOUS at 01:06

## 2022-06-28 RX ADMIN — SODIUM CHLORIDE 1000 ML: 0.9 INJECTION, SOLUTION INTRAVENOUS at 01:06

## 2022-06-28 NOTE — DISCHARGE INSTRUCTIONS
Your blood work today is unremarkable. Your urinalysis is negative for infection and you have no blood in your urine.  Your CT scan shows bilateral nonobstructing renal stones but none in your ureters that would be causing pain.    I recommend close follow up with your primary doctor.

## 2022-06-28 NOTE — ED NOTES
LOC: The patient is awake, alert and aware of environment with an appropriate affect, the patient is oriented x 4 and speaking appropriately.  APPEARANCE: Patient resting comfortably and in no acute distress, patient is clean and well groomed, patient's clothing is properly fastened.  SKIN: The skin is warm and dry, color consistent with ethnicity, patient has normal skin turgor and moist mucus membranes, skin intact, no breakdown or bruising noted. Denies diaphoresis   MUSCULOSKELETAL: Patient moving all extremities well, no obvious swelling nor deformities noted.   RESPIRATORY: Airway is open and patent, respirations are spontaneous, patient has a normal effort and rate, no accessory muscle use noted. Lung sounds clear throughout all fields. Denies productive cough  CARDIAC: Patient has a normal rate, no periphreal edema noted, capillary refill < 3 seconds. Denies chest pain  ABDOMEN: Soft and non tender to palpation, no distention noted. Bowel sounds present in all quads. Denies n/v, diarrhea/constipation,  Reports bilat flank pain and inability to pass urine.  NEUROLOGIC: PERRL, 2mm bilaterally, eyes open spontaneously, behavior appropriate to situation, follows commands, facial expression symmetrical, bilateral hand grasp equal and even, purposeful motor response noted, normal sensation in all extremities when touched with a finger.

## 2022-06-28 NOTE — ED TRIAGE NOTES
"Reports pain to both flanks. "I'm having kidney stones". States that he hasn't voided since yesterday afternoon.   "

## 2022-06-28 NOTE — ED PROVIDER NOTES
Encounter Date: 6/27/2022       History     Chief Complaint   Patient presents with    Flank Pain     Pt states bilateral flank, states has not used restroom since yesterday evening, does not feel sensation of needing to urinate. Hx kidney stones     Pt is a 40 yo M with PMH of appendectomy, kidney stones with stenting and lithotripsy previously who presents with R flank pain and R lower back pain that radiates into his left lower back, feels like previous stones. Currently pain is severe 7-8/10 and has not taken anything for pain. Has associated nausea. He states he has not urinated for over 24 hours. He states he does not have the urge. He denies abdominal pain. He denies constipation and diarrhea. No fever or chills  No leg weakness, numbness, saddle anesthesia    Last stone was October 2018    The history is provided by the patient.     Review of patient's allergies indicates:  No Known Allergies  Past Medical History:   Diagnosis Date    GERD without esophagitis     takes OTC nexium PRN    History of kidney stones     Morbid obesity with body mass index of 60.0-69.9 in adult     GAYLE (obstructive sleep apnea)     did not tolerate CPAP     Past Surgical History:   Procedure Laterality Date    APPENDECTOMY  3/2008    CYSTOSCOPY  10/11/2019    Procedure: CYSTOSCOPY;  Surgeon: Gamal Sosa MD;  Location: [facility] OR [address] [place] [address];  Service: Urology;;    CYSTOSCOPY W/ URETERAL STENT PLACEMENT Left 10/6/2019    Procedure: CYSTOSCOPY, WITH URETERAL STENT INSERTION;  Surgeon: Gamal Sosa MD;  Location: [facility] OR [address] [place] [address];  Service: Urology;  Laterality: Left;    LASER LITHOTRIPSY Left 10/11/2019    Procedure: LITHOTRIPSY, USING LASER;  Surgeon: Gamal Sosa MD;  Location: [facility] OR [address] [place] [address];  Service: Urology;  Laterality: Left;    RETROGRADE PYELOGRAPHY Left 10/6/2019    Procedure: PYELOGRAM, RETROGRADE;  Surgeon: Gamal Sosa MD;  Location: [facility] OR [address] [place] [address];  Service: Urology;  Laterality: Left;     "RETROGRADE PYELOGRAPHY Left 10/11/2019    Procedure: PYELOGRAM, RETROGRADE;  Surgeon: Gamal Sosa MD;  Location: [facility] OR [address] [place] [address];  Service: Urology;  Laterality: Left;    URETEROSCOPIC REMOVAL OF URETERIC CALCULUS  10/11/2019    Procedure: REMOVAL, CALCULUS, URETER, URETEROSCOPIC;  Surgeon: Gamal Sosa MD;  [facility];  Service: Urology;;    URETEROSCOPY Left 10/6/2019    Procedure: URETEROSCOPY;  Surgeon: Gamal Sosa MD;  Location: [facility] OR [facility];  Service: Urology;  Laterality: Left;    URETEROSCOPY Left 10/11/2019    Procedure: URETEROSCOPY;  Surgeon: Gamal Sosa MD;  Location: [facility] OR [address] [place] [address];  Service: Urology;  Laterality: Left;  1hr     Family History   Problem Relation Age of Onset    Heart disease Father     Stroke Father     Hypertension Father     Diabetes Maternal Grandmother     Heart disease Maternal Grandfather     Stroke Paternal Grandmother     Heart disease Paternal Grandfather     Diabetes Mother     Obesity Mother     Obesity Sister     Diabetes Brother     Obesity Brother      Social History     Tobacco Use    Smoking status: Never Smoker    Smokeless tobacco: Current User     Types: Chew    Tobacco comment: 2 cans/ week   Substance Use Topics    Alcohol use: Yes     Alcohol/week: 3.0 standard drinks     Types: 3 Cans of beer per week     Comment: "socially but not usually"    Drug use: No     Review of Systems  General: No fever.  No chills.  Eyes: No visual changes.  Head: No headache.    Integument: No rashes or lesions.  Chest: No shortness of breath.  Cardiovascular: No chest pain.  Abdomen: No abdominal pain.  No diarrhea or constipation  Urinary: + abnormal urination.  Neurologic: No focal weakness.  No numbness.  Hematologic: No easy bruising.  Endocrine: No excessive thirst or urination.    Physical Exam     Initial Vitals [06/27/22 2334]   BP Pulse Resp Temp SpO2   (!) 173/103 67 18 98.8 °F (37.1 °C) 97 %      MAP       -- "         Physical Exam    Nursing note and vitals reviewed.  Constitutional: He appears well-developed and well-nourished. He is not diaphoretic. No distress.   HENT:   Head: Normocephalic and atraumatic.   Eyes: Conjunctivae and EOM are normal.   Neck: Neck supple.   Normal range of motion.  Cardiovascular: Normal rate and regular rhythm.   Pulmonary/Chest: Breath sounds normal. No respiratory distress.   Abdominal: Abdomen is soft. He exhibits no distension. There is no abdominal tenderness.   Musculoskeletal:         General: Normal range of motion.      Cervical back: Normal range of motion and neck supple.      Comments: No CVA tenderness bilaterally     Neurological: He is alert and oriented to person, place, and time. GCS score is 15. GCS eye subscore is 4. GCS verbal subscore is 5. GCS motor subscore is 6.   Skin: Skin is warm and dry. Rash noted.   Psychiatric: He has a normal mood and affect. His behavior is normal. Judgment and thought content normal.         ED Course   Procedures  Labs Reviewed   CBC W/ AUTO DIFFERENTIAL - Abnormal; Notable for the following components:       Result Value    RBC 4.33 (*)     Hemoglobin 13.4 (*)     Hematocrit 38.8 (*)     All other components within normal limits   COMPREHENSIVE METABOLIC PANEL - Abnormal; Notable for the following components:    CO2 22 (*)     All other components within normal limits   URINALYSIS, REFLEX TO URINE CULTURE    Narrative:     Specimen Source->Urine   HIV 1 / 2 ANTIBODY    Narrative:     Release to patient->Immediate   HEPATITIS C ANTIBODY    Narrative:     Release to patient->Immediate          Imaging Results          CT Renal Stone Study ABD Pelvis WO (Final result)  Result time 06/28/22 03:26:32    Final result by Jeramie Tripp MD (06/28/22 03:26:32)                 Impression:      Bilateral nonobstructing renal stones.  No evidence of obstructive uropathy.  Suggest correlation with urinalysis.    Hepatosplenomegaly.    Prior  appendectomy.      Electronically signed by: Jeramie Tripp MD  Date:    06/28/2022  Time:    03:26             Narrative:    EXAMINATION:  CT RENAL STONE STUDY ABD PELVIS WO    CLINICAL HISTORY:  Flank pain, kidney stone suspected;    TECHNIQUE:  Low dose axial images, sagittal and coronal reformations were obtained from the lung bases to the pubic symphysis.  Oral and IV contrast was not administered.    COMPARISON:  CT abdomen pelvis without contrast, 10/05/2019.    FINDINGS:  Exam quality is limited by extensive beam hardening artifact and truncation artifact related to body habitus and soft tissue attenuation of the x-ray beam.    Lower chest: Heart size is normal. Lung bases are essentially clear. No pleural or pericardial effusion.    Abdomen:    Evaluation of the solid abdominal organs and bowel is limited in the absence of IV contrast.    Liver is enlarged, measuring approximately 22 cm in craniocaudal length.  No focal hepatic mass identified on this limited noncontrast study.  Gallbladder is contracted but otherwise unremarkable.  No intra-or extrahepatic biliary ductal dilatation.    Spleen is enlarged, measuring approximately 15 cm in length.  Adrenal glands and pancreas are unremarkable.    Kidneys are symmetric.  There are at least 2 punctate nonobstructing stones in the upper pole of the left kidney measuring up to 4 mm in size.  Single nonobstructing punctate stone in the upper pole of the right kidney measures approximately 3 mm.  No ureteral stones identified.  No hydronephrosis.  No asymmetric perinephric inflammatory changes.    No distended loops of small bowel. Minimal edema in the central mesentery.  Appendix appears surgically absent.  No large volume stool burden identified.    Abdominal aorta is normal in course and caliber without significant calcific atherosclerosis.    No bulky abdominal lymphadenopathy.    No abdominal free fluid or pneumoperitoneum.    Pelvis: Urinary bladder is  decompressed but otherwise unremarkable.  Rectum is unremarkable.  No free fluid in the pelvis.    Bones and soft tissues: No aggressive osseous lesions. There are minor degenerative changes in the lumbar spine.  There is mild body wall edema.  Small fat containing umbilical hernia.                                 Medications   ketorolac injection 9.999 mg (9.999 mg Intravenous Given 6/28/22 0105)   ondansetron injection 4 mg (4 mg Intravenous Given 6/28/22 0105)   sodium chloride 0.9% bolus 1,000 mL (0 mLs Intravenous Stopped 6/28/22 0200)     Medical Decision Making:   History:   Old Medical Records: I decided to obtain old medical records.  Old Records Summarized: records from clinic visits.  Differential Diagnosis:   Renal stones, pyelonephritis, muscle spasm, herniated disk, aortic dissection, SI joint pain, UTI  Clinical Tests:   Lab Tests: Ordered and Reviewed  Radiological Study: Ordered and Reviewed  ED Management:  Labs and imaging with no findings to account for his pain  Also low suspicion for aortic dissection given normal exam, normal CT (although limited without contrast)   Likely MSK pain  Will have him follow up with his pcp and return if symptoms return or develops signs of symptoms of cauda equina/cord compression, vascular insufficiency                      Clinical Impression:   Final diagnoses:  [R10.9] Flank pain (Primary)          ED Disposition Condition    Discharge Stable        ED Prescriptions     None        Follow-up Information     Follow up With Specialties Details Why Contact Info    [facility] - Emergency Dept Emergency Medicine   [id number]  [place] [address]  [phone]           Jossie Carlos MD  06/29/22 5145

## 2023-02-23 ENCOUNTER — TELEPHONE (OUTPATIENT)
Dept: BARIATRICS | Facility: CLINIC | Age: 43
End: 2023-02-23
Payer: COMMERCIAL

## 2023-02-23 NOTE — TELEPHONE ENCOUNTER
Notified patient of the date & time of financial phone call appt.  Pt aware appt is a telephone call.    Dashboard updated  Appt. 02/24/2023

## 2023-03-10 ENCOUNTER — TELEPHONE (OUTPATIENT)
Dept: BARIATRICS | Facility: CLINIC | Age: 43
End: 2023-03-10
Payer: COMMERCIAL

## 2023-03-23 ENCOUNTER — CLINICAL SUPPORT (OUTPATIENT)
Dept: BARIATRICS | Facility: CLINIC | Age: 43
End: 2023-03-23
Payer: COMMERCIAL

## 2023-03-23 ENCOUNTER — OFFICE VISIT (OUTPATIENT)
Dept: BARIATRICS | Facility: CLINIC | Age: 43
End: 2023-03-23
Payer: COMMERCIAL

## 2023-03-23 VITALS
BODY MASS INDEX: 46.65 KG/M2 | HEART RATE: 64 BPM | WEIGHT: 315 LBS | SYSTOLIC BLOOD PRESSURE: 140 MMHG | DIASTOLIC BLOOD PRESSURE: 84 MMHG | HEIGHT: 69 IN | OXYGEN SATURATION: 98 %

## 2023-03-23 DIAGNOSIS — G47.33 OSA (OBSTRUCTIVE SLEEP APNEA): Primary | ICD-10-CM

## 2023-03-23 DIAGNOSIS — G47.33 OSA (OBSTRUCTIVE SLEEP APNEA): Chronic | ICD-10-CM

## 2023-03-23 DIAGNOSIS — E66.01 MORBID OBESITY WITH BODY MASS INDEX OF 60.0-69.9 IN ADULT: ICD-10-CM

## 2023-03-23 DIAGNOSIS — K21.9 GERD WITHOUT ESOPHAGITIS: Chronic | ICD-10-CM

## 2023-03-23 DIAGNOSIS — Z71.3 DIETARY COUNSELING AND SURVEILLANCE: ICD-10-CM

## 2023-03-23 DIAGNOSIS — Z72.0 TOBACCO DIPPER: ICD-10-CM

## 2023-03-23 DIAGNOSIS — E66.01 MORBID OBESITY WITH BODY MASS INDEX OF 60.0-69.9 IN ADULT: Primary | Chronic | ICD-10-CM

## 2023-03-23 PROCEDURE — 99212 OFFICE O/P EST SF 10 MIN: CPT | Mod: PBBFAC,27 | Performed by: DIETITIAN, REGISTERED

## 2023-03-23 PROCEDURE — 99205 OFFICE O/P NEW HI 60 MIN: CPT | Mod: S$GLB,,, | Performed by: NURSE PRACTITIONER

## 2023-03-23 PROCEDURE — 99205 PR OFFICE/OUTPT VISIT, NEW, LEVL V, 60-74 MIN: ICD-10-PCS | Mod: S$GLB,,, | Performed by: NURSE PRACTITIONER

## 2023-03-23 PROCEDURE — 99499 UNLISTED E&M SERVICE: CPT | Mod: S$PBB,,, | Performed by: DIETITIAN, REGISTERED

## 2023-03-23 PROCEDURE — 99215 OFFICE O/P EST HI 40 MIN: CPT | Mod: PBBFAC | Performed by: NURSE PRACTITIONER

## 2023-03-23 PROCEDURE — 99999 PR PBB SHADOW E&M-EST. PATIENT-LVL II: CPT | Mod: PBBFAC,,, | Performed by: DIETITIAN, REGISTERED

## 2023-03-23 PROCEDURE — 99999 PR PBB SHADOW E&M-EST. PATIENT-LVL V: CPT | Mod: PBBFAC,,, | Performed by: NURSE PRACTITIONER

## 2023-03-23 PROCEDURE — 99999 PR PBB SHADOW E&M-EST. PATIENT-LVL II: ICD-10-PCS | Mod: PBBFAC,,, | Performed by: DIETITIAN, REGISTERED

## 2023-03-23 PROCEDURE — 99499 NO LOS: ICD-10-PCS | Mod: S$PBB,,, | Performed by: DIETITIAN, REGISTERED

## 2023-03-23 PROCEDURE — 99999 PR PBB SHADOW E&M-EST. PATIENT-LVL V: ICD-10-PCS | Mod: PBBFAC,,, | Performed by: NURSE PRACTITIONER

## 2023-03-23 NOTE — PATIENT INSTRUCTIONS
1200- 1500 calorie Sample meal plan  80-120g protein per day.   Protein drinks and bars: 0-4 grams sugar  Drink lots of water throughout the day and exercise!  MENU # 1  Breakfast: 2 eggs, 1 turkey sausage le, 1 apple  Snack: Atkins bar  Lunch: 2 roll-ups (sliced turkey, low-fat sliced cheese, mustard), 12 baby carrots dipped in 1 Tbsp natural peanut butter  Mid-Day Snack: ¼ cup unsalted almonds, ½ cup fruit  Dinner: 1 chicken thigh simmered in tomato sauce + 2 Tbsp mozzarella cheese, ½ cup black beans, 1/2 cup steamed carrots  Evening Snack: 1/2 cup grapes with 4 cubes low-fat cheddar cheese   ___________________________________________________  MENU # 2  Breakfast: 200 calorie protein drink  Mid-morning snack : ¼ cup unsalted nuts, medium banana  Lunch: 3oz tuna or chicken salad made with 2 tbsp light craig, over salad with tomatoes and cucumbers.   Snack: low-fat cheese stick  Dinner: 3oz hamburger le, slice of low-fat cheese, 1 cup boiled yellow squash and zucchini.   Snack: 6oz light yogurt  _______________________________________________________  Menu #3  Breakfast: 6oz plain Greek yogurt + fruit (½ banana, ½ cup fruit - pineapple, blueberries, strawberries, peach), may add Splenda to babatunde.  Lunch: Grilled  chicken breast w/ slice low-fat pepper myles cheese, 1/2 cup grilled/baked asparagus and small salad with Salad Spritzer.    Mid-Day snack: 200 calorie protein drink   Dinner: 4oz Grilled fish, ½ cup white beans, ½ cup cooked spinach  Evening Snack: fudgsicle no-sugar-added    Menu # 4  Breakfast: 1 scoop vanilla protein powder + 4oz skim milk + 4oz coffee   Snack: Pure protein bar  Lunch: 2 Lettuce tacos: 3oz seasoned ground turkey wrapped in a Otoniel lettuce leaves with 1 Tbsp shredded cheese and dollop low-fat sour cream  Snack: ½ cup cottage cheese, ½ cup pineapple chunks  Dinner: Shrimp omelet: 2 eggs, ½ cup shrimp, green onions, and shredded  cheese  ______________________________________________________  Menu #5  Breakfast: 1 cup low-fat cottage cheese, ½ cup peaches (no sugar added)  Snack: 1 apple, 4 cubes cheese  Lunch: 3oz baked pork chop, 1 cup okra and tomato stew  Snack: 1/2 cup black beans + salsa + dollop light sour cream  Dinner: Caprese chicken salad: 3 oz chicken breast, 1oz fresh mozzarella, sliced tomato, 1 Tbsp olive oil, basil  Snack: sugar-free popsicle    Menu #6  Breakfast: ½ cup part-skim ricotta cheese, 2 Tbsp sugar-free strawberry preserves, sprinkle of slivered almonds  Snack: 1 orange  Lunch: 3 oz canned chicken, 1oz shredded cheddar cheese, ½  cup black beans, 2 Tbsp salsa  Snack: 200 calorie Protein drink  Dinner: 4 oz grilled salmon steak, over mixed green salad with 2 tbsp light dressing      Meal Ideas for Regular Bariatric Diet  *Recipes and products available at www.bariatriceating.com      Breakfast: (15-20g protein)    - Egg white omelet: 2 egg whites or ½ cup Egg Beaters. (Optional proteins: cheese, shrimp, black beans, chicken, sliced turkey) (Optional veggies: tomatoes, salsa, spinach, mushrooms, onions, green peppers, or small slice avocado)     - Egg and sausage: 1 egg or ¼ cup Egg Beaters (any variety), with 1 le or 2 links of Turkey sausage or Veggie breakfast sausage (TransLattice or I Do Now I Don't)    - Crust-less breakfast quiche: To make a glass pie dish, mix 4oz part skim Ricotta, 1 cup skim milk, and 2 eggs as your base. Add protein: shredded cheese, sliced lean ham or turkey, turkey segovia/sausage. Add veggies: tomato, onion, green onion, mushroom, green pepper, spinach, etc.    - Yogurt parfait: Mix 1 - 6oz container Dannon Light N Fit vanilla yogurt, with ¼ cup crushed unsalted nuts    - Cottage cheese and fruit: ½ cup part-skim cottage cheese or ricotta cheese topped with fresh fruit or sugar free preserves     - Chikis Conti's Vanilla Egg custard* (add 2 Tbsp instant coffee granules to make Cappuccino  Custard*)    - Hi-Protein café latte (skim milk, decaf coffee, 1 scoop protein powder). Optional to add Sugar free syrup or extract flavoring.    - Breakfast Lox: spread fat free cream cheese on slices of smoked salmon. Serve over scrambled or egg over easy (sauteed with nonstick cookspray) OR on a cucumber slice    - Eggwhich: Scramble or cook 1 large egg over easy using nonstick cookspray. Place between 2 slices of Botswanan segovia and low fat cheese.     Lunch: (20-30g protein)    - ½ cup Black bean soup (Homemade or Progresso), with ¼ cup shredded low-fat cheese. Top with chopped tomato or fresh salsa.     - Lean deli turkey breast and low-fat sliced cheese, mustard or light craig to moisten, rolled up together, or wrapped in a Otoniel lettuce leaf    - Chicken salad made from dinner leftovers, moisten with low-fat salad dressing or light craig. Also try leftover salmon, shrimp, tuna or boiled eggs. Serve ½ cup over dark green salad    - Fat-free canned refried beans, topped with ¼ cup shredded low-fat cheese. Top with chopped tomato or fresh salsa.     - Greek salad: Top mixed greens with 1-2oz grilled chicken, tomatoes, red onions, 2-3 kalamata olives, and sprinkle lightly with feta cheese. Spritz with Balsamic vinegar to taste.     - Crust-less lunch quiche: To make a glass pie dish, mix 4oz part skim Ricotta, 1 cup skim milk, and 2 eggs as your base. Add protein: shredded cheese, sliced lean ham or turkey, shrimp, chicken. Add veggies: tomato, onion, green onion, mushroom, green pepper, spinach, artichoke, broccoli, etc.    - Pizza bake: spread a  leora ismael mushroom with tomato sauce, low-fat shredded mozzarella and turkey pepperoni or [place] segovia. Add any veggies. Roast for 10-15 minutes, until cheese melted.     - Cucumber crab bites: Spread ¼ cup crab dip (lump crabmeat + light cream cheese and green onions) over sliced cucumber.     - Chicken with light spinach and artichoke dip*: Puree in food  processor: 6oz cooked and drained spinach, 2 cloves garlic, 1 can cannelloni beans, ½ cup chopped green onions, 1 can drained artichoke hearts (not marinated in oil), lemon juice and basil. Mix in 2oz chopped up chicken.    Supper: (20-30g protein)    - Serve grilled fish over dark green salad tossed with low-fat dressing, served with grilled asparagus melgar     - Rotisserie chicken salad: served with sliced strawberries, walnuts, fat-free feta cheese crumbles and 1 tbsp Burnetts Own Light Raspberry [place] Vinaigrette    - Shrimp cocktail: Dip cold boiled shrimp in homemade low-sugar cocktail sauce (1/2 cup Alfred One Carb ketchup, 2 tbsp horseradish, 1/4 tsp hot sauce, 1 tsp Worcestershire sauce, 1 tbsp freshly-squeezed lemon juice). Serve with dark green salad, walnuts, and crumbled blue cheese drizzled with olive oil and Balsamic vinegar    - Tuna Melt: Spread tuna salad onto 2 thick slices of tomato. Top with low-fat cheese and broil until cheese is melted. May also be made with chicken salad of shrimp salad. [place] with different types of cheeses.    - Chicken or beef fajitas (no tortilla, rice, beans, chips). Top meat and veggies w/ fresh salsa, fat free sour cream.     - Homemade low-fat Chili using extra lean ground beef or ground turkey. Top with shredded cheese and salsa as desired. May add dollop fat-free sour cream if desired    - Chicken parmesan: Top chicken breast w/ low sugar marinara sauce, mozzarella cheese and bake until chicken reaches 165*.  Serve w/ spaghetti SQUASH or Korean cut green beans    - Dinner Omelet with shrimp or chicken and onion, green peppers and chives.    - No noodle lasagna: Use sliced zucchini or eggplant in place of noodles.  Layer with part skim ricotta cheese and low sugar meat sauce (use very lean ground beef or ground turkey).    - Mexican chicken bake: Bake chunks of chicken breast or thigh with taco seasoning, Pace brand enchilada sauce, green onions and low-fat  cheese. Serve with ¼ cup black beans or fat free refried beans topped with chopped tomatoes or salsa.    - Ashtyn frozen meatballs, simmered in Classico Marinara sauce. Different flavors of salsa or spaghetti sauce create different dishes! Sprinkle with parmesan cheese. Serve with grilled or steamed veggies, or a dark green salad.    - Simmer boneless skinless chicken thigh chunks in Classico Marinara sauce or roasted salsa until tender with chopped onion, bell pepper, garlic, mushrooms, spinach, etc.     - Hamburger or veggie burger, without the bun, dressed the way you like. Served with grilled or steamed veggies.    - Eggplant parmesan: Bake slices of eggplant at 350 degrees for 15 minutes. Layer tomato sauce, sliced eggplant and low-fat mozzarella cheese in a baking dish and cover with foil. Bake 30-40 more minutes or until bubbly. Uncover and bake at 400 degrees for about 15 more minutes, or until top is slightly crisp.    - Fish tacos: grilled/baked white fish, wrapped in Otoniel lettuce leaf, topped with salsa, shredded low-fat cheese, and light coleslaw.    - Chicken norm: Sprinkle chicken w/ 1 tsp of hidden valley ranch dip mix. Then grill chicken and top with black beans, salsa and 1 tsp fat free sour cream.     - Cauliflower pizza crust: Use cauliflower as crust (see recipe on aiden, no flour!). Top w/ low fat cheese, turkey pepperoni and veggies and bake again    - chicken or turkey crust pizza: use ground chicken or turkey instead of cauliflower, spread in [place] and bake at 350 for about 20-30 minutes(may want to add garlic, black pepper, oregano and other herbs to ground meat mixture).  Remove and top w/ low fat cheese, turkey pepperoni and veggies and bake again for another 10 minutes or until cheese is browned.     Snacks: (100-200 calories; >5g protein)    - 1 low-fat cheese stick with 8 cherry tomatoes or 1 serving fresh fruit  - 4 thin slices fat-free turkey breast and 1 slice low-fat  cheese  - 4 thin slices fat-free honey ham with wedge of melon  - 6-8 edamame pods (equivalent to about 1/4 cup edamame without pods).   - 1/4 cup unsalted nuts with ½ cup fruit  - 6-oz container Dannon Light n Fit vanilla yogurt, topped with 1oz unsalted nuts         - apple, celery or baby carrots spread with 2 Tbsp PB2  - apple slices with 1 oz slice low-fat cheese  - Apple slices dipped in 2 Tbsp of PB2  - celery, cucumber, bell pepper or baby carrots dipped in ¼ cup hummus bean spread or light spinach and artichoke dip (*recipe in lunch section)  - celery, cucumber, baby carrots dipped in high protein greek yogurt (Mix 16 oz plain greek yogurt + 1 packet of hidden valley ranch dip mix)  - Scooby Links Beef Steak - 14g protein! (similar to beef jerky)  - 2 wedges Laughing Cow - Light Herb & Garlic Cheese with sliced cucumber or green bell pepper  - 1/2 cup low-fat cottage cheese with ¼ cup fruit or ¼ cup salsa  - RTD Protein drinks: Atkins, Low Carb Slim Fast, EAS light, Muscle Milk Light, etc.  - Homemade Protein drinks: GNC Soy95, Isopure, Nectar, UNJURY, Whey Gourmet, etc. Mix 1 scoop powder with 8oz skim/1% milk or light soymilk.  - Protein bars: Atkins, EAS, Pure Protein, Think Thin, Detour, etc. Must have 0-4 grams sugar - Read the label.    Takeout Options: No more than twice/week  Deli - Salads (no pasta or rice), meats, cheeses. Roasted chicken. Lox (salmon)    Mexican - Platters which don't include tortillas, chips, or rice. Go easy on the beans. Example: Fajitas without the tortillas. Ask the  not to bring chips to the table if they are too tempting.    Greek - Meat or fish and vegetable, but no bread or rice. Including hummus, baba ganoush, etc, is OK. Most sit-down Greek restaurants can provide you with cucumber slices for dipping instead of soha bread.    Fast Food (Avoid as much as possible) - Salads (no croutons and limit salad dressing to 2 tbsp), grilled chicken sandwich without the bun  and ask for no craig. Olyas low fat chili or Taco Bell pintos and cheese.    BBQ - The meats are fine if you ask for sauces on the side, but most of the traditional side dishes are loaded with carbs. Kameron slaw, baked beans and BBQ sauce are typically made with sugar.    Chinese - Nothing deep-fried, no rice or noodles. Many Chinese sauces have starch and sugar in them, so you'll have to use your judgement. If you find that these sauces trigger cravings, or cause Dumping, you can ask for the sauce to be made without sugar or just use soy sauce.

## 2023-03-23 NOTE — PROGRESS NOTES
NUTRITIONAL CONSULT    Referring Physician: Tay Stephen M.D.   Reason for MNT Referral: Initial assessment for laparoscopic Pradeep-en-Y work-up    PAST MEDICAL HISTORY:   42 y.o. male  Weight history includes He has struggled with excess weight since high school.  His highest adult weight was 441 lbs at age 42, and his lowest adult weight was 260 lbs at age 22.  The patient has tried calorie counting and high protein/low carb and exercise.  The patient was most successful with high protein with a weight loss of 50 lbs.  His current exercise includes walking and weight lifting 5 times a week.     Past Medical History:   Diagnosis Date    GERD without esophagitis     takes OTC nexium PRN    History of kidney stones     Morbid obesity with body mass index of 60.0-69.9 in adult     GAYLE (obstructive sleep apnea)     did not tolerate CPAP       CLINICAL DATA:  42 y.o.-year-old White male.  Height: 5 ft. 9 in.  Weight: 441 lbs  IBW: 160 lbs  BMI: 64.6  The patient's goal weight (50-70% EBW): 240- 300 lbs  Personal goal weight: 300 lbs    Goal for Bariatric Surgery: to improve health, to improve quality of life, to lose weight, and to prevent future medical conditions    DAILY NUTRITIONAL NEEDS: pre-op nutritional bariatric guidelines to promote weight loss  7281-4292 Calories    Grams Protein    NUTRITION & HEALTH HISTORY:  Greatest challenge: starchy CHO, portion control, and emotional eating    Current diet recall:     B: skips OR 2 instant packs oatmeal/grits  L: homemade taco soup (ground meat with taco seasoning, beans, corn, tomatoes)  Sn: 6 pack pb crackers  D 8pm: homemade crawfish fettuccini, sweet tea/lemonade    Current Diet:  Meal pattern: 2-3 meals + 1-2 snacks  Protein supplements: Total Lean shakes for [employer] (lactose free)  Snacking: chips, popcorn, crackers. Sweet tooth Rare  Vegetables: Likes a variety. Eats 2-3 times per week.  Fruits: Likes a variety. Eats 2-3 times per week.  Beverages: Water  mostly. Sweet tea/lemonade.  Dining out: Weekly. Mostly fast food, restaurants, and take-out.  Cooking at home: Daily. Weekly. Mostly baked, grilled, and smothered meat, fish, starchy CHO, and vegetables.    Exercise:  Past exercise: off and on    Current exercise: Treadmill walking 15-20 min, and weights. 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  Grocery shopping and food prep done by his girlfriend.  Patient believes the household will be supportive after surgery.  Alcohol: Socially.  Smoking:  Dipping tobacco. Starting smoking cessation soon.    ASSESSMENT:  Patient reports attempts at weight loss, only to regain lost weight.  Patient demonstrated knowledge of healthy eating behaviors and exercise patterns; admits to not eating healthy and not exercising at this point.  Patient states willingness to change lifestyle and make behavior modifications.        Barriers to Education: none    Stage of change: determination    NUTRITION DIAGNOSIS:    Morbid Obesity related to Excessive carbohydrate intake and Excessive calorie intake as evidence by BMI.    BARIATRIC DIET DISCUSSION/PLAN:  Discussed diet after surgery and related to patient's food record.  Reviewed nutrition guidelines for before and after surgery.  Answered all questions.  Continue to review Bariatric Nutrition Guidebook at home and call with any questions.  Work on Bariatric Nutrition Checklist.  Work on expanding variety of vegetables.  Work on gradually cutting back on starchy CHO in the diet.  Begin trying various protein supplements to determine preference.  1200-calorie diet.  1500-calorie diet.  5-6 meals per day.  Start including protein supplements in the diet plan daily.  Reduce  frequency of dining out.  More grocery shopping and meal preparation at home.  Increase exercise.  Quit tobacco/nicotine use    Focus goals:  - Protein for breakfast (prot shake or eggs)  - Eliminate sugary drinks. Try CL.  - Healthy afternoon snack (list provided)  - Increase veg in place of starchy CHO for meals    RECOMMENDATIONS:  Patient is a potential candidate for bariatric surgery - Gastric Bypass.    Needs additional visit(s) with RD to work on dietary and lifestyle changes in preparation for bariatric surgery.    Patient verbalized understanding.    Expect fair  compliance after surgery at this time.    Communicated nutrition plan with bariatric team.    SESSION TIME:  30 minutes

## 2023-03-23 NOTE — PROGRESS NOTES
BARIATRIC NEW PATIENT EVALUATION    CHIEF COMPLAINT:   Morbid obesity, body mass index is 64.66 kg/m². and inability to lose weight    HPI:  Surjit Valentine is a 42 y.o. morbidly obese male. His current body mass index is 64.66 kg/m². He has multiple associated comorbidities including GAYLE and GERD.  He has struggled with excess weight since high school.  His highest adult weight was 441 lbs at age 42 , and his lowest adult weight was 260 lbs at age 22.  The patient has tried calorie counting and high protein and exercise .  The patient was most successful with high protein with a weight loss of 50 lbs.  His current exercise includes walking and weight lifting   5 times a week. He denies any history of eating disorder such as anorexia, bulimia, or taking laxatives for weight loss, and denies any addiction including illicit substances, alcohol, or gambling.  Patient states he has a good  support system.  He lives with family.  He is currently employed  .  He  endorses a 10 year history of GERD.  No meds needed for reflux.  The patient's goal is 300 lbs .    ESS: Score of 7, reviewed 03/23/2023.  Does not need Sleep Study.    Pre op weight-441  IBW-163    2008 Appendectomy     PAST MEDICAL HISTORY:  Past Medical History:   Diagnosis Date    GERD without esophagitis     takes OTC nexium PRN    History of kidney stones     Morbid obesity with body mass index of 60.0-69.9 in adult     GAYLE (obstructive sleep apnea)     did not tolerate CPAP       PAST SURGICAL HISTORY:  Past Surgical History:   Procedure Laterality Date    APPENDECTOMY  3/2008    CYSTOSCOPY  10/11/2019    Procedure: CYSTOSCOPY;  Surgeon: Gamal Sosa MD;  Location: [facility] OR [address] [place] [address];  Service: Urology;;    CYSTOSCOPY W/ URETERAL STENT PLACEMENT Left 10/6/2019    Procedure: CYSTOSCOPY, WITH URETERAL STENT INSERTION;  Surgeon: Gamal Sosa MD;  Location: [facility] OR [address] [place] [address];  Service: Urology;  Laterality: Left;    LASER  "LITHOTRIPSY Left 10/11/2019    Procedure: LITHOTRIPSY, USING LASER;  Surgeon: Gamal Sosa MD;  Location: [facility] OR 1ST FLR;  Service: Urology;  Laterality: Left;    RETROGRADE PYELOGRAPHY Left 10/6/2019    Procedure: PYELOGRAM, RETROGRADE;  Surgeon: Gamal Sosa MD;  Location: [facility] OR 1ST FLR;  Service: Urology;  Laterality: Left;    RETROGRADE PYELOGRAPHY Left 10/11/2019    Procedure: PYELOGRAM, RETROGRADE;  Surgeon: Gamal Sosa MD;  Location: [facility] OR 1ST FLR;  Service: Urology;  Laterality: Left;    URETEROSCOPIC REMOVAL OF URETERIC CALCULUS  10/11/2019    Procedure: REMOVAL, CALCULUS, URETER, URETEROSCOPIC;  Surgeon: Gamal Sosa MD;  Location: [facility] OR 1ST FLR;  Service: Urology;;    URETEROSCOPY Left 10/6/2019    Procedure: URETEROSCOPY;  Surgeon: Gamal Sosa MD;  Location: [facility] OR 1ST FLR;  Service: Urology;  Laterality: Left;    URETEROSCOPY Left 10/11/2019    Procedure: URETEROSCOPY;  Surgeon: Gamal Sosa MD;  Location: [facility] OR 1ST FLR;  Service: Urology;  Laterality: Left;  1hr       FAMILY HISTORY:  Family History   Problem Relation Age of Onset    Heart disease Father     Stroke Father     Hypertension Father     Diabetes Maternal Grandmother     Heart disease Maternal Grandfather     Stroke Paternal Grandmother     Heart disease Paternal Grandfather     Diabetes Mother     Obesity Mother     Obesity Sister     Diabetes Brother     Obesity Brother         SOCIAL HISTORY:  Social History     Socioeconomic History    Marital status: Single   Tobacco Use    Smoking status: Never    Smokeless tobacco: Current     Types: Chew    Tobacco comments:     2 cans/ week   Substance and Sexual Activity    Alcohol use: Yes     Alcohol/week: 3.0 standard drinks     Types: 3 Cans of beer per week     Comment: "socially but not usually"    Drug use: No   Social History Narrative    Works full time: [employer] : [employer].  .  1 son, 14 years old.  " "      MEDICATIONS:  Medications have been reviewed.    ALLERGIES:  Allergies have been reviewed.    Review of Systems   Constitutional:  Negative for chills and fever.   HENT:  Negative for ear pain, nosebleeds and sore throat.    Eyes:  Negative for blurred vision and double vision.   Respiratory:  Negative for cough and shortness of breath.    Cardiovascular:  Negative for chest pain, palpitations, orthopnea, claudication and leg swelling.   Gastrointestinal:  Negative for abdominal pain, constipation, diarrhea, heartburn, nausea and vomiting.   Genitourinary:  Negative for dysuria and urgency.   Musculoskeletal:  Negative for back pain and joint pain.   Skin:  Negative for rash.   Neurological:  Negative for dizziness, tingling, focal weakness and headaches.   Endo/Heme/Allergies:  Does not bruise/bleed easily.   Psychiatric/Behavioral:  Negative for depression and suicidal ideas.      Vitals:    03/23/23 1000   BP: (!) 140/84   Pulse: 64   SpO2: 98%   Weight: (!) 200.3 kg (441 lb 9.3 oz)   Height: 5' 9.29" (1.76 m)   PainSc: 0-No pain       Physical Exam  Vitals and nursing note reviewed.   Constitutional:       Appearance: He is well-developed. He is morbidly obese.   HENT:      Head: Normocephalic.      Nose: Nose normal.      Mouth/Throat:      Mouth: Mucous membranes are moist.   Eyes:      Extraocular Movements: Extraocular movements intact.   Cardiovascular:      Rate and Rhythm: Normal rate and regular rhythm.      Heart sounds: Normal heart sounds.   Pulmonary:      Effort: Pulmonary effort is normal.      Breath sounds: Normal breath sounds.   Abdominal:      General: Bowel sounds are normal.      Palpations: Abdomen is soft.   Musculoskeletal:         General: Normal range of motion.      Cervical back: Normal range of motion.   Skin:     General: Skin is warm and dry.      Capillary Refill: Capillary refill takes less than 2 seconds.   Neurological:      Mental Status: He is alert and oriented to " person, place, and time.   Psychiatric:         Mood and Affect: Mood normal.        DIAGNOSIS:  1. Morbid obesity, body mass index is 64.66 kg/m². and inability to lose weight.  2. Co-morbidities: GAYLE and GERD      PLAN:  The patient is a good candidate for Bariatric Surgery. The patient is interested in laparoscopic eb-en-y gastric bypass with Dr. Stephen. The surgery and post-op care was discussed in detail with the patient. All questions were answered.    The patient understands that bariatric surgery is a tool to aid in weight loss and that they need to be committed to the diet and exercise post-operatively for successful weight loss. Discussed with patient that bariatric surgery is not the easy way out and that it will take plenty of dedication on the patient's part to be successful. Also discussed the possibility of weight regain if the patient strays from the diet guidelines or exercise requirements. Patient verbalized understanding and wishes to proceed with the work-up.    Estimated Goal weight is 300-310 lbs.    Discussed no NSAIDS     ORDERS:  1. Chest X-Ray and EKG  2. Psychological Consult and Bariatric Dietician Consult  3. Bariatric Labs: Per orders.  4. Bariatric Medicine referral   5. Smoking Cessation  Tobacco Dips daily     PCP: Primary Doctor No  RTC: As scheduled.      60 minute visit, over 50% of time spent counseling patient face to face on surgical options, risks, benefits, expected diet, recommended exercise regimen, and expected weight loss

## 2023-03-23 NOTE — PATIENT INSTRUCTIONS
Prior to surgery you will need to complete:  - Dietitian consult and follow up appointments as needed  - Labs  - Chest X-ray  - EKG  - Psychological evaluation, Please call psychiatry [phone] to schedule      In preparation for bariatric surgery, please complete the following:   Discuss your current medications with your primary care provider, remember your medications will need to be crushed, chewable, or in liquid form for the first 2-4 weeks after a gastric bypass or sleeve.  For a gastric band, your medications will need to be crushed indefinitely.    If you take a blood thinner such as: Coumadin (warfarin), Pradaxa (dabigatran), or Plavix (clopidogrel), you will need to speak with your prescribing provider on how or if this medication can be stopped before surgery.   If you take a medication for depression or anxiety, remember to discuss this with the psychologist or psychiatrist that you see.   If you take medication for arthritis on a daily basis that is considered a non-steroidal anti-inflammatory (NSAID), please discuss with your prescribing physician an alternative medication.  After having gastric bypass or gastric sleeve, this group of medications is not appropriate to take due to increased risk of bleeding stomach ulcers.      DEFINITIONS  Appointments: Pre-scheduled meetings or consultations with any physician, advanced practice provider, dietitian, or psychologist, and labs, imaging studies, sleep studies, etc.   Late cancellation: Cancelling an appointment 24-48 hours prior to scheduled time.  No-Show appointment:  is when   You do NOT arrive to your appointment at the time its scheduled.  You call to cancel or cancel via [facility] less than 24 hours in advance of your scheduled appointment  You show up 15 minutes AFTER your scheduled appointment time without any notification of being late.     POLICY  You are allowed up to 3 cancellations for appointments.   After 3 cancellations your case  will be placed on hold for 2 months and after that time you can resume the program.   You are allowed only 1 no-show for an appointment.   You will be re-scheduled one time and if there is a 2nd no-show at any point, your case will be placed on hold for 3 months.  After 3 months you can resume the program.     Upon resuming the program after being placed on hold for either above mentioned reasons, if you have a late cancel or no show for any appointment, the bariatric team will review if youre an appropriate candidate for surgery at the monthly meeting.

## 2023-03-27 ENCOUNTER — HOSPITAL ENCOUNTER (OUTPATIENT)
Dept: RADIOLOGY | Facility: HOSPITAL | Age: 43
Discharge: HOME | End: 2023-03-27
Attending: NURSE PRACTITIONER
Payer: COMMERCIAL

## 2023-03-27 ENCOUNTER — HOSPITAL ENCOUNTER (OUTPATIENT)
Dept: CARDIOLOGY | Facility: CLINIC | Age: 43
Discharge: HOME | End: 2023-03-27
Payer: COMMERCIAL

## 2023-03-27 DIAGNOSIS — G47.33 OSA (OBSTRUCTIVE SLEEP APNEA): ICD-10-CM

## 2023-03-27 DIAGNOSIS — G47.33 OSA (OBSTRUCTIVE SLEEP APNEA): Chronic | ICD-10-CM

## 2023-03-27 DIAGNOSIS — K21.9 GERD WITHOUT ESOPHAGITIS: Chronic | ICD-10-CM

## 2023-03-27 DIAGNOSIS — E66.01 MORBID OBESITY WITH BODY MASS INDEX OF 60.0-69.9 IN ADULT: ICD-10-CM

## 2023-03-27 DIAGNOSIS — K21.9 GERD WITHOUT ESOPHAGITIS: ICD-10-CM

## 2023-03-27 DIAGNOSIS — E66.01 MORBID OBESITY WITH BODY MASS INDEX OF 60.0-69.9 IN ADULT: Chronic | ICD-10-CM

## 2023-03-27 PROCEDURE — 93010 EKG 12-LEAD: ICD-10-PCS | Mod: S$GLB,,, | Performed by: INTERNAL MEDICINE

## 2023-03-27 PROCEDURE — 71046 XR CHEST PA AND LATERAL: ICD-10-PCS | Mod: 26,,, | Performed by: RADIOLOGY

## 2023-03-27 PROCEDURE — 93005 EKG 12-LEAD: ICD-10-PCS | Mod: S$GLB,,, | Performed by: NURSE PRACTITIONER

## 2023-03-27 PROCEDURE — 93005 ELECTROCARDIOGRAM TRACING: CPT | Mod: S$GLB,,, | Performed by: NURSE PRACTITIONER

## 2023-03-27 PROCEDURE — 71046 X-RAY EXAM CHEST 2 VIEWS: CPT | Mod: TC,FY

## 2023-03-27 PROCEDURE — 71046 X-RAY EXAM CHEST 2 VIEWS: CPT | Mod: 26,,, | Performed by: RADIOLOGY

## 2023-03-27 PROCEDURE — 93010 ELECTROCARDIOGRAM REPORT: CPT | Mod: S$GLB,,, | Performed by: INTERNAL MEDICINE

## 2023-03-27 RX ORDER — ERGOCALCIFEROL 1.25 MG/1
50000 CAPSULE ORAL
Qty: 24 CAPSULE | Refills: 0 | Status: SHIPPED | OUTPATIENT
Start: 2023-03-27 | End: 2023-06-16

## 2023-04-19 ENCOUNTER — PATIENT MESSAGE (OUTPATIENT)
Dept: BARIATRICS | Facility: CLINIC | Age: 43
End: 2023-04-19
Payer: COMMERCIAL

## 2023-04-21 ENCOUNTER — TELEPHONE (OUTPATIENT)
Dept: BARIATRICS | Facility: CLINIC | Age: 43
End: 2023-04-21
Payer: COMMERCIAL
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
## 2023-04-25 ENCOUNTER — CLINICAL SUPPORT (OUTPATIENT)
Dept: BARIATRICS | Facility: CLINIC | Age: 43
End: 2023-04-25
Payer: COMMERCIAL

## 2023-04-25 VITALS — BODY MASS INDEX: 64.08 KG/M2 | WEIGHT: 315 LBS

## 2023-04-25 DIAGNOSIS — E66.01 MORBID OBESITY WITH BMI OF 60.0-69.9, ADULT: ICD-10-CM

## 2023-04-25 DIAGNOSIS — Z71.3 DIETARY COUNSELING: ICD-10-CM

## 2023-04-25 DIAGNOSIS — G47.33 OSA (OBSTRUCTIVE SLEEP APNEA): Primary | Chronic | ICD-10-CM

## 2023-04-25 PROCEDURE — 99999 PR PBB SHADOW E&M-EST. PATIENT-LVL I: CPT | Mod: PBBFAC,,, | Performed by: DIETITIAN, REGISTERED

## 2023-04-25 PROCEDURE — 99499 NO LOS: ICD-10-PCS | Mod: S$GLB,,, | Performed by: DIETITIAN, REGISTERED

## 2023-04-25 PROCEDURE — 97803 PR MED NUTR THER, SUBSQ, INDIV, EA 15 MIN: ICD-10-PCS | Mod: S$GLB,,, | Performed by: DIETITIAN, REGISTERED

## 2023-04-25 PROCEDURE — 97803 MED NUTRITION INDIV SUBSEQ: CPT | Mod: S$GLB,,, | Performed by: DIETITIAN, REGISTERED

## 2023-04-25 PROCEDURE — 99999 PR PBB SHADOW E&M-EST. PATIENT-LVL I: ICD-10-PCS | Mod: PBBFAC,,, | Performed by: DIETITIAN, REGISTERED

## 2023-04-25 PROCEDURE — 99499 UNLISTED E&M SERVICE: CPT | Mod: S$GLB,,, | Performed by: DIETITIAN, REGISTERED

## 2023-04-25 NOTE — PROGRESS NOTES
NUTRITIONAL Note     Referring Physician: Tay Stephen M.D.   Reason for MNT Referral: Follow up assessment for laparoscopic Pradeep-en-Y work-up     42 y.o. male presents with 4 lbs weight loss over the past month by making dietary and lifestyle changes in preparation for bariatric surgery. Reports more energy and feeling better overall.          Past Medical History:   Diagnosis Date    GERD without esophagitis       takes OTC nexium PRN    History of kidney stones      Morbid obesity with body mass index of 60.0-69.9 in adult      GAYLE (obstructive sleep apnea)       did not tolerate CPAP         CLINICAL DATA:  42 y.o.-year-old White male.  Height: 5 ft. 9 in.  Weight: 437 lbs  IBW: 160 lbs  BMI: 64     DAILY NUTRITIONAL NEEDS: pre-op nutritional bariatric guidelines to promote weight loss  7494-3562 Calories    Grams Protein     NUTRITION & HEALTH HISTORY:  Greatest challenge: starchy CHO, portion control, and emotional eating     Current diet recall:      B: prot shake OR greek yogurt with loose granola, occ with berries  L: dinner leftovers OR tuna pouch/canned chicken over a salad with no sugar dressing. water  Sn: beef jerky, water  D 8pm: baked chicken, pork chop or fish with cooked veg like broccoli OR red beans w/o rice. Water/CL     Current Diet:  Meal pattern: 2-3 meals + 1-2 snacks  Protein supplements: Total Lean [employer] powder (lactose free) + milk/water OR [employer] nutrition plan shakes  Snacking: beef jerky, Smart Pop popcorn, strawberries, handful of mixed nuts, [employer] hard granola bar. Sweet tooth Rare  Vegetables: Likes a variety. Eats daily.  Fruits: Likes a variety. Eats 2-3 times per week.  Beverages: Water mostly/CL  Dining out: Weekly. Mostly fast food, restaurants, and take-out.  Cooking at home: Daily. Weekly. Mostly baked, grilled, and smothered meat, fish, (limited starchy CHO), and vegetables.     Exercise:  Treadmill walking 15-20 min, and weights. 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  Patient believes the household will be supportive after surgery.  Alcohol: Socially.  Smoking:  Dipping tobacco. Starting smoking cessation soon. Trying to do more sunflower seeds during Softball.     ASSESSMENT:  Patient demonstrated knowledge of healthy eating behaviors and exercise patterns.  Patient demonstrates willingness to change lifestyle and make behavior modifications AEB weight loss, dietary changes, protein supplements, exercise.           Barriers to Education: none     Stage of change: action     NUTRITION DIAGNOSIS:    Morbid Obesity related to Excessive carbohydrate intake and Excessive calorie intake as evidence by BMI.  Status: Improved     BARIATRIC DIET DISCUSSION/PLAN:  Discussed diet after surgery and related to patient's food record.  Reviewed nutrition guidelines for before and after surgery.  Answered all questions.  Continue to review Bariatric Nutrition Guidebook at home and call with any questions.  Work on Bariatric Nutrition Checklist.  1200-calorie diet.  1500-calorie diet.  5-6 meals per day.  Quit tobacco/nicotine use     RECOMMENDATIONS:  Patient is a good candidate for bariatric surgery - Gastric Bypass.     Patient verbalized understanding.     Expect good compliance after surgery at this time.     Communicated nutrition plan with bariatric team.     SESSION TIME:  30 minutes

## 2023-05-05 ENCOUNTER — OFFICE VISIT (OUTPATIENT)
Dept: PSYCHIATRY | Facility: CLINIC | Age: 43
End: 2023-05-05
Payer: COMMERCIAL

## 2023-05-05 DIAGNOSIS — E66.01 MORBID OBESITY WITH BODY MASS INDEX OF 60.0-69.9 IN ADULT: Chronic | ICD-10-CM

## 2023-05-05 DIAGNOSIS — Z01.818 PREOPERATIVE EVALUATION TO RULE OUT SURGICAL CONTRAINDICATION: Primary | ICD-10-CM

## 2023-05-05 DIAGNOSIS — G47.33 OSA (OBSTRUCTIVE SLEEP APNEA): Chronic | ICD-10-CM

## 2023-05-05 PROCEDURE — 90791 PSYCH DIAGNOSTIC EVALUATION: CPT | Mod: 95,,, | Performed by: PSYCHOLOGIST

## 2023-05-05 PROCEDURE — 3044F PR MOST RECENT HEMOGLOBIN A1C LEVEL <7.0%: ICD-10-PCS | Mod: CPTII,95,, | Performed by: PSYCHOLOGIST

## 2023-05-05 PROCEDURE — 3044F HG A1C LEVEL LT 7.0%: CPT | Mod: CPTII,95,, | Performed by: PSYCHOLOGIST

## 2023-05-05 PROCEDURE — 90791 PR PSYCHIATRIC DIAGNOSTIC EVALUATION: ICD-10-PCS | Mod: 95,,, | Performed by: PSYCHOLOGIST

## 2023-05-05 NOTE — PROGRESS NOTES
Psychiatry Initial Visit (PhD/LCSW)   Diagnostic Interview - CPT 80427     Date: 05/05/2023    The patient location is: home ([place], [place])  The chief complaint leading to consultation is: presurgical psychological evaluation    Visit type: audiovisual    Face to Face time with patient: 60  90 minutes of total time spent on the encounter, which includes face to face time and non-face to face time preparing to see the patient (eg, review of tests), Obtaining and/or reviewing separately obtained history, Documenting clinical information in the electronic or other health record, Independently interpreting results (not separately reported) and communicating results to the patient/family/caregiver, or Care coordination (not separately reported).         Each patient to whom he or she provides medical services by telemedicine is:  (1) informed of the relationship between the physician and patient and the respective role of any other health care provider with respect to management of the patient; and (2) notified that he or she may decline to receive medical services by telemedicine and may withdraw from such care at any time.      Referral source: Tay Stephen M.D.     Clinical status of patient: Outpatient     Surjit Valentine, a 42 y.o. male, presented for initial evaluation visit. Before this evaluation was initiated, the purposes and process of the assessment and the limits of confidentiality were discussed with the patient who expressed understanding of these issues and orally consented to proceed with the evaluation.     Chief complaint/reason for encounter: Routine psychological evaluation prior to bariatric surgery.     Type of surgery sought: IVET    History of present illness: Mr. Valentine is a 42-year-old  male who is pursuing bariatric surgery to improve his health and quality of life. He has no history of significant psychological difficulties. He has never taken psychotropic medication, has never  "been hospitalized for psychiatric reasons, and denied any history of suicidality. He has begun making positive lifestyle changes in anticipation for surgery, with good benefit. The patient has a Body Mass Index of 65 as documented by the referring provider.    Mr. Valentine has struggled with weight since after high school, when his lifestyle changed and he became much less active. Factors that have contributed to her weight gain over the years include: late night eating, drinking alcohol in the past, large portions, eating frequent fast food and restaurants. He does not consider himself to be an emotional eater, but does report that he has eaten more during periods of excitement and celebration. He has tried many weight loss methods on his own (i.e. high protein, low starch diets) with some success, and he believes that his biggest weight loss challenge in the past was not having good support in his life. His motivation for seeking surgery now is to prevent the onset of weight-related co-morbidities. His postsurgical goals include: having more stamina for his job, being able to travel more actively with his family.      Mr. Valentine has met with Ms. Naomi Galeana RD, bariatric dietician, and reports that she has made the following lifestyle changes since beginning the bariatric program:  leaving bread and starch out of his meals, bringing meals and snacks to work. He has been cleared for surgery by Ms. Galeana due to demonstrating these changes.      Co-morbidities: GAYLE     Knowledge of surgery information:  - Basics of procedure: Y - "rearrange and redirect my intestines and stomach".  - Risks: Y - "leakage and dumping".  - Basics of diet: Y - "high protein, very low starch".    Pain: 0/10    Psychiatric Symptoms:   Depression - denied significant symptoms of depression, PHQ-9 score is 2 (Normal).   Ana/Hypomania - denied significant symptoms of ana/hypomania.  Anxiety - denied significant symptoms of anxiety, " "MERCY-7 score is 4 (Normal).   Thoughts - denied delusions, hallucinations.  Suicidal thoughts/behaviors - denied.  Substance abuse - denied abuse or dependence.   Sleep - "good, I don't have issues falling asleep as I'm exhausted at night, gets about 7-8 hours per night".  Self-injury - denied.    Current psychiatric treatment:  Medications: Denies    Psychotherapy: Denies    Treating clinicians: N/A    Health behaviors: Reported adherence to pharmacologic regimen.      Psychiatric history:   Previous diagnosis: Denies    Previous hospitalizations: Denies     History of outpatient treatment: Denies    Previous suicide attempt: Denies      Trauma history:  - Father  of heart attack when he was 11 years old.      History of eating disorders:  History of bulimia: Denies.    History of binge-eating episodes: Denies.      Family history of psychiatric illness: None reported.     Social history (marriage, employment, etc.): Mr. Valentine was born and raised in [place] by his biological parents. His dad  of heart problems when Mr. Valentine was 11 years old. He has 2 much older siblings. He describes his childhood as "very good", even when his father  he had many male members of his family "step up" to be in his life. He denies a history of abuse as child. He completed high school, and is currently in college pursuing a Special Education degree. 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 He has been with his girlfriend for 1 year, with a history of marriage and divorce. He has 1 biological child - age 22. He lives in [place], [place] with his girlfriend and her 3 children (17, 10, and 9). He identifies as Spiritism.     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    Report of coping skills: Sitting outside on the patio swing set, talking to " "openly girlfriend, going hunting and fishing, playing and watching sports.    Support system: Girlfriend is very supportive, fellow coaches and colleagues at school. His girlfriend has been changing her eating habits, as has her children.    Substance use:   Alcohol: Denied current use - stopped completely about two months ago when started workup; denied history of abuse or dependency.   Drugs: Denied current use; denied history of abuse or dependency.  Tobacco: Dipping tobacco - quit two months ago "cold turkey" after 20 years of using.   Caffeine: Denied routine use - cut out all sweet tea.    Current medications and drug reactions (include OTC, herbal): see medication list     Strengths and liabilities: Strength: Patient accepts guidance/feedback, Strength: Patient is expressive/articulate., Strength: Patient is intelligent., Strength: Patient is motivated for change., Strength: Patient has positive support network., Strength: Patient has reasonable judgment., Strength: Patient is stable.     Current Evaluation:    Mental Status Exam:   General Appearance:  age appropriate, well dressed, neatly groomed, overweight    Speech:  normal tone, normal rate, normal pitch, normal volume    Level of Cooperation:  cooperative    Thought Processes:  normal and logical    Mood:  euthymic    Thought Content:  normal, no suicidality, no homicidality, delusions, or paranoia    Affect:  congruent and appropriate    Orientation:  oriented x3    Memory:  No problems reported or observed   Attention Span & Concentration:  spelled "WORLD" forwards and backwards without errors   Fund of General Knowledge:  appropriate for education    Abstract Reasoning:  No problems reported or observed   Judgment & Insight:  good    Language  intact        Diagnostic Impression - Plan:      Diagnostic Impression:  Z01.818 Pre-operative examination   E66.01   Morbid obesity  G47.33   Obstructive Sleep Apnea  Z68.44    Body Mass Index of " 65    Summary/Conclusion:   There are no overt psychological contraindications for proceeding with bariatric surgery. Ms. Valentine has no significant psychiatric history, and reports no current psychiatric problems or major adjustment issues. He has reasonable expectations for surgery, good social support, and has already begun making appropriate lifestyle changes in anticipation for surgery. He has verbalized appropriate awareness and commitment to the necessary behavioral changes associated with bariatric surgery and appears willing to comply with long-term lifestyle changes.     Recommendations:  -This patient is psychologically cleared to proceed with bariatric surgery.  -There are no recommendations for psychological treatment at this time. The patient is aware of resources available should his needs change in the future.

## 2023-05-06 ENCOUNTER — PATIENT MESSAGE (OUTPATIENT)
Dept: BARIATRICS | Facility: CLINIC | Age: 43
End: 2023-05-06
Payer: COMMERCIAL

## 2023-05-08 ENCOUNTER — DOCUMENTATION ONLY (OUTPATIENT)
Dept: BARIATRICS | Facility: CLINIC | Age: 43
End: 2023-05-08
Payer: COMMERCIAL

## 2023-05-08 ENCOUNTER — PATIENT MESSAGE (OUTPATIENT)
Dept: BARIATRICS | Facility: CLINIC | Age: 43
End: 2023-05-08
Payer: COMMERCIAL

## 2023-05-09 ENCOUNTER — TELEPHONE (OUTPATIENT)
Dept: BARIATRICS | Facility: CLINIC | Age: 43
End: 2023-05-09
Payer: COMMERCIAL

## 2023-05-09 DIAGNOSIS — K21.9 GERD WITHOUT ESOPHAGITIS: Chronic | ICD-10-CM

## 2023-05-09 DIAGNOSIS — G47.33 OSA (OBSTRUCTIVE SLEEP APNEA): Chronic | ICD-10-CM

## 2023-05-09 DIAGNOSIS — E66.01 MORBID OBESITY: Primary | ICD-10-CM

## 2023-05-09 NOTE — TELEPHONE ENCOUNTER
"Spoke with patient and confirmed the surgical procedure of LRNY with Dr Stephen on 7/3/2023.  Scheduled preop appts/surgery date/2 week and 8 week post op appts. All dates and times agreed upon. Pt aware that if they are required to have PCP clearance, it must be within 6 months of surgery, unless their medical history has changed, it should be dated, signed and in chart for preop appointment. All medications have been reviewed regarding the necessity to be crushed or broken into pieces smaller that the tip of a pencil eraser for 2 weeks following gastric sleeve surgery and 4 weeks following gastric bypass surgery. Pt instructed to stop taking all NSAIDS 1 week before surgery and for life after surgery. Pt aware that protein liquid diet start date is 6/19/2023. Patient is doing well with their diet. Patient was instructed about the progression of the diet phases. The patient's current weight is 437lbs, height is 5'9", and BMI is 64.08. Refer to medical letter of necessity from Surgeon. Discussed the importance of increased physical activity and dieting lifestyle changes to improve weight loss and meet goals. Screened patient for history of UTI per protocol. Discussed with patient to avoid antibiotics and elective procedures involving sedation/anesthesia within 30 days of surgery unless cleared by the bariatric department. Patient instructed to call the bariatric clinic post op for any s/s of UTI. Patient's mailing address confirmed and informed to expect a manilla envelop containing bariatric surgery pre op booklet, appointment reminders, protein and fluid log sheets, and liquid diet and vitamin information sheets. Pt aware that all appts can be seen in my [facility] patient portal at this time. Confirmed email address and informed patient that they will be enrolled in the Patient Reported Outcomes program to track their progress and successes. The first email will be sent 2-3 weeks before surgery and then every " year on your surgery anniversary date. Office phone and fax number given to patient for any future questions/concerns. Discussed the pre-surgery complex carbohydrate beverage to purchase in [facility] pharmacy to drink 30 minutes before the surgery arrival time. Reviewed the policy of scheduling a covid test 72 hours prior to surgery if necessary.      Pt needs nicotine testing. Will call pt back to finish scheduling call.

## 2023-05-11 ENCOUNTER — TELEPHONE (OUTPATIENT)
Dept: BARIATRICS | Facility: CLINIC | Age: 43
End: 2023-05-11
Payer: COMMERCIAL

## 2023-06-07 ENCOUNTER — PATIENT MESSAGE (OUTPATIENT)
Dept: BARIATRICS | Facility: CLINIC | Age: 43
End: 2023-06-07
Payer: COMMERCIAL

## 2023-06-13 ENCOUNTER — PATIENT MESSAGE (OUTPATIENT)
Dept: BARIATRICS | Facility: CLINIC | Age: 43
End: 2023-06-13
Payer: COMMERCIAL

## 2023-06-19 ENCOUNTER — CLINICAL SUPPORT (OUTPATIENT)
Dept: BARIATRICS | Facility: CLINIC | Age: 43
End: 2023-06-19
Payer: COMMERCIAL

## 2023-06-19 DIAGNOSIS — K21.9 GERD WITHOUT ESOPHAGITIS: Chronic | ICD-10-CM

## 2023-06-19 DIAGNOSIS — Z71.3 DIETARY COUNSELING AND SURVEILLANCE: ICD-10-CM

## 2023-06-19 DIAGNOSIS — G47.33 OSA (OBSTRUCTIVE SLEEP APNEA): Primary | Chronic | ICD-10-CM

## 2023-06-19 PROCEDURE — 99499 NO LOS: ICD-10-PCS | Mod: 95,,, | Performed by: DIETITIAN, REGISTERED

## 2023-06-19 PROCEDURE — 99499 UNLISTED E&M SERVICE: CPT | Mod: 95,,, | Performed by: DIETITIAN, REGISTERED

## 2023-06-19 NOTE — PROGRESS NOTES
Audio Only Telehealth Visit     The patient location is: work ([place])  The chief complaint leading to consultation is: Morbid obesity; Pre-op liquid diet and nutrition instructions  Visit type: Virtual visit with audio only (telephone)  Total time spent with patient: 15 mins     The reason for the audio only service rather than synchronous audio and video virtual visit was related to technical difficulties or patient preference/necessity.     Each patient to whom I provide medical services by telemedicine is: (1) informed of the relationship between the physician and patient and the respective role of any other health care provider with respect to management of the patient; and (2) notified that they may decline to receive medical services by telemedicine and may withdraw from such care at any time. Patient verbally consented to receive this service via voice-only telephone call.    This service was not originating from a related E/M service provided within the previous 7 days nor will  to an E/M service or procedure within the next 24 hours or my soonest available appointment.  Prevailing standard of care was able to be met in this audio-only visit.      NUTRITION NOTE    Bariatric Surgeon: Tay Stephen M.D.  Reason for MNT Referral: Pre-op liquid diet and nutrition instructions  Sleeve or Bypass: Bypass  Date of Surgery: 7/3/23    Pre-op liquid diet  Pt using Premier protein, Bariwise protein soups for preop liquid diet  Infused water with fruit, water with Crystal Light      Discussion:  -  gms of protein per day  - 600-800 calories per day   - Less than 4gm sugar per shake  - SF, Decaf, non-carbonated Fluids  - No fruits, juices, yogurt or pudding on liquid diet  - No vitamins for 1 week prior to surgery  - No herbal supplements including green tea and fish oils for 2 weeks prior to surgery  - Discussed PT use of Grinds coffee pouches as a substitute for chewing tobacco  - Discussed drinking  restaurant-made soups without solids (advised against due to unknown calorie content) and gumbo bases (advised to use discretion due to high sodium)    Remind pt per nursing and medical team to inform our department if taking antibiotics within the 30 days post bariatric surgery or it any other surgeries/procedures are scheduled within 30 days after bariatric surgery.    2 week post-op instructions  Pt will use Premier protein shakes for first three days after surgery.  If using protein powder, reminded pt of mixing with water for days 1 and 2, by day 3 may try using skim, 1% milk or unsweetened almond/soy milk.  By Day 4, may use RTD protein shakes as tolerated    Pt will purchase the following vitamins and minerals to start taking once discharged from hospital:    Multivitamin with iron  Super B complex with thiamine  Calcium Citrate with vitamin D  Vitamin B-12 sublingual     Reviewed dosage and timing of vitamin/mineral guidelines.    Reviewed nutritional guidelines for protein and fluid requirements for week 1 and week 2 post-surgery.  Handout provided to log protein and fluid daily. 1 ounce medicine cups provided for patient to measure fluid intake after surgery.    Reviewed common nutritional concerns and prevention tips after bariatric surgery.    Reminded not to lift anything greater than 10 lbs for 6 week post-surgery    Pt verbalized understanding of information provided with appropriate questions and comments.     SESSION TIME: 15 minutes

## 2023-06-20 ENCOUNTER — LAB VISIT (OUTPATIENT)
Dept: LAB | Facility: HOSPITAL | Age: 43
End: 2023-06-20
Attending: SURGERY
Payer: COMMERCIAL

## 2023-06-20 ENCOUNTER — OFFICE VISIT (OUTPATIENT)
Dept: BARIATRICS | Facility: CLINIC | Age: 43
End: 2023-06-20
Payer: COMMERCIAL

## 2023-06-20 VITALS
WEIGHT: 315 LBS | BODY MASS INDEX: 46.65 KG/M2 | HEART RATE: 78 BPM | HEIGHT: 69 IN | DIASTOLIC BLOOD PRESSURE: 76 MMHG | SYSTOLIC BLOOD PRESSURE: 126 MMHG | OXYGEN SATURATION: 98 %

## 2023-06-20 DIAGNOSIS — G47.33 OSA (OBSTRUCTIVE SLEEP APNEA): Chronic | ICD-10-CM

## 2023-06-20 DIAGNOSIS — E66.01 MORBID OBESITY WITH BODY MASS INDEX OF 60.0-69.9 IN ADULT: Primary | Chronic | ICD-10-CM

## 2023-06-20 DIAGNOSIS — K21.9 GERD WITHOUT ESOPHAGITIS: Chronic | ICD-10-CM

## 2023-06-20 DIAGNOSIS — E66.01 MORBID OBESITY: ICD-10-CM

## 2023-06-20 LAB — 25(OH)D3+25(OH)D2 SERPL-MCNC: 23 NG/ML (ref 30–96)

## 2023-06-20 PROCEDURE — 1159F MED LIST DOCD IN RCRD: CPT | Mod: CPTII,S$GLB,, | Performed by: SURGERY

## 2023-06-20 PROCEDURE — 99999 PR PBB SHADOW E&M-EST. PATIENT-LVL III: CPT | Mod: PBBFAC,,, | Performed by: SURGERY

## 2023-06-20 PROCEDURE — 99214 PR OFFICE/OUTPT VISIT, EST, LEVL IV, 30-39 MIN: ICD-10-PCS | Mod: S$GLB,,, | Performed by: SURGERY

## 2023-06-20 PROCEDURE — 36415 COLL VENOUS BLD VENIPUNCTURE: CPT | Performed by: NURSE PRACTITIONER

## 2023-06-20 PROCEDURE — 1160F PR REVIEW ALL MEDS BY PRESCRIBER/CLIN PHARMACIST DOCUMENTED: ICD-10-PCS | Mod: CPTII,S$GLB,, | Performed by: SURGERY

## 2023-06-20 PROCEDURE — 3078F PR MOST RECENT DIASTOLIC BLOOD PRESSURE < 80 MM HG: ICD-10-PCS | Mod: CPTII,S$GLB,, | Performed by: SURGERY

## 2023-06-20 PROCEDURE — 3044F PR MOST RECENT HEMOGLOBIN A1C LEVEL <7.0%: ICD-10-PCS | Mod: CPTII,S$GLB,, | Performed by: SURGERY

## 2023-06-20 PROCEDURE — 3074F PR MOST RECENT SYSTOLIC BLOOD PRESSURE < 130 MM HG: ICD-10-PCS | Mod: CPTII,S$GLB,, | Performed by: SURGERY

## 2023-06-20 PROCEDURE — 1160F RVW MEDS BY RX/DR IN RCRD: CPT | Mod: CPTII,S$GLB,, | Performed by: SURGERY

## 2023-06-20 PROCEDURE — 3008F BODY MASS INDEX DOCD: CPT | Mod: CPTII,S$GLB,, | Performed by: SURGERY

## 2023-06-20 PROCEDURE — 99214 OFFICE O/P EST MOD 30 MIN: CPT | Mod: S$GLB,,, | Performed by: SURGERY

## 2023-06-20 PROCEDURE — 3008F PR BODY MASS INDEX (BMI) DOCUMENTED: ICD-10-PCS | Mod: CPTII,S$GLB,, | Performed by: SURGERY

## 2023-06-20 PROCEDURE — 99999 PR PBB SHADOW E&M-EST. PATIENT-LVL III: ICD-10-PCS | Mod: PBBFAC,,, | Performed by: SURGERY

## 2023-06-20 PROCEDURE — 1159F PR MEDICATION LIST DOCUMENTED IN MEDICAL RECORD: ICD-10-PCS | Mod: CPTII,S$GLB,, | Performed by: SURGERY

## 2023-06-20 PROCEDURE — 3074F SYST BP LT 130 MM HG: CPT | Mod: CPTII,S$GLB,, | Performed by: SURGERY

## 2023-06-20 PROCEDURE — 3078F DIAST BP <80 MM HG: CPT | Mod: CPTII,S$GLB,, | Performed by: SURGERY

## 2023-06-20 PROCEDURE — 3044F HG A1C LEVEL LT 7.0%: CPT | Mod: CPTII,S$GLB,, | Performed by: SURGERY

## 2023-06-20 PROCEDURE — 82306 VITAMIN D 25 HYDROXY: CPT | Performed by: NURSE PRACTITIONER

## 2023-06-20 PROCEDURE — 80323 ALKALOIDS NOS: CPT | Performed by: SURGERY

## 2023-06-20 RX ORDER — PANTOPRAZOLE SODIUM 40 MG/10ML
40 INJECTION, POWDER, LYOPHILIZED, FOR SOLUTION INTRAVENOUS 2 TIMES DAILY
Status: CANCELLED | OUTPATIENT
Start: 2023-06-20

## 2023-06-20 RX ORDER — FAMOTIDINE 10 MG/ML
20 INJECTION INTRAVENOUS ONCE
Status: CANCELLED | OUTPATIENT
Start: 2023-06-20 | End: 2023-06-20

## 2023-06-20 RX ORDER — METRONIDAZOLE 500 MG/100ML
500 INJECTION, SOLUTION INTRAVENOUS
Status: CANCELLED | OUTPATIENT
Start: 2023-06-20

## 2023-06-20 RX ORDER — HYDROMORPHONE HYDROCHLORIDE 1 MG/ML
0.5 INJECTION, SOLUTION INTRAMUSCULAR; INTRAVENOUS; SUBCUTANEOUS
Status: CANCELLED | OUTPATIENT
Start: 2023-06-20

## 2023-06-20 RX ORDER — SODIUM CHLORIDE, SODIUM LACTATE, POTASSIUM CHLORIDE, CALCIUM CHLORIDE 600; 310; 30; 20 MG/100ML; MG/100ML; MG/100ML; MG/100ML
INJECTION, SOLUTION INTRAVENOUS CONTINUOUS
Status: CANCELLED | OUTPATIENT
Start: 2023-06-20

## 2023-06-20 RX ORDER — OMEPRAZOLE 40 MG/1
40 CAPSULE, DELAYED RELEASE ORAL EVERY MORNING
Qty: 30 CAPSULE | Refills: 2 | Status: SHIPPED | OUTPATIENT
Start: 2023-06-20

## 2023-06-20 RX ORDER — OXYCODONE HCL 5 MG/5 ML
5 SOLUTION, ORAL ORAL EVERY 8 HOURS PRN
Qty: 105 ML | Refills: 0 | Status: SHIPPED | OUTPATIENT
Start: 2023-06-20 | End: 2023-07-17

## 2023-06-20 RX ORDER — URSODIOL 500 MG/1
TABLET, FILM COATED ORAL
Qty: 90 TABLET | Refills: 1 | Status: SHIPPED | OUTPATIENT
Start: 2023-06-20

## 2023-06-20 RX ORDER — HEPARIN SODIUM 5000 [USP'U]/ML
5000 INJECTION, SOLUTION INTRAVENOUS; SUBCUTANEOUS ONCE
Status: CANCELLED | OUTPATIENT
Start: 2023-06-20 | End: 2023-06-20

## 2023-06-20 RX ORDER — ENOXAPARIN SODIUM 100 MG/ML
60 INJECTION SUBCUTANEOUS EVERY 12 HOURS
Status: CANCELLED | OUTPATIENT
Start: 2023-06-20

## 2023-06-20 RX ORDER — GABAPENTIN 250 MG/5ML
300 SOLUTION ORAL 3 TIMES DAILY
Status: CANCELLED | OUTPATIENT
Start: 2023-06-20

## 2023-06-20 RX ORDER — ACETAMINOPHEN 650 MG/20.3ML
500 LIQUID ORAL EVERY 8 HOURS
Status: CANCELLED | OUTPATIENT
Start: 2023-06-20 | End: 2023-06-21

## 2023-06-20 RX ORDER — MUPIROCIN 20 MG/G
OINTMENT TOPICAL
Status: CANCELLED | OUTPATIENT
Start: 2023-06-20

## 2023-06-20 RX ORDER — ONDANSETRON 8 MG/1
8 TABLET, ORALLY DISINTEGRATING ORAL EVERY 6 HOURS PRN
Qty: 30 TABLET | Refills: 0 | Status: SHIPPED | OUTPATIENT
Start: 2023-06-20 | End: 2023-07-17

## 2023-06-20 RX ORDER — PROCHLORPERAZINE EDISYLATE 5 MG/ML
5 INJECTION INTRAMUSCULAR; INTRAVENOUS EVERY 6 HOURS PRN
Status: CANCELLED | OUTPATIENT
Start: 2023-06-20

## 2023-06-20 RX ORDER — SODIUM CHLORIDE 9 MG/ML
INJECTION, SOLUTION INTRAVENOUS CONTINUOUS
Status: CANCELLED | OUTPATIENT
Start: 2023-06-20

## 2023-06-20 RX ORDER — LIDOCAINE HYDROCHLORIDE 10 MG/ML
1 INJECTION, SOLUTION EPIDURAL; INFILTRATION; INTRACAUDAL; PERINEURAL ONCE
Status: CANCELLED | OUTPATIENT
Start: 2023-06-20 | End: 2023-06-20

## 2023-06-20 RX ORDER — ONDANSETRON 2 MG/ML
8 INJECTION INTRAMUSCULAR; INTRAVENOUS EVERY 6 HOURS PRN
Status: CANCELLED | OUTPATIENT
Start: 2023-06-20

## 2023-06-20 RX ORDER — HYDROCODONE BITARTRATE AND ACETAMINOPHEN 7.5; 325 MG/15ML; MG/15ML
15 SOLUTION ORAL EVERY 4 HOURS PRN
Status: CANCELLED | OUTPATIENT
Start: 2023-06-21

## 2023-06-20 RX ORDER — DEXTROMETHORPHAN/PSEUDOEPHED 2.5-7.5/.8
40 DROPS ORAL 4 TIMES DAILY PRN
Status: CANCELLED | OUTPATIENT
Start: 2023-06-20

## 2023-06-20 NOTE — PROGRESS NOTES
History & Physical    SUBJECTIVE:     History of Present Illness:  Surjit Valentine is a 42 y.o. male who presents for pre-operative exam for weight loss surgery.  he has completed him pre-operative evaluation.  he has failed medical treatment for obesity.  1. Morbid obesity, body mass index is 64.66 kg/m². and inability to lose weight.  2. Co-morbidities: GAYLE on cpap and GERD  3. Vit d deficiency under treatment    He is  and .    Chief Complaint   Patient presents with    Pre-op Exam       Review of patient's allergies indicates:  No Known Allergies    Current Outpatient Medications   Medication Sig    naproxen (NAPROSYN) 500 MG tablet Take 1 tablet (500 mg total) by mouth 2 (two) times daily with meals.     No current facility-administered medications for this visit.       Past Medical History:   Diagnosis Date    GERD without esophagitis     takes OTC nexium PRN    History of kidney stones     Morbid obesity with body mass index of 60.0-69.9 in adult     GAYLE (obstructive sleep apnea)     did not tolerate CPAP       Past Surgical History:   Procedure Laterality Date    APPENDECTOMY  3/2008    CYSTOSCOPY  10/11/2019    Procedure: CYSTOSCOPY;  Surgeon: Gamal Sosa MD;  Location: [address];  Service: Urology;;    CYSTOSCOPY W/ URETERAL STENT PLACEMENT Left 10/6/2019    Procedure: CYSTOSCOPY, WITH URETERAL STENT INSERTION;  Surgeon: Gamal Sosa MD;  Location: [address];  Service: Urology;  Laterality: Left;    LASER LITHOTRIPSY Left 10/11/2019    Procedure: LITHOTRIPSY, USING LASER;  Surgeon: Gamal Sosa MD;  Location: [address];  Service: Urology;  Laterality: Left;    RETROGRADE PYELOGRAPHY Left 10/6/2019    Procedure: PYELOGRAM, RETROGRADE;  Surgeon: Gamal Sosa MD;  Location: [address];  Service: Urology;  Laterality: Left;    RETROGRADE PYELOGRAPHY Left 10/11/2019    Procedure: PYELOGRAM, RETROGRADE;  Surgeon: Gamal Sosa MD;   "Location: [facility] [address] [place] [address];  Service: Urology;  Laterality: Left;    URETEROSCOPIC REMOVAL OF URETERIC CALCULUS  10/11/2019    Procedure: REMOVAL, CALCULUS, URETER, URETEROSCOPIC;  Surgeon: Gamal Sosa MD;  Location: [facility] [address] [place] [address];  Service: Urology;;    URETEROSCOPY Left 10/6/2019    Procedure: URETEROSCOPY;  Surgeon: Gamal Sosa MD;  Location: [facility] [address] [place] [address];  Service: Urology;  Laterality: Left;    URETEROSCOPY Left 10/11/2019    Procedure: URETEROSCOPY;  Surgeon: Gamal Sosa MD;  Location: [facility] [address] [place] [address];  Service: Urology;  Laterality: Left;  1hr       Review of Systems   Constitutional:  Negative for fever.   Respiratory:  Negative for shortness of breath.    Cardiovascular:  Negative for chest pain.   Gastrointestinal:  Negative for abdominal pain, constipation, diarrhea, heartburn, nausea and vomiting.   Genitourinary:  Negative for dysuria.   Endo/Heme/Allergies:  Does not bruise/bleed easily.        OBJECTIVE:     Vitals:    06/20/23 1445   BP: 126/76   Pulse: 78   SpO2: 98%   Weight: (!) 208.6 kg (459 lb 14.1 oz)   Height: 5' 9.2" (1.758 m)       Physical Exam  Vitals reviewed.   Constitutional:       Appearance: Normal appearance.   Abdominal:      Palpations: Abdomen is soft.   Skin:     General: Skin is warm and dry.   Neurological:      General: No focal deficit present.      Mental Status: He is alert and oriented to person, place, and time.   Psychiatric:         Mood and Affect: Mood normal.         Behavior: Behavior normal.         Thought Content: Thought content normal.         Judgment: Judgment normal.        Laboratory  CBC: Reviewed  CMP: Reviewed  Basically ok but vits show low vit d/deficiency    Diagnostic Results:  ECG: Reviewed  X-Ray: Reviewed  CT: Reviewed  Echo: Reviewed  All basically ok from bariatric surgery standpoint    Ct films viewed, liver and spleen enlarged but no cirrhosis findings otherwise  Echo 2015    1 - Normal left ventricular systolic " function (EF 55-60%).     2 - Normal left ventricular diastolic function.     3 - Right ventricle is upper limit of normal in size with normal systolic function.    Dietitian: Patient has participated in pre-operative nutritional program with understanding of necessary lifelong dietary changes required with surgery.    Psych: No overt contraindications to bariatric surgery, patient has completed psychological evaluation and is able to give informed consent.    Clearance: pcp    ASSESSMENT/PLAN:     Morbid obesity with failure of medical conservative therapy.    Co Morbid Conditions:   Patient Active Problem List   Diagnosis    GERD without esophagitis    GAYLE (obstructive sleep apnea)    Morbid obesity with body mass index of 60.0-69.9 in adult    Kidney stones    Ureterolithiasis    Urolithiasis    COVID-19    Acute hypoxemic respiratory failure due to COVID-19       Patient wishes to undergo  Robotic gastric bypass  150 cm eb limb.      The patient was informed that risks include, but are not limited to: death, leak, obstruction, bleeding, and sepsis. Any of these could require further surgery. Other risks include DVT, PE, pneumonia, wound dehiscence, hernia, wound infection, the need for dilatations and the inability to lose appropriate weight and keep it off.     We discussed that our goal is to ameliorate his medical problems and not to obtain a specific body mass index. he understands the risks and benefits and wishes to proceed with the procedure.  he has signed a consent form.

## 2023-06-20 NOTE — H&P (VIEW-ONLY)
History & Physical    SUBJECTIVE:     History of Present Illness:  Surjit Valentine is a 42 y.o. male who presents for pre-operative exam for weight loss surgery.  he has completed him pre-operative evaluation.  he has failed medical treatment for obesity.  1. Morbid obesity, body mass index is 64.66 kg/m². and inability to lose weight.  2. Co-morbidities: GAYLE on cpap and GERD  3. Vit d deficiency under treatment    He is  and .    Chief Complaint   Patient presents with    Pre-op Exam       Review of patient's allergies indicates:  No Known Allergies    Current Outpatient Medications   Medication Sig    naproxen (NAPROSYN) 500 MG tablet Take 1 tablet (500 mg total) by mouth 2 (two) times daily with meals.     No current facility-administered medications for this visit.       Past Medical History:   Diagnosis Date    GERD without esophagitis     takes OTC nexium PRN    History of kidney stones     Morbid obesity with body mass index of 60.0-69.9 in adult     GAYLE (obstructive sleep apnea)     did not tolerate CPAP       Past Surgical History:   Procedure Laterality Date    APPENDECTOMY  3/2008    CYSTOSCOPY  10/11/2019    Procedure: CYSTOSCOPY;  Surgeon: Gamal Sosa MD;  Location: [address];  Service: Urology;;    CYSTOSCOPY W/ URETERAL STENT PLACEMENT Left 10/6/2019    Procedure: CYSTOSCOPY, WITH URETERAL STENT INSERTION;  Surgeon: Gamal Sosa MD;  Location: [address];  Service: Urology;  Laterality: Left;    LASER LITHOTRIPSY Left 10/11/2019    Procedure: LITHOTRIPSY, USING LASER;  Surgeon: Gamal Sosa MD;  Location: [address];  Service: Urology;  Laterality: Left;    RETROGRADE PYELOGRAPHY Left 10/6/2019    Procedure: PYELOGRAM, RETROGRADE;  Surgeon: Gamal Sosa MD;  Location: [address];  Service: Urology;  Laterality: Left;    RETROGRADE PYELOGRAPHY Left 10/11/2019    Procedure: PYELOGRAM, RETROGRADE;  Surgeon: Gamal Sosa MD;   "Location: [facility] OR [address] [place] [address];  Service: Urology;  Laterality: Left;    URETEROSCOPIC REMOVAL OF URETERIC CALCULUS  10/11/2019    Procedure: REMOVAL, CALCULUS, URETER, URETEROSCOPIC;  Surgeon: Gamal Sosa MD;  Location: [facility] OR [address] [place] [address];  Service: Urology;;    URETEROSCOPY Left 10/6/2019    Procedure: URETEROSCOPY;  Surgeon: Gamal Sosa MD;  Location: [facility] OR [address] [place] [address];  Service: Urology;  Laterality: Left;    URETEROSCOPY Left 10/11/2019    Procedure: URETEROSCOPY;  Surgeon: Gamal Sosa MD;  Location: [facility] OR [address] [place] [address];  Service: Urology;  Laterality: Left;  1hr       Review of Systems   Constitutional:  Negative for fever.   Respiratory:  Negative for shortness of breath.    Cardiovascular:  Negative for chest pain.   Gastrointestinal:  Negative for abdominal pain, constipation, diarrhea, heartburn, nausea and vomiting.   Genitourinary:  Negative for dysuria.   Endo/Heme/Allergies:  Does not bruise/bleed easily.        OBJECTIVE:     Vitals:    06/20/23 1445   BP: 126/76   Pulse: 78   SpO2: 98%   Weight: (!) 208.6 kg (459 lb 14.1 oz)   Height: 5' 9.2" (1.758 m)       Physical Exam  Vitals reviewed.   Constitutional:       Appearance: Normal appearance.   Abdominal:      Palpations: Abdomen is soft.   Skin:     General: Skin is warm and dry.   Neurological:      General: No focal deficit present.      Mental Status: He is alert and oriented to person, place, and time.   Psychiatric:         Mood and Affect: Mood normal.         Behavior: Behavior normal.         Thought Content: Thought content normal.         Judgment: Judgment normal.        Laboratory  CBC: Reviewed  CMP: Reviewed  Basically ok but vits show low vit d/deficiency    Diagnostic Results:  ECG: Reviewed  X-Ray: Reviewed  CT: Reviewed  Echo: Reviewed  All basically ok from bariatric surgery standpoint    Ct films viewed, liver and spleen enlarged but no cirrhosis findings otherwise  Echo 2015    1 - Normal left ventricular systolic " function (EF 55-60%).     2 - Normal left ventricular diastolic function.     3 - Right ventricle is upper limit of normal in size with normal systolic function.    Dietitian: Patient has participated in pre-operative nutritional program with understanding of necessary lifelong dietary changes required with surgery.    Psych: No overt contraindications to bariatric surgery, patient has completed psychological evaluation and is able to give informed consent.    Clearance: pcp    ASSESSMENT/PLAN:     Morbid obesity with failure of medical conservative therapy.    Co Morbid Conditions:   Patient Active Problem List   Diagnosis    GERD without esophagitis    GAYLE (obstructive sleep apnea)    Morbid obesity with body mass index of 60.0-69.9 in adult    Kidney stones    Ureterolithiasis    Urolithiasis    COVID-19    Acute hypoxemic respiratory failure due to COVID-19       Patient wishes to undergo  Robotic gastric bypass  150 cm eb limb.      The patient was informed that risks include, but are not limited to: death, leak, obstruction, bleeding, and sepsis. Any of these could require further surgery. Other risks include DVT, PE, pneumonia, wound dehiscence, hernia, wound infection, the need for dilatations and the inability to lose appropriate weight and keep it off.     We discussed that our goal is to ameliorate his medical problems and not to obtain a specific body mass index. he understands the risks and benefits and wishes to proceed with the procedure.  he has signed a consent form.

## 2023-06-21 RX ORDER — ERGOCALCIFEROL 1.25 MG/1
50000 CAPSULE ORAL
Qty: 12 CAPSULE | Refills: 0 | Status: SHIPPED | OUTPATIENT
Start: 2023-06-21 | End: 2023-07-17

## 2023-06-22 LAB
COTININE SERPL-MCNC: <3 NG/ML
NICOTINE SERPL-MCNC: <3 NG/ML

## 2023-06-30 ENCOUNTER — TELEPHONE (OUTPATIENT)
Dept: BARIATRICS | Facility: CLINIC | Age: 43
End: 2023-06-30
Payer: COMMERCIAL

## 2023-06-30 ENCOUNTER — TELEPHONE (OUTPATIENT)
Dept: SURGERY | Facility: CLINIC | Age: 43
End: 2023-06-30
Payer: COMMERCIAL

## 2023-06-30 NOTE — TELEPHONE ENCOUNTER
Notified patient of arrival time to the [facility] 2nd floor [facility] at 0730 with expected surgery start time 0930 on 7/3/2023. Instructed patient regarding pre-op instructions including no protein shakes or sugar free clear liquids after midnight but can have a rare sip of water for comfort, showering and preop medications to hold/take per anesthesia/preop. Reminded pt to drink pre-surgery beverage or 8 oz water at 0700. Instructed patient on the s/s of dehydration and for patient to call at the first sign of dehydration. Informed patient that someone from bariatrics will call them 1 week post op to review diet/fluid intake and to ensure adequate hydration. Reminded patient not to take antibiotics for 30 days following surgery or schedule elective procedures involving anesthesia/sedation for 30 days following surgery unless checking with the bariatric clinic first. Pt verbalized understanding. Pt given office phone number for any additional questions/concerns.

## 2023-07-02 ENCOUNTER — ANESTHESIA EVENT (OUTPATIENT)
Dept: SURGERY | Facility: HOSPITAL | Age: 43
DRG: 621 | End: 2023-07-02

## 2023-07-02 NOTE — ANESTHESIA PREPROCEDURE EVALUATION
[facility]  Anesthesia Pre-Operative Evaluation         Patient Name/: Surjit Valentine, 1980  MRN: [id number]    SUBJECTIVE:     Pre-operative evaluation for Procedure(s) (LRB):  XI ROBOTIC GASTROENTEROSTOMY, TODD-EN-Y (PT is SELF PAY, DO NOT submit to insurance (N/A)     2023    Surjit Valentine is a 42 y.o. male w/ a significant PMHx of GERD, GAYLE on CPAP, and morbid obesity (BMI 67.52). Patient now presents for the above procedure(s).    Patient denies any other known cardiopulmonary disease.     Functional status: METs>4    NPO status: Appropriate    Previous Anesthetics:   URETEROSCOPY (Left: Ureter)   LITHOTRIPSY, USING LASER (Left: Ureter)   PLACEMENT-STENT (Left: Ureter)   PYELOGRAM, RETROGRADE (Left: Ureter)   CYSTOSCOPY (Bladder)   REMOVAL, CALCULUS, URETER, URETEROSCOPIC (Ureter)   REMOVAL-STENT (Left: Ureter)  Procedures  Date: 10/11/2019  Anesthesia Type: General    Previous Airway: None prior in our records      ________________________________________  Echo 10/08/2015:    1 - Normal left ventricular systolic function (EF 55-60%).     2 - Normal left ventricular diastolic function.     3 - Right ventricle is upper limit of normal in size with normal systolic function.     ________________________________________    LDA: None documented.       Drips: None documented.      Patient Active Problem List   Diagnosis    GERD without esophagitis    GAYLE (obstructive sleep apnea)    Morbid obesity with body mass index of 60.0-69.9 in adult    Kidney stones    Ureterolithiasis    Urolithiasis    COVID-19    Acute hypoxemic respiratory failure due to COVID-19       Review of patient's allergies indicates:  No Known Allergies    Current Inpatient Medications:       No current facility-administered medications on file prior to encounter.     No current outpatient medications on file prior to encounter.       Past Surgical History:   Procedure Laterality Date     APPENDECTOMY  3/2008    CYSTOSCOPY  10/11/2019    Procedure: CYSTOSCOPY;  Surgeon: Gamal Sosa MD;  Location: [facility] OR 1ST FLR;  Service: Urology;;    CYSTOSCOPY W/ URETERAL STENT PLACEMENT Left 10/6/2019    Procedure: CYSTOSCOPY, WITH URETERAL STENT INSERTION;  Surgeon: Gamal Sosa MD;  Location: [facility] OR 1ST FLR;  Service: Urology;  Laterality: Left;    LASER LITHOTRIPSY Left 10/11/2019    Procedure: LITHOTRIPSY, USING LASER;  Surgeon: Gamal Sosa MD;  Location: [facility] OR 1ST FLR;  Service: Urology;  Laterality: Left;    RETROGRADE PYELOGRAPHY Left 10/6/2019    Procedure: PYELOGRAM, RETROGRADE;  Surgeon: Gamal Sosa MD;  Location: [facility] OR [facility] FLR;  Service: Urology;  Laterality: Left;    RETROGRADE PYELOGRAPHY Left 10/11/2019    Procedure: PYELOGRAM, RETROGRADE;  Surgeon: Gamal Sosa MD;  [facility];  Service: Urology;  Laterality: Left;    URETEROSCOPIC REMOVAL OF URETERIC CALCULUS  10/11/2019    Procedure: REMOVAL, CALCULUS, URETER, URETEROSCOPIC;  Surgeon: Gamal Sosa MD;  Location: [facility] OR [facility] FLR;  Service: Urology;;    URETEROSCOPY Left 10/6/2019    Procedure: URETEROSCOPY;  Surgeon: Gamal Sosa MD;  Location: [facility] OR [facility] FLR;  Service: Urology;  Laterality: Left;    URETEROSCOPY Left 10/11/2019    Procedure: URETEROSCOPY;  Surgeon: Gamal Sosa MD;  [facility];  Service: Urology;  Laterality: Left;  1hr       Social History:  Tobacco Use: High Risk    Smoking Tobacco Use: Never    Smokeless Tobacco Use: Current    Passive Exposure: Not on file       Alcohol Use: Not on file       OBJECTIVE:     Vital Signs Range:  BMI Readings from Last 1 Encounters:   06/20/23 67.52 kg/m²               Significant Labs:        Component Value Date/Time    WBC 6.75 03/27/2023 0743    HGB 13.9 (L) 03/27/2023 0743    HCT 41.0 03/27/2023 0743    HCT 39 10/24/2021 2224     03/27/2023 0743     03/27/2023 0743    K 4.3  03/27/2023 0743     03/27/2023 0743    CO2 24 03/27/2023 0743     03/27/2023 0743    BUN 15 03/27/2023 0743    CREATININE 0.8 03/27/2023 0743    MG 1.8 03/27/2023 0743    PHOS 3.5 03/27/2023 0743    CALCIUM 9.3 03/27/2023 0743    ALBUMIN 3.5 03/27/2023 0743    PROT 7.1 03/27/2023 0743    ALKPHOS 71 03/27/2023 0743    BILITOT 0.4 03/27/2023 0743    AST 17 03/27/2023 0743    ALT 27 03/27/2023 0743    INR 1.0 12/03/2020 1322    HGBA1C 5.4 03/27/2023 0743        Please see Results Review for additional labs.     Diagnostic Studies: No relevant studies.    EKG:   Results for orders placed or performed during the hospital encounter of 03/27/23   EKG    Collection Time: 03/27/23  8:13 AM    Narrative    Test Reason : E66.01,Z68.44,K21.9,G47.33,    Vent. Rate : 068 BPM     Atrial Rate : 068 BPM     P-R Int : 146 ms          QRS Dur : 086 ms      QT Int : 384 ms       P-R-T Axes : 019 000 015 degrees     QTc Int : 408 ms    Normal sinus rhythm  Normal ECG  When compared with ECG of 24-OCT-2021 21:27,  No significant change was found  Confirmed by ARNULFO LOREDO MD ([id number]) on 3/27/2023 9:55:22 AM    Referred By: DENIA STEIN           Confirmed By:ARNULFO LOREDO MD       ECHO:  See subjective, if available.      ASSESSMENT/PLAN:           Pre-op Assessment    I have reviewed the Patient Summary Reports.     I have reviewed the Nursing Notes. I have reviewed the NPO Status.   I have reviewed the Medications.     Review of Systems  Anesthesia Hx:  No problems with previous Anesthesia  Denies Family Hx of Anesthesia complications.   Denies Personal Hx of Anesthesia complications.   Social:  Alcohol Use, Non-Smoker Smokeless tobacco use, 3 standard alcoholic drinks per week   Cardiovascular:   Denies Hypertension.  Denies CAD.    Denies Dysrhythmias.   Denies CHF.    Pulmonary:   Denies COPD.  Denies Asthma. Sleep Apnea    Renal/:   renal calculi    Hepatic/GI:   GERD    Neurological:   Denies CVA. Denies Seizures.     Endocrine:   Denies Diabetes.  Morbid Obesity / BMI > 40  Psych:   denies anxiety          Physical Exam  General: Well nourished, Cooperative and Alert  obese  Airway:  Mallampati: II   Mouth Opening: Normal  TM Distance: Normal  Neck ROM: Normal ROM    Dental:  Intact        Anesthesia Plan  Type of Anesthesia, risks & benefits discussed:    Anesthesia Type: Gen ETT  Intra-op Monitoring Plan: Standard ASA Monitors  Post Op Pain Control Plan: multimodal analgesia  Induction:  IV  Airway Plan: Video, Post-Induction  Informed Consent: Informed consent signed with the Patient and all parties understand the risks and agree with anesthesia plan.  All questions answered. Patient consented to blood products? Yes  ASA Score: 3  Day of Surgery Review of History & Physical: H&P Update referred to the surgeon/provider.  Anesthesia Plan Notes: Discussed plan for General endotracheal anesthesia    Ready For Surgery From Anesthesia Perspective.     .

## 2023-07-03 ENCOUNTER — ANESTHESIA (OUTPATIENT)
Dept: SURGERY | Facility: HOSPITAL | Age: 43
DRG: 621 | End: 2023-07-03

## 2023-07-03 ENCOUNTER — HOSPITAL ENCOUNTER (INPATIENT)
Facility: HOSPITAL | Age: 43
LOS: 1 days | Discharge: HOME | DRG: 621 | End: 2023-07-04
Attending: SURGERY | Admitting: SURGERY

## 2023-07-03 DIAGNOSIS — E66.01 MORBID OBESITY WITH BODY MASS INDEX OF 60.0-69.9 IN ADULT: Chronic | ICD-10-CM

## 2023-07-03 PROBLEM — E66.9 OBESITY: Status: ACTIVE | Noted: 2023-07-03

## 2023-07-03 LAB
CREAT SERPL-MCNC: 0.9 MG/DL (ref 0.5–1.4)
EST. GFR  (NO RACE VARIABLE): >60 ML/MIN/1.73 M^2

## 2023-07-03 PROCEDURE — 71000033 HC RECOVERY, INTIAL HOUR: Performed by: SURGERY

## 2023-07-03 PROCEDURE — 37000009 HC ANESTHESIA EA ADD 15 MINS: Performed by: SURGERY

## 2023-07-03 PROCEDURE — 71000016 HC POSTOP RECOV ADDL HR: Performed by: SURGERY

## 2023-07-03 PROCEDURE — D9220A PRA ANESTHESIA: Mod: ,,, | Performed by: ANESTHESIOLOGY

## 2023-07-03 PROCEDURE — 63600175 PHARM REV CODE 636 W HCPCS: Performed by: SURGERY

## 2023-07-03 PROCEDURE — 94761 N-INVAS EAR/PLS OXIMETRY MLT: CPT

## 2023-07-03 PROCEDURE — 71000015 HC POSTOP RECOV 1ST HR: Performed by: SURGERY

## 2023-07-03 PROCEDURE — 25000003 PHARM REV CODE 250: Performed by: SURGERY

## 2023-07-03 PROCEDURE — C9113 INJ PANTOPRAZOLE SODIUM, VIA: HCPCS | Performed by: SURGERY

## 2023-07-03 PROCEDURE — 36000712 HC OR TIME LEV V 1ST 15 MIN: Performed by: SURGERY

## 2023-07-03 PROCEDURE — 37000008 HC ANESTHESIA 1ST 15 MINUTES: Performed by: SURGERY

## 2023-07-03 PROCEDURE — D9220A PRA ANESTHESIA: ICD-10-PCS | Mod: ,,, | Performed by: ANESTHESIOLOGY

## 2023-07-03 PROCEDURE — 25000003 PHARM REV CODE 250

## 2023-07-03 PROCEDURE — 43644 PR LAP GASTRIC BYPASS/ROUX-EN-Y: ICD-10-PCS | Mod: CSM,,, | Performed by: SURGERY

## 2023-07-03 PROCEDURE — 99900035 HC TECH TIME PER 15 MIN (STAT)

## 2023-07-03 PROCEDURE — 11000001 HC ACUTE MED/SURG PRIVATE ROOM

## 2023-07-03 PROCEDURE — 82565 ASSAY OF CREATININE: CPT | Performed by: SURGERY

## 2023-07-03 PROCEDURE — 63600175 PHARM REV CODE 636 W HCPCS

## 2023-07-03 PROCEDURE — 27201423 OPTIME MED/SURG SUP & DEVICES STERILE SUPPLY: Performed by: SURGERY

## 2023-07-03 PROCEDURE — 43644 LAP GASTRIC BYPASS/ROUX-EN-Y: CPT | Mod: CSM,,, | Performed by: SURGERY

## 2023-07-03 PROCEDURE — 36000713 HC OR TIME LEV V EA ADD 15 MIN: Performed by: SURGERY

## 2023-07-03 RX ORDER — ACETAMINOPHEN 650 MG/20.3ML
500 LIQUID ORAL EVERY 8 HOURS
Status: COMPLETED | OUTPATIENT
Start: 2023-07-03 | End: 2023-07-04

## 2023-07-03 RX ORDER — SODIUM CHLORIDE 9 MG/ML
INJECTION, SOLUTION INTRAVENOUS CONTINUOUS
Status: DISCONTINUED | OUTPATIENT
Start: 2023-07-03 | End: 2023-07-04 | Stop reason: HOSPADM

## 2023-07-03 RX ORDER — HYDROMORPHONE HYDROCHLORIDE 1 MG/ML
0.2 INJECTION, SOLUTION INTRAMUSCULAR; INTRAVENOUS; SUBCUTANEOUS EVERY 10 MIN PRN
Status: DISCONTINUED | OUTPATIENT
Start: 2023-07-03 | End: 2023-07-03 | Stop reason: HOSPADM

## 2023-07-03 RX ORDER — PROPOFOL 10 MG/ML
VIAL (ML) INTRAVENOUS
Status: DISCONTINUED | OUTPATIENT
Start: 2023-07-03 | End: 2023-07-03

## 2023-07-03 RX ORDER — SUCCINYLCHOLINE CHLORIDE 20 MG/ML
INJECTION INTRAMUSCULAR; INTRAVENOUS
Status: DISCONTINUED | OUTPATIENT
Start: 2023-07-03 | End: 2023-07-03

## 2023-07-03 RX ORDER — CEFAZOLIN SODIUM 1 G/3ML
3 INJECTION, POWDER, FOR SOLUTION INTRAMUSCULAR; INTRAVENOUS
Status: COMPLETED | OUTPATIENT
Start: 2023-07-03 | End: 2023-07-03

## 2023-07-03 RX ORDER — HYDROCODONE BITARTRATE AND ACETAMINOPHEN 7.5; 325 MG/15ML; MG/15ML
15 SOLUTION ORAL EVERY 4 HOURS PRN
Status: DISCONTINUED | OUTPATIENT
Start: 2023-07-03 | End: 2023-07-04 | Stop reason: HOSPADM

## 2023-07-03 RX ORDER — HEPARIN SODIUM 5000 [USP'U]/ML
5000 INJECTION, SOLUTION INTRAVENOUS; SUBCUTANEOUS ONCE
Status: COMPLETED | OUTPATIENT
Start: 2023-07-03 | End: 2023-07-03

## 2023-07-03 RX ORDER — DEXMEDETOMIDINE HYDROCHLORIDE 100 UG/ML
INJECTION, SOLUTION INTRAVENOUS
Status: DISCONTINUED | OUTPATIENT
Start: 2023-07-03 | End: 2023-07-03

## 2023-07-03 RX ORDER — ACETAMINOPHEN 500 MG
1000 TABLET ORAL ONCE
Status: COMPLETED | OUTPATIENT
Start: 2023-07-03 | End: 2023-07-03

## 2023-07-03 RX ORDER — SODIUM CHLORIDE, SODIUM LACTATE, POTASSIUM CHLORIDE, CALCIUM CHLORIDE 600; 310; 30; 20 MG/100ML; MG/100ML; MG/100ML; MG/100ML
INJECTION, SOLUTION INTRAVENOUS CONTINUOUS
Status: DISCONTINUED | OUTPATIENT
Start: 2023-07-03 | End: 2023-07-04 | Stop reason: HOSPADM

## 2023-07-03 RX ORDER — MUPIROCIN 20 MG/G
OINTMENT TOPICAL
Status: DISCONTINUED | OUTPATIENT
Start: 2023-07-03 | End: 2023-07-03

## 2023-07-03 RX ORDER — PROCHLORPERAZINE EDISYLATE 5 MG/ML
5 INJECTION INTRAMUSCULAR; INTRAVENOUS EVERY 6 HOURS PRN
Status: DISCONTINUED | OUTPATIENT
Start: 2023-07-03 | End: 2023-07-04 | Stop reason: HOSPADM

## 2023-07-03 RX ORDER — FAMOTIDINE 10 MG/ML
20 INJECTION INTRAVENOUS ONCE
Status: COMPLETED | OUTPATIENT
Start: 2023-07-03 | End: 2023-07-03

## 2023-07-03 RX ORDER — ONDANSETRON 2 MG/ML
INJECTION INTRAMUSCULAR; INTRAVENOUS
Status: DISCONTINUED | OUTPATIENT
Start: 2023-07-03 | End: 2023-07-03

## 2023-07-03 RX ORDER — KETAMINE HCL IN 0.9 % NACL 50 MG/5 ML
SYRINGE (ML) INTRAVENOUS
Status: DISCONTINUED | OUTPATIENT
Start: 2023-07-03 | End: 2023-07-03

## 2023-07-03 RX ORDER — HYDROMORPHONE HYDROCHLORIDE 1 MG/ML
0.5 INJECTION, SOLUTION INTRAMUSCULAR; INTRAVENOUS; SUBCUTANEOUS
Status: DISCONTINUED | OUTPATIENT
Start: 2023-07-03 | End: 2023-07-04 | Stop reason: HOSPADM

## 2023-07-03 RX ORDER — HALOPERIDOL 5 MG/ML
0.5 INJECTION INTRAMUSCULAR EVERY 10 MIN PRN
Status: COMPLETED | OUTPATIENT
Start: 2023-07-03 | End: 2023-07-03

## 2023-07-03 RX ORDER — METRONIDAZOLE 500 MG/100ML
INJECTION, SOLUTION INTRAVENOUS
Status: DISCONTINUED | OUTPATIENT
Start: 2023-07-03 | End: 2023-07-03

## 2023-07-03 RX ORDER — PANTOPRAZOLE SODIUM 40 MG/10ML
40 INJECTION, POWDER, LYOPHILIZED, FOR SOLUTION INTRAVENOUS 2 TIMES DAILY
Status: DISCONTINUED | OUTPATIENT
Start: 2023-07-03 | End: 2023-07-04 | Stop reason: HOSPADM

## 2023-07-03 RX ORDER — LIDOCAINE HYDROCHLORIDE 10 MG/ML
1 INJECTION, SOLUTION EPIDURAL; INFILTRATION; INTRACAUDAL; PERINEURAL ONCE
Status: DISCONTINUED | OUTPATIENT
Start: 2023-07-03 | End: 2023-07-03

## 2023-07-03 RX ORDER — ENOXAPARIN SODIUM 100 MG/ML
60 INJECTION SUBCUTANEOUS EVERY 12 HOURS
Status: DISCONTINUED | OUTPATIENT
Start: 2023-07-03 | End: 2023-07-04 | Stop reason: HOSPADM

## 2023-07-03 RX ORDER — ROCURONIUM BROMIDE 10 MG/ML
INJECTION, SOLUTION INTRAVENOUS
Status: DISCONTINUED | OUTPATIENT
Start: 2023-07-03 | End: 2023-07-03

## 2023-07-03 RX ORDER — GABAPENTIN 250 MG/5ML
300 SOLUTION ORAL 3 TIMES DAILY
Status: DISCONTINUED | OUTPATIENT
Start: 2023-07-03 | End: 2023-07-04 | Stop reason: HOSPADM

## 2023-07-03 RX ORDER — METRONIDAZOLE 500 MG/100ML
500 INJECTION, SOLUTION INTRAVENOUS
Status: COMPLETED | OUTPATIENT
Start: 2023-07-03 | End: 2023-07-03

## 2023-07-03 RX ORDER — BUPIVACAINE HYDROCHLORIDE 2.5 MG/ML
INJECTION, SOLUTION EPIDURAL; INFILTRATION; INTRACAUDAL
Status: DISCONTINUED | OUTPATIENT
Start: 2023-07-03 | End: 2023-07-03 | Stop reason: HOSPADM

## 2023-07-03 RX ORDER — DEXAMETHASONE SODIUM PHOSPHATE 4 MG/ML
INJECTION, SOLUTION INTRA-ARTICULAR; INTRALESIONAL; INTRAMUSCULAR; INTRAVENOUS; SOFT TISSUE
Status: DISCONTINUED | OUTPATIENT
Start: 2023-07-03 | End: 2023-07-03

## 2023-07-03 RX ORDER — HYDROMORPHONE HYDROCHLORIDE 1 MG/ML
0.2 INJECTION, SOLUTION INTRAMUSCULAR; INTRAVENOUS; SUBCUTANEOUS EVERY 5 MIN PRN
Status: COMPLETED | OUTPATIENT
Start: 2023-07-03 | End: 2023-07-03

## 2023-07-03 RX ORDER — LIDOCAINE HYDROCHLORIDE 20 MG/ML
INJECTION, SOLUTION EPIDURAL; INFILTRATION; INTRACAUDAL; PERINEURAL
Status: DISCONTINUED | OUTPATIENT
Start: 2023-07-03 | End: 2023-07-03

## 2023-07-03 RX ORDER — FENTANYL CITRATE 50 UG/ML
INJECTION, SOLUTION INTRAMUSCULAR; INTRAVENOUS
Status: DISCONTINUED | OUTPATIENT
Start: 2023-07-03 | End: 2023-07-03

## 2023-07-03 RX ORDER — ONDANSETRON 2 MG/ML
8 INJECTION INTRAMUSCULAR; INTRAVENOUS EVERY 6 HOURS PRN
Status: DISCONTINUED | OUTPATIENT
Start: 2023-07-03 | End: 2023-07-04 | Stop reason: HOSPADM

## 2023-07-03 RX ORDER — MIDAZOLAM HYDROCHLORIDE 5 MG/ML
INJECTION INTRAMUSCULAR; INTRAVENOUS
Status: DISCONTINUED | OUTPATIENT
Start: 2023-07-03 | End: 2023-07-03

## 2023-07-03 RX ADMIN — ROCURONIUM BROMIDE 10 MG: 10 INJECTION, SOLUTION INTRAVENOUS at 11:07

## 2023-07-03 RX ADMIN — FAMOTIDINE 20 MG: 10 INJECTION, SOLUTION INTRAVENOUS at 08:07

## 2023-07-03 RX ADMIN — LIDOCAINE HYDROCHLORIDE 100 MG: 20 INJECTION, SOLUTION EPIDURAL; INFILTRATION; INTRACAUDAL; PERINEURAL at 09:07

## 2023-07-03 RX ADMIN — CEFAZOLIN 3 G: 1 INJECTION, POWDER, FOR SOLUTION INTRAMUSCULAR; INTRAVENOUS at 09:07

## 2023-07-03 RX ADMIN — PROPOFOL 30 MG: 10 INJECTION, EMULSION INTRAVENOUS at 11:07

## 2023-07-03 RX ADMIN — HYDROMORPHONE HYDROCHLORIDE 0.2 MG: 1 INJECTION, SOLUTION INTRAMUSCULAR; INTRAVENOUS; SUBCUTANEOUS at 02:07

## 2023-07-03 RX ADMIN — SODIUM CHLORIDE: 0.9 INJECTION, SOLUTION INTRAVENOUS at 09:07

## 2023-07-03 RX ADMIN — HYDROMORPHONE HYDROCHLORIDE 0.2 MG: 1 INJECTION, SOLUTION INTRAMUSCULAR; INTRAVENOUS; SUBCUTANEOUS at 01:07

## 2023-07-03 RX ADMIN — HALOPERIDOL LACTATE 0.5 MG: 5 INJECTION, SOLUTION INTRAMUSCULAR at 01:07

## 2023-07-03 RX ADMIN — Medication 10 MG: at 11:07

## 2023-07-03 RX ADMIN — HEPARIN SODIUM 5000 UNITS: 5000 INJECTION, SOLUTION INTRAVENOUS; SUBCUTANEOUS at 08:07

## 2023-07-03 RX ADMIN — MIDAZOLAM HYDROCHLORIDE 2 MG: 5 INJECTION, SOLUTION INTRAMUSCULAR; INTRAVENOUS at 09:07

## 2023-07-03 RX ADMIN — SODIUM CHLORIDE, SODIUM LACTATE, POTASSIUM CHLORIDE, AND CALCIUM CHLORIDE: 600; 310; 30; 20 INJECTION, SOLUTION INTRAVENOUS at 01:07

## 2023-07-03 RX ADMIN — FENTANYL CITRATE 50 MCG: 50 INJECTION, SOLUTION INTRAMUSCULAR; INTRAVENOUS at 11:07

## 2023-07-03 RX ADMIN — ROCURONIUM BROMIDE 30 MG: 10 INJECTION, SOLUTION INTRAVENOUS at 11:07

## 2023-07-03 RX ADMIN — METRONIDAZOLE 500 MG: 5 INJECTION, SOLUTION INTRAVENOUS at 08:07

## 2023-07-03 RX ADMIN — SODIUM CHLORIDE, SODIUM GLUCONATE, SODIUM ACETATE, POTASSIUM CHLORIDE, MAGNESIUM CHLORIDE, SODIUM PHOSPHATE, DIBASIC, AND POTASSIUM PHOSPHATE: .53; .5; .37; .037; .03; .012; .00082 INJECTION, SOLUTION INTRAVENOUS at 11:07

## 2023-07-03 RX ADMIN — DEXMEDETOMIDINE 8 MCG: 100 INJECTION, SOLUTION, CONCENTRATE INTRAVENOUS at 11:07

## 2023-07-03 RX ADMIN — ROCURONIUM BROMIDE 60 MG: 10 INJECTION, SOLUTION INTRAVENOUS at 09:07

## 2023-07-03 RX ADMIN — HYDROCODONE BITARTRATE AND ACETAMINOPHEN 15 ML: 7.5; 325 SOLUTION ORAL at 01:07

## 2023-07-03 RX ADMIN — ENOXAPARIN SODIUM 60 MG: 100 INJECTION SUBCUTANEOUS at 08:07

## 2023-07-03 RX ADMIN — ROCURONIUM BROMIDE 20 MG: 10 INJECTION, SOLUTION INTRAVENOUS at 10:07

## 2023-07-03 RX ADMIN — PROPOFOL 20 MG: 10 INJECTION, EMULSION INTRAVENOUS at 11:07

## 2023-07-03 RX ADMIN — DEXMEDETOMIDINE 4 MCG: 100 INJECTION, SOLUTION, CONCENTRATE INTRAVENOUS at 12:07

## 2023-07-03 RX ADMIN — Medication 30 MG: at 09:07

## 2023-07-03 RX ADMIN — METRONIDAZOLE 500 MG: 500 INJECTION, SOLUTION INTRAVENOUS at 09:07

## 2023-07-03 RX ADMIN — HYDROMORPHONE HYDROCHLORIDE 0.2 MG: 1 INJECTION, SOLUTION INTRAMUSCULAR; INTRAVENOUS; SUBCUTANEOUS at 12:07

## 2023-07-03 RX ADMIN — DEXAMETHASONE SODIUM PHOSPHATE 8 MG: 4 INJECTION INTRA-ARTICULAR; INTRALESIONAL; INTRAMUSCULAR; INTRAVENOUS; SOFT TISSUE at 09:07

## 2023-07-03 RX ADMIN — DEXMEDETOMIDINE 4 MCG: 100 INJECTION, SOLUTION, CONCENTRATE INTRAVENOUS at 11:07

## 2023-07-03 RX ADMIN — PROPOFOL 200 MG: 10 INJECTION, EMULSION INTRAVENOUS at 09:07

## 2023-07-03 RX ADMIN — SUGAMMADEX 400 MG: 100 INJECTION, SOLUTION INTRAVENOUS at 12:07

## 2023-07-03 RX ADMIN — DEXMEDETOMIDINE 8 MCG: 100 INJECTION, SOLUTION, CONCENTRATE INTRAVENOUS at 12:07

## 2023-07-03 RX ADMIN — ONDANSETRON 4 MG: 2 INJECTION INTRAMUSCULAR; INTRAVENOUS at 12:07

## 2023-07-03 RX ADMIN — GABAPENTIN 300 MG: 250 SOLUTION ORAL at 03:07

## 2023-07-03 RX ADMIN — ROCURONIUM BROMIDE 10 MG: 10 INJECTION, SOLUTION INTRAVENOUS at 10:07

## 2023-07-03 RX ADMIN — SUCCINYLCHOLINE CHLORIDE 200 MG: 20 INJECTION, SOLUTION INTRAMUSCULAR; INTRAVENOUS; PARENTERAL at 09:07

## 2023-07-03 RX ADMIN — FENTANYL CITRATE 100 MCG: 50 INJECTION, SOLUTION INTRAMUSCULAR; INTRAVENOUS at 09:07

## 2023-07-03 RX ADMIN — ACETAMINOPHEN 499.51 MG: 650 SOLUTION ORAL at 04:07

## 2023-07-03 RX ADMIN — GABAPENTIN 300 MG: 250 SOLUTION ORAL at 08:07

## 2023-07-03 RX ADMIN — PANTOPRAZOLE SODIUM 40 MG: 40 INJECTION, POWDER, FOR SOLUTION INTRAVENOUS at 01:07

## 2023-07-03 RX ADMIN — ONDANSETRON 8 MG: 2 INJECTION INTRAMUSCULAR; INTRAVENOUS at 01:07

## 2023-07-03 RX ADMIN — HYDROCODONE BITARTRATE AND ACETAMINOPHEN 15 ML: 7.5; 325 SOLUTION ORAL at 08:07

## 2023-07-03 RX ADMIN — PROCHLORPERAZINE EDISYLATE 5 MG: 5 INJECTION INTRAMUSCULAR; INTRAVENOUS at 02:07

## 2023-07-03 RX ADMIN — Medication 10 MG: at 10:07

## 2023-07-03 RX ADMIN — PANTOPRAZOLE SODIUM 40 MG: 40 INJECTION, POWDER, FOR SOLUTION INTRAVENOUS at 08:07

## 2023-07-03 RX ADMIN — ACETAMINOPHEN 1000 MG: 500 TABLET ORAL at 08:07

## 2023-07-03 RX ADMIN — DEXMEDETOMIDINE 4 MCG: 100 INJECTION, SOLUTION, CONCENTRATE INTRAVENOUS at 10:07

## 2023-07-03 RX ADMIN — ROCURONIUM BROMIDE 10 MG: 10 INJECTION, SOLUTION INTRAVENOUS at 12:07

## 2023-07-03 NOTE — BRIEF OP NOTE
Operative Note       Surgery Date: 7/3/2023     Surgeon(s) and Role:     * Tay Stephen MD - Primary     * Fannie Bullock MD - Resident - Assisting    Pre-op Diagnosis:  Morbid obesity [E66.01]  BMI 60.0-69.9, adult [Z68.44]  GAYLE (obstructive sleep apnea) [G47.33]  GERD without esophagitis [K21.9]    Post-op Diagnosis:  Morbid obesity [E66.01]  BMI 60.0-69.9, adult [Z68.44]  GAYLE (obstructive sleep apnea) [G47.33]  GERD without esophagitis [K21.9]    Procedure(s) (LRB):  XI ROBOTIC GASTROENTEROSTOMY, TODD-EN-Y (PT is SELF PAY, DO NOT submit to insurance (N/A)  EGD (ESOPHAGOGASTRODUODENOSCOPY) (N/A)    Anesthesia: General    Procedure in Detail/Findings:  Bypass without apparent complication.    Estimated Blood Loss: Minimal           Specimens (From admission, onward)      None          Implants: * No implants in log *           Disposition: PACU - hemodynamically stable.           Condition: Good    Attestation:  I was present and scrubbed for the entire procedure.

## 2023-07-03 NOTE — NURSING TRANSFER
Nursing Transfer Note      7/3/2023     Reason patient is being transferred: post procedure    Transfer To: [id number]    Transfer via bed    Transfer with IV pump/fluid, O2    Transported by transport personnel    Telemetry: N/A    Medicines sent: none    Any special needs or follow-up needed: routine    Chart send with patient: Yes    Notified: significant other    Patient reassessed at: 7/3/2023 at  1645    Upon arrival to floor: cardiac monitor applied, patient oriented to room, and bed in lowest position

## 2023-07-03 NOTE — NURSING
PT arrived to room at 1745. Vitals stable at this time. Pt reporting pain at this time. Placing pt on NC for GAYLE until CPAP arrives.

## 2023-07-03 NOTE — TRANSFER OF CARE
"Anesthesia Transfer of Care Note    Patient: Surjit Valentine    Procedure(s) Performed: Procedure(s) (LRB):  XI ROBOTIC GASTROENTEROSTOMY, TODD-EN-Y (PT is SELF PAY, DO NOT submit to insurance (N/A)  EGD (ESOPHAGOGASTRODUODENOSCOPY) (N/A)    Patient location: PACU    Anesthesia Type: general    Transport from OR: Transported from OR on 6-10 L/min O2 by face mask with adequate spontaneous ventilation    Post pain: adequate analgesia    Post assessment: no apparent anesthetic complications and tolerated procedure well    Post vital signs: stable    Level of consciousness: awake, alert and oriented    Nausea/Vomiting: no nausea/vomiting    Complications: none    Transfer of care protocol was followed      Last vitals:   Visit Vitals  /74 (BP Location: Right arm, Patient Position: Lying)   Pulse 72   Temp 36.9 °C (98.4 °F) (Oral)   Resp 18   Ht 5' 10" (1.778 m)   Wt (!) 198.9 kg (438 lb 9.3 oz)   SpO2 98%   BMI 62.93 kg/m²     "

## 2023-07-03 NOTE — ANESTHESIA POSTPROCEDURE EVALUATION
Anesthesia Post Evaluation    Patient: Surjit Valentine    Procedure(s) Performed: Procedure(s) (LRB):  XI ROBOTIC GASTROENTEROSTOMY, TODD-EN-Y (PT is SELF PAY, DO NOT submit to insurance (N/A)  EGD (ESOPHAGOGASTRODUODENOSCOPY) (N/A)    Final Anesthesia Type: general      Patient location during evaluation: PACU  Patient participation: Yes- Able to Participate  Level of consciousness: awake and alert  Post-procedure vital signs: reviewed and stable  Pain management: adequate  Airway patency: patent    PONV status at discharge: No PONV  Anesthetic complications: no      Cardiovascular status: blood pressure returned to baseline  Respiratory status: unassisted  Hydration status: euvolemic  Follow-up not needed.          Vitals Value Taken Time   /88 07/03/23 1501   Temp 36.7 °C (98 °F) 07/03/23 1245   Pulse 71 07/03/23 1506   Resp 12 07/03/23 1506   SpO2 97 % 07/03/23 1506   Vitals shown include unvalidated device data.      Event Time   Out of Recovery 13:00:00         Pain/Alma Delia Score: Pain Rating Prior to Med Admin: 6 (7/3/2023  2:20 PM)  Alma Delia Score: 10 (7/3/2023  1:00 PM)

## 2023-07-03 NOTE — BRIEF OP NOTE
[facility] - Surgery ([facility])  Brief Operative Note    SUMMARY     Surgery Date: 7/3/2023     Surgeon(s) and Role:     * Tay Stephen MD - Primary     * Fannie Bullock MD - Resident - Assisting    Pre-op Diagnosis:  Morbid obesity [E66.01]  BMI 60.0-69.9, adult [Z68.44]  GAYLE (obstructive sleep apnea) [G47.33]  GERD without esophagitis [K21.9]    Post-op Diagnosis:  Post-Op Diagnosis Codes:     * Morbid obesity [E66.01]     * BMI 60.0-69.9, adult [Z68.44]     * GAYLE (obstructive sleep apnea) [G47.33]     * GERD without esophagitis [K21.9]    Procedure(s) (LRB):  XI ROBOTIC GASTROENTEROSTOMY, EB-EN-Y (PT is SELF PAY, DO NOT submit to insurance (N/A)  EGD (ESOPHAGOGASTRODUODENOSCOPY) (N/A)    Anesthesia: General    Operative Findings: Robotic eb-en-y gastric bypass. See operative note for details    Estimated Blood Loss: * No values recorded between 7/3/2023 10:15 AM and 7/3/2023 12:25 PM *    Estimated Blood Loss has been documented.         Specimens:   Specimen (24h ago, onward)      None            [id number]    Fannie Bullock MD  General Surgery PGY4

## 2023-07-03 NOTE — ADDENDUM NOTE
Addendum  created 07/03/23 1536 by Messi Santana MD    Order list changed, Pharmacy for encounter modified

## 2023-07-03 NOTE — ANESTHESIA PROCEDURE NOTES
Intubation    Date/Time: 7/3/2023 9:50 AM  Performed by: Janette Queen DO  Authorized by: Messi Santana MD     Intubation:     Induction:  Intravenous    Intubated:  Postinduction    Mask Ventilation:  Easy mask    Attempts:  1    Attempted By:  Resident anesthesiologist    Method of Intubation:  Blind intubation    Blade:  Hernandez 3    Laryngeal View Grade: Grade I - full view of cords      Difficult Airway Encountered?: No      Complications:  None    Airway Device:  Oral endotracheal tube    Airway Device Size:  7.5    Style/Cuff Inflation:  Cuffed (inflated to minimal occlusive pressure)    Tube secured:  24    Secured at:  The teeth    Placement Verified By:  Capnometry    Complicating Factors:  None    Findings Post-Intubation:  BS equal bilateral and atraumatic/condition of teeth unchanged

## 2023-07-04 VITALS
DIASTOLIC BLOOD PRESSURE: 60 MMHG | BODY MASS INDEX: 45.1 KG/M2 | SYSTOLIC BLOOD PRESSURE: 127 MMHG | OXYGEN SATURATION: 94 % | TEMPERATURE: 98 F | WEIGHT: 315 LBS | HEIGHT: 70 IN | HEART RATE: 74 BPM | RESPIRATION RATE: 17 BRPM

## 2023-07-04 LAB
ANION GAP SERPL CALC-SCNC: 11 MMOL/L (ref 8–16)
BASOPHILS # BLD AUTO: 0.01 K/UL (ref 0–0.2)
BASOPHILS NFR BLD: 0.1 % (ref 0–1.9)
BUN SERPL-MCNC: 9 MG/DL (ref 6–20)
CALCIUM SERPL-MCNC: 9.1 MG/DL (ref 8.7–10.5)
CHLORIDE SERPL-SCNC: 107 MMOL/L (ref 95–110)
CO2 SERPL-SCNC: 23 MMOL/L (ref 23–29)
CREAT SERPL-MCNC: 0.8 MG/DL (ref 0.5–1.4)
DIFFERENTIAL METHOD: ABNORMAL
EOSINOPHIL # BLD AUTO: 0 K/UL (ref 0–0.5)
EOSINOPHIL NFR BLD: 0 % (ref 0–8)
ERYTHROCYTE [DISTWIDTH] IN BLOOD BY AUTOMATED COUNT: 12.4 % (ref 11.5–14.5)
EST. GFR  (NO RACE VARIABLE): >60 ML/MIN/1.73 M^2
GLUCOSE SERPL-MCNC: 132 MG/DL (ref 70–110)
HCT VFR BLD AUTO: 38.6 % (ref 40–54)
HGB BLD-MCNC: 13.3 G/DL (ref 14–18)
IMM GRANULOCYTES # BLD AUTO: 0.02 K/UL (ref 0–0.04)
IMM GRANULOCYTES NFR BLD AUTO: 0.2 % (ref 0–0.5)
LYMPHOCYTES # BLD AUTO: 1.3 K/UL (ref 1–4.8)
LYMPHOCYTES NFR BLD: 11.7 % (ref 18–48)
MAGNESIUM SERPL-MCNC: 1.9 MG/DL (ref 1.6–2.6)
MCH RBC QN AUTO: 30.6 PG (ref 27–31)
MCHC RBC AUTO-ENTMCNC: 34.5 G/DL (ref 32–36)
MCV RBC AUTO: 89 FL (ref 82–98)
MONOCYTES # BLD AUTO: 0.9 K/UL (ref 0.3–1)
MONOCYTES NFR BLD: 8.6 % (ref 4–15)
NEUTROPHILS # BLD AUTO: 8.5 K/UL (ref 1.8–7.7)
NEUTROPHILS NFR BLD: 79.4 % (ref 38–73)
NRBC BLD-RTO: 0 /100 WBC
PHOSPHATE SERPL-MCNC: 3 MG/DL (ref 2.7–4.5)
PLATELET # BLD AUTO: 235 K/UL (ref 150–450)
PMV BLD AUTO: 10 FL (ref 9.2–12.9)
POTASSIUM SERPL-SCNC: 4.2 MMOL/L (ref 3.5–5.1)
RBC # BLD AUTO: 4.34 M/UL (ref 4.6–6.2)
SODIUM SERPL-SCNC: 141 MMOL/L (ref 136–145)
WBC # BLD AUTO: 10.65 K/UL (ref 3.9–12.7)

## 2023-07-04 PROCEDURE — 83735 ASSAY OF MAGNESIUM: CPT | Performed by: SURGERY

## 2023-07-04 PROCEDURE — C9113 INJ PANTOPRAZOLE SODIUM, VIA: HCPCS | Performed by: SURGERY

## 2023-07-04 PROCEDURE — 36415 COLL VENOUS BLD VENIPUNCTURE: CPT | Performed by: SURGERY

## 2023-07-04 PROCEDURE — 84100 ASSAY OF PHOSPHORUS: CPT | Performed by: SURGERY

## 2023-07-04 PROCEDURE — 80048 BASIC METABOLIC PNL TOTAL CA: CPT | Performed by: SURGERY

## 2023-07-04 PROCEDURE — 85025 COMPLETE CBC W/AUTO DIFF WBC: CPT | Performed by: SURGERY

## 2023-07-04 PROCEDURE — 63600175 PHARM REV CODE 636 W HCPCS: Performed by: SURGERY

## 2023-07-04 PROCEDURE — 25000003 PHARM REV CODE 250: Performed by: SURGERY

## 2023-07-04 RX ADMIN — SIMETHICONE 40 MG: 20 SUSPENSION/ DROPS ORAL at 07:07

## 2023-07-04 RX ADMIN — GABAPENTIN 300 MG: 250 SOLUTION ORAL at 08:07

## 2023-07-04 RX ADMIN — ACETAMINOPHEN 499.51 MG: 650 SOLUTION ORAL at 08:07

## 2023-07-04 RX ADMIN — SODIUM CHLORIDE, SODIUM LACTATE, POTASSIUM CHLORIDE, AND CALCIUM CHLORIDE: 600; 310; 30; 20 INJECTION, SOLUTION INTRAVENOUS at 04:07

## 2023-07-04 RX ADMIN — PANTOPRAZOLE SODIUM 40 MG: 40 INJECTION, POWDER, FOR SOLUTION INTRAVENOUS at 08:07

## 2023-07-04 RX ADMIN — ENOXAPARIN SODIUM 60 MG: 100 INJECTION SUBCUTANEOUS at 08:07

## 2023-07-04 RX ADMIN — HYDROCODONE BITARTRATE AND ACETAMINOPHEN 15 ML: 7.5; 325 SOLUTION ORAL at 10:07

## 2023-07-04 RX ADMIN — ACETAMINOPHEN 499.51 MG: 650 SOLUTION ORAL at 12:07

## 2023-07-04 RX ADMIN — HYDROCODONE BITARTRATE AND ACETAMINOPHEN 15 ML: 7.5; 325 SOLUTION ORAL at 05:07

## 2023-07-04 NOTE — DISCHARGE INSTRUCTIONS
Postoperative General Instructions    What to Expect:  It is normal to experience pain and swelling at the surgical site.  The pain usually decreases significantly after the first week but may last for many weeks.  Each day, the pain should be similar or better to the previous day. If it worsens, call the doctor's office.     Activity:  You should walk beginning on the day of surgery and increase activity slowly over the next two to four weeks.  Do not drive while taking prescription pain medication.  You may return to work when you feel ready.  Do not lift anything heavier than 10 lbs     Wound Care:  You may shower. Let soap and water run over the incisions and pat dry. Do not submerge in a bathtub or pool.     Diet:  Follow the diet recommenced by the dietician. You will be on a high protein, low sugar clear liquid diet after surgery  Take a stool softener or laxative to avoid constipation.     Medication:  Pain Control  Take liquid acetaminophen (Tylenol) 650 mg 4 times daily as needed for pain.  Take the prescribed Hycvet as needed if you have pain that is not controlled by acetaminophen and ibuprofen.  Bowel Regularity  Take an over-the-counter stool softener/laxative (Colace, Miralax, or Milk of Magnesia) to avoid constipation.  Previous Home Medication  Restart your previous home medication unless otherwise instructed.  All medications should be liquid, 5mm or smaller, or crushed    Call Your Doctor's Office If You Experience:  Fevers greater than 101.3°F.  Vomiting.  Spreading redness or drainage from the incisions.  Opening of the incisions.  Worsening pain not controlled by medication.  Chest pain or shortness of breath.    Follow-Up:  Follow-up with your surgeon as scheduled in 1-2 weeks.  If no follow-up appointment has been scheduled, call to schedule an appointment within 1-2 weeks of discharge.     Contact Information:  During normal business hours call the clinic with any questions or concerns. On  weekends and evenings call [phone] to have the operators page the surgery resident on call after hours with questions or concerns.

## 2023-07-04 NOTE — ASSESSMENT & PLAN NOTE
POD1 abhinavo eb en y gastric bypass recovering appropriately.     - Water protocol this am, then advance to CLD if tolerates this  - DC mIVF it tolerating CLD  - All meds to be in elixer form or crushed  - Liat tylenol, gabapentin, PRN hycet  - PRN nausea meds   - Encourage OOB, IS, ambulation  - Home meds: N/A  - GI ppx: protonix  - DVT ppx: lovenox BID    Dispo: Once tolerating diet and adequate pain control

## 2023-07-04 NOTE — HOSPITAL COURSE
JOEY CH 42 y.o.male underwent: Procedure(s) (LRB):  XI ROBOTIC GASTROENTEROSTOMY, TODD-EN-Y (PT is SELF PAY, DO NOT submit to insurance (N/A)  EGD (ESOPHAGOGASTRODUODENOSCOPY) (N/A). The patient tolerated the procedure well, was transferred to recovery post-op, and then transferred to the floor for continuation of medical care. The patient's clinical condition progressively improved. By the time of discharge, he was tolerating a diet without nausea or vomiting, pain was well controlled with oral medications, and he was ambulating without difficulty. On POD 1 the patient was discharged to home. On discharge, the patient's incisions were c/d/i and the surgical site was soft and appropriately tender to palpation. The patient will follow up in Dr. Stephen's clinic in 2 weeks.

## 2023-07-04 NOTE — PROGRESS NOTES
[facility]  General Surgery  Progress Note    Subjective:     History of Present Illness:  No notes on file    Post-Op Info:  Procedure(s) (LRB):  XI ROBOTIC GASTROENTEROSTOMY, PRADEEP-EN-Y (PT is SELF PAY, DO NOT submit to insurance (N/A)  EGD (ESOPHAGOGASTRODUODENOSCOPY) (N/A)   1 Day Post-Op     Interval History: POD1 robo Pradeep en y gastric bypass. No acute events overnight. Pain well controlled. Has been up out of bed ambulating. Has tolerated 5 sips of the water protocol with no N/V. Patient resting comfortably in bed this morning.    Medications:  Continuous Infusions:   sodium chloride 0.9%      lactated ringers 125 mL/hr at 07/04/23 0452     Scheduled Meds:   acetaminophen  499.5074 mg Oral Q8H    enoxparin  60 mg Subcutaneous Q12H    gabapentin  300 mg Oral TID    pantoprazole  40 mg Intravenous BID     PRN Meds:hydrocodone-apap 7.5-325 MG/15 ML, HYDROmorphone, ondansetron, prochlorperazine, simethicone     Review of patient's allergies indicates:  No Known Allergies  Objective:     Vital Signs (Most Recent):  Temp: 97.4 °F (36.3 °C) (07/04/23 0724)  Pulse: 73 (07/04/23 0724)  Resp: 18 (07/04/23 0724)  BP: 126/60 (07/04/23 0724)  SpO2: 96 % (07/04/23 0724) Vital Signs (24h Range):  Temp:  [96.8 °F (36 °C)-98.6 °F (37 °C)] 97.4 °F (36.3 °C)  Pulse:  [69-83] 73  Resp:  [12-20] 18  SpO2:  [90 %-100 %] 96 %  BP: (126-181)/() 126/60     Weight: (!) 198.9 kg (438 lb 9.3 oz)  Body mass index is 62.93 kg/m².    Intake/Output - Last 3 Shifts         07/02 0700  07/03 0659 07/03 0700  07/04 0659 07/04 0700 07/05 0659    I.V. (mL/kg)  436.5 (2.2)     IV Piggyback  1000     Total Intake(mL/kg)  1436.5 (7.2)     Net  +1436.5                     Physical Exam  Vitals and nursing note reviewed.   Constitutional:       General: He is not in acute distress.     Appearance: He is obese. He is not ill-appearing.   Cardiovascular:      Rate and Rhythm: Normal rate and regular rhythm.   Pulmonary:      Effort:  Pulmonary effort is normal.   Abdominal:      General: Abdomen is flat. There is no distension.      Palpations: Abdomen is soft.      Tenderness: There is no guarding.   Neurological:      Mental Status: He is alert.        Significant Labs:  I have reviewed all pertinent lab results within the past 24 hours.  CBC:   Recent Labs   Lab 07/04/23  0246   WBC 10.65   RBC 4.34*   HGB 13.3*   HCT 38.6*      MCV 89   MCH 30.6   MCHC 34.5     BMP:   Recent Labs   Lab 07/04/23  0246   *      K 4.2      CO2 23   BUN 9   CREATININE 0.8   CALCIUM 9.1   MG 1.9       Significant Diagnostics:  I have reviewed all pertinent imaging results/findings within the past 24 hours.    Assessment/Plan:     * Obesity  POD1 robo eb en y gastric bypass recovering appropriately.     - Water protocol this am, then advance to CLD if tolerates this  - DC mIVF it tolerating CLD  - All meds to be in elixer form or crushed  - Liat tylenol, gabapentin, PRN hycet  - PRN nausea meds   - Encourage OOB, IS, ambulation  - Home meds: N/A  - GI ppx: protonix  - DVT ppx: lovenox BID    Dispo: Once tolerating diet and adequate pain control        Demi Moon MD  General Surgery  [facility]

## 2023-07-04 NOTE — PLAN OF CARE
[facility]  Discharge Final Note    Primary Care Provider: Primary Doctor No    Expected Discharge Date: 7/4/2023    Final Discharge Note (most recent)       Final Note - 07/04/23 1624          Final Note    Assessment Type Final Discharge Note (P)      Anticipated Discharge Disposition Home or Self Care (P)      Hospital Resources/Appts/Education Provided Provided patient/caregiver with written discharge plan information;Appointments scheduled and added to AVS;Provided education on problems/symptoms using teachback (P)         Post-Acute Status    Post-Acute Authorization Other (P)      Coverage Self Pay (P)      Other Status No Post-Acute Service Needs (P)                      Important Message from Medicare             Contact Info       Tay Stephen MD   Specialty: General Surgery, Bariatrics    [address]  [facility] [address]   Phone: [phone]       Next Steps: Follow up in 2 week(s)    Instructions: Post-op Robotic Pradeep en y gastric bypass          Pt. discharged to home. Significant other, Juliette providing pt. transportation home. No discharge needs identified at this time.     Zeenat Mccracken LMSW

## 2023-07-04 NOTE — SUBJECTIVE & OBJECTIVE
Interval History: POD1 robo Pradeep en y gastric bypass. No acute events overnight. Pain well controlled. Has been up out of bed ambulating. Has tolerated 5 sips of the water protocol with no N/V. Patient resting comfortably in bed this morning.    Medications:  Continuous Infusions:   sodium chloride 0.9%      lactated ringers 125 mL/hr at 07/04/23 0452     Scheduled Meds:   acetaminophen  499.5074 mg Oral Q8H    enoxparin  60 mg Subcutaneous Q12H    gabapentin  300 mg Oral TID    pantoprazole  40 mg Intravenous BID     PRN Meds:hydrocodone-apap 7.5-325 MG/15 ML, HYDROmorphone, ondansetron, prochlorperazine, simethicone     Review of patient's allergies indicates:  No Known Allergies  Objective:     Vital Signs (Most Recent):  Temp: 97.4 °F (36.3 °C) (07/04/23 0724)  Pulse: 73 (07/04/23 0724)  Resp: 18 (07/04/23 0724)  BP: 126/60 (07/04/23 0724)  SpO2: 96 % (07/04/23 0724) Vital Signs (24h Range):  Temp:  [96.8 °F (36 °C)-98.6 °F (37 °C)] 97.4 °F (36.3 °C)  Pulse:  [69-83] 73  Resp:  [12-20] 18  SpO2:  [90 %-100 %] 96 %  BP: (126-181)/() 126/60     Weight: (!) 198.9 kg (438 lb 9.3 oz)  Body mass index is 62.93 kg/m².    Intake/Output - Last 3 Shifts         07/02 0700  07/03 0659 07/03 0700  07/04 0659 07/04 0700  07/05 0659    I.V. (mL/kg)  436.5 (2.2)     IV Piggyback  1000     Total Intake(mL/kg)  1436.5 (7.2)     Net  +1436.5                     Physical Exam  Vitals and nursing note reviewed.   Constitutional:       General: He is not in acute distress.     Appearance: He is obese. He is not ill-appearing.   Cardiovascular:      Rate and Rhythm: Normal rate and regular rhythm.   Pulmonary:      Effort: Pulmonary effort is normal.   Abdominal:      General: Abdomen is flat. There is no distension.      Palpations: Abdomen is soft.      Tenderness: There is no guarding.   Neurological:      Mental Status: He is alert.        Significant Labs:  I have reviewed all pertinent lab results within the past 24  hours.  CBC:   Recent Labs   Lab 07/04/23  0246   WBC 10.65   RBC 4.34*   HGB 13.3*   HCT 38.6*      MCV 89   MCH 30.6   MCHC 34.5     BMP:   Recent Labs   Lab 07/04/23 0246   *      K 4.2      CO2 23   BUN 9   CREATININE 0.8   CALCIUM 9.1   MG 1.9       Significant Diagnostics:  I have reviewed all pertinent imaging results/findings within the past 24 hours.

## 2023-07-04 NOTE — CONSULTS
Food & Nutrition  Education    Diet Education: Post-op RYGB  Time Spent: 10 min  Learners: patient, caregiver    Nutrition Education provided with handouts: Pradeep-en-Y Gastric Bypass/Sleeve Gastrectomy Discharge Nutrition Therapy    Comments: Pt agreed to review education materials. Pt reports plan to be on liquid diet x 2 wks, puree diet x 2 wks, and soft diet x 2 wks. Discussed difference between CLD and FLD, and advancement per MD. Pt is on CLD at time of visit. All questions and concerns answered. Dietitian's contact information provided.       Follow-Up: Yes    Please Re-consult as needed.    Thanks!    Sherin Villasenor, MS, RD, LDN

## 2023-07-04 NOTE — PLAN OF CARE
[facility]  Initial Discharge Assessment       Primary Care Provider: Primary Doctor No    Admission Diagnosis: Morbid obesity [E66.01]  BMI 60.0-69.9, adult [Z68.44]  GAYLE (obstructive sleep apnea) [G47.33]  GERD without esophagitis [K21.9]  Morbid obesity with body mass index of 60.0-69.9 in adult [E66.01, Z68.44]  Obesity [E66.9]    Admission Date: 7/3/2023  Expected Discharge Date: 7/4/2023    Transition of Care Barriers: (P) None    Payor: /     Extended Emergency Contact Information  Primary Emergency Contact: Juliette Willson   [place]  Mobile Phone: [phone]  Relation: Significant other    Discharge Plan A: (P) Home  Discharge Plan B: (P) Home with family      [id number] - [place], [place] - [address]  [id number]  [place] [address]  Phone: [phone] Fax: [phone]      Initial Assessment (most recent)       Adult Discharge Assessment - 07/04/23 1616          Discharge Assessment    Assessment Type Discharge Planning Assessment (P)      Confirmed/corrected address, phone number and insurance Yes (P)      Confirmed Demographics Correct on Facesheet (P)      Source of Information patient (P)      Communicated VIKRAM with patient/caregiver Yes (P)      Reason For Admission Morbid Obesity (P)      People in Home significant other (P)      Do you expect to return to your current living situation? Yes (P)      Do you have help at home or someone to help you manage your care at home? Yes (P)      Who are your caregiver(s) and their phone number(s)? Significant Juliette Chavez [phone] (P)      Prior to hospitilization cognitive status: Alert/Oriented (P)      Equipment Currently Used at Home none (P)      Readmission within 30 days? No (P)      Patient currently being followed by outpatient case management? No (P)      Do you currently have service(s) that help you manage your care at home? No (P)      Do you take prescription medications? No (P)      Do you have  prescription coverage? No (P)      Coverage Self-Pay (P)      Do you have any problems affording any of your prescribed medications? No (P)      Is the patient taking medications as prescribed? yes (P)      Who is going to help you get home at discharge? Significant Other (P)      How do you get to doctors appointments? car, drives self;family or friend will provide (P)      Are you on dialysis? No (P)      Do you take coumadin? No (P)      Discharge Plan A Home (P)      Discharge Plan B Home with family (P)      DME Needed Upon Discharge  none (P)      Discharge Plan discussed with: Patient;Spouse/sig other (P)      Name(s) and Number(s) Juliette Willson (P)      Transition of Care Barriers None (P)         Physical Activity    On average, how many days per week do you engage in moderate to strenuous exercise (like a brisk walk)? 0 days (P)      On average, how many minutes do you engage in exercise at this level? 0 min (P)         Financial Resource Strain    How hard is it for you to pay for the very basics like food, housing, medical care, and heating? Not hard at all (P)         Housing Stability    In the last 12 months, was there a time when you were not able to pay the mortgage or rent on time? No (P)      In the last 12 months, how many places have you lived? 1 (P)      In the last 12 months, was there a time when you did not have a steady place to sleep or slept in a shelter (including now)? No (P)         Transportation Needs    In the past 12 months, has lack of transportation kept you from medical appointments or from getting medications? No (P)      In the past 12 months, has lack of transportation kept you from meetings, work, or from getting things needed for daily living? No (P)         Food Insecurity    Within the past 12 months, you worried that your food would run out before you got the money to buy more. Never true (P)      Within the past 12 months, the food you bought just didn't last and you  didn't have money to get more. Never true (P)         Stress    Do you feel stress - tense, restless, nervous, or anxious, or unable to sleep at night because your mind is troubled all the time - these days? Not at all (P)         Social Connections    In a typical week, how many times do you talk on the phone with family, friends, or neighbors? More than three times a week (P)      How often do you get together with friends or relatives? Once a week (P)      How often do you attend Quaker or Mu-ism services? More than 4 times per year (P)      Do you belong to any clubs or organizations such as Quaker groups, unions, fraternal or athletic groups, or school groups? No (P)      How often do you attend meetings of the clubs or organizations you belong to? Never (P)         Alcohol Use    Q1: How often do you have a drink containing alcohol? Monthly or less (P)      Q2: How many drinks containing alcohol do you have on a typical day when you are drinking? 3 or 4 (P)      Q3: How often do you have six or more drinks on one occasion? Never (P)                       SW met with pt. and significant other, Juliette at bedside to complete Initial Discharge Assessment. Pt. lives in his home in [place], [place]. No DME, Home Health, Coumadin or Dialysis. SW noting pt. Is Self-Pay. SW to email [facility] team regarding pt's Self-Pay status. Pt. drives, but Juliette will provide transportation home upon discharge.     Zeenat Mccracken LMSW

## 2023-07-04 NOTE — DISCHARGE SUMMARY
[facility]  General Surgery  Discharge Summary      Patient Name: Joey Valentine  MRN: [id number]  Admission Date: 7/3/2023  Hospital Length of Stay: 1 days  Discharge Date and Time:  07/04/2023 2:00 PM  Attending Physician: Tay Stephen MD   Discharging Provider: Demi Moon MD  Primary Care Provider: Primary Doctor Shital    HPI:   Clinic HPI 6/20/23:  Joey Valentine is a 42 y.o. male who presents for pre-operative exam for weight loss surgery.  he has completed him pre-operative evaluation.  he has failed medical treatment for obesity.  1. Morbid obesity, body mass index is 64.66 kg/m². and inability to lose weight.  2. Co-morbidities: GAYLE on cpap and GERD  3. Vit d deficiency under treatment    Presented 7/3/2023 for Robotic Todd en y gastric bypass    Procedure(s) (LRB):  XI ROBOTIC GASTROENTEROSTOMY, TODD-EN-Y (PT is SELF PAY, DO NOT submit to insurance (N/A)  EGD (ESOPHAGOGASTRODUODENOSCOPY) (N/A)      Indwelling Lines/Drains at time of discharge:   Lines/Drains/Airways     None               Hospital Course: JOEY VALENTINE 42 y.o.male underwent: Procedure(s) (LRB):  XI ROBOTIC GASTROENTEROSTOMY, TODD-EN-Y (PT is SELF PAY, DO NOT submit to insurance (N/A)  EGD (ESOPHAGOGASTRODUODENOSCOPY) (N/A). The patient tolerated the procedure well, was transferred to recovery post-op, and then transferred to the floor for continuation of medical care. The patient's clinical condition progressively improved. By the time of discharge, he was tolerating a diet without nausea or vomiting, pain was well controlled with oral medications, and he was ambulating without difficulty. On POD 1 the patient was discharged to home. On discharge, the patient's incisions were c/d/i and the surgical site was soft and appropriately tender to palpation. The patient will follow up in Dr. Stephen's clinic in 2 weeks.       Goals of Care Treatment Preferences:  Code Status: Full Code      Consults:    Consults (From admission, onward)        Status Ordering Provider     Inpatient consult to Registered Dietitian/Nutritionist  Once        Provider:  (Not yet assigned)    Completed AKILA DICK          Significant Diagnostic Studies: Labs:   BMP:   Recent Labs   Lab 07/03/23  1602 07/04/23  0246   GLU  --  132*   NA  --  141   K  --  4.2   CL  --  107   CO2  --  23   BUN  --  9   CREATININE 0.9 0.8   CALCIUM  --  9.1   MG  --  1.9    and CBC   Recent Labs   Lab 07/04/23  0246   WBC 10.65   HGB 13.3*   HCT 38.6*          Pending Diagnostic Studies:     None        Final Active Diagnoses:    Diagnosis Date Noted POA    PRINCIPAL PROBLEM:  Obesity [E66.9] 07/03/2023 Yes      Problems Resolved During this Admission:      Discharged Condition: good    Disposition: Home or Self Care    Follow Up:   Follow-up Information     Tay Stephen MD Follow up in 2 week(s).    Specialties: General Surgery, Bariatrics  Why: Post-op Robotic Pradeep en y gastric bypass  Contact information:  [address] [place] [address] [phone]                       Patient Instructions:   No discharge procedures on file.  Medications:  Reconciled Home Medications:      Medication List      CONTINUE taking these medications    ergocalciferol 50,000 unit Cap  Commonly known as: ERGOCALCIFEROL  Take 1 capsule (50,000 Units total) by mouth every 7 days. for 12 doses     omeprazole 40 MG capsule  Commonly known as: PRILOSEC  Take 1 capsule (40 mg total) by mouth every morning. Open capsule and take with apple sauce     ondansetron 8 MG Tbdl  Commonly known as: ZOFRAN-ODT  Dissolve 1 tablet (8 mg total) by mouth every 6 (six) hours as needed.     oxyCODONE 5 mg/5 mL Soln  Commonly known as: ROXICODONE  Take 5 mLs (5 mg total) by mouth every 8 (eight) hours as needed.     ursodioL 500 MG tablet  Commonly known as: ALESSANDRO FORTE  Crush one tablet by mouth daily for gall bladder.          Time spent on the discharge of  patient: 15 minutes    eDmi Moon MD  General Surgery  [facility] - Surgery

## 2023-07-07 ENCOUNTER — TELEPHONE (OUTPATIENT)
Dept: BARIATRICS | Facility: CLINIC | Age: 43
End: 2023-07-07
Payer: COMMERCIAL

## 2023-07-07 NOTE — TELEPHONE ENCOUNTER
Pt called in and transferred via phone room. He is experiencing RUQ abdominal pain. It is consistent and a 5-6 on the pain scale. The pain started 7/5/2023.  He stated no fever or n/v or pain in his right arm.      Immediately contacted [facility] who will call pt.

## 2023-07-07 NOTE — TELEPHONE ENCOUNTER
Spoke to patient about right sided abd pain. Pt states its worse with stretching and pressing. Suggested gas x to help with possible gas. Informed patient if he gets a fever, fast hrt or pain is several increases to go to ER. Pt will follow up on Monday and verbalizes understanding . Denies fever, chills, CP, SOB, N/V or dysphagia. Tolerating liquids and medications.     Thank you     L[facility] Serr[facility]  Bariatric Surgery

## 2023-07-09 ENCOUNTER — HOSPITAL ENCOUNTER (EMERGENCY)
Facility: HOSPITAL | Age: 43
Discharge: HOME | End: 2023-07-09
Attending: EMERGENCY MEDICINE
Payer: COMMERCIAL

## 2023-07-09 VITALS
TEMPERATURE: 98 F | DIASTOLIC BLOOD PRESSURE: 88 MMHG | OXYGEN SATURATION: 99 % | SYSTOLIC BLOOD PRESSURE: 154 MMHG | HEART RATE: 76 BPM | RESPIRATION RATE: 18 BRPM

## 2023-07-09 DIAGNOSIS — R55 SYNCOPE: ICD-10-CM

## 2023-07-09 LAB
ALBUMIN SERPL BCP-MCNC: 3.9 G/DL (ref 3.5–5.2)
ALP SERPL-CCNC: 67 U/L (ref 55–135)
ALT SERPL W/O P-5'-P-CCNC: 47 U/L (ref 10–44)
ANION GAP SERPL CALC-SCNC: 14 MMOL/L (ref 8–16)
AST SERPL-CCNC: 30 U/L (ref 10–40)
BASOPHILS # BLD AUTO: 0.04 K/UL (ref 0–0.2)
BASOPHILS NFR BLD: 0.5 % (ref 0–1.9)
BILIRUB SERPL-MCNC: 0.5 MG/DL (ref 0.1–1)
BILIRUB UR QL STRIP: NEGATIVE
BNP SERPL-MCNC: <10 PG/ML (ref 0–99)
BUN SERPL-MCNC: 12 MG/DL (ref 6–20)
CALCIUM SERPL-MCNC: 10 MG/DL (ref 8.7–10.5)
CHLORIDE SERPL-SCNC: 102 MMOL/L (ref 95–110)
CLARITY UR REFRACT.AUTO: CLEAR
CO2 SERPL-SCNC: 25 MMOL/L (ref 23–29)
COLOR UR AUTO: YELLOW
CREAT SERPL-MCNC: 0.9 MG/DL (ref 0.5–1.4)
DIFFERENTIAL METHOD: NORMAL
EOSINOPHIL # BLD AUTO: 0.2 K/UL (ref 0–0.5)
EOSINOPHIL NFR BLD: 2.2 % (ref 0–8)
ERYTHROCYTE [DISTWIDTH] IN BLOOD BY AUTOMATED COUNT: 12.4 % (ref 11.5–14.5)
EST. GFR  (NO RACE VARIABLE): >60 ML/MIN/1.73 M^2
GLUCOSE SERPL-MCNC: 84 MG/DL (ref 70–110)
GLUCOSE UR QL STRIP: NEGATIVE
HCT VFR BLD AUTO: 42.7 % (ref 40–54)
HGB BLD-MCNC: 14.5 G/DL (ref 14–18)
HGB UR QL STRIP: NEGATIVE
IMM GRANULOCYTES # BLD AUTO: 0.01 K/UL (ref 0–0.04)
IMM GRANULOCYTES NFR BLD AUTO: 0.1 % (ref 0–0.5)
KETONES UR QL STRIP: ABNORMAL
LEUKOCYTE ESTERASE UR QL STRIP: NEGATIVE
LYMPHOCYTES # BLD AUTO: 2 K/UL (ref 1–4.8)
LYMPHOCYTES NFR BLD: 24.3 % (ref 18–48)
MAGNESIUM SERPL-MCNC: 1.8 MG/DL (ref 1.6–2.6)
MCH RBC QN AUTO: 30.9 PG (ref 27–31)
MCHC RBC AUTO-ENTMCNC: 34 G/DL (ref 32–36)
MCV RBC AUTO: 91 FL (ref 82–98)
MONOCYTES # BLD AUTO: 0.7 K/UL (ref 0.3–1)
MONOCYTES NFR BLD: 8.2 % (ref 4–15)
NEUTROPHILS # BLD AUTO: 5.2 K/UL (ref 1.8–7.7)
NEUTROPHILS NFR BLD: 64.7 % (ref 38–73)
NITRITE UR QL STRIP: NEGATIVE
NRBC BLD-RTO: 0 /100 WBC
PH UR STRIP: 6 [PH] (ref 5–8)
PHOSPHATE SERPL-MCNC: 2.7 MG/DL (ref 2.7–4.5)
PLATELET # BLD AUTO: 222 K/UL (ref 150–450)
PMV BLD AUTO: 11.5 FL (ref 9.2–12.9)
POTASSIUM SERPL-SCNC: 3.9 MMOL/L (ref 3.5–5.1)
PROT SERPL-MCNC: 8.4 G/DL (ref 6–8.4)
PROT UR QL STRIP: NEGATIVE
RBC # BLD AUTO: 4.7 M/UL (ref 4.6–6.2)
SODIUM SERPL-SCNC: 141 MMOL/L (ref 136–145)
SP GR UR STRIP: 1.02 (ref 1–1.03)
TROPONIN I SERPL DL<=0.01 NG/ML-MCNC: 0.01 NG/ML (ref 0–0.03)
URN SPEC COLLECT METH UR: ABNORMAL
WBC # BLD AUTO: 8.08 K/UL (ref 3.9–12.7)

## 2023-07-09 PROCEDURE — 81003 URINALYSIS AUTO W/O SCOPE: CPT | Performed by: STUDENT IN AN ORGANIZED HEALTH CARE EDUCATION/TRAINING PROGRAM

## 2023-07-09 PROCEDURE — 99284 EMERGENCY DEPT VISIT MOD MDM: CPT

## 2023-07-09 PROCEDURE — 80053 COMPREHEN METABOLIC PANEL: CPT | Performed by: STUDENT IN AN ORGANIZED HEALTH CARE EDUCATION/TRAINING PROGRAM

## 2023-07-09 PROCEDURE — 84100 ASSAY OF PHOSPHORUS: CPT | Performed by: STUDENT IN AN ORGANIZED HEALTH CARE EDUCATION/TRAINING PROGRAM

## 2023-07-09 PROCEDURE — 84484 ASSAY OF TROPONIN QUANT: CPT | Performed by: STUDENT IN AN ORGANIZED HEALTH CARE EDUCATION/TRAINING PROGRAM

## 2023-07-09 PROCEDURE — 93010 EKG 12-LEAD: ICD-10-PCS | Mod: ,,, | Performed by: INTERNAL MEDICINE

## 2023-07-09 PROCEDURE — 93010 ELECTROCARDIOGRAM REPORT: CPT | Mod: ,,, | Performed by: INTERNAL MEDICINE

## 2023-07-09 PROCEDURE — 63600175 PHARM REV CODE 636 W HCPCS: Performed by: STUDENT IN AN ORGANIZED HEALTH CARE EDUCATION/TRAINING PROGRAM

## 2023-07-09 PROCEDURE — 83880 ASSAY OF NATRIURETIC PEPTIDE: CPT | Performed by: STUDENT IN AN ORGANIZED HEALTH CARE EDUCATION/TRAINING PROGRAM

## 2023-07-09 PROCEDURE — 93005 ELECTROCARDIOGRAM TRACING: CPT

## 2023-07-09 PROCEDURE — 83735 ASSAY OF MAGNESIUM: CPT | Performed by: STUDENT IN AN ORGANIZED HEALTH CARE EDUCATION/TRAINING PROGRAM

## 2023-07-09 PROCEDURE — 85025 COMPLETE CBC W/AUTO DIFF WBC: CPT | Performed by: STUDENT IN AN ORGANIZED HEALTH CARE EDUCATION/TRAINING PROGRAM

## 2023-07-09 RX ADMIN — SODIUM CHLORIDE, POTASSIUM CHLORIDE, SODIUM LACTATE AND CALCIUM CHLORIDE 1000 ML: 600; 310; 30; 20 INJECTION, SOLUTION INTRAVENOUS at 02:07

## 2023-07-09 NOTE — DISCHARGE INSTRUCTIONS
Home Care Instructions:  - Medications: Continue taking your home medications as prescribed  - It is very important that you stay hydrated.     Follow-Up Plan:  - Follow-up with: Primary care doctor within 3  days  - Additional testing and/or evaluation will be directed by your primary doctor    Return to the Emergency Department for symptoms including but not limited to: worsening symptoms, severe back pain, shortness of breath or chest pain, vomiting with inability to hold down fluids, blood in vomit or poop, fevers greater than 100.4°F, passing out/fainting/unconsciousness, or other concerning symptoms.

## 2023-07-09 NOTE — ED TRIAGE NOTES
"Loss of Consciousness (Pt states he passed out earlier today for "a couple of minutes". EMT assessed patient at his house. Pt states he had gastric bypass on 7/3/23. )  "

## 2023-07-09 NOTE — ED PROVIDER NOTES
"Encounter Date: 7/9/2023       History     Chief Complaint   Patient presents with    Loss of Consciousness     Pt states he passed out earlier today for "a couple of minutes". EMT assessed patient at his house. Pt states he had gastric bypass on 7/3/23.      42-year-old male with significant past medical history of morbid obesity who is postop day 6 status post gastric bypass surgery.  Patient states he is supposed to be on a clear liquid diet and consuming at least 48-64 oz of fluid daily.  Over the past couple days, he has had significantly less fluids than instructed.  Today, he was yelling to a family member when he suddenly felt lightheaded and fell forward onto his knees.  He was able to eased himself down on the floor without any head injury.  He did experience loss of consciousness.  He drank a smoothie on the way to the ED and states that he is starting to feel much better.  He denies fevers, chills, nausea, vomiting, abdominal pain, lower extremity edema, calf tenderness, chest pain, or shortness of breath.      Review of patient's allergies indicates:  No Known Allergies  Past Medical History:   Diagnosis Date    GERD without esophagitis     takes OTC nexium PRN    History of kidney stones     Morbid obesity with body mass index of 60.0-69.9 in adult     GAYLE (obstructive sleep apnea)     did not tolerate CPAP     Past Surgical History:   Procedure Laterality Date    APPENDECTOMY  3/2008    CYSTOSCOPY  10/11/2019    Procedure: CYSTOSCOPY;  Surgeon: Gamal Sosa MD;  Location: [facility] OR [address] [place] [address];  Service: Urology;;    CYSTOSCOPY W/ URETERAL STENT PLACEMENT Left 10/6/2019    Procedure: CYSTOSCOPY, WITH URETERAL STENT INSERTION;  Surgeon: Gamal Sosa MD;  Location: [facility] OR [address] [place] [address];  Service: Urology;  Laterality: Left;    ESOPHAGOGASTRODUODENOSCOPY N/A 7/3/2023    Procedure: EGD (ESOPHAGOGASTRODUODENOSCOPY);  Surgeon: Tay Stephen MD;  Location: [facility] OR [address] [place] [address];  Service: General;  " Laterality: N/A;    LASER LITHOTRIPSY Left 10/11/2019    Procedure: LITHOTRIPSY, USING LASER;  Surgeon: Gamal Sosa MD;  Location: [facility] OR 1ST FLR;  Service: Urology;  Laterality: Left;    RETROGRADE PYELOGRAPHY Left 10/6/2019    Procedure: PYELOGRAM, RETROGRADE;  Surgeon: Gamal Sosa MD;  Location: [facility] OR 1ST FLR;  Service: Urology;  Laterality: Left;    RETROGRADE PYELOGRAPHY Left 10/11/2019    Procedure: PYELOGRAM, RETROGRADE;  Surgeon: Gamal Sosa MD;  Location: [facility] OR 1ST FLR;  Service: Urology;  Laterality: Left;    URETEROSCOPIC REMOVAL OF URETERIC CALCULUS  10/11/2019    Procedure: REMOVAL, CALCULUS, URETER, URETEROSCOPIC;  Surgeon: Gamal Sosa MD;  Location: [facility] OR 1ST FLR;  Service: Urology;;    URETEROSCOPY Left 10/6/2019    Procedure: URETEROSCOPY;  Surgeon: Gamal Sosa MD;  Location: [facility] OR 1ST FLR;  Service: Urology;  Laterality: Left;    URETEROSCOPY Left 10/11/2019    Procedure: URETEROSCOPY;  Surgeon: Gamal Sosa MD;  Location: [facility] OR 1ST FLR;  Service: Urology;  Laterality: Left;  1hr    XI ROBOTIC GASTROENTEROSTOMY, TODD-EN-Y N/A 7/3/2023    Procedure: XI ROBOTIC GASTROENTEROSTOMY, TODD-EN-Y (PT is SELF PAY, DO NOT submit to insurance;  Surgeon: Tay Stephen MD;  Location: [facility] OR 2ND FLR;  Service: General;  Laterality: N/A;     Family History   Problem Relation Age of Onset    Heart disease Father     Stroke Father     Hypertension Father     Diabetes Maternal Grandmother     Heart disease Maternal Grandfather     Stroke Paternal Grandmother     Heart disease Paternal Grandfather     Diabetes Mother     Obesity Mother     Obesity Sister     Diabetes Brother     Obesity Brother      Social History     Tobacco Use    Smoking status: Never    Smokeless tobacco: Current     Types: Chew    Tobacco comments:     2 cans/ week   Substance Use Topics    Alcohol use: Yes     Alcohol/week: 3.0 standard drinks     Types: 3 Cans of beer per week      "Comment: "socially but not usually"    Drug use: No     Review of Systems   Constitutional:  Negative for chills and fever.   HENT:  Negative for congestion and sore throat.    Eyes:  Negative for pain and discharge.   Respiratory:  Negative for shortness of breath and wheezing.    Cardiovascular:  Negative for chest pain and palpitations.   Gastrointestinal:  Negative for abdominal pain, nausea and vomiting.   Genitourinary:  Negative for difficulty urinating and dysuria.   Musculoskeletal:  Negative for back pain and joint swelling.   Skin:  Negative for color change, rash and wound.   Neurological:  Positive for syncope. Negative for weakness and numbness.   Hematological:  Does not bruise/bleed easily.     Physical Exam     Initial Vitals [07/09/23 1325]   BP Pulse Resp Temp SpO2   (!) 164/101 74 17 97.6 °F (36.4 °C) 99 %      MAP       --         Physical Exam    Nursing note and vitals reviewed.  Constitutional: He is not diaphoretic. No distress.   HENT:   Head: Normocephalic and atraumatic.   Mouth/Throat: Oropharynx is clear and moist.   Eyes: Conjunctivae and EOM are normal.   Neck: Neck supple.   Normal range of motion.  Cardiovascular:  Normal rate, regular rhythm, normal heart sounds and intact distal pulses.           Pulmonary/Chest: Breath sounds normal. He has no wheezes. He has no rhonchi. He has no rales.   Abdominal: Abdomen is soft. He exhibits no distension. There is no abdominal tenderness.   Abdominal incision sites are clean, dry and intact.   Musculoskeletal:         General: No tenderness or edema. Normal range of motion.      Cervical back: Normal range of motion and neck supple.     Neurological: He is alert and oriented to person, place, and time. He has normal strength. No cranial nerve deficit or sensory deficit.   Skin: Skin is warm and dry. Capillary refill takes less than 2 seconds.       ED Course   Procedures  Labs Reviewed   COMPREHENSIVE METABOLIC PANEL - Abnormal; Notable for " the following components:       Result Value    ALT 47 (*)     All other components within normal limits   URINALYSIS, REFLEX TO URINE CULTURE - Abnormal; Notable for the following components:    Ketones, UA 2+ (*)     All other components within normal limits    Narrative:     Specimen Source->Urine   CBC W/ AUTO DIFFERENTIAL   MAGNESIUM   PHOSPHORUS   TROPONIN I   B-TYPE NATRIURETIC PEPTIDE     EKG Readings: (Independently Interpreted)   Initial Reading: No STEMI. Previous EKG: Compared with most recent EKG Rhythm: Normal Sinus Rhythm. Heart Rate: 65. Ectopy: No Ectopy. Conduction: Normal. ST Segments: Normal ST Segments. T Waves: Normal.     Imaging Results    None          Medications   lactated ringers bolus 1,000 mL (1,000 mLs Intravenous New Bag 7/9/23 5414)     Medical Decision Making:   History:   Old Medical Records: I decided to obtain old medical records.  Old Records Summarized: records from previous admission(s).       <> Summary of Records: 7/4/23:  Discharge summary - JOEY CH 42 y.o.male underwent: Procedure(s) (LRB):  XI ROBOTIC GASTROENTEROSTOMY, TODD-EN-Y (PT is SELF PAY, DO NOT submit to insurance (N/A)  EGD (ESOPHAGOGASTRODUODENOSCOPY) (N/A). The patient tolerated the procedure well, was transferred to recovery post-op, and then transferred to the floor for continuation of medical care. The patient's clinical condition progressively improved. By the time of discharge, he was tolerating a diet without nausea or vomiting, pain was well controlled with oral medications, and he was ambulating without difficulty. On POD 1 the patient was discharged to home. On discharge, the patient's incisions were c/d/i and the surgical site was soft and appropriately tender to palpation. The patient will follow up in Dr. Stephen's clinic in 2 weeks.   Differential Diagnosis:   Arrhythmia  Electrolyte derangement  Dehydration  PE (low suspicion)  Independently Interpreted Test(s):   I have ordered  and independently interpreted EKG Reading(s) - see prior notes       <> Summary of EKG Reading(s): Normal sinus rhythm.  No STEMI.  Clinical Tests:   Lab Tests: Ordered and Reviewed  Medical Tests: Ordered and Reviewed  ED Management:  Patient is mildly hypertensive but otherwise hemodynamically stable.  UA shows ketones consistent with dehydration.  CBC, CMP, magnesium, troponin, and BNP are within normal limits.  Patient given 1 L IV fluid bolus.  On reassessment, patient states that he feels much better and would like to go home.  He was encouraged to follow up with his PCP and to keep his postoperative follow up appointments as scheduled.  Patient counseled on importance of staying hydrated and compliant with clear liquid diet.  He verbalized understanding and agreement with plan.  Patient discharged home with return precautions.  All questions answered.          Attending Attestation:   Physician Attestation Statement for Resident:  As the supervising MD   Physician Attestation Statement: I have personally seen and examined this patient.   I agree with the above history.  -:   As the supervising MD I agree with the above PE.     As the supervising MD I agree with the above treatment, course, plan, and disposition.                        I have reviewed and concur with the resident's history, physical, assessment, and plan.  I have personally interviewed and examined the patient at bedside.   I did supervise any and all procedures and was present for any critical portion, and was always immediately available for help and as a resource.     The above history physical, review of symptoms, HPI and physical exam reflect my independent interpretation and evaluation.    Complexity: High Risk    Final diagnoses:  [R55] Syncope     Dimitrios Tsai DO, RUFUS  Emergency Staff Physician   Dept of Emergency Medicine   [facility]  Spectralink: [id number]        Disclaimer: This note has been generated using  voice-recognition software. There may be typographical errors that have been missed during proof-reading.                 Clinical Impression:   Final diagnoses:  [R55] Syncope        ED Disposition Condition    Discharge Stable          ED Prescriptions    None       Follow-up Information       Follow up With Specialties Details Why Contact Info    [facility] - Emergency Dept Emergency Medicine  As needed, If symptoms worsen [id number]  [facility] [id number]  [phone]             Christina Shane MD  Resident  07/09/23 2237       Dimitrios Tsai DO  07/10/23 1052

## 2023-07-09 NOTE — ED NOTES
Patient identifiers verified and correct for Surjit Valentine  LOC: The patient is awake, alert and aware of environment with an appropriate affect, the patient is oriented x 3 and speaking appropriately. Pt had syncopal episode today.  APPEARANCE: Patient appears comfortable and in no acute distress, patient is clean and well groomed.  SKIN: The skin is warm and dry, color consistent with ethnicity, patient has normal skin turgor and moist mucus membranes, skin intact, no breakdown or bruising noted.   MUSCULOSKELETAL: Patient moving all extremities spontaneously, no swelling noted.  RESPIRATORY: Airway is open and patent, respirations are spontaneous, patient has a normal effort and rate, no accessory muscle use noted, pt placed on continuous pulse ox with O2 sats noted at 99% on room air.  CARDIAC: Pt placed on cardiac monitor. Patient has a normal rate and regular rhythm, no edema noted, capillary refill < 3 seconds.   GASTRO: Soft and non tender to palpation, no distention noted, normoactive bowel sounds present in all four quadrants. Pt states bowel movements have been regular.  : Pt denies any pain or frequency with urination.  NEURO: Pt opens eyes spontaneously, behavior appropriate to situation, follows commands, facial expression symmetrical, bilateral hand grasp equal and even, purposeful motor response noted, normal sensation in all extremities when touched with a finger.

## 2023-07-10 ENCOUNTER — TELEPHONE (OUTPATIENT)
Dept: BARIATRICS | Facility: CLINIC | Age: 43
End: 2023-07-10
Payer: COMMERCIAL

## 2023-07-12 ENCOUNTER — PATIENT MESSAGE (OUTPATIENT)
Dept: BARIATRICS | Facility: CLINIC | Age: 43
End: 2023-07-12
Payer: COMMERCIAL

## 2023-07-17 ENCOUNTER — CLINICAL SUPPORT (OUTPATIENT)
Dept: BARIATRICS | Facility: CLINIC | Age: 43
End: 2023-07-17
Payer: COMMERCIAL

## 2023-07-17 ENCOUNTER — LAB VISIT (OUTPATIENT)
Dept: LAB | Facility: HOSPITAL | Age: 43
End: 2023-07-17
Payer: COMMERCIAL

## 2023-07-17 ENCOUNTER — OFFICE VISIT (OUTPATIENT)
Dept: BARIATRICS | Facility: CLINIC | Age: 43
End: 2023-07-17
Payer: COMMERCIAL

## 2023-07-17 VITALS
DIASTOLIC BLOOD PRESSURE: 73 MMHG | SYSTOLIC BLOOD PRESSURE: 134 MMHG | WEIGHT: 315 LBS | HEART RATE: 76 BPM | BODY MASS INDEX: 59.69 KG/M2 | OXYGEN SATURATION: 94 % | TEMPERATURE: 99 F

## 2023-07-17 DIAGNOSIS — K21.9 GERD WITHOUT ESOPHAGITIS: ICD-10-CM

## 2023-07-17 DIAGNOSIS — E66.01 MORBID OBESITY: ICD-10-CM

## 2023-07-17 DIAGNOSIS — G47.33 OSA (OBSTRUCTIVE SLEEP APNEA): Chronic | ICD-10-CM

## 2023-07-17 DIAGNOSIS — Z98.84 S/P BARIATRIC SURGERY: ICD-10-CM

## 2023-07-17 DIAGNOSIS — K21.9 GERD WITHOUT ESOPHAGITIS: Chronic | ICD-10-CM

## 2023-07-17 DIAGNOSIS — Z71.3 DIETARY COUNSELING AND SURVEILLANCE: ICD-10-CM

## 2023-07-17 DIAGNOSIS — Z98.84 S/P GASTRIC BYPASS: Primary | ICD-10-CM

## 2023-07-17 DIAGNOSIS — G47.33 OSA (OBSTRUCTIVE SLEEP APNEA): Primary | ICD-10-CM

## 2023-07-17 LAB
ALBUMIN SERPL BCP-MCNC: 3.8 G/DL (ref 3.5–5.2)
ALP SERPL-CCNC: 67 U/L (ref 55–135)
ALT SERPL W/O P-5'-P-CCNC: 31 U/L (ref 10–44)
ANION GAP SERPL CALC-SCNC: 12 MMOL/L (ref 8–16)
AST SERPL-CCNC: 27 U/L (ref 10–40)
BASOPHILS # BLD AUTO: 0.04 K/UL (ref 0–0.2)
BASOPHILS NFR BLD: 0.6 % (ref 0–1.9)
BILIRUB SERPL-MCNC: 0.5 MG/DL (ref 0.1–1)
BUN SERPL-MCNC: 10 MG/DL (ref 6–20)
CALCIUM SERPL-MCNC: 9.4 MG/DL (ref 8.7–10.5)
CHLORIDE SERPL-SCNC: 109 MMOL/L (ref 95–110)
CO2 SERPL-SCNC: 23 MMOL/L (ref 23–29)
CREAT SERPL-MCNC: 0.9 MG/DL (ref 0.5–1.4)
DIFFERENTIAL METHOD: ABNORMAL
EOSINOPHIL # BLD AUTO: 0.2 K/UL (ref 0–0.5)
EOSINOPHIL NFR BLD: 2.8 % (ref 0–8)
ERYTHROCYTE [DISTWIDTH] IN BLOOD BY AUTOMATED COUNT: 12.9 % (ref 11.5–14.5)
EST. GFR  (NO RACE VARIABLE): >60 ML/MIN/1.73 M^2
GLUCOSE SERPL-MCNC: 89 MG/DL (ref 70–110)
HCT VFR BLD AUTO: 37.3 % (ref 40–54)
HGB BLD-MCNC: 12.5 G/DL (ref 14–18)
IMM GRANULOCYTES # BLD AUTO: 0.01 K/UL (ref 0–0.04)
IMM GRANULOCYTES NFR BLD AUTO: 0.1 % (ref 0–0.5)
LYMPHOCYTES # BLD AUTO: 2.1 K/UL (ref 1–4.8)
LYMPHOCYTES NFR BLD: 28.5 % (ref 18–48)
MCH RBC QN AUTO: 30.3 PG (ref 27–31)
MCHC RBC AUTO-ENTMCNC: 33.5 G/DL (ref 32–36)
MCV RBC AUTO: 91 FL (ref 82–98)
MONOCYTES # BLD AUTO: 0.6 K/UL (ref 0.3–1)
MONOCYTES NFR BLD: 8.7 % (ref 4–15)
NEUTROPHILS # BLD AUTO: 4.3 K/UL (ref 1.8–7.7)
NEUTROPHILS NFR BLD: 59.3 % (ref 38–73)
NRBC BLD-RTO: 0 /100 WBC
PLATELET # BLD AUTO: 259 K/UL (ref 150–450)
PMV BLD AUTO: 11.5 FL (ref 9.2–12.9)
POTASSIUM SERPL-SCNC: 3.8 MMOL/L (ref 3.5–5.1)
PROT SERPL-MCNC: 7.5 G/DL (ref 6–8.4)
RBC # BLD AUTO: 4.12 M/UL (ref 4.6–6.2)
SODIUM SERPL-SCNC: 144 MMOL/L (ref 136–145)
VIT B12 SERPL-MCNC: 1069 PG/ML (ref 210–950)
WBC # BLD AUTO: 7.22 K/UL (ref 3.9–12.7)

## 2023-07-17 PROCEDURE — 99999 PR PBB SHADOW E&M-EST. PATIENT-LVL III: CPT | Mod: PBBFAC,,, | Performed by: NURSE PRACTITIONER

## 2023-07-17 PROCEDURE — 99024 POSTOP FOLLOW-UP VISIT: CPT | Mod: S$GLB,,, | Performed by: NURSE PRACTITIONER

## 2023-07-17 PROCEDURE — 36415 COLL VENOUS BLD VENIPUNCTURE: CPT | Performed by: NURSE PRACTITIONER

## 2023-07-17 PROCEDURE — 1160F RVW MEDS BY RX/DR IN RCRD: CPT | Mod: CPTII,S$GLB,, | Performed by: NURSE PRACTITIONER

## 2023-07-17 PROCEDURE — 80053 COMPREHEN METABOLIC PANEL: CPT | Performed by: NURSE PRACTITIONER

## 2023-07-17 PROCEDURE — 99999 PR PBB SHADOW E&M-EST. PATIENT-LVL I: CPT | Mod: PBBFAC,,, | Performed by: DIETITIAN, REGISTERED

## 2023-07-17 PROCEDURE — 1159F PR MEDICATION LIST DOCUMENTED IN MEDICAL RECORD: ICD-10-PCS | Mod: CPTII,S$GLB,, | Performed by: NURSE PRACTITIONER

## 2023-07-17 PROCEDURE — 3075F SYST BP GE 130 - 139MM HG: CPT | Mod: CPTII,S$GLB,, | Performed by: NURSE PRACTITIONER

## 2023-07-17 PROCEDURE — 99999 PR PBB SHADOW E&M-EST. PATIENT-LVL III: ICD-10-PCS | Mod: PBBFAC,,, | Performed by: NURSE PRACTITIONER

## 2023-07-17 PROCEDURE — 84425 ASSAY OF VITAMIN B-1: CPT | Performed by: NURSE PRACTITIONER

## 2023-07-17 PROCEDURE — 99499 UNLISTED E&M SERVICE: CPT | Mod: S$GLB,,, | Performed by: DIETITIAN, REGISTERED

## 2023-07-17 PROCEDURE — 3078F DIAST BP <80 MM HG: CPT | Mod: CPTII,S$GLB,, | Performed by: NURSE PRACTITIONER

## 2023-07-17 PROCEDURE — 99999 PR PBB SHADOW E&M-EST. PATIENT-LVL I: ICD-10-PCS | Mod: PBBFAC,,, | Performed by: DIETITIAN, REGISTERED

## 2023-07-17 PROCEDURE — 3044F PR MOST RECENT HEMOGLOBIN A1C LEVEL <7.0%: ICD-10-PCS | Mod: CPTII,S$GLB,, | Performed by: NURSE PRACTITIONER

## 2023-07-17 PROCEDURE — 99499 NO LOS: ICD-10-PCS | Mod: S$GLB,,, | Performed by: DIETITIAN, REGISTERED

## 2023-07-17 PROCEDURE — 3008F BODY MASS INDEX DOCD: CPT | Mod: CPTII,S$GLB,, | Performed by: NURSE PRACTITIONER

## 2023-07-17 PROCEDURE — 85025 COMPLETE CBC W/AUTO DIFF WBC: CPT | Performed by: NURSE PRACTITIONER

## 2023-07-17 PROCEDURE — 99024 PR POST-OP FOLLOW-UP VISIT: ICD-10-PCS | Mod: S$GLB,,, | Performed by: NURSE PRACTITIONER

## 2023-07-17 PROCEDURE — 3008F PR BODY MASS INDEX (BMI) DOCUMENTED: ICD-10-PCS | Mod: CPTII,S$GLB,, | Performed by: NURSE PRACTITIONER

## 2023-07-17 PROCEDURE — 1159F MED LIST DOCD IN RCRD: CPT | Mod: CPTII,S$GLB,, | Performed by: NURSE PRACTITIONER

## 2023-07-17 PROCEDURE — 3075F PR MOST RECENT SYSTOLIC BLOOD PRESS GE 130-139MM HG: ICD-10-PCS | Mod: CPTII,S$GLB,, | Performed by: NURSE PRACTITIONER

## 2023-07-17 PROCEDURE — 3078F PR MOST RECENT DIASTOLIC BLOOD PRESSURE < 80 MM HG: ICD-10-PCS | Mod: CPTII,S$GLB,, | Performed by: NURSE PRACTITIONER

## 2023-07-17 PROCEDURE — 3044F HG A1C LEVEL LT 7.0%: CPT | Mod: CPTII,S$GLB,, | Performed by: NURSE PRACTITIONER

## 2023-07-17 PROCEDURE — 1160F PR REVIEW ALL MEDS BY PRESCRIBER/CLIN PHARMACIST DOCUMENTED: ICD-10-PCS | Mod: CPTII,S$GLB,, | Performed by: NURSE PRACTITIONER

## 2023-07-17 PROCEDURE — 82607 VITAMIN B-12: CPT | Performed by: NURSE PRACTITIONER

## 2023-07-17 NOTE — PATIENT INSTRUCTIONS
High Protein Pureed Diet    2 weeks after gastric bypass and sleeve you may be ready to add pureed food to your diet.  All food should be the consistency of baby food, or thinner.  Follow pureed diet for the next 2 weeks.    Protein - It is very important to pay attention to protein intake during this time.      Inadequate protein intake can cause:  Delayed Wound Healing  Hair Loss  Muscle Breakdown    Meal Plan - Eat 3-4 meals per day (2-4 tbsp each), with protein supplements in between to meet protein needs.  Meeting protein needs daily will help increase healing, decrease muscle loss, and increase weight loss.  Your goal is  grams of protein a day.    Protein First - Always eat the foods with the highest protein first.  Foods high in protein include milk, yogurt, cheese, egg whites, and blenderized meat, seafood, and beans.    Fluids - Keep track in your journal of how much you are drinking; you should try to drink at least 64oz of fluids every day.      Foods allowed: Portion size Protein (g)   Sugar-free clear liquids As desired 0   Skim or 1% milk ½ cup 4   Sugar free pudding, light yogurt, custard (use skim or 1% milk in preparation) 3 oz 2.5   Strained baby food meats, or home-made pureed lean meats and shrimp 1 oz 7   Beans (red, white, black, lima, pardo, fat free refried, hummus) and lentils ¼ cup 4   Low-fat/fat free cheese.(cottage cheese, mozzarella string cheese, ricotta cheese, Laughing Cow, Baby Bell, cheddar, etc) ¼ cup 7-8   Scrambled eggs or Egg Beaters 1 or ¼ cup 6   Edamame or Tofu, mashed ¼ cup 5   Unflavored protein powder (add to 1 scoop to  98% fat free soups or SF pudding) 3 Tbsp 9   *PB2: peanut powder (45 calories) 2 Tbsp 5     *PB2 powdered peanut butter: 45 calories vs. 190 calories in 2 tbsp of regular peanut butter. Purchase online at [employer], or  at various [employer], [employer], [employer], [employer] and [employer].        Bariatric Liquid/Pureed Sample Menu    3-4 small meals  plus 2-3 protein drinks per day.    8am 1 egg or ¼ cup Egg Beaters   9am 1 cup water, or decaf coffee or tea   10am Protein drink, 30g protein   11am 2 tbsp low-fat cottage cheese, and 1 tbsp pureed peaches   12pm 1 cup water, or sugar-free lemonade    1pm 2 tbsp pureed chicken, and 1 tbsp pureed carrots    2pm 1 cup water, or sugar-free lemonade   3pm Protein drink, 30g protein   5pm 1 cup water    6pm 1 cup hi-protein creamy chicken soup 14g protein (see Recipe below)   7pm 1 cup water, or sugar-free fruit punch    8pm 1 cup water     This sample menu provides approx. 80g protein and 64oz fluids.  Liquid protein supplements should contain 20-30g protein and less than 4 grams of sugar each.    Sip fluids continuously in between meals.  Drink at least ¼ cup every 15 minutes.  For fluids: ¼ cup = 2 oz = 4 tbsp       RECIPE IDEAS for Bariatric Pureed Diet:    Hi-Protein Creamy Chicken Soup: (10g protein per 1 cup serving)  Empty 1 can of 98% fat free cream of chicken soup into saucepan. Then  blend 1 scoop of unflavored protein powder with 1 can of skim milk until smooth.  Add protein milk to saucepan and heat to warm. (Note: Do NOT boil. Protein powder may clump if heated too hot).     Hi-Protein Pudding: (14g protein per ½ cup serving)  Add 2 scoops protein powder to 2 cups cold skim milk and mix well.  Stir in dry Jell-O Sugar-Free Instant Pudding mix.  Chill and Enjoy!    Tuna Mousse (12g protein per ¼ cup serving) Page 135 in book Eating Well After Weight Loss Surgery.  In a  or , combine all ingredients and pulse until smooth.  2 6-ounce cans tuna packed in water, drained  2 tbsp low-fat mayonnaise  2 tbsp fat-free sour cream  2 tbsp fat-free cream cheese, softened  ½ cup shallots, finely chopped  1 tbsp lemon juice  ¼ tsp ground pepper  ½ tsp celery seed    Chocolate Peanut Butter Mousse  (28g protein total)  6oz plain Greek yogurt  4 tbsp chocolate PB2

## 2023-07-17 NOTE — PROGRESS NOTES
BARIATRIC POST-OPERATIVE VISIT:    HPI:  Surjit Valentine is a 42 y.o. year old male presents for 2 week post op visit following robotic RNY.  he is doing well and tolerating the diet without difficulty.  he has no complaints.    Denies: nausea, vomiting, abdominal pain, changes in bowel movement pattern, fever, chills, dysphagia, chest pain, and shortness of breath.    Had one visit to ED for fluids after working outside in heat.     Review of Systems   Constitutional:  Negative for activity change and fatigue.   Respiratory:  Negative for shortness of breath.    Cardiovascular:  Negative for chest pain, palpitations and leg swelling.   Gastrointestinal:  Negative for abdominal pain, diarrhea and vomiting.   Endocrine: Negative for polydipsia, polyphagia and polyuria.   Genitourinary:  Negative for dysuria.   Musculoskeletal:  Negative for gait problem.   Skin:  Negative for rash.   Allergic/Immunologic: Negative for immunocompromised state.   Neurological:  Negative for dizziness, syncope and weakness.   Hematological:  Does not bruise/bleed easily.   Psychiatric/Behavioral:  Negative for behavioral problems.      EXERCISE & VITAMINS:  See Bariatric Assessment    MEDICATIONS/ALLERGIES:  Have been reviewed.    DIET: Liquid Bariatric Diet.  1 protein shakes daily, 40 grams protein.  60 fl oz SF clear beverage.      Protein soups   Gatorade zero     See Dietician note from today for a more detailed assessment.      Physical Exam  Vitals and nursing note reviewed.   Constitutional:       Appearance: He is well-developed. He is morbidly obese.   HENT:      Head: Normocephalic.      Nose: Nose normal.      Mouth/Throat:      Mouth: Mucous membranes are moist.   Eyes:      Extraocular Movements: Extraocular movements intact.   Cardiovascular:      Rate and Rhythm: Normal rate and regular rhythm.      Heart sounds: Normal heart sounds.   Pulmonary:      Effort: Pulmonary effort is normal.      Breath sounds: Normal  breath sounds.   Abdominal:      General: Bowel sounds are normal.      Palpations: Abdomen is soft.   Musculoskeletal:         General: Normal range of motion.      Cervical back: Normal range of motion.   Skin:     General: Skin is warm and dry.      Capillary Refill: Capillary refill takes less than 2 seconds.   Neurological:      Mental Status: He is alert and oriented to person, place, and time.   Psychiatric:         Mood and Affect: Mood normal.       ASSESSMENT:  - Morbid obesity s/p laparoscopic Pradeep-en-Y on 7/3/23.  - Co-morbidities: GERD and obstructive sleep apnea  -  Weight loss, 43 #'s and 15% EWL  -  Exercise routine   - Good Diet  - Good Vitamin regimen    PLAN:  - Ursodiol 300 mg twice daily for 6 months  - Anti-Acid medication,  daily for 3 months  - No lifting more than 10 lbs for 6 weeks  - Miralax daily for constipation  - Emphasized the importance of regular exercise and adherence to bariatric diet to achieve maximum weight loss.  - Encouraged patient to start regular exercise.  - Follow-up with dietician to advance diet.  - Continue daily vitamins and medications.  - RTC in 6 weeks or sooner if needed.  - Call the office for any issues.  - Check labs today.

## 2023-07-17 NOTE — PROGRESS NOTES
NUTRITION NOTE    Referring Physician: Tay Stephen M.D.   Reason for MNT Referral: Follow-up 2 Weeks s/p Gastric Bypass    PAST MEDICAL HISTORY:  Denies nausea, vomiting, constipation, and diarrhea.  Reports doing well.    Past Medical History:   Diagnosis Date    GERD without esophagitis     takes OTC nexium PRN    History of kidney stones     Morbid obesity with body mass index of 60.0-69.9 in adult     GAYLE (obstructive sleep apnea)     did not tolerate CPAP     CLINICAL DATA:  42 y.o.-year-old White male.    Pre-op weight: 438 lbs  Current Weight: 416 lbs  BMI: 59.69  Total Weight Loss: 43 lbs    Excess Weight Loss: 15%      LABS:  None available at time of visit    CURRENT DIET:  Bariatric Liquid Diet    Diet Recall: 40-50 grams of protein/day; 40-60 oz of fluids/day    Diet Includes: diet apple juice, diet lemonade, water with Crystal Light, water  Protein Supplements: Premier shakes, Gladiator Smoothies, and protein soups    EXERCISE:  Walking, yard/house work/renovations     VITAMINS/MINERALS:  Multivitamin with iron Melt  Calcium chews 3 x day  B-1 melts  B-12 melts     ASSESSMENT:  Doing well overall.  Adequate protein intake.  Adequate fluid intake.    BARIATRIC DIET DISCUSSION:  Instructed and provided written materials on bariatric puree diet plan   Bariatric soft diet plan to start in 2 weeks as miguelito  Pt may swallow tablets and pills at 4 week post op for bypass surgery  Reinforced post-op nutrition guidelines.    PLAN/RECOMMONDATIONS:  Advance to bariatric puree diet  Increase protein intake.  Increase fluid intake.  Continue light exercise.  Continue appropriate vitamins & minerals.    Return to clinic in 6 weeks.    SESSION TIME: 15 minutes

## 2023-07-20 LAB — VIT B1 BLD-MCNC: 83 UG/L (ref 38–122)

## 2023-08-02 ENCOUNTER — PATIENT MESSAGE (OUTPATIENT)
Dept: BARIATRICS | Facility: CLINIC | Age: 43
End: 2023-08-02
Payer: COMMERCIAL

## 2023-08-14 ENCOUNTER — PATIENT MESSAGE (OUTPATIENT)
Dept: BARIATRICS | Facility: CLINIC | Age: 43
End: 2023-08-14
Payer: COMMERCIAL

## 2023-08-29 RX ORDER — ERGOCALCIFEROL 1.25 MG/1
50000 CAPSULE ORAL
Qty: 12 CAPSULE | Refills: 0 | Status: ON HOLD | OUTPATIENT
Start: 2023-08-29 | End: 2023-11-17 | Stop reason: HOSPADM

## 2023-08-31 ENCOUNTER — OFFICE VISIT (OUTPATIENT)
Dept: BARIATRICS | Facility: CLINIC | Age: 43
End: 2023-08-31
Payer: COMMERCIAL

## 2023-08-31 ENCOUNTER — CLINICAL SUPPORT (OUTPATIENT)
Dept: BARIATRICS | Facility: CLINIC | Age: 43
End: 2023-08-31
Payer: COMMERCIAL

## 2023-08-31 VITALS
TEMPERATURE: 99 F | HEART RATE: 62 BPM | SYSTOLIC BLOOD PRESSURE: 137 MMHG | HEIGHT: 70 IN | DIASTOLIC BLOOD PRESSURE: 79 MMHG | OXYGEN SATURATION: 97 % | BODY MASS INDEX: 45.1 KG/M2 | WEIGHT: 315 LBS

## 2023-08-31 DIAGNOSIS — Z98.84 S/P BARIATRIC SURGERY: Primary | ICD-10-CM

## 2023-08-31 DIAGNOSIS — G47.33 OSA (OBSTRUCTIVE SLEEP APNEA): Primary | ICD-10-CM

## 2023-08-31 DIAGNOSIS — Z71.3 DIETARY COUNSELING AND SURVEILLANCE: ICD-10-CM

## 2023-08-31 DIAGNOSIS — Z98.84 S/P BARIATRIC SURGERY: ICD-10-CM

## 2023-08-31 DIAGNOSIS — E66.01 MORBID OBESITY WITH BMI OF 50.0-59.9, ADULT: ICD-10-CM

## 2023-08-31 PROCEDURE — 3075F PR MOST RECENT SYSTOLIC BLOOD PRESS GE 130-139MM HG: ICD-10-PCS | Mod: CPTII,S$GLB,, | Performed by: NURSE PRACTITIONER

## 2023-08-31 PROCEDURE — 99999 PR PBB SHADOW E&M-EST. PATIENT-LVL IV: CPT | Mod: PBBFAC,,, | Performed by: NURSE PRACTITIONER

## 2023-08-31 PROCEDURE — 3044F PR MOST RECENT HEMOGLOBIN A1C LEVEL <7.0%: ICD-10-PCS | Mod: CPTII,S$GLB,, | Performed by: NURSE PRACTITIONER

## 2023-08-31 PROCEDURE — 3044F HG A1C LEVEL LT 7.0%: CPT | Mod: CPTII,S$GLB,, | Performed by: NURSE PRACTITIONER

## 2023-08-31 PROCEDURE — 99999 PR PBB SHADOW E&M-EST. PATIENT-LVL IV: ICD-10-PCS | Mod: PBBFAC,,, | Performed by: NURSE PRACTITIONER

## 2023-08-31 PROCEDURE — 1160F PR REVIEW ALL MEDS BY PRESCRIBER/CLIN PHARMACIST DOCUMENTED: ICD-10-PCS | Mod: CPTII,S$GLB,, | Performed by: NURSE PRACTITIONER

## 2023-08-31 PROCEDURE — 99024 PR POST-OP FOLLOW-UP VISIT: ICD-10-PCS | Mod: S$GLB,,, | Performed by: NURSE PRACTITIONER

## 2023-08-31 PROCEDURE — 1159F PR MEDICATION LIST DOCUMENTED IN MEDICAL RECORD: ICD-10-PCS | Mod: CPTII,S$GLB,, | Performed by: NURSE PRACTITIONER

## 2023-08-31 PROCEDURE — 3078F DIAST BP <80 MM HG: CPT | Mod: CPTII,S$GLB,, | Performed by: NURSE PRACTITIONER

## 2023-08-31 PROCEDURE — 3075F SYST BP GE 130 - 139MM HG: CPT | Mod: CPTII,S$GLB,, | Performed by: NURSE PRACTITIONER

## 2023-08-31 PROCEDURE — 99499 UNLISTED E&M SERVICE: CPT | Mod: S$GLB,,, | Performed by: DIETITIAN, REGISTERED

## 2023-08-31 PROCEDURE — 99499 NO LOS: ICD-10-PCS | Mod: S$GLB,,, | Performed by: DIETITIAN, REGISTERED

## 2023-08-31 PROCEDURE — 99999 PR PBB SHADOW E&M-EST. PATIENT-LVL I: ICD-10-PCS | Mod: PBBFAC,,, | Performed by: DIETITIAN, REGISTERED

## 2023-08-31 PROCEDURE — 3008F BODY MASS INDEX DOCD: CPT | Mod: CPTII,S$GLB,, | Performed by: NURSE PRACTITIONER

## 2023-08-31 PROCEDURE — 99999 PR PBB SHADOW E&M-EST. PATIENT-LVL I: CPT | Mod: PBBFAC,,, | Performed by: DIETITIAN, REGISTERED

## 2023-08-31 PROCEDURE — 3078F PR MOST RECENT DIASTOLIC BLOOD PRESSURE < 80 MM HG: ICD-10-PCS | Mod: CPTII,S$GLB,, | Performed by: NURSE PRACTITIONER

## 2023-08-31 PROCEDURE — 1160F RVW MEDS BY RX/DR IN RCRD: CPT | Mod: CPTII,S$GLB,, | Performed by: NURSE PRACTITIONER

## 2023-08-31 PROCEDURE — 1159F MED LIST DOCD IN RCRD: CPT | Mod: CPTII,S$GLB,, | Performed by: NURSE PRACTITIONER

## 2023-08-31 PROCEDURE — 3008F PR BODY MASS INDEX (BMI) DOCUMENTED: ICD-10-PCS | Mod: CPTII,S$GLB,, | Performed by: NURSE PRACTITIONER

## 2023-08-31 PROCEDURE — 99024 POSTOP FOLLOW-UP VISIT: CPT | Mod: S$GLB,,, | Performed by: NURSE PRACTITIONER

## 2023-08-31 NOTE — PROGRESS NOTES
BARIATRIC POST-OPERATIVE VISIT:    HPI:  Surjit Valentine is a 42 y.o. year old male presents for 8 week post op visit following robotic RNY.  he is doing well and tolerating the diet without difficulty.  he has no complaints.    Denies: nausea, vomiting, abdominal pain, changes in bowel movement pattern, fever, chills, dysphagia, chest pain, and shortness of breath.    Pt states he has intermittent vomiting with eating to fast michelle with ground meat       Review of Systems   Constitutional:  Negative for activity change and fatigue.   Respiratory:  Negative for shortness of breath.    Cardiovascular:  Negative for chest pain, palpitations and leg swelling.   Gastrointestinal:  Negative for abdominal pain, diarrhea and vomiting.   Endocrine: Negative for polydipsia, polyphagia and polyuria.   Genitourinary:  Negative for dysuria.   Musculoskeletal:  Negative for gait problem.   Skin:  Negative for rash.   Allergic/Immunologic: Negative for immunocompromised state.   Neurological:  Negative for dizziness, syncope and weakness.   Hematological:  Does not bruise/bleed easily.   Psychiatric/Behavioral:  Negative for behavioral problems.        EXERCISE & VITAMINS:  See Bariatric Assessment    MEDICATIONS/ALLERGIES:  Have been reviewed.    DIET: Soft/Regular Bariatric Diet.    0 protein shakes daily,  80 grams protein. 64+  fl oz SF clear beverage.      Restarting on protein shakes  Yogurt in am   Guac   Ground meat, fish, chicken, red beans, eggs, sausage links, cheese      See Dietician note from today for a more detailed assessment.      Physical Exam  Vitals and nursing note reviewed.   Constitutional:       Appearance: He is well-developed. He is morbidly obese.   HENT:      Head: Normocephalic.      Nose: Nose normal.      Mouth/Throat:      Mouth: Mucous membranes are moist.   Eyes:      Extraocular Movements: Extraocular movements intact.   Cardiovascular:      Rate and Rhythm: Normal rate and regular rhythm.       Heart sounds: Normal heart sounds.   Pulmonary:      Effort: Pulmonary effort is normal.      Breath sounds: Normal breath sounds.   Abdominal:      General: Bowel sounds are normal.      Palpations: Abdomen is soft.   Musculoskeletal:         General: Normal range of motion.      Cervical back: Normal range of motion.   Skin:     General: Skin is warm and dry.      Capillary Refill: Capillary refill takes less than 2 seconds.   Neurological:      Mental Status: He is alert and oriented to person, place, and time.   Psychiatric:         Mood and Affect: Mood normal.         ASSESSMENT:  - Morbid obesity s/p laparoscopic Pradeep-en-Y on 7/3/23.  - Co-morbidities: GERD and obstructive sleep apnea  -  Weight loss,  70#'s and 24% EWL  -  Exercise routine walking x 5-6 day  - Good Diet  - Good Vitamin regimen    PLAN:  - Ursodiol 300 mg twice daily for 6 months  - Anti-Acid medication,  daily for 3 months, slow taper   - Miralax daily for constipation prn   - Emphasized the importance of regular exercise and adherence to bariatric diet to achieve maximum weight loss.  - Encouraged patient to start regular exercise.  - Follow-up with dietician to advance diet.  - Continue daily vitamins and medications.  - RTC in 4 months or sooner if needed.  - Call the office for any issues.  - Check labs today.  - Psychology referral placed per pt request

## 2023-08-31 NOTE — PROGRESS NOTES
NUTRITION NOTE    Referring Physician: Tay Stephen M.D.  Reason for MNT Referral: Follow-up 8 weeks s/p Gastric Bypass    Denies nausea, vomiting, constipation, and diarrhea.  Reports doing well.    Past Medical History:   Diagnosis Date    GERD without esophagitis     takes OTC nexium PRN    History of kidney stones     Morbid obesity with body mass index of 60.0-69.9 in adult     GAYLE (obstructive sleep apnea)     did not tolerate CPAP       CLINICAL DATA:  42 y.o. male.    Current Weight: 389 lbs  BMI: 55.9  Total Weight Loss: 70 lbs  Excess Weight Loss: 24%    LABS:  Reviewed.    CURRENT DIET:  Bariatric Diet.  Diet Recall:  grams of protein/day; 48 oz of fluids/day    - greek yogurt OR 1 egg OR 2 segovia/1 sausage link  - guac individual cup  - tuna pouch 3/4  - prosciutto wrapped cheese stick  - prot shake  - 1 smoked chicken wing OR 1-2oz baked fish    Diet includes:  Meal Pattern: 5 meal(s) + 1 protein supplement(s)  Adequate protein supplement intake.  Adequate dairy intake.    EXERCISE:  Adequate light exercise.    Restrictions to Exercise: None.    VITAMINS / MINERALS:  As directed    ASSESSMENT:  Doing well overall.  Weight loss on track.  Adequate calorie intake.  Adequate protein intake.  Adequate fluid intake.  Following diet appropriately.  Exercising.  Adequate vitamins & minerals.    BARIATRIC DIET DISCUSSION:  Instructed and provided written materials on bariatric diet plan.  Reinforced post-op nutrition guidelines.    PLAN / RECOMMENDATIONS:  May begin to incorporate raw vegetables, lettuce, unsalted nuts, and protein bars as tolerated.  Continue excellent diet plan.  Maintain protein intake.  Maintain fluid intake.  Continue exercise.  Continue appropriate vitamins & minerals.    Return to clinic in 4 months.    SESSION TIME: 15 minutes

## 2023-08-31 NOTE — PATIENT INSTRUCTIONS
Meal Ideas for Regular Bariatric Diet  *Recipes and products available at [email or web address]      Breakfast: (15-20g protein)    - Egg white omelet: 2 egg whites or ½ cup Egg Beaters. (Optional proteins: cheese, shrimp, black beans, chicken, sliced turkey) (Optional veggies: tomatoes, salsa, spinach, mushrooms, onions, green peppers, or small slice avocado)     - Egg and sausage: 1 egg or ¼ cup Egg Beaters (any variety), with 1 le or 2 links of Turkey sausage or Veggie breakfast sausage (Deadeye Marksmanship or CureSquare)    - Crust-less breakfast quiche: To make a glass pie dish, mix 4oz part skim Ricotta, 1 cup skim milk, and 2 eggs as your base. Add protein: shredded cheese, sliced lean ham or turkey, turkey segovia/sausage. Add veggies: tomato, onion, green onion, mushroom, green pepper, spinach, etc.    - Yogurt parfait: Mix 1 - 6oz container Dannon Light N Fit vanilla yogurt, with ¼ cup crushed unsalted nuts    - Cottage cheese and fruit: ½ cup part-skim cottage cheese or ricotta cheese topped with fresh fruit or sugar free preserves     - Chikis Conti's Vanilla Egg custard* (add 2 Tbsp instant coffee granules to make Cappuccino Custard*)    - Hi-Protein café latte (skim milk, decaf coffee, 1 scoop protein powder). Optional to add Sugar free syrup or extract flavoring.    - Breakfast Lox: spread fat free cream cheese on slices of smoked salmon. Serve over scrambled or egg over easy (sauteed with nonstick cookspray) OR on a cucumber slice    - Eggwhich: Scramble or cook 1 large egg over easy using nonstick cookspray. Place between 2 slices of [place] segovia and low fat cheese.     Lunch: (20-30g protein)    - ½ cup Black bean soup (Homemade or Progresso), with ¼ cup shredded low-fat cheese. Top with chopped tomato or fresh salsa.     - Lean deli turkey breast and low-fat sliced cheese, mustard or light craig to moisten, rolled up together, or wrapped in a Otoniel lettuce leaf    - Chicken salad made from dinner  leftovers, moisten with low-fat salad dressing or light craig. Also try leftover salmon, shrimp, tuna or boiled eggs. Serve ½ cup over dark green salad    - Fat-free canned refried beans, topped with ¼ cup shredded low-fat cheese. Top with chopped tomato or fresh salsa.     - Greek salad: Top mixed greens with 1-2oz grilled chicken, tomatoes, red onions, 2-3 kalamata olives, and sprinkle lightly with feta cheese. Spritz with Balsamic vinegar to taste.     - Crust-less lunch quiche: To make a glass pie dish, mix 4oz part skim Ricotta, 1 cup skim milk, and 2 eggs as your base. Add protein: shredded cheese, sliced lean ham or turkey, shrimp, chicken. Add veggies: tomato, onion, green onion, mushroom, green pepper, spinach, artichoke, broccoli, etc.    - Pizza bake: spread a  leora ismael mushroom with tomato sauce, low-fat shredded mozzarella and turkey pepperoni or [place] segovia. Add any veggies. Roast for 10-15 minutes, until cheese melted.     - Cucumber crab bites: Spread ¼ cup crab dip (lump crabmeat + light cream cheese and green onions) over sliced cucumber.     - Chicken with light spinach and artichoke dip*: Puree in : 6oz cooked and drained spinach, 2 cloves garlic, 1 can cannelloni beans, ½ cup chopped green onions, 1 can drained artichoke hearts (not marinated in oil), lemon juice and basil. Mix in 2oz chopped up chicken.    Supper: (20-30g protein)    - Serve grilled fish over dark green salad tossed with low-fat dressing, served with grilled asparagus melgar     - Rotisserie chicken salad: served with sliced strawberries, walnuts, fat-free feta cheese crumbles and 1 tbsp Burnetts Own Light Raspberry Wilbur Vinaigrette    - Shrimp cocktail: Dip cold boiled shrimp in homemade low-sugar cocktail sauce (1/2 cup Alfred One Carb ketchup, 2 tbsp horseradish, 1/4 tsp hot sauce, 1 tsp Worcestershire sauce, 1 tbsp freshly-squeezed lemon juice). Serve with dark green salad, walnuts, and crumbled blue  cheese drizzled with olive oil and Balsamic vinegar    - Tuna Melt: Spread tuna salad onto 2 thick slices of tomato. Top with low-fat cheese and broil until cheese is melted. May also be made with chicken salad of shrimp salad. Cobb with different types of cheeses.    - Chicken or beef fajitas (no tortilla, rice, beans, chips). Top meat and veggies w/ fresh salsa, fat free sour cream.     - Homemade low-fat Chili using extra lean ground beef or ground turkey. Top with shredded cheese and salsa as desired. May add dollop fat-free sour cream if desired    - Chicken parmesan: Top chicken breast w/ low sugar marinara sauce, mozzarella cheese and bake until chicken reaches 165*.  Serve w/ spaghetti SQUASH or Maldivian cut green beans    - Dinner Omelet with shrimp or chicken and onion, green peppers and chives.    - No noodle lasagna: Use sliced zucchini or eggplant in place of noodles.  Layer with part skim ricotta cheese and low sugar meat sauce (use very lean ground beef or ground turkey).    - Mexican chicken bake: Bake chunks of chicken breast or thigh with taco seasoning, Pace brand enchilada sauce, green onions and low-fat cheese. Serve with ¼ cup black beans or fat free refried beans topped with chopped tomatoes or salsa.    - Ashtyn frozen meatballs, simmered in Classico Marinara sauce. Different flavors of salsa or spaghetti sauce create different dishes! Sprinkle with parmesan cheese. Serve with grilled or steamed veggies, or a dark green salad.    - Simmer boneless skinless chicken thigh chunks in Classico Marinara sauce or roasted salsa until tender with chopped onion, bell pepper, garlic, mushrooms, spinach, etc.     - Hamburger or veggie burger, without the bun, dressed the way you like. Served with grilled or steamed veggies.    - Eggplant parmesan: Bake slices of eggplant at 350 degrees for 15 minutes. Layer tomato sauce, sliced eggplant and low-fat mozzarella cheese in a baking dish and cover with  foil. Bake 30-40 more minutes or until bubbly. Uncover and bake at 400 degrees for about 15 more minutes, or until top is slightly crisp.    - Fish tacos: grilled/baked white fish, wrapped in Otoniel lettuce leaf, topped with salsa, shredded low-fat cheese, and light coleslaw.    - Chicken norm: Sprinkle chicken w/ 1 tsp of hidden [place] ranch dip mix. Then grill chicken and top with black beans, salsa and 1 tsp fat free sour cream.     - Cauliflower pizza crust: Use cauliflower as crust (see recipe on pinterest, no flour!). Top w/ low fat cheese, turkey pepperoni and veggies and bake again    - chicken or turkey crust pizza: use ground chicken or turkey instead of cauliflower, spread in [place] and bake at 350 for about 20-30 minutes(may want to add garlic, black pepper, oregano and other herbs to ground meat mixture).  Remove and top w/ low fat cheese, turkey pepperoni and veggies and bake again for another 10 minutes or until cheese is browned.     Snacks: (100-200 calories; >5g protein)    - 1 low-fat cheese stick with 8 cherry tomatoes or 1 serving fresh fruit  - 4 thin slices fat-free turkey breast and 1 slice low-fat cheese  - 4 thin slices fat-free honey ham with wedge of melon  - 6-8 edamame pods (equivalent to about 1/4 cup edamame without pods).   - 1/4 cup unsalted nuts with ½ cup fruit  - 6-oz container Dannon Light n Fit vanilla yogurt, topped with 1oz unsalted nuts         - apple, celery or baby carrots spread with 2 Tbsp PB2  - apple slices with 1 oz slice low-fat cheese  - Apple slices dipped in 2 Tbsp of PB2  - celery, cucumber, bell pepper or baby carrots dipped in ¼ cup hummus bean spread or light spinach and artichoke dip (*recipe in lunch section)  - celery, cucumber, baby carrots dipped in high protein greek yogurt (Mix 16 oz plain greek yogurt + 1 packet of hidden [place] ranch dip mix)  - Scooby Links Beef Steak - 14g protein! (similar to beef jerky)  - 2 wedges Laughing Cow - Light Herb  & Garlic Cheese with sliced cucumber or green bell pepper  - 1/2 cup low-fat cottage cheese with ¼ cup fruit or ¼ cup salsa  - RTD Protein drinks: Atkins, Low Carb Slim Fast, EAS light, Muscle Milk Light, etc.  - Homemade Protein drinks: GNC Soy95, Isopure, Nectar, UNJURY, Whey Gourmet, etc. Mix 1 scoop powder with 8oz skim/1% milk or light soymilk.  - Protein bars: Atkins, EAS, Pure Protein, Think Thin, Detour, etc. Must have 0-4 grams sugar - Read the label.    Takeout Options: No more than twice/week  Deli - Salads (no pasta or rice), meats, cheeses. Roasted chicken. Lox (salmon)    Mexican - Platters which don't include tortillas, chips, or rice. Go easy on the beans. Example: Fajitas without the tortillas. Ask the  not to bring chips to the table if they are too tempting.    Greek - Meat or fish and vegetable, but no bread or rice. Including hummus, baba ganoush, etc, is OK. Most sit-down Greek restaurants can provide you with cucumber slices for dipping instead of soha bread.    Fast Food (Avoid as much as possible) - Salads (no croutons and limit salad dressing to 2 tbsp), grilled chicken sandwich without the bun and ask for no craig. Olyas low fat chili or Taco Bell pintos and cheese.    BBQ - The meats are fine if you ask for sauces on the side, but most of the traditional side dishes are loaded with carbs. Kameron slaw, baked beans and BBQ sauce are typically made with sugar.    Chinese - Nothing deep-fried, no rice or noodles. Many Chinese sauces have starch and sugar in them, so you'll have to use your judgement. If you find that these sauces trigger cravings, or cause Dumping, you can ask for the sauce to be made without sugar or just use soy sauce.    How to taper off of Omeprazole:  - Take 1 Tablet every other day for 2 weeks.  If you do not experience any heartburn, indigestion, nausea symptoms, the following week, take 1 tablet every 3 days.  Again if you remain without the above symptoms, you  may completely discontinue the medication.  If symptoms return at any point, please restart the medication.

## 2023-09-06 ENCOUNTER — PATIENT MESSAGE (OUTPATIENT)
Dept: BARIATRICS | Facility: CLINIC | Age: 43
End: 2023-09-06
Payer: COMMERCIAL

## 2023-09-08 ENCOUNTER — OFFICE VISIT (OUTPATIENT)
Dept: SPORTS MEDICINE | Facility: CLINIC | Age: 43
End: 2023-09-08
Payer: COMMERCIAL

## 2023-09-08 ENCOUNTER — HOSPITAL ENCOUNTER (OUTPATIENT)
Dept: RADIOLOGY | Facility: HOSPITAL | Age: 43
Discharge: HOME | End: 2023-09-08
Attending: PHYSICIAN ASSISTANT
Payer: COMMERCIAL

## 2023-09-08 VITALS
HEIGHT: 69 IN | HEART RATE: 59 BPM | SYSTOLIC BLOOD PRESSURE: 118 MMHG | BODY MASS INDEX: 46.65 KG/M2 | DIASTOLIC BLOOD PRESSURE: 82 MMHG | WEIGHT: 315 LBS

## 2023-09-08 DIAGNOSIS — S83.231D COMPLEX TEAR OF MEDIAL MENISCUS OF RIGHT KNEE AS CURRENT INJURY, SUBSEQUENT ENCOUNTER: ICD-10-CM

## 2023-09-08 DIAGNOSIS — M25.511 RIGHT SHOULDER PAIN, UNSPECIFIED CHRONICITY: ICD-10-CM

## 2023-09-08 DIAGNOSIS — M25.561 ACUTE PAIN OF RIGHT KNEE: Primary | ICD-10-CM

## 2023-09-08 PROCEDURE — 3074F SYST BP LT 130 MM HG: CPT | Mod: CPTII,S$GLB,, | Performed by: PHYSICIAN ASSISTANT

## 2023-09-08 PROCEDURE — 3008F PR BODY MASS INDEX (BMI) DOCUMENTED: ICD-10-PCS | Mod: CPTII,S$GLB,, | Performed by: PHYSICIAN ASSISTANT

## 2023-09-08 PROCEDURE — 99214 PR OFFICE/OUTPT VISIT, EST, LEVL IV, 30-39 MIN: ICD-10-PCS | Mod: 25,S$GLB,, | Performed by: PHYSICIAN ASSISTANT

## 2023-09-08 PROCEDURE — 3074F PR MOST RECENT SYSTOLIC BLOOD PRESSURE < 130 MM HG: ICD-10-PCS | Mod: CPTII,S$GLB,, | Performed by: PHYSICIAN ASSISTANT

## 2023-09-08 PROCEDURE — 3008F BODY MASS INDEX DOCD: CPT | Mod: CPTII,S$GLB,, | Performed by: PHYSICIAN ASSISTANT

## 2023-09-08 PROCEDURE — 1159F MED LIST DOCD IN RCRD: CPT | Mod: CPTII,S$GLB,, | Performed by: PHYSICIAN ASSISTANT

## 2023-09-08 PROCEDURE — 1160F RVW MEDS BY RX/DR IN RCRD: CPT | Mod: CPTII,S$GLB,, | Performed by: PHYSICIAN ASSISTANT

## 2023-09-08 PROCEDURE — 99999 PR PBB SHADOW E&M-EST. PATIENT-LVL IV: ICD-10-PCS | Mod: PBBFAC,,, | Performed by: PHYSICIAN ASSISTANT

## 2023-09-08 PROCEDURE — 3079F DIAST BP 80-89 MM HG: CPT | Mod: CPTII,S$GLB,, | Performed by: PHYSICIAN ASSISTANT

## 2023-09-08 PROCEDURE — 99214 OFFICE O/P EST MOD 30 MIN: CPT | Mod: 25,S$GLB,, | Performed by: PHYSICIAN ASSISTANT

## 2023-09-08 PROCEDURE — 73564 X-RAY EXAM KNEE 4 OR MORE: CPT | Mod: 26,,, | Performed by: RADIOLOGY

## 2023-09-08 PROCEDURE — 3044F HG A1C LEVEL LT 7.0%: CPT | Mod: CPTII,S$GLB,, | Performed by: PHYSICIAN ASSISTANT

## 2023-09-08 PROCEDURE — 1160F PR REVIEW ALL MEDS BY PRESCRIBER/CLIN PHARMACIST DOCUMENTED: ICD-10-PCS | Mod: CPTII,S$GLB,, | Performed by: PHYSICIAN ASSISTANT

## 2023-09-08 PROCEDURE — 99999 PR PBB SHADOW E&M-EST. PATIENT-LVL IV: CPT | Mod: PBBFAC,,, | Performed by: PHYSICIAN ASSISTANT

## 2023-09-08 PROCEDURE — 20610 LARGE JOINT ASPIRATION/INJECTION: R KNEE: ICD-10-PCS | Mod: RT,S$GLB,, | Performed by: PHYSICIAN ASSISTANT

## 2023-09-08 PROCEDURE — 1159F PR MEDICATION LIST DOCUMENTED IN MEDICAL RECORD: ICD-10-PCS | Mod: CPTII,S$GLB,, | Performed by: PHYSICIAN ASSISTANT

## 2023-09-08 PROCEDURE — 73564 X-RAY EXAM KNEE 4 OR MORE: CPT | Mod: TC,50

## 2023-09-08 PROCEDURE — 3044F PR MOST RECENT HEMOGLOBIN A1C LEVEL <7.0%: ICD-10-PCS | Mod: CPTII,S$GLB,, | Performed by: PHYSICIAN ASSISTANT

## 2023-09-08 PROCEDURE — 3079F PR MOST RECENT DIASTOLIC BLOOD PRESSURE 80-89 MM HG: ICD-10-PCS | Mod: CPTII,S$GLB,, | Performed by: PHYSICIAN ASSISTANT

## 2023-09-08 PROCEDURE — 73564 XR KNEE ORTHO BILAT WITH FLEXION: ICD-10-PCS | Mod: 26,,, | Performed by: RADIOLOGY

## 2023-09-08 PROCEDURE — 20610 DRAIN/INJ JOINT/BURSA W/O US: CPT | Mod: RT,S$GLB,, | Performed by: PHYSICIAN ASSISTANT

## 2023-09-08 RX ORDER — MELOXICAM 15 MG/1
1 TABLET ORAL DAILY
COMMUNITY
End: 2023-11-16

## 2023-09-08 RX ORDER — TRIAMCINOLONE ACETONIDE 40 MG/ML
40 INJECTION, SUSPENSION INTRA-ARTICULAR; INTRAMUSCULAR
Status: DISCONTINUED | OUTPATIENT
Start: 2023-09-08 | End: 2023-09-08 | Stop reason: HOSPADM

## 2023-09-08 RX ORDER — OSELTAMIVIR PHOSPHATE 75 MG/1
CAPSULE ORAL
Status: ON HOLD | COMMUNITY
End: 2023-11-17

## 2023-09-08 RX ORDER — AZITHROMYCIN 250 MG/1
TABLET, FILM COATED ORAL
Status: ON HOLD | COMMUNITY
End: 2023-11-17

## 2023-09-08 RX ORDER — DICLOFENAC SODIUM 10 MG/G
2 GEL TOPICAL 4 TIMES DAILY PRN
Qty: 350 G | Refills: 2 | Status: ON HOLD | OUTPATIENT
Start: 2023-09-08 | End: 2023-11-17

## 2023-09-08 RX ORDER — TRAMADOL HYDROCHLORIDE 50 MG/1
TABLET ORAL
COMMUNITY

## 2023-09-08 RX ADMIN — TRIAMCINOLONE ACETONIDE 40 MG: 40 INJECTION, SUSPENSION INTRA-ARTICULAR; INTRAMUSCULAR at 11:09

## 2023-09-08 NOTE — PROGRESS NOTES
CC: Right knee pain    Patient is a 42-year-old male who presents today for evaluation of right knee pain.  He is a  and teacher at [employer].  Previously seen by me for the same issue last year.  He had plans for right knee arthroscopy to address a meniscus tear confirmed on MRI, but his symptoms improved and he was able to avoid surgery.  Patient reports that last Sunday he awoke in the middle of the night to severe pain and stiffness of the right knee.  He does not recall any precipitating injury and states that he awoke to his knee feeling locked up and unable to move it due to pain.  Following days, this gradually improved and now his range of motion is approximately 80-90% of what it was before.  He does note some associated swelling.  He localizes the pain to the lateral aspect of the kneecap that is worse with knee flexion and with fully straightening his knee.  He recently underwent bariatric surgery and is unable to take oral NSAIDs.  He has been treating at home with ice compresses, topical analgesics, and Tylenol with gradual relief.    - mechanical symptoms, - instability    Is affecting ADLs.  Pain is 2/10 at it's worst.    REVIEW OF SYSTEMS:   Constitution: Negative. Negative for chills, fever and night sweats.   HENT: Negative for congestion and headaches.    Eyes: Negative for blurred vision, left vision loss and right vision loss.   Cardiovascular: Negative for chest pain and syncope.   Respiratory: Negative for cough and shortness of breath.    Endocrine: Negative for polydipsia, polyphagia and polyuria.   Hematologic/Lymphatic: Negative for bleeding problem. Does not bruise/bleed easily.   Skin: Negative for dry skin, itching and rash.   Musculoskeletal: Negative for falls. Positive for right knee pain and  muscle weakness.   Gastrointestinal: Negative for abdominal pain and bowel incontinence.   Genitourinary: Negative for bladder incontinence and nocturia.    Neurological: Negative for disturbances in coordination, loss of balance and seizures.   Psychiatric/Behavioral: Negative for depression. The patient does not have insomnia.    Allergic/Immunologic: Negative for hives and persistent infections.     PAST MEDICAL HISTORY:   Past Medical History:   Diagnosis Date    GERD without esophagitis     takes OTC nexium PRN    History of kidney stones     Morbid obesity with body mass index of 60.0-69.9 in adult     GAYLE (obstructive sleep apnea)     did not tolerate CPAP       PAST SURGICAL HISTORY:   Past Surgical History:   Procedure Laterality Date    APPENDECTOMY  3/2008    CYSTOSCOPY  10/11/2019    Procedure: CYSTOSCOPY;  Surgeon: Gamal Sosa MD;  Location: [facility] OR [address] [place] [address];  Service: Urology;;    CYSTOSCOPY W/ URETERAL STENT PLACEMENT Left 10/6/2019    Procedure: CYSTOSCOPY, WITH URETERAL STENT INSERTION;  Surgeon: Gamal Sosa MD;  Location: [facility] OR [address] [place] [address];  Service: Urology;  Laterality: Left;    ESOPHAGOGASTRODUODENOSCOPY N/A 7/3/2023    Procedure: EGD (ESOPHAGOGASTRODUODENOSCOPY);  Surgeon: Tay Stephen MD;  Location: [facility] OR 2ND FLR;  Service: General;  Laterality: N/A;    LASER LITHOTRIPSY Left 10/11/2019    Procedure: LITHOTRIPSY, USING LASER;  Surgeon: Gamal Sosa MD;  Location: [facility] OR [address] [place] [address];  Service: Urology;  Laterality: Left;    RETROGRADE PYELOGRAPHY Left 10/6/2019    Procedure: PYELOGRAM, RETROGRADE;  Surgeon: Gamal Sosa MD;  Location: [facility] OR [address] [place] [address];  Service: Urology;  Laterality: Left;    RETROGRADE PYELOGRAPHY Left 10/11/2019    Procedure: PYELOGRAM, RETROGRADE;  Surgeon: Gamal Sosa MD;  Location: [facility] OR [address] [place] [address];  Service: Urology;  Laterality: Left;    URETEROSCOPIC REMOVAL OF URETERIC CALCULUS  10/11/2019    Procedure: REMOVAL, CALCULUS, URETER, URETEROSCOPIC;  Surgeon: Gamal Sosa MD;  Location: [facility] OR [address] [place] [address];  Service: Urology;;    URETEROSCOPY Left 10/6/2019    Procedure: URETEROSCOPY;   "Surgeon: Gamal Sosa MD;  Location: [facility] OR [address] [place] [address];  Service: Urology;  Laterality: Left;    URETEROSCOPY Left 10/11/2019    Procedure: URETEROSCOPY;  Surgeon: Gamal Sosa MD;  [facility];  Service: Urology;  Laterality: Left;  1hr    XI ROBOTIC GASTROENTEROSTOMY, TODD-EN-Y N/A 7/3/2023    Procedure: XI ROBOTIC GASTROENTEROSTOMY, TODD-EN-Y (PT is SELF PAY, DO NOT submit to insurance;  Surgeon: Tay Stephen MD;  Location: [facility] OR 2[facility];  Service: General;  Laterality: N/A;       FAMILY HISTORY:   Family History   Problem Relation Age of Onset    Heart disease Father     Stroke Father     Hypertension Father     Diabetes Maternal Grandmother     Heart disease Maternal Grandfather     Stroke Paternal Grandmother     Heart disease Paternal Grandfather     Diabetes Mother     Obesity Mother     Obesity Sister     Diabetes Brother     Obesity Brother        SOCIAL HISTORY:   Social History     Socioeconomic History    Marital status: Single   Tobacco Use    Smoking status: Never    Smokeless tobacco: Former     Types: Chew    Tobacco comments:     2 cans/ week   Substance and Sexual Activity    Alcohol use: Not Currently     Alcohol/week: 3.0 standard drinks of alcohol     Types: 3 Cans of beer per week     Comment: "socially but not usually"    Drug use: No   Social History Narrative    Works full time: [employer] : [employer]  .  1 son, 14 years old.      Social Determinants of Health     Financial Resource Strain: Low Risk  (7/4/2023)    Overall Financial Resource Strain (CARDIA)     Difficulty of Paying Living Expenses: Not hard at all   Food Insecurity: No Food Insecurity (7/4/2023)    Hunger Vital Sign     Worried About Running Out of Food in the Last Year: Never true     Ran Out of Food in the Last Year: Never true   Transportation Needs: No Transportation Needs (7/4/2023)    PRAPARE - Transportation     Lack of Transportation " "(Medical): No     Lack of Transportation (Non-Medical): No   Physical Activity: Inactive (7/4/2023)    Exercise Vital Sign     Days of Exercise per Week: 0 days     Minutes of Exercise per Session: 0 min   Stress: No Stress Concern Present (7/4/2023)    [employer] [place] [employer] - Occupational Stress Questionnaire     Feeling of Stress : Not at all   Social Connections: Unknown (7/4/2023)    Social Connection and Isolation Panel [NHANES]     Frequency of Communication with Friends and Family: More than three times a week     Frequency of Social Gatherings with Friends and Family: Once a week     Attends Nondenominational Services: More than 4 times per year     Active Member of Clubs or Organizations: No     Attends Club or Organization Meetings: Never   Housing Stability: Low Risk  (7/4/2023)    Housing Stability Vital Sign     Unable to Pay for Housing in the Last Year: No     Number of Places Lived in the Last Year: 1     Unstable Housing in the Last Year: No       MEDICATIONS:     Current Outpatient Medications:     ergocalciferol (ERGOCALCIFEROL) 50,000 unit Cap, Take 1 capsule (50,000 Units total) by mouth every 7 days. for 12 doses, Disp: 12 capsule, Rfl: 0    meloxicam (MOBIC) 15 MG tablet, Take 1 tablet by mouth once daily., Disp: , Rfl:     omeprazole (PRILOSEC) 40 MG capsule, Take 1 capsule (40 mg total) by mouth every morning. Open capsule and take with apple sauce, Disp: 30 capsule, Rfl: 2    oseltamivir (TAMIFLU) 75 MG capsule, , Disp: , Rfl:     traMADoL (ULTRAM) 50 mg tablet, Take 1-2 tablet(s) EVERY 8 HOURS by oral route as needed., Disp: , Rfl:     ursodioL (ALESSANDRO FORTE) 500 MG tablet, Crush one tablet by mouth daily for gall bladder., Disp: 90 tablet, Rfl: 1    azithromycin (Z-EMA) 250 MG tablet, , Disp: , Rfl:     ALLERGIES:   Review of patient's allergies indicates:  No Known Allergies    VITAL SIGNS:   /82   Pulse (!) 59   Ht 5' 8.5" (1.74 m)   Wt (!) 174 kg (383 lb 9.6 oz)   " BMI 57.48 kg/m²      PHYSICAL EXAMINATION:  General:  The patient is alert and oriented x 3.  Mood is pleasant.  Observation of ears, eyes and nose reveal no gross abnormalities.  No labored breathing observed.    RIGHT KNEE EXAMINATION     OBSERVATION / INSPECTION   Gait:   Nonantalgic   Alignment:  Neutral   Scars:   None   Muscle atrophy: Mild  Effusion:  1+   Warmth:  None   Discoloration:   none     TENDERNESS / CREPITUS (T / C):          T / C      T / C   Patella   - / -   Lateral joint line   - / -   Peripatellar medial  -  Medial joint line    - / -   Peripatellar lateral +  Medial plica   - / -   Patellar tendon -   Popliteal fossa   - / -   Quad tendon   -   Gastrocnemius   -   Prepatellar Bursa - / -   Quadricep   -   Tibial tubercle  -  Thigh/hamstring  -   Pes anserine/HS -  Fibula    -   ITB   - / -  Tibia     -   Tib/fib joint  - / -  LCL    -     MFC   - / -   MCL: Proximal  -    LFC   - / -    Distal   -          ROM: (* = pain)  PASSIVE   ACTIVE    Left :   5 / 0 / 130   5 / 0 / 130     Right :    5 / 0* / 120*   5 / 0* / 120*    Patellofemoral examination:  See above noted areas of tenderness.   Patella position    Subluxation / dislocation: Centered           Sup. / Inf;   Normal   Crepitus (PF):    A+  Patellar Mobility:       Medial-lateral:   Normal    Superior-inferior:  Normal    Inferior tilt   Normal    Patellar tendon:  Normal   Lateral tilt:    Normal   J-sign:     None   Patellofemoral grind:   + pain       MENISCAL SIGNS:     Pain on terminal extension:  +  Pain on terminal flexion:  +  Carlos maneuver:  - (for pain)  Squat     deferred    LIGAMENT EXAMINATION:  ACL / Lachman:  normal (-1 to 2mm)    PCL-Post.  drawer: normal 0 to 2mm  MCL- Valgus:  normal 0 to 2mm  LCL- Varus:  normal 0 to 2mm  Pivot shift: normal (Equal)   Dial Test: difference c/w other side   At 30° flexion: normal (< 5°)    At 90° flexion: normal (< 5°)   Reverse Pivot Shift:   normal (Equal)      STRENGTH: (* = with pain) PAINFUL SIDE   Quadricep   5/5   Hamstrin/5    EXTREMITY NEURO-VASCULAR EXAMINATION:   Sensation:  Grossly intact to light touch all dermatomal regions.   Motor Function:  Fully intact motor function at hip, knee, foot and ankle    DTRs;  quadriceps and  achilles 2+.  No clonus and downgoing Babinski.    Vascular status:  DP and PT pulses 2+, brisk capillary refill, symmetric.     OTHER FINDINGS:  N/A    X-rays bilateral knees (2023):    X-rays including standing, weight bearing AP and flexion bilateral knees, lateral and merchant views ordered and images reviewed by me show:   No acute fracture or dislocation.  Mild tibiofemoral and patellofemoral joint space narrowing seen bilaterally.    Kellgren Delon grade 2 bilaterally        ASSESSMENT:    Right knee pain, possible new cartilage injury  Medial meniscus tear, right knee    PLAN:     I made the decision to obtain old records of the patient including previous notes and imaging. New imaging was ordered today of the extremity or extremities evaluated. I independently reviewed and interpreted the radiographs and/or MRIs today as well as prior imaging, if available.    We discussed at length different treatment options including conservative vs surgical management. These include anti-inflammatories, acetaminophen, rest, ice, heat, formal physical therapy including strengthening and stretching exercises, home exercise programs, dry needling, corticosteroid versus viscosupplementation injections, and finally surgical intervention.      7 cc of normal joint fluid aspirated from the right knee followed by therapeutic corticosteroid injection performed today.  See procedure note for details.    Prescription for Voltaren 1% gel provided today.  Advised patient to apply topically 3-4 times daily as needed for pain.  Take over-the-counter acetaminophen as needed for pain.  Avoid oral NSAID use in setting of recent bariatric  surgery.    Orders placed for MRI without contrast of the right knee to re-evaluate previously diagnosed meniscus tear and for new possible cartilage injury.    Follow-up in clinic or virtually to discuss MRI results and treatment moving forward.      All questions were answered, pt will contact us for questions or concerns in the interim.

## 2023-09-08 NOTE — PROCEDURES
Large Joint Aspiration/Injection: R knee    Date/Time: 9/8/2023 11:30 AM    Performed by: Warner Vallejo PA-C  Authorized by: Warner Vallejo PA-C    Consent Done?:  Yes (Verbal)  Indications:  Joint swelling and pain  Site marked: the procedure site was marked    Timeout: prior to procedure the correct patient, procedure, and site was verified    Prep: patient was prepped and draped in usual sterile fashion    Local anesthesia used?: No      Details:  Needle Size:  22 G  Ultrasonic Guidance for needle placement?: No    Approach:  Superior  Location:  Knee  Site:  R knee  Medications:  40 mg triamcinolone acetonide 40 mg/mL  Aspirate amount (mL):  7  Aspirate:  Clear and serous  Patient tolerance:  Patient tolerated the procedure well with no immediate complications    Aspiration/Injection Procedure    A time out was performed, including verification of patient ID, procedure, site and side, availability of information and equipment, review of safety issues, and agreement with consent, the procedure site was marked.    Aspiration:  After time out was performed, the patient was prepped aseptically with povidone-iodine swabsticks. 7cc's of normal joint fluid was aspirated from the Superolateral  aspect of the right Knee Joint using a 22g x 1.5 needle in sterile fashion without complication.     Injection:  Following aspiration, a diagnostic and therapeutic injection of 1:3cc Kenalog/Marcaine was given under sterile technique in the supine position.     Surjit Valentine had no adverse reactions to the medication. Pain decreased. He was instructed to apply ice to the joint for 20 minutes and avoid strenuous activities for 24-36 hours following the injection. He was warned of possible blood sugar and/or blood pressure changes during that time. Following that time, he can resume regular activities.

## 2023-09-09 ENCOUNTER — HOSPITAL ENCOUNTER (OUTPATIENT)
Dept: RADIOLOGY | Facility: HOSPITAL | Age: 43
Discharge: HOME | End: 2023-09-09
Attending: PHYSICIAN ASSISTANT
Payer: COMMERCIAL

## 2023-09-09 DIAGNOSIS — M25.561 ACUTE PAIN OF RIGHT KNEE: ICD-10-CM

## 2023-09-09 PROCEDURE — 73721 MRI JNT OF LWR EXTRE W/O DYE: CPT | Mod: 26,RT,, | Performed by: RADIOLOGY

## 2023-09-09 PROCEDURE — 73721 MRI KNEE WITHOUT CONTRAST RIGHT: ICD-10-PCS | Mod: 26,RT,, | Performed by: RADIOLOGY

## 2023-09-09 PROCEDURE — 73721 MRI JNT OF LWR EXTRE W/O DYE: CPT | Mod: TC,RT

## 2023-09-12 ENCOUNTER — PATIENT MESSAGE (OUTPATIENT)
Dept: BARIATRICS | Facility: CLINIC | Age: 43
End: 2023-09-12
Payer: COMMERCIAL

## 2023-09-15 ENCOUNTER — OFFICE VISIT (OUTPATIENT)
Dept: SPORTS MEDICINE | Facility: CLINIC | Age: 43
End: 2023-09-15
Payer: COMMERCIAL

## 2023-09-15 DIAGNOSIS — M25.561 ACUTE PAIN OF RIGHT KNEE: Primary | ICD-10-CM

## 2023-09-15 DIAGNOSIS — M23.203 OLD TEAR OF MEDIAL MENISCUS OF RIGHT KNEE, UNSPECIFIED TEAR TYPE: ICD-10-CM

## 2023-09-15 DIAGNOSIS — M94.261 CHONDROMALACIA OF RIGHT KNEE: ICD-10-CM

## 2023-09-15 PROCEDURE — 3044F HG A1C LEVEL LT 7.0%: CPT | Mod: CPTII,S$GLB,, | Performed by: PHYSICIAN ASSISTANT

## 2023-09-15 PROCEDURE — 99999 PR PBB SHADOW E&M-EST. PATIENT-LVL II: CPT | Mod: PBBFAC,,, | Performed by: PHYSICIAN ASSISTANT

## 2023-09-15 PROCEDURE — 1159F PR MEDICATION LIST DOCUMENTED IN MEDICAL RECORD: ICD-10-PCS | Mod: CPTII,S$GLB,, | Performed by: PHYSICIAN ASSISTANT

## 2023-09-15 PROCEDURE — 1160F RVW MEDS BY RX/DR IN RCRD: CPT | Mod: CPTII,S$GLB,, | Performed by: PHYSICIAN ASSISTANT

## 2023-09-15 PROCEDURE — 1159F MED LIST DOCD IN RCRD: CPT | Mod: CPTII,S$GLB,, | Performed by: PHYSICIAN ASSISTANT

## 2023-09-15 PROCEDURE — 99999 PR PBB SHADOW E&M-EST. PATIENT-LVL II: ICD-10-PCS | Mod: PBBFAC,,, | Performed by: PHYSICIAN ASSISTANT

## 2023-09-15 PROCEDURE — 99214 PR OFFICE/OUTPT VISIT, EST, LEVL IV, 30-39 MIN: ICD-10-PCS | Mod: S$GLB,,, | Performed by: PHYSICIAN ASSISTANT

## 2023-09-15 PROCEDURE — 1160F PR REVIEW ALL MEDS BY PRESCRIBER/CLIN PHARMACIST DOCUMENTED: ICD-10-PCS | Mod: CPTII,S$GLB,, | Performed by: PHYSICIAN ASSISTANT

## 2023-09-15 PROCEDURE — 3044F PR MOST RECENT HEMOGLOBIN A1C LEVEL <7.0%: ICD-10-PCS | Mod: CPTII,S$GLB,, | Performed by: PHYSICIAN ASSISTANT

## 2023-09-15 PROCEDURE — 99214 OFFICE O/P EST MOD 30 MIN: CPT | Mod: S$GLB,,, | Performed by: PHYSICIAN ASSISTANT

## 2023-09-15 NOTE — PROGRESS NOTES
CC: Right knee pain    Patient presents today for follow-up evaluation of right knee pain.  At his last visit, he received a corticosteroid injection which only temporarily provided relief.  Overall, he is doing well from a pain standpoint, however is interested in proceeding with right knee arthroscopy around the holidays to prevent further flare ups/pain.  No new injuries since his last visit.  Swelling seems to have subsided.  Here today to discuss further options.    HPI (09/08/2023):  Patient is a 42-year-old male who presents today for evaluation of right knee pain.  He is a  and teacher at [employer].  Previously seen by me for the same issue last year.  He had plans for right knee arthroscopy to address a meniscus tear confirmed on MRI, but his symptoms improved and he was able to avoid surgery.  Patient reports that last Sunday he awoke in the middle of the night to severe pain and stiffness of the right knee.  He does not recall any precipitating injury and states that he awoke to his knee feeling locked up and unable to move it due to pain.  Following days, this gradually improved and now his range of motion is approximately 80-90% of what it was before.  He does note some associated swelling.  He localizes the pain to the lateral aspect of the kneecap that is worse with knee flexion and with fully straightening his knee.  He recently underwent bariatric surgery and is unable to take oral NSAIDs.  He has been treating at home with ice compresses, topical analgesics, and Tylenol with gradual relief.    - mechanical symptoms, - instability    Is affecting ADLs.  Pain is 2/10 at it's worst.    REVIEW OF SYSTEMS:   Constitution: Negative. Negative for chills, fever and night sweats.   HENT: Negative for congestion and headaches.    Eyes: Negative for blurred vision, left vision loss and right vision loss.   Cardiovascular: Negative for chest pain and syncope.   Respiratory: Negative  for cough and shortness of breath.    Endocrine: Negative for polydipsia, polyphagia and polyuria.   Hematologic/Lymphatic: Negative for bleeding problem. Does not bruise/bleed easily.   Skin: Negative for dry skin, itching and rash.   Musculoskeletal: Negative for falls. Positive for right knee pain and  muscle weakness.   Gastrointestinal: Negative for abdominal pain and bowel incontinence.   Genitourinary: Negative for bladder incontinence and nocturia.   Neurological: Negative for disturbances in coordination, loss of balance and seizures.   Psychiatric/Behavioral: Negative for depression. The patient does not have insomnia.    Allergic/Immunologic: Negative for hives and persistent infections.     PAST MEDICAL HISTORY:   Past Medical History:   Diagnosis Date    GERD without esophagitis     takes OTC nexium PRN    History of kidney stones     Morbid obesity with body mass index of 60.0-69.9 in adult     GAYLE (obstructive sleep apnea)     did not tolerate CPAP       PAST SURGICAL HISTORY:   Past Surgical History:   Procedure Laterality Date    APPENDECTOMY  3/2008    CYSTOSCOPY  10/11/2019    Procedure: CYSTOSCOPY;  Surgeon: Gamal Sosa MD;  Location: [facility] OR [address] [place] [address];  Service: Urology;;    CYSTOSCOPY W/ URETERAL STENT PLACEMENT Left 10/6/2019    Procedure: CYSTOSCOPY, WITH URETERAL STENT INSERTION;  Surgeon: Gamal Sosa MD;  Location: [facility] OR [address] [place] [address];  Service: Urology;  Laterality: Left;    ESOPHAGOGASTRODUODENOSCOPY N/A 7/3/2023    Procedure: EGD (ESOPHAGOGASTRODUODENOSCOPY);  Surgeon: Tay Stephen MD;  Location: [facility] OR [address] [place] [address];  Service: General;  Laterality: N/A;    LASER LITHOTRIPSY Left 10/11/2019    Procedure: LITHOTRIPSY, USING LASER;  Surgeon: Gamal Sosa MD;  Location: [facility] OR [address] [place] [address];  Service: Urology;  Laterality: Left;    RETROGRADE PYELOGRAPHY Left 10/6/2019    Procedure: PYELOGRAM, RETROGRADE;  Surgeon: Gamal Sosa MD;  Location: [address];  Service:  "Urology;  Laterality: Left;    RETROGRADE PYELOGRAPHY Left 10/11/2019    Procedure: PYELOGRAM, RETROGRADE;  Surgeon: Gamal Sosa MD;  [facility];  Service: Urology;  Laterality: Left;    URETEROSCOPIC REMOVAL OF URETERIC CALCULUS  10/11/2019    Procedure: REMOVAL, CALCULUS, URETER, URETEROSCOPIC;  Surgeon: Gamal Sosa MD;  [facility];  Service: Urology;;    URETEROSCOPY Left 10/6/2019    Procedure: URETEROSCOPY;  Surgeon: Gamal Sosa MD;  [facility];  Service: Urology;  Laterality: Left;    URETEROSCOPY Left 10/11/2019    Procedure: URETEROSCOPY;  Surgeon: Gamal Sosa MD;  [facility];  Service: Urology;  Laterality: Left;  1hr    XI ROBOTIC GASTROENTEROSTOMY, TODD-EN-Y N/A 7/3/2023    Procedure: XI ROBOTIC GASTROENTEROSTOMY, TODD-EN-Y (PT is SELF PAY, DO NOT submit to insurance;  Surgeon: Tay Stephen MD;  Location: [facility] OR 2ND FLR;  Service: General;  Laterality: N/A;       FAMILY HISTORY:   Family History   Problem Relation Age of Onset    Heart disease Father     Stroke Father     Hypertension Father     Diabetes Maternal Grandmother     Heart disease Maternal Grandfather     Stroke Paternal Grandmother     Heart disease Paternal Grandfather     Diabetes Mother     Obesity Mother     Obesity Sister     Diabetes Brother     Obesity Brother        SOCIAL HISTORY:   Social History     Socioeconomic History    Marital status: Single   Tobacco Use    Smoking status: Never    Smokeless tobacco: Former     Types: Chew    Tobacco comments:     2 cans/ week   Substance and Sexual Activity    Alcohol use: Not Currently     Alcohol/week: 3.0 standard drinks of alcohol     Types: 3 Cans of beer per week     Comment: "socially but not usually"    Drug use: No   Social History Narrative    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  .  1 son, 14 years old.      Social Determinants of Health "     Financial Resource Strain: Low Risk  (7/4/2023)    Overall Financial Resource Strain (CARDIA)     Difficulty of Paying Living Expenses: Not hard at all   Food Insecurity: No Food Insecurity (7/4/2023)    Hunger Vital Sign     Worried About Running Out of Food in the Last Year: Never true     Ran Out of Food in the Last Year: Never true   Transportation Needs: No Transportation Needs (7/4/2023)    PRAPARE - Transportation     Lack of Transportation (Medical): No     Lack of Transportation (Non-Medical): No   Physical Activity: Inactive (7/4/2023)    Exercise Vital Sign     Days of Exercise per Week: 0 days     Minutes of Exercise per Session: 0 min   Stress: No Stress Concern Present (7/4/2023)    [employer] - Occupational Stress Questionnaire     Feeling of Stress : Not at all   Social Connections: Unknown (7/4/2023)    Social Connection and Isolation Panel [NHANES]     Frequency of Communication with Friends and Family: More than three times a week     Frequency of Social Gatherings with Friends and Family: Once a week     Attends Temple Services: More than 4 times per year     Active Member of Clubs or Organizations: No     Attends Club or Organization Meetings: Never   Housing Stability: Low Risk  (7/4/2023)    Housing Stability Vital Sign     Unable to Pay for Housing in the Last Year: No     Number of Places Lived in the Last Year: 1     Unstable Housing in the Last Year: No       MEDICATIONS:     Current Outpatient Medications:     azithromycin (Z-EMA) 250 MG tablet, , Disp: , Rfl:     diclofenac sodium (VOLTAREN) 1 % Gel, Apply 2 g topically 4 (four) times daily as needed (pain)., Disp: 350 g, Rfl: 2    ergocalciferol (ERGOCALCIFEROL) 50,000 unit Cap, Take 1 capsule (50,000 Units total) by mouth every 7 days. for 12 doses, Disp: 12 capsule, Rfl: 0    meloxicam (MOBIC) 15 MG tablet, Take 1 tablet by mouth once daily., Disp: , Rfl:     omeprazole (PRILOSEC) 40 MG capsule,  Take 1 capsule (40 mg total) by mouth every morning. Open capsule and take with apple sauce, Disp: 30 capsule, Rfl: 2    oseltamivir (TAMIFLU) 75 MG capsule, , Disp: , Rfl:     traMADoL (ULTRAM) 50 mg tablet, Take 1-2 tablet(s) EVERY 8 HOURS by oral route as needed., Disp: , Rfl:     ursodioL (ALESSANDRO FORTE) 500 MG tablet, Crush one tablet by mouth daily for gall bladder., Disp: 90 tablet, Rfl: 1    ALLERGIES:   Review of patient's allergies indicates:  No Known Allergies    VITAL SIGNS:   There were no vitals taken for this visit.     PHYSICAL EXAMINATION:  General:  The patient is alert and oriented x 3.  Mood is pleasant.  Observation of ears, eyes and nose reveal no gross abnormalities.  No labored breathing observed.    RIGHT KNEE EXAMINATION     OBSERVATION / INSPECTION   Gait:   Nonantalgic   Alignment:  Neutral   Scars:   None   Muscle atrophy: Mild  Effusion:  1+   Warmth:  None   Discoloration:   none     TENDERNESS / CREPITUS (T / C):          T / C      T / C   Patella   - / -   Lateral joint line   - / -   Peripatellar medial  -  Medial joint line    - / -   Peripatellar lateral +  Medial plica   - / -   Patellar tendon -   Popliteal fossa   - / -   Quad tendon   -   Gastrocnemius   -   Prepatellar Bursa - / -   Quadricep   -   Tibial tubercle  -  Thigh/hamstring  -   Pes anserine/HS -  Fibula    -   ITB   - / -  Tibia     -   Tib/fib joint  - / -  LCL    -     MFC   - / -   MCL: Proximal  -    LFC   - / -    Distal   -          ROM: (* = pain)  PASSIVE   ACTIVE    Left :   5 / 0 / 130   5 / 0 / 130     Right :    5 / 0* / 120*   5 / 0* / 120*    Patellofemoral examination:  See above noted areas of tenderness.   Patella position    Subluxation / dislocation: Centered           Sup. / Inf;   Normal   Crepitus (PF):    A+  Patellar Mobility:       Medial-lateral:   Normal    Superior-inferior:  Normal    Inferior tilt   Normal    Patellar tendon:  Normal   Lateral tilt:    Normal   J-sign:     None    Patellofemoral grind:   + pain       MENISCAL SIGNS:     Pain on terminal extension:  +  Pain on terminal flexion:  +  Carols maneuver:  - (for pain)  Squat     deferred    LIGAMENT EXAMINATION:  ACL / Lachman:  normal (-1 to 2mm)    PCL-Post.  drawer: normal 0 to 2mm  MCL- Valgus:  normal 0 to 2mm  LCL- Varus:  normal 0 to 2mm  Pivot shift: normal (Equal)   Dial Test: difference c/w other side   At 30° flexion: normal (< 5°)    At 90° flexion: normal (< 5°)   Reverse Pivot Shift:   normal (Equal)     STRENGTH: (* = with pain) PAINFUL SIDE   Quadricep   5/5   Hamstrin/5    EXTREMITY NEURO-VASCULAR EXAMINATION:   Sensation:  Grossly intact to light touch all dermatomal regions.   Motor Function:  Fully intact motor function at hip, knee, foot and ankle    DTRs;  quadriceps and  achilles 2+.  No clonus and downgoing Babinski.    Vascular status:  DP and PT pulses 2+, brisk capillary refill, symmetric.     OTHER FINDINGS:  N/A    X-rays bilateral knees (2023):    X-rays including standing, weight bearing AP and flexion bilateral knees, lateral and merchant views ordered and images reviewed by me show:   No acute fracture or dislocation.  Mild tibiofemoral and patellofemoral joint space narrowing seen bilaterally.    Kellgren Delon grade 2 bilaterally    MRI right knee (2023):  Impression:     1. Suspected free edge fraying at the junction of the posterior horn and posterior root ligament of the medial meniscus.  No displaced meniscal tear.  2. Mild patellofemoral chondral loss.  3. Joint effusion.      ASSESSMENT:    Right knee pain  Medial meniscus tear, right knee  Chondromalacia of right knee    PLAN:     I made the decision to obtain old records of the patient including previous notes and imaging. I independently reviewed and interpreted the radiographs and/or MRIs today as well as prior imaging. Reviewed MRI and radiograph results with patient in detail.    We discussed at length  different treatment options including conservative vs surgical management. These include anti-inflammatories, acetaminophen, rest, ice, heat, formal physical therapy including strengthening and stretching exercises, home exercise programs, dry needling, corticosteroid versus viscosupplementation injections, and finally surgical intervention.      Patient like to proceed with surgical intervention of his right knee sometime around the Thanksgiving break to avoid significant time off from work.    Surgical plan would include right knee arthroscopy in the supine position with partial meniscectomy versus meniscus repair, chondroplasty, possible loose body removal, and any other indicated procedures.    We will reach out to him next week to schedule.      All questions were answered, pt will contact us for questions or concerns in the interim.      Medical Dictation software was used during the dictation of portions or the entirety of this medical record.  Phonetic or grammatic errors may exist due to the use of this software. For clarification, refer to the author of the document.

## 2023-10-03 ENCOUNTER — PATIENT MESSAGE (OUTPATIENT)
Dept: SPORTS MEDICINE | Facility: CLINIC | Age: 43
End: 2023-10-03
Payer: COMMERCIAL

## 2023-10-03 ENCOUNTER — PATIENT MESSAGE (OUTPATIENT)
Dept: BARIATRICS | Facility: CLINIC | Age: 43
End: 2023-10-03
Payer: COMMERCIAL

## 2023-10-04 ENCOUNTER — PATIENT MESSAGE (OUTPATIENT)
Dept: BARIATRICS | Facility: CLINIC | Age: 43
End: 2023-10-04
Payer: COMMERCIAL

## 2023-10-04 DIAGNOSIS — M23.203 OLD TEAR OF MEDIAL MENISCUS OF RIGHT KNEE, UNSPECIFIED TEAR TYPE: Primary | ICD-10-CM

## 2023-10-04 DIAGNOSIS — M94.261 CHONDROMALACIA OF RIGHT KNEE: ICD-10-CM

## 2023-10-05 ENCOUNTER — TELEPHONE (OUTPATIENT)
Dept: SPORTS MEDICINE | Facility: CLINIC | Age: 43
End: 2023-10-05
Payer: COMMERCIAL

## 2023-10-05 DIAGNOSIS — M94.261 CHONDROMALACIA OF RIGHT KNEE: ICD-10-CM

## 2023-10-05 DIAGNOSIS — M23.203 OLD TEAR OF MEDIAL MENISCUS OF RIGHT KNEE, UNSPECIFIED TEAR TYPE: Primary | ICD-10-CM

## 2023-10-10 ENCOUNTER — PATIENT MESSAGE (OUTPATIENT)
Dept: BARIATRICS | Facility: CLINIC | Age: 43
End: 2023-10-10
Payer: COMMERCIAL

## 2023-10-22 ENCOUNTER — OFFICE VISIT (OUTPATIENT)
Dept: URGENT CARE | Facility: CLINIC | Age: 43
End: 2023-10-22
Payer: COMMERCIAL

## 2023-10-22 VITALS
DIASTOLIC BLOOD PRESSURE: 60 MMHG | BODY MASS INDEX: 46.65 KG/M2 | SYSTOLIC BLOOD PRESSURE: 124 MMHG | OXYGEN SATURATION: 98 % | WEIGHT: 315 LBS | HEIGHT: 69 IN | TEMPERATURE: 98 F | RESPIRATION RATE: 15 BRPM | HEART RATE: 82 BPM

## 2023-10-22 DIAGNOSIS — R05.9 COUGH, UNSPECIFIED TYPE: ICD-10-CM

## 2023-10-22 DIAGNOSIS — H92.02 OTALGIA, LEFT: ICD-10-CM

## 2023-10-22 DIAGNOSIS — H66.002 NON-RECURRENT ACUTE SUPPURATIVE OTITIS MEDIA OF LEFT EAR WITHOUT SPONTANEOUS RUPTURE OF TYMPANIC MEMBRANE: Primary | ICD-10-CM

## 2023-10-22 LAB
CTP QC/QA: YES
CTP QC/QA: YES
POC MOLECULAR INFLUENZA A AGN: NEGATIVE
POC MOLECULAR INFLUENZA B AGN: NEGATIVE
SARS-COV-2 AG RESP QL IA.RAPID: NEGATIVE

## 2023-10-22 PROCEDURE — 99213 PR OFFICE/OUTPT VISIT, EST, LEVL III, 20-29 MIN: ICD-10-PCS | Mod: S$GLB,,, | Performed by: NURSE PRACTITIONER

## 2023-10-22 PROCEDURE — 87811 SARS CORONAVIRUS 2 ANTIGEN POCT, MANUAL READ: ICD-10-PCS | Mod: QW,S$GLB,, | Performed by: NURSE PRACTITIONER

## 2023-10-22 PROCEDURE — 99213 OFFICE O/P EST LOW 20 MIN: CPT | Mod: S$GLB,,, | Performed by: NURSE PRACTITIONER

## 2023-10-22 PROCEDURE — 87502 INFLUENZA DNA AMP PROBE: CPT | Mod: QW,S$GLB,, | Performed by: NURSE PRACTITIONER

## 2023-10-22 PROCEDURE — 87502 POCT INFLUENZA A/B MOLECULAR: ICD-10-PCS | Mod: QW,S$GLB,, | Performed by: NURSE PRACTITIONER

## 2023-10-22 PROCEDURE — 87811 SARS-COV-2 COVID19 W/OPTIC: CPT | Mod: QW,S$GLB,, | Performed by: NURSE PRACTITIONER

## 2023-10-22 RX ORDER — AMOXICILLIN AND CLAVULANATE POTASSIUM 875; 125 MG/1; MG/1
1 TABLET, FILM COATED ORAL 2 TIMES DAILY
Qty: 20 TABLET | Refills: 0 | Status: ON HOLD | OUTPATIENT
Start: 2023-10-22 | End: 2023-11-17

## 2023-10-22 RX ORDER — FLUTICASONE PROPIONATE 50 MCG
2 SPRAY, SUSPENSION (ML) NASAL DAILY
Qty: 11.1 ML | Refills: 0 | Status: SHIPPED | OUTPATIENT
Start: 2023-10-22 | End: 2023-11-21

## 2023-10-22 RX ORDER — LEVOCETIRIZINE DIHYDROCHLORIDE 5 MG/1
5 TABLET, FILM COATED ORAL NIGHTLY
Qty: 30 TABLET | Refills: 0 | Status: SHIPPED | OUTPATIENT
Start: 2023-10-22 | End: 2023-11-21

## 2023-10-22 NOTE — PROGRESS NOTES
"Subjective:      Patient ID: Surjit Valentine is a 42 y.o. male.    Vitals:  height is 5' 9" (1.753 m) and weight is 163.3 kg (360 lb) (abnormal). His oral temperature is 98.4 °F (36.9 °C). His blood pressure is 124/60 and his pulse is 82. His respiration is 15 and oxygen saturation is 98%.     Chief Complaint: Cough    41yo male pt reports dry cough, nasal/sinus congestion and pressure, headache, and L ear pain, along with some body aches that started yesterday.  Denies fever/chills.  Denies n/v/d, denies abd pain.  Denies chest pain, wheezing, or SOB, but reports feeling some chest congestion.  Reports that he works as a  and has had multiple possible sick contacts, unsure of specific exposures.  Reports COVID vaccination, denies flu vaccination.    Cough  This is a new problem. The current episode started yesterday. The problem has been unchanged. The problem occurs hourly. The cough is Non-productive. Associated symptoms include ear pain, headaches, nasal congestion and postnasal drip. Pertinent negatives include no chest pain, chills, fever, sore throat, shortness of breath, sweats or wheezing. Nothing aggravates the symptoms. He has tried nothing for the symptoms. The treatment provided no relief. There is no history of asthma, bronchitis or pneumonia.       Constitution: Negative for chills and fever.   HENT:  Positive for ear pain, congestion, postnasal drip and sinus pressure. Negative for ear discharge, foreign body in ear, tinnitus, hearing loss and sore throat.    Cardiovascular:  Negative for chest pain.   Respiratory:  Positive for cough. Negative for chest tightness, sputum production, shortness of breath and wheezing.    Gastrointestinal:  Negative for abdominal pain, nausea, vomiting and diarrhea.   Neurological:  Positive for headaches.      Objective:     Physical Exam   Constitutional: He is oriented to person, place, and time. He appears well-developed. He is cooperative.  " Non-toxic appearance. He does not appear ill. No distress.   HENT:   Head: Normocephalic and atraumatic.   Ears:   Right Ear: Hearing, external ear and ear canal normal. Tympanic membrane is bulging. Tympanic membrane is not erythematous and not retracted. A middle ear effusion (clear fluid) is present.   Left Ear: Hearing, external ear and ear canal normal. Tympanic membrane is erythematous and bulging. Tympanic membrane is not retracted. A middle ear effusion is present.   Nose: Rhinorrhea (clear to BL nares) present. No mucosal edema, purulent discharge or nasal deformity. No epistaxis. Right sinus exhibits no maxillary sinus tenderness and no frontal sinus tenderness. Left sinus exhibits no maxillary sinus tenderness and no frontal sinus tenderness.   Mouth/Throat: Uvula is midline and mucous membranes are normal. No trismus in the jaw. Normal dentition. No uvula swelling. Oropharyngeal exudate (clear postnasal drip) present. No posterior oropharyngeal edema or posterior oropharyngeal erythema. Tonsils are 1+ on the right. Tonsils are 1+ on the left. No tonsillar exudate.   Eyes: Conjunctivae and lids are normal. No scleral icterus.   Neck: Trachea normal and phonation normal. Neck supple. No edema present. No erythema present. No neck rigidity present.   Cardiovascular: Normal rate, regular rhythm, normal heart sounds and normal pulses.   Pulmonary/Chest: Effort normal and breath sounds normal. No accessory muscle usage or stridor. No tachypnea. No respiratory distress. He has no decreased breath sounds. He has no wheezes. He has no rhonchi. He has no rales.   Dry cough elicited once during exam.         Comments: Dry cough elicited once during exam.    Abdominal: Normal appearance.   Musculoskeletal: Normal range of motion.         General: No deformity. Normal range of motion.   Lymphadenopathy:        Head (right side): No submandibular adenopathy present.        Head (left side): No submandibular adenopathy  present.     He has no cervical adenopathy.   Neurological: He is alert and oriented to person, place, and time. He exhibits normal muscle tone. Coordination normal.   Skin: Skin is warm, dry, intact, not diaphoretic and not pale.   Psychiatric: His speech is normal and behavior is normal. Judgment and thought content normal.   Nursing note and vitals reviewed.      Results for orders placed or performed in visit on 10/22/23   SARS Coronavirus 2 Antigen, POCT Manual Read   Result Value Ref Range    SARS Coronavirus 2 Antigen Negative Negative     Acceptable Yes    POCT Influenza A/B MOLECULAR   Result Value Ref Range    POC Molecular Influenza A Ag Negative Negative, Not Reported    POC Molecular Influenza B Ag Negative Negative, Not Reported     Acceptable Yes          Assessment:     1. Non-recurrent acute suppurative otitis media of left ear without spontaneous rupture of tympanic membrane    2. Cough, unspecified type    3. Otalgia, left        Plan:     Provided education on prescribed medications, recommended resting for COVID in 2-3 days if sinus symptoms do not improve.  Provided education on return/ER precautions.  Pt verbalized understanding and agreed to plan.      Non-recurrent acute suppurative otitis media of left ear without spontaneous rupture of tympanic membrane  -     amoxicillin-clavulanate 875-125mg (AUGMENTIN) 875-125 mg per tablet; Take 1 tablet by mouth 2 (two) times daily.  Dispense: 20 tablet; Refill: 0  -     levocetirizine (XYZAL) 5 MG tablet; Take 1 tablet (5 mg total) by mouth every evening.  Dispense: 30 tablet; Refill: 0  -     fluticasone propionate (FLONASE) 50 mcg/actuation nasal spray; 2 sprays (100 mcg total) by Each Nostril route once daily.  Dispense: 11.1 mL; Refill: 0    Cough, unspecified type  -     SARS Coronavirus 2 Antigen, POCT Manual Read  -     POCT Influenza A/B MOLECULAR    Otalgia, left      Patient Instructions   If your condition  "worsens or fails to improve, we recommend that you receive another evaluation at the ER immediately contact your PCP to discuss your concerns, or return here.  You must understand that you've received an urgent care treatment only, and that you may be released before all your medical problems are known or treated.  You, the patient, will arrange for follow-up care as instructed.     If we discussed that I think your illness is viral, it will not respond to antibiotics and will last 10-14 days.  If we discussed "wait and see" antibiotics, and if over the next few days the symptoms worsen, start the antibiotics I have given you.     If you are female and on birth control pills and do take the antibiotics, use additional methods to prevent pregnancy while on the antibiotics and for one cycle after.     Flonase (fluticasone) is a nasal spray which is available over the counter and may help with your symptoms.  Zyrtec D, Claritin D, or Allegra D can also help with symptoms of congestion and drainage.  If you have hypertension, avoid using the "D" which is the decongestant formula.    If you just have drainage, you can take plain Zyrtec, Claritin, or Allegra.  If you just have a congested feeling, you can take pseudoephedrine (unless you have high blood pressure), which you have to sign for behind the counter.  Do not buy phenylephrine OTC, as it is not effective.    Rest and fluids are also important.  Tylenol or ibuprofen can also be used as directed for pain, unless you have an allergy to them or medical condition (such as stomach ulcers, kidney or liver disease, or use blood thinners, etc.) for which you should not be taking these type of medications.     If you are flying in the next few days, Afrin nose drops for the airplane flight upon take off and landing may help.  Other than at those times, refrain from using Afrin.     If you were prescribed a narcotic or sedating cough medicine, do not drive or operate heavy " machinery while taking these medications.

## 2023-10-23 ENCOUNTER — HOSPITAL ENCOUNTER (EMERGENCY)
Facility: HOSPITAL | Age: 43
Discharge: HOME | End: 2023-10-24
Attending: EMERGENCY MEDICINE
Payer: COMMERCIAL

## 2023-10-23 DIAGNOSIS — R05.9 COUGH: ICD-10-CM

## 2023-10-23 DIAGNOSIS — G47.33 OSA (OBSTRUCTIVE SLEEP APNEA): Chronic | ICD-10-CM

## 2023-10-23 DIAGNOSIS — E66.9 OBESITY, UNSPECIFIED CLASSIFICATION, UNSPECIFIED OBESITY TYPE, UNSPECIFIED WHETHER SERIOUS COMORBIDITY PRESENT: Primary | ICD-10-CM

## 2023-10-23 DIAGNOSIS — R07.9 CHEST PAIN IN ADULT: ICD-10-CM

## 2023-10-23 PROCEDURE — U0002 COVID-19 LAB TEST NON-CDC: HCPCS | Performed by: EMERGENCY MEDICINE

## 2023-10-23 PROCEDURE — 93010 ELECTROCARDIOGRAM REPORT: CPT | Mod: ,,, | Performed by: INTERNAL MEDICINE

## 2023-10-23 PROCEDURE — 87502 INFLUENZA DNA AMP PROBE: CPT

## 2023-10-23 PROCEDURE — 93010 EKG 12-LEAD: ICD-10-PCS | Mod: ,,, | Performed by: INTERNAL MEDICINE

## 2023-10-23 PROCEDURE — 93005 ELECTROCARDIOGRAM TRACING: CPT

## 2023-10-23 PROCEDURE — 87502 INFLUENZA DNA AMP PROBE: CPT | Performed by: EMERGENCY MEDICINE

## 2023-10-23 RX ORDER — IBUPROFEN 600 MG/1
600 TABLET ORAL
Status: COMPLETED | OUTPATIENT
Start: 2023-10-23 | End: 2023-10-24

## 2023-10-24 VITALS
SYSTOLIC BLOOD PRESSURE: 133 MMHG | OXYGEN SATURATION: 95 % | WEIGHT: 315 LBS | HEART RATE: 82 BPM | RESPIRATION RATE: 16 BRPM | TEMPERATURE: 98 F | HEIGHT: 69 IN | BODY MASS INDEX: 46.65 KG/M2 | DIASTOLIC BLOOD PRESSURE: 78 MMHG

## 2023-10-24 LAB
INFLUENZA A, MOLECULAR: NEGATIVE
INFLUENZA B, MOLECULAR: NEGATIVE
SARS-COV-2 RDRP RESP QL NAA+PROBE: NEGATIVE
SPECIMEN SOURCE: NORMAL

## 2023-10-24 PROCEDURE — 99285 EMERGENCY DEPT VISIT HI MDM: CPT | Mod: 25

## 2023-10-24 PROCEDURE — 25000003 PHARM REV CODE 250: Performed by: EMERGENCY MEDICINE

## 2023-10-24 RX ORDER — NAPROXEN 500 MG/1
500 TABLET ORAL 2 TIMES DAILY WITH MEALS
Qty: 30 TABLET | Refills: 0 | Status: SHIPPED | OUTPATIENT
Start: 2023-10-24 | End: 2023-11-16

## 2023-10-24 RX ADMIN — IBUPROFEN 600 MG: 600 TABLET ORAL at 12:10

## 2023-10-24 NOTE — ED TRIAGE NOTES
"Surjit Valentine, a 42 y.o. male presents to the ED w/ complaint of nasal congestion, cough, HA, chest pressure for several weeks. Was seen at  yesterday     Triage note:  Chief Complaint   Patient presents with    Cough     States "nasal congestion and cough" which has "gotten worse since seen and treated at urgent care yesterday. States its making "his chest feel heavy"     Review of patient's allergies indicates:  No Known Allergies  Past Medical History:   Diagnosis Date    GERD without esophagitis     takes OTC nexium PRN    History of kidney stones     Morbid obesity with body mass index of 60.0-69.9 in adult     GAYLE (obstructive sleep apnea)     did not tolerate CPAP      "

## 2023-10-24 NOTE — ED PROVIDER NOTES
"Encounter Date: 10/23/2023       History     Chief Complaint   Patient presents with    Cough     States "nasal congestion and cough" which has "gotten worse since seen and treated at urgent care yesterday. States its making "his chest feel heavy"     Pt is a 43 yo M with PMH of GERD, GAYLE, kidney stones who presents with cough and nasal congestion. He reports body aches as well. Was seen yesterday at  and started on Augmentin for otitis media.    The history is provided by the patient.     Review of patient's allergies indicates:  No Known Allergies  Past Medical History:   Diagnosis Date    GERD without esophagitis     takes OTC nexium PRN    History of kidney stones     Morbid obesity with body mass index of 60.0-69.9 in adult     GAYLE (obstructive sleep apnea)     did not tolerate CPAP     Past Surgical History:   Procedure Laterality Date    APPENDECTOMY  3/2008    CYSTOSCOPY  10/11/2019    Procedure: CYSTOSCOPY;  Surgeon: Gamal Sosa MD;  Location: [facility] OR [address] [place] [address];  Service: Urology;;    CYSTOSCOPY W/ URETERAL STENT PLACEMENT Left 10/6/2019    Procedure: CYSTOSCOPY, WITH URETERAL STENT INSERTION;  Surgeon: Gamal Sosa MD;  Location: [facility] OR [address] [place] [address];  Service: Urology;  Laterality: Left;    ESOPHAGOGASTRODUODENOSCOPY N/A 7/3/2023    Procedure: EGD (ESOPHAGOGASTRODUODENOSCOPY);  Surgeon: Tay Stephen MD;  Location: [facility] OR 2ND FLR;  Service: General;  Laterality: N/A;    LASER LITHOTRIPSY Left 10/11/2019    Procedure: LITHOTRIPSY, USING LASER;  Surgeon: Gamal Sosa MD;  [facility];  Service: Urology;  Laterality: Left;    RETROGRADE PYELOGRAPHY Left 10/6/2019    Procedure: PYELOGRAM, RETROGRADE;  Surgeon: Gamal Sosa MD;  [facility];  Service: Urology;  Laterality: Left;    RETROGRADE PYELOGRAPHY Left 10/11/2019    Procedure: PYELOGRAM, RETROGRADE;  Surgeon: Gamal Sosa MD;  Location: [facility] OR [address] [place] [address];  Service: Urology;  Laterality: Left; " "   URETEROSCOPIC REMOVAL OF URETERIC CALCULUS  10/11/2019    Procedure: REMOVAL, CALCULUS, URETER, URETEROSCOPIC;  Surgeon: Gamal Sosa MD;  [facility];  Service: Urology;;    URETEROSCOPY Left 10/6/2019    Procedure: URETEROSCOPY;  Surgeon: Gamal Sosa MD;  [facility];  Service: Urology;  Laterality: Left;    URETEROSCOPY Left 10/11/2019    Procedure: URETEROSCOPY;  Surgeon: Gamal Sosa MD;  [facility];  Service: Urology;  Laterality: Left;  1hr    XI ROBOTIC GASTROENTEROSTOMY, TODD-EN-Y N/A 7/3/2023    Procedure: XI ROBOTIC GASTROENTEROSTOMY, TODD-EN-Y (PT is SELF PAY, DO NOT submit to insurance;  Surgeon: Tay Stephen MD;  Location: [facility] OR 2ND FLR;  Service: General;  Laterality: N/A;     Family History   Problem Relation Age of Onset    Heart disease Father     Stroke Father     Hypertension Father     Diabetes Maternal Grandmother     Heart disease Maternal Grandfather     Stroke Paternal Grandmother     Heart disease Paternal Grandfather     Diabetes Mother     Obesity Mother     Obesity Sister     Diabetes Brother     Obesity Brother      Social History     Tobacco Use    Smoking status: Never     Passive exposure: Never    Smokeless tobacco: Former     Types: Chew    Tobacco comments:     2 cans/ week   Substance Use Topics    Alcohol use: Not Currently     Alcohol/week: 3.0 standard drinks of alcohol     Types: 3 Cans of beer per week     Comment: "socially but not usually"    Drug use: No         Physical Exam     Initial Vitals [10/23/23 2246]   BP Pulse Resp Temp SpO2   (!) 140/87 71 (!) 22 98.6 °F (37 °C) 95 %      MAP       --         Physical Exam    Nursing note and vitals reviewed.  Constitutional: He appears well-developed and well-nourished. He is not diaphoretic. No distress.   HENT:   Head: Normocephalic and atraumatic.   TM's without pus bilaterally   Eyes: EOM are normal.   Neck: Neck supple.   Normal range of " motion.  Cardiovascular:  Normal rate and regular rhythm.           Pulmonary/Chest: No respiratory distress.   Abdominal: He exhibits no distension.   Musculoskeletal:         General: Normal range of motion.      Cervical back: Normal range of motion and neck supple.     Neurological: He is alert and oriented to person, place, and time. GCS score is 15. GCS eye subscore is 4. GCS verbal subscore is 5. GCS motor subscore is 6.   Skin: Skin is warm and dry.   Psychiatric: He has a normal mood and affect. His behavior is normal. Judgment and thought content normal.         ED Course   Procedures  Labs Reviewed   INFLUENZA A & B BY MOLECULAR   SARS-COV-2 RNA AMPLIFICATION, QUAL        ECG Results              EKG 12-lead (Final result)  Result time 10/24/23 08:09:37      Final result by Interface, Lab In [facility] (10/24/23 08:09:37)                   Narrative:    Test Reason : R07.9,    Vent. Rate : 066 BPM     Atrial Rate : 066 BPM     P-R Int : 160 ms          QRS Dur : 086 ms      QT Int : 394 ms       P-R-T Axes : 050 000 005 degrees     QTc Int : 413 ms    Normal sinus rhythm  Normal ECG  When compared with ECG of 09-JUL-2023 13:26,  Nonspecific T wave abnormality no longer evident in Lateral leads  Confirmed by Ruslan ABBOTT MD ([id number]) on 10/24/2023 8:09:29 AM    Referred By: [email or web address]   SELF           Confirmed By:Ruslan ABBOTT MD                                  Imaging Results              X-Ray Chest PA And Lateral (Final result)  Result time 10/24/23 00:49:48      Final result by Aaron Berry MD (10/24/23 00:49:48)                   Impression:      No acute cardiopulmonary process.      Electronically signed by: Aaron Berry MD  Date:    10/24/2023  Time:    00:49               Narrative:    EXAMINATION:  XR CHEST PA AND LATERAL    CLINICAL HISTORY:  Cough, unspecified    TECHNIQUE:  PA and lateral views of the chest were performed.    COMPARISON:  03/27/2023.    FINDINGS:  Subsegmental atelectatic  change right base.    There is no consolidation, effusion, or pneumothorax.    Cardiomediastinal silhouette is unremarkable.    Regional osseous structures are unremarkable.                                       Medications   ibuprofen tablet 600 mg (600 mg Oral Given 10/24/23 0017)     Medical Decision Making  Encouraged patient to finish augmentin that was started  Ears appear improved today on my exam  I think he likely has a viral syndrome  He does not appear toxic or septic  He is not hypoxic and chest xray is clear    Discharged to home in stable condition, return to ED warnings given, follow up and patient care instructions given.        Amount and/or Complexity of Data Reviewed  Labs: ordered.  Radiology: ordered.    Risk  Prescription drug management.                               Clinical Impression:   Final diagnoses:  [R07.9] Chest pain in adult  [R05.9] Cough  [E66.9] Obesity, unspecified classification, unspecified obesity type, unspecified whether serious comorbidity present (Primary)  [G47.33] GAYLE (obstructive sleep apnea) (Chronic)        ED Disposition Condition    Discharge Stable          ED Prescriptions       Medication Sig Dispense Start Date End Date Auth. Provider    naproxen (NAPROSYN) 500 MG tablet Take 1 tablet (500 mg total) by mouth 2 (two) times daily with meals. 30 tablet 10/24/2023 -- Jossie Carlos MD          Follow-up Information       Follow up With Specialties Details Why Contact Info Additional Information    [facility] - Emergency Dept Emergency Medicine  As needed [address]  [facility] [id number] [phone]     [facility] Internal Medicine Schedule an appointment as soon as possible for a visit   [address]  [facility] [id number] [phone] [facility] Please park in surface lot and check in at central registration desk             Jossie Carlos MD  10/29/23  [phone]

## 2023-10-24 NOTE — DISCHARGE INSTRUCTIONS
Continue the medications given to you at urgent care.   I recommend that you also take naproxen 500 mg every 12 hours and/or Tylenol 650 mg every 6 hours.  You can also try over the counter cold and flu medication.  Your chest x-ray today is clear. Your flu test is negative.    I recommend establishing care with a primary care doctor. I have placed a referral to internal medicien.

## 2023-11-14 ENCOUNTER — PATIENT MESSAGE (OUTPATIENT)
Dept: BARIATRICS | Facility: CLINIC | Age: 43
End: 2023-11-14
Payer: COMMERCIAL

## 2023-11-16 ENCOUNTER — OFFICE VISIT (OUTPATIENT)
Dept: SPORTS MEDICINE | Facility: CLINIC | Age: 43
End: 2023-11-16
Payer: COMMERCIAL

## 2023-11-16 ENCOUNTER — ANESTHESIA EVENT (OUTPATIENT)
Dept: SURGERY | Facility: HOSPITAL | Age: 43
End: 2023-11-16
Payer: COMMERCIAL

## 2023-11-16 VITALS — BODY MASS INDEX: 46.65 KG/M2 | WEIGHT: 315 LBS | HEIGHT: 69 IN

## 2023-11-16 DIAGNOSIS — M23.203 OLD TEAR OF MEDIAL MENISCUS OF RIGHT KNEE, UNSPECIFIED TEAR TYPE: Primary | ICD-10-CM

## 2023-11-16 DIAGNOSIS — M94.261 CHONDROMALACIA OF RIGHT KNEE: ICD-10-CM

## 2023-11-16 PROCEDURE — 99499 UNLISTED E&M SERVICE: CPT | Mod: S$GLB,,, | Performed by: PHYSICIAN ASSISTANT

## 2023-11-16 PROCEDURE — 99999 PR PBB SHADOW E&M-EST. PATIENT-LVL III: ICD-10-PCS | Mod: PBBFAC,,, | Performed by: PHYSICIAN ASSISTANT

## 2023-11-16 PROCEDURE — 99999 PR PBB SHADOW E&M-EST. PATIENT-LVL III: CPT | Mod: PBBFAC,,, | Performed by: PHYSICIAN ASSISTANT

## 2023-11-16 PROCEDURE — 99499 NO LOS: ICD-10-PCS | Mod: S$GLB,,, | Performed by: PHYSICIAN ASSISTANT

## 2023-11-16 RX ORDER — PROMETHAZINE HYDROCHLORIDE 25 MG/1
25 TABLET ORAL EVERY 6 HOURS PRN
Qty: 20 TABLET | Refills: 0 | Status: SHIPPED | OUTPATIENT
Start: 2023-11-16

## 2023-11-16 RX ORDER — CEFAZOLIN SODIUM 2 G/50ML
2 SOLUTION INTRAVENOUS
Status: CANCELLED | OUTPATIENT
Start: 2023-11-16

## 2023-11-16 RX ORDER — ASPIRIN 325 MG
325 TABLET ORAL DAILY
Qty: 21 TABLET | Refills: 0 | Status: SHIPPED | OUTPATIENT
Start: 2023-11-16 | End: 2023-12-08

## 2023-11-16 RX ORDER — TRAMADOL HYDROCHLORIDE 50 MG/1
50 TABLET ORAL EVERY 6 HOURS PRN
Qty: 20 TABLET | Refills: 0 | Status: SHIPPED | OUTPATIENT
Start: 2023-11-16

## 2023-11-16 RX ORDER — HYDROCODONE BITARTRATE AND ACETAMINOPHEN 7.5; 325 MG/1; MG/1
1 TABLET ORAL EVERY 6 HOURS PRN
Qty: 20 TABLET | Refills: 0 | Status: SHIPPED | OUTPATIENT
Start: 2023-11-16

## 2023-11-16 RX ORDER — SODIUM CHLORIDE 9 MG/ML
INJECTION, SOLUTION INTRAVENOUS CONTINUOUS
Status: CANCELLED | OUTPATIENT
Start: 2023-11-16

## 2023-11-16 NOTE — PRE-PROCEDURE INSTRUCTIONS
PreOp Instructions given:   - Verbal medication information (what to hold and what to take)   - NPO guidelines   - Arrival place directions given; time to be given the day before procedure by the   Surgeon's Office DOSC   - Bathing with antibacterial soap   - Don't wear any jewelry or bring any valuables AM of surgery   - No makeup or moisturizer to face   - No perfume/cologne, powder, lotions or aftershave   Pt. verbalized understanding.   Pt denies any h/o Anesthesia/Sedation complications or side effects.  Patient does not know arrival time.  Explained that this information comes from the surgeon's office and if they haven't heard from them by 2 or 3 pm to call the office.  Patient stated an understanding.

## 2023-11-16 NOTE — H&P
Surjit Valentine  is here for a completion of his perioperative paperwork. he  Is scheduled to undergo:    RIGHT knee arthroscopic partial meniscectomy, possible chondroplasty, possible loose body removal, and any other indicated procedures.     on 11/16/2023.      He is a healthy individual but does need clearance for this procedure.     He is cleared to proceed with surgery.     PAST MEDICAL HISTORY:   Past Medical History:   Diagnosis Date    GERD without esophagitis     takes OTC nexium PRN    History of kidney stones     Morbid obesity with body mass index of 60.0-69.9 in adult     GAYLE (obstructive sleep apnea)     did not tolerate CPAP     PAST SURGICAL HISTORY:   Past Surgical History:   Procedure Laterality Date    APPENDECTOMY  3/2008    CYSTOSCOPY  10/11/2019    Procedure: CYSTOSCOPY;  Surgeon: Gamal Sosa MD;  [facility];  Service: Urology;;    CYSTOSCOPY W/ URETERAL STENT PLACEMENT Left 10/6/2019    Procedure: CYSTOSCOPY, WITH URETERAL STENT INSERTION;  Surgeon: Gamal Sosa MD;  [facility];  Service: Urology;  Laterality: Left;    ESOPHAGOGASTRODUODENOSCOPY N/A 7/3/2023    Procedure: EGD (ESOPHAGOGASTRODUODENOSCOPY);  Surgeon: Tay Stephen MD;  Location: [facility] OR 2ND FLR;  Service: General;  Laterality: N/A;    LASER LITHOTRIPSY Left 10/11/2019    Procedure: LITHOTRIPSY, USING LASER;  Surgeon: Gamal Sosa MD;  [facility];  Service: Urology;  Laterality: Left;    RETROGRADE PYELOGRAPHY Left 10/6/2019    Procedure: PYELOGRAM, RETROGRADE;  Surgeon: Gamal Sosa MD;  [facility];  Service: Urology;  Laterality: Left;    RETROGRADE PYELOGRAPHY Left 10/11/2019    Procedure: PYELOGRAM, RETROGRADE;  Surgeon: Gamal oSsa MD;  [facility];  Service: Urology;  Laterality: Left;    URETEROSCOPIC REMOVAL OF URETERIC CALCULUS  10/11/2019    Procedure: REMOVAL, CALCULUS, URETER, URETEROSCOPIC;   "Surgeon: Gamal Sosa MD;  Location: [facility] OR 1ST FLR;  Service: Urology;;    URETEROSCOPY Left 10/6/2019    Procedure: URETEROSCOPY;  Surgeon: Gamal Sosa MD;  Location: [facility] OR 1ST FLR;  Service: Urology;  Laterality: Left;    URETEROSCOPY Left 10/11/2019    Procedure: URETEROSCOPY;  Surgeon: Gamal Sosa MD;  [facility];  Service: Urology;  Laterality: Left;  1hr    XI ROBOTIC GASTROENTEROSTOMY, TODD-EN-Y N/A 7/3/2023    Procedure: XI ROBOTIC GASTROENTEROSTOMY, TODD-EN-Y (PT is SELF PAY, DO NOT submit to insurance;  Surgeon: Tay Stephen MD;  Location: [facility] OR 2ND FLR;  Service: General;  Laterality: N/A;     FAMILY HISTORY:   Family History   Problem Relation Age of Onset    Heart disease Father     Stroke Father     Hypertension Father     Diabetes Maternal Grandmother     Heart disease Maternal Grandfather     Stroke Paternal Grandmother     Heart disease Paternal Grandfather     Diabetes Mother     Obesity Mother     Obesity Sister     Diabetes Brother     Obesity Brother      SOCIAL HISTORY:   Social History     Socioeconomic History    Marital status: Single   Tobacco Use    Smoking status: Never     Passive exposure: Never    Smokeless tobacco: Former     Types: Chew    Tobacco comments:     2 cans/ week   Substance and Sexual Activity    Alcohol use: Not Currently     Alcohol/week: 3.0 standard drinks of alcohol     Types: 3 Cans of beer per week     Comment: "socially but not usually"    Drug use: No   Social History Narrative    Works full time: [employer] : [employer].  .  1 son, 14 years old.      Social Determinants of Health     Financial Resource Strain: Low Risk  (7/4/2023)    Overall Financial Resource Strain (CARDIA)     Difficulty of Paying Living Expenses: Not hard at all   Food Insecurity: No Food Insecurity (7/4/2023)    Hunger Vital Sign     Worried About Running Out of Food in the Last Year: Never true     Ran " Out of Food in the Last Year: Never true   Transportation Needs: No Transportation Needs (7/4/2023)    PRAPARE - Transportation     Lack of Transportation (Medical): No     Lack of Transportation (Non-Medical): No   Physical Activity: Inactive (7/4/2023)    Exercise Vital Sign     Days of Exercise per Week: 0 days     Minutes of Exercise per Session: 0 min   Stress: No Stress Concern Present (7/4/2023)    [employer] [place] [employer] - Occupational Stress Questionnaire     Feeling of Stress : Not at all   Social Connections: Unknown (7/4/2023)    Social Connection and Isolation Panel [NHANES]     Frequency of Communication with Friends and Family: More than three times a week     Frequency of Social Gatherings with Friends and Family: Once a week     Attends Denominational Services: More than 4 times per year     Active Member of Clubs or Organizations: No     Attends Club or Organization Meetings: Never   Housing Stability: Low Risk  (7/4/2023)    Housing Stability Vital Sign     Unable to Pay for Housing in the Last Year: No     Number of Places Lived in the Last Year: 1     Unstable Housing in the Last Year: No       MEDICATIONS:   Current Outpatient Medications:     amoxicillin-clavulanate 875-125mg (AUGMENTIN) 875-125 mg per tablet, Take 1 tablet by mouth 2 (two) times daily. (Patient not taking: Reported on 11/16/2023), Disp: 20 tablet, Rfl: 0    azithromycin (Z-EMA) 250 MG tablet, , Disp: , Rfl:     diclofenac sodium (VOLTAREN) 1 % Gel, Apply 2 g topically 4 (four) times daily as needed (pain)., Disp: 350 g, Rfl: 2    ergocalciferol (ERGOCALCIFEROL) 50,000 unit Cap, Take 1 capsule (50,000 Units total) by mouth every 7 days. for 12 doses, Disp: 12 capsule, Rfl: 0    fluticasone propionate (FLONASE) 50 mcg/actuation nasal spray, 2 sprays (100 mcg total) by Each Nostril route once daily. (Patient not taking: Reported on 11/16/2023), Disp: 11.1 mL, Rfl: 0    levocetirizine (XYZAL) 5 MG tablet, Take 1  tablet (5 mg total) by mouth every evening. (Patient not taking: Reported on 11/16/2023), Disp: 30 tablet, Rfl: 0    omeprazole (PRILOSEC) 40 MG capsule, Take 1 capsule (40 mg total) by mouth every morning. Open capsule and take with apple sauce, Disp: 30 capsule, Rfl: 2    oseltamivir (TAMIFLU) 75 MG capsule, , Disp: , Rfl:     traMADoL (ULTRAM) 50 mg tablet, Take 1-2 tablet(s) EVERY 8 HOURS by oral route as needed., Disp: , Rfl:     ursodioL (ALESSANDRO FORTE) 500 MG tablet, Crush one tablet by mouth daily for gall bladder., Disp: 90 tablet, Rfl: 1  ALLERGIES: Review of patient's allergies indicates:  No Known Allergies    VITAL SIGNS: There were no vitals taken for this visit.     Risks, indications and benefits of the surgical procedure were discussed with the patient. All questions with regard to surgery, rehab, expected return to functional activities, activities of daily living and recreational endeavors were answered to his satisfaction.    It was explained to the patient that there may be an increase in surgical risks if the patient has certain co-morbidities such as but not limited to: Obesity, Cardiovascular issues (CHF, CAD, Arrhythmias), chronic pulmonary issues, previous or current neurovascular/neurological issues, previous strokes, diabetes mellitus, previous wound healing issues, previous wound or skin infections, PVD, clotting disorders, if the patient uses chronic steroids, if the patient takes or has immune compromising medications or diseases, or has previously or currently used tobacco products.     The patient verbalized that he/she does not have any additional clotting, bleeding, or blood disorders, other than what is list in her chart on today's review.     Then a brief history and physical exam were performed.    Review of Systems   Constitution: Negative. Negative for chills, fever and night sweats.   HENT: Negative for congestion and headaches.    Eyes: Negative for blurred vision, left vision  loss and right vision loss.   Cardiovascular: Negative for chest pain and syncope.   Respiratory: Negative for cough and shortness of breath.    Endocrine: Negative for polydipsia, polyphagia and polyuria.   Hematologic/Lymphatic: Negative for bleeding problem. Does not bruise/bleed easily.   Skin: Negative for dry skin, itching and rash.   Musculoskeletal: Negative for falls and muscle weakness.   Gastrointestinal: Negative for abdominal pain and bowel incontinence.   Genitourinary: Negative for bladder incontinence and nocturia.   Neurological: Negative for disturbances in coordination, loss of balance and seizures.   Psychiatric/Behavioral: Negative for depression. The patient does not have insomnia.    Allergic/Immunologic: Negative for hives and persistent infections.     PHYSICAL EXAM:  GEN: A&Ox3, WD WN NAD  HEENT: WNL  CHEST: CTAB, no W/R/R  HEART: RRR, no M/R/G  ABD: Soft, NT ND, BS x4 QUADS  MS; See Epic  NEURO: CN II-XII intact       The surgical consent was then reviewed with the patient, who agreed with all the contents of the consent form and it was signed. he was then given the [facility] surgery packet to bring with him to surgery for the anesthesia portion of his perioperative paperwork.   For all physicians except for Dr. Martinez, we will email and possibly fax the consent forms and booking sheets to [facility] pre-admit.    The patient was given the opportunity to ask questions about the surgical plan and consent form, and once no other questions were asked, I proceeded with the pre-op appointment.    PHYSICAL THERAPY:  He was also instructed regarding physical therapy and will begin on POD#1-3 at the [place] location.     POST OP CARE:instructions were reviewed including care of the wound and dressing after surgery and when he can shower.     CRUTCHES OR WALKER: It was explained to the patient that if they are having a lower extremity surgery that they will require  either a walker or crutches to ambulate safely with after surgery. It was explained that a cane or other assistive devices are not sufficient to safely ambulate with after surgery. I explained to the patient that I will place an order for them to receive either crutches or a walker after surgery to go home with. It was explained that if they have crutches or a walker at home already, that they are REQUIRED to bring them to the hospital on the day of surgery. It was explained that if they do not have them at the hospital on the day of surgery that they WILL be provided a new pair or crutches or a walker to go home with to ensure ambulation will be safe if the patient needs to stop somewhere on the way home.      PAIN MANAGEMENT: Surjit Valentine was also given their pain management regimen, which includes the TENS unit given to him by [facility] along with the education required for its use. He was also instructed regarding the Polar ice unit that will be in place after surgery and his postoperative pain medications.     PAIN MEDICATION:  Norco 7.5/325mg 1 po q 4-6 hours prn pain  Ultram 50 mg Take 1-2 p.o. q.6 hours p.r.n. breakthrough pain,   Phenergan 25 mg one p.o. q.6 hours p.r.n. nausea and vomiting.    Post op meds to be delivered bedside prior to discharge. Deliver to family if patient is in surgery at 5pm.    The patient was told that narcotic pain medications may make them drowsy and instructions were given to not sign legal documents, drive or operate heavy machinery, cars, or equipment while under the influence of narcotic medications. The patient was told and understands that narcotic pain medications should only be used as needed to control pain and that other options of pain control include TENs unit and ice packs/unit.     Patient was instructed to purchase and take Colace to counter possible GI side effects of taking opiates.     DVT prophylaxis was discussed with the patient today including  risk factors for developing DVTs and history of DVTs. The patient was asked if any specific recommendations were given from the doctor/s that did pre-operative surgical clearance. The patient was then given an education sheet about DVTs and PE with warning signs and symptoms of both and steps to take if they suspect either of these.    Patient was asked if they were taking or using OCP pills or devices. If they answered yes, then they were instructed to stop using OCPs at this pre-operative appointment until 2 months post-op to help prevent DVT development. They understand that there are other forms of birth control that do not involve hormones. They expressed understanding that ignoring/not following this instruction could result in a DVT which could turn into a deadly pulmonary embolism.     This along with the Modified Caprini risk assessment model for VTE in general surgical patients was used to determine the patient's DVT risk.     From: Herlinda CLARK, Quoc DA, Ghada MARTINEZ, et al. Prevention of VTE in nonorthopedic surgical patients: antithrombotic therapy and prevention of thrombosis, 9th ed: American College of Chest Physicians evidence-based clinical practical guidelines. Chest 2012; 141:e227S. Copyright © 2012. Reproduced with permission from the American College of Chest Physicians.    The below listed DVT prophylaxis regimen was discussed along with SCDs during surgery and bilateral CHAPARRO compression stockings to be used post-op. Length of treatment has been determined to be 10-42 days post-op by the above noted Caprini assessment model. Early ambulation post-op was also discussed and emphasized with the patient.     Patient was instructed to buy and take:  Aspirin 325mg QD x 3 weeks for DVT prophylaxis starting on the evening after surgery.  Patient will also use bilateral TEDs on lower extremities, SCDs during surgery, and early ambulation post-op. If the patient was previously taking 81mg baby aspirin, they  were told to not take it will using the above stated aspirin and to restart the 81mg aspirin after completion of the aspirin dose.      Patient was also told to buy over the counter Prilosec medication and take it once daily for GI protection as long as they are taking NSAIDs or Aspirin.     Published data in Stephanie ALEXANDER et al. J Arthroplasty. Oct; 31(10):2237-40, 2016; showed that aspirin use as prophylaxis during revision total joint arthroplasty was more effective than warfarin in preventing symptomatic venous thromboembolic events and was associated with lower complications.  Patients in the study were also treated with intermittent pneumatic compression devices. Compression stockings would be our method of mechanical prophylaxis, which has been shown to be similar to pneumatic compression in the systematic review, Andreas RJ, et al. Delfina Surg. Feb; 239(2): 162-171, 2004.     Results showed a significantly higher incidence of symptomatic venous thromboembolic events among patients in the warfarin group vs. the aspirin group (1.75% vs. 0.56%). Researchers also noted a bleeding event rate of 1.5% among patients who received warfarin compared with a rate of 0.4% among patients who received aspirin.    Patient denies history of seizures.     I explained to following and the patient expressed understanding:  The patient is currently aware of the COVID19 pandemic and that proceeding with their surgical procedure could potentially increase exposure to coronavirus in the community. The patient understands that there is the possibility of delayed or cancelled appts or PT visits in the future. They understand that infection with the coronavirus could complicate their surgery recovery. They are aware of the current policies and procedures of [facility] and the government regarding the pandemic and they were given the option of delaying my surgery. The patient elects to proceed with surgery at this time.     The patient was  instructed to practice strict social distancing, hand washing/hygiene, respiratory hygiene, and cough etiquette from now until 6 weeks following surgery to reduce the risk of pretty coronavirus.    As there were no other questions to be asked, he was given my business card along with Rogers Martinez MD business card if he has any questions or concerns prior to surgery or in the postop period.

## 2023-11-16 NOTE — H&P (VIEW-ONLY)
Surjit Valentine  is here for a completion of his perioperative paperwork. he  Is scheduled to undergo:    RIGHT knee arthroscopic partial meniscectomy, possible chondroplasty, possible loose body removal, and any other indicated procedures.     on 11/16/2023.      He is a healthy individual but does need clearance for this procedure.     He is cleared to proceed with surgery.     PAST MEDICAL HISTORY:   Past Medical History:   Diagnosis Date    GERD without esophagitis     takes OTC nexium PRN    History of kidney stones     Morbid obesity with body mass index of 60.0-69.9 in adult     GAYLE (obstructive sleep apnea)     did not tolerate CPAP     PAST SURGICAL HISTORY:   Past Surgical History:   Procedure Laterality Date    APPENDECTOMY  3/2008    CYSTOSCOPY  10/11/2019    Procedure: CYSTOSCOPY;  Surgeon: Gamal Sosa MD;  [facility];  Service: Urology;;    CYSTOSCOPY W/ URETERAL STENT PLACEMENT Left 10/6/2019    Procedure: CYSTOSCOPY, WITH URETERAL STENT INSERTION;  Surgeon: Gamal Sosa MD;  [facility];  Service: Urology;  Laterality: Left;    ESOPHAGOGASTRODUODENOSCOPY N/A 7/3/2023    Procedure: EGD (ESOPHAGOGASTRODUODENOSCOPY);  Surgeon: Tay Stephen MD;  Location: [facility] OR 2ND FLR;  Service: General;  Laterality: N/A;    LASER LITHOTRIPSY Left 10/11/2019    Procedure: LITHOTRIPSY, USING LASER;  Surgeon: Gamal Sosa MD;  [facility];  Service: Urology;  Laterality: Left;    RETROGRADE PYELOGRAPHY Left 10/6/2019    Procedure: PYELOGRAM, RETROGRADE;  Surgeon: Gamal Sosa MD;  [facility];  Service: Urology;  Laterality: Left;    RETROGRADE PYELOGRAPHY Left 10/11/2019    Procedure: PYELOGRAM, RETROGRADE;  Surgeon: Gaaml Sosa MD;  [facility];  Service: Urology;  Laterality: Left;    URETEROSCOPIC REMOVAL OF URETERIC CALCULUS  10/11/2019    Procedure: REMOVAL, CALCULUS, URETER, URETEROSCOPIC;   "Surgeon: Gamal Sosa MD;  Location: [facility] OR 1ST FLR;  Service: Urology;;    URETEROSCOPY Left 10/6/2019    Procedure: URETEROSCOPY;  Surgeon: Gamal Sosa MD;  Location: [facility] OR 1ST FLR;  Service: Urology;  Laterality: Left;    URETEROSCOPY Left 10/11/2019    Procedure: URETEROSCOPY;  Surgeon: Gamal Sosa MD;  [facility];  Service: Urology;  Laterality: Left;  1hr    XI ROBOTIC GASTROENTEROSTOMY, TODD-EN-Y N/A 7/3/2023    Procedure: XI ROBOTIC GASTROENTEROSTOMY, TODD-EN-Y (PT is SELF PAY, DO NOT submit to insurance;  Surgeon: Tay Stephen MD;  Location: [facility] OR 2ND FLR;  Service: General;  Laterality: N/A;     FAMILY HISTORY:   Family History   Problem Relation Age of Onset    Heart disease Father     Stroke Father     Hypertension Father     Diabetes Maternal Grandmother     Heart disease Maternal Grandfather     Stroke Paternal Grandmother     Heart disease Paternal Grandfather     Diabetes Mother     Obesity Mother     Obesity Sister     Diabetes Brother     Obesity Brother      SOCIAL HISTORY:   Social History     Socioeconomic History    Marital status: Single   Tobacco Use    Smoking status: Never     Passive exposure: Never    Smokeless tobacco: Former     Types: Chew    Tobacco comments:     2 cans/ week   Substance and Sexual Activity    Alcohol use: Not Currently     Alcohol/week: 3.0 standard drinks of alcohol     Types: 3 Cans of beer per week     Comment: "socially but not usually"    Drug use: No   Social History Narrative    Works full time: [employer] : [employer].  .  1 son, 14 years old.      Social Determinants of Health     Financial Resource Strain: Low Risk  (7/4/2023)    Overall Financial Resource Strain (CARDIA)     Difficulty of Paying Living Expenses: Not hard at all   Food Insecurity: No Food Insecurity (7/4/2023)    Hunger Vital Sign     Worried About Running Out of Food in the Last Year: Never true     Ran " Out of Food in the Last Year: Never true   Transportation Needs: No Transportation Needs (7/4/2023)    PRAPARE - Transportation     Lack of Transportation (Medical): No     Lack of Transportation (Non-Medical): No   Physical Activity: Inactive (7/4/2023)    Exercise Vital Sign     Days of Exercise per Week: 0 days     Minutes of Exercise per Session: 0 min   Stress: No Stress Concern Present (7/4/2023)    [employer] - Occupational Stress Questionnaire     Feeling of Stress : Not at all   Social Connections: Unknown (7/4/2023)    Social Connection and Isolation Panel [NHANES]     Frequency of Communication with Friends and Family: More than three times a week     Frequency of Social Gatherings with Friends and Family: Once a week     Attends Alevism Services: More than 4 times per year     Active Member of Clubs or Organizations: No     Attends Club or Organization Meetings: Never   Housing Stability: Low Risk  (7/4/2023)    Housing Stability Vital Sign     Unable to Pay for Housing in the Last Year: No     Number of Places Lived in the Last Year: 1     Unstable Housing in the Last Year: No       MEDICATIONS:   Current Outpatient Medications:     amoxicillin-clavulanate 875-125mg (AUGMENTIN) 875-125 mg per tablet, Take 1 tablet by mouth 2 (two) times daily. (Patient not taking: Reported on 11/16/2023), Disp: 20 tablet, Rfl: 0    azithromycin (Z-EMA) 250 MG tablet, , Disp: , Rfl:     diclofenac sodium (VOLTAREN) 1 % Gel, Apply 2 g topically 4 (four) times daily as needed (pain)., Disp: 350 g, Rfl: 2    ergocalciferol (ERGOCALCIFEROL) 50,000 unit Cap, Take 1 capsule (50,000 Units total) by mouth every 7 days. for 12 doses, Disp: 12 capsule, Rfl: 0    fluticasone propionate (FLONASE) 50 mcg/actuation nasal spray, 2 sprays (100 mcg total) by Each Nostril route once daily. (Patient not taking: Reported on 11/16/2023), Disp: 11.1 mL, Rfl: 0    levocetirizine (XYZAL) 5 MG tablet, Take 1  tablet (5 mg total) by mouth every evening. (Patient not taking: Reported on 11/16/2023), Disp: 30 tablet, Rfl: 0    omeprazole (PRILOSEC) 40 MG capsule, Take 1 capsule (40 mg total) by mouth every morning. Open capsule and take with apple sauce, Disp: 30 capsule, Rfl: 2    oseltamivir (TAMIFLU) 75 MG capsule, , Disp: , Rfl:     traMADoL (ULTRAM) 50 mg tablet, Take 1-2 tablet(s) EVERY 8 HOURS by oral route as needed., Disp: , Rfl:     ursodioL (ALESSANDRO FORTE) 500 MG tablet, Crush one tablet by mouth daily for gall bladder., Disp: 90 tablet, Rfl: 1  ALLERGIES: Review of patient's allergies indicates:  No Known Allergies    VITAL SIGNS: There were no vitals taken for this visit.     Risks, indications and benefits of the surgical procedure were discussed with the patient. All questions with regard to surgery, rehab, expected return to functional activities, activities of daily living and recreational endeavors were answered to his satisfaction.    It was explained to the patient that there may be an increase in surgical risks if the patient has certain co-morbidities such as but not limited to: Obesity, Cardiovascular issues (CHF, CAD, Arrhythmias), chronic pulmonary issues, previous or current neurovascular/neurological issues, previous strokes, diabetes mellitus, previous wound healing issues, previous wound or skin infections, PVD, clotting disorders, if the patient uses chronic steroids, if the patient takes or has immune compromising medications or diseases, or has previously or currently used tobacco products.     The patient verbalized that he/she does not have any additional clotting, bleeding, or blood disorders, other than what is list in her chart on today's review.     Then a brief history and physical exam were performed.    Review of Systems   Constitution: Negative. Negative for chills, fever and night sweats.   HENT: Negative for congestion and headaches.    Eyes: Negative for blurred vision, left vision  loss and right vision loss.   Cardiovascular: Negative for chest pain and syncope.   Respiratory: Negative for cough and shortness of breath.    Endocrine: Negative for polydipsia, polyphagia and polyuria.   Hematologic/Lymphatic: Negative for bleeding problem. Does not bruise/bleed easily.   Skin: Negative for dry skin, itching and rash.   Musculoskeletal: Negative for falls and muscle weakness.   Gastrointestinal: Negative for abdominal pain and bowel incontinence.   Genitourinary: Negative for bladder incontinence and nocturia.   Neurological: Negative for disturbances in coordination, loss of balance and seizures.   Psychiatric/Behavioral: Negative for depression. The patient does not have insomnia.    Allergic/Immunologic: Negative for hives and persistent infections.     PHYSICAL EXAM:  GEN: A&Ox3, WD WN NAD  HEENT: WNL  CHEST: CTAB, no W/R/R  HEART: RRR, no M/R/G  ABD: Soft, NT ND, BS x4 QUADS  MS; See Epic  NEURO: CN II-XII intact       The surgical consent was then reviewed with the patient, who agreed with all the contents of the consent form and it was signed. he was then given the [facility] surgery packet to bring with him to surgery for the anesthesia portion of his perioperative paperwork.   For all physicians except for Dr. Martinez, we will email and possibly fax the consent forms and booking sheets to [facility] pre-admit.    The patient was given the opportunity to ask questions about the surgical plan and consent form, and once no other questions were asked, I proceeded with the pre-op appointment.    PHYSICAL THERAPY:  He was also instructed regarding physical therapy and will begin on POD#1-3 at the [place] location.     POST OP CARE:instructions were reviewed including care of the wound and dressing after surgery and when he can shower.     CRUTCHES OR WALKER: It was explained to the patient that if they are having a lower extremity surgery that they will require  either a walker or crutches to ambulate safely with after surgery. It was explained that a cane or other assistive devices are not sufficient to safely ambulate with after surgery. I explained to the patient that I will place an order for them to receive either crutches or a walker after surgery to go home with. It was explained that if they have crutches or a walker at home already, that they are REQUIRED to bring them to the hospital on the day of surgery. It was explained that if they do not have them at the hospital on the day of surgery that they WILL be provided a new pair or crutches or a walker to go home with to ensure ambulation will be safe if the patient needs to stop somewhere on the way home.      PAIN MANAGEMENT: Surjit Valentine was also given their pain management regimen, which includes the TENS unit given to him by [facility] along with the education required for its use. He was also instructed regarding the Polar ice unit that will be in place after surgery and his postoperative pain medications.     PAIN MEDICATION:  Norco 7.5/325mg 1 po q 4-6 hours prn pain  Ultram 50 mg Take 1-2 p.o. q.6 hours p.r.n. breakthrough pain,   Phenergan 25 mg one p.o. q.6 hours p.r.n. nausea and vomiting.    Post op meds to be delivered bedside prior to discharge. Deliver to family if patient is in surgery at 5pm.    The patient was told that narcotic pain medications may make them drowsy and instructions were given to not sign legal documents, drive or operate heavy machinery, cars, or equipment while under the influence of narcotic medications. The patient was told and understands that narcotic pain medications should only be used as needed to control pain and that other options of pain control include TENs unit and ice packs/unit.     Patient was instructed to purchase and take Colace to counter possible GI side effects of taking opiates.     DVT prophylaxis was discussed with the patient today including  risk factors for developing DVTs and history of DVTs. The patient was asked if any specific recommendations were given from the doctor/s that did pre-operative surgical clearance. The patient was then given an education sheet about DVTs and PE with warning signs and symptoms of both and steps to take if they suspect either of these.    Patient was asked if they were taking or using OCP pills or devices. If they answered yes, then they were instructed to stop using OCPs at this pre-operative appointment until 2 months post-op to help prevent DVT development. They understand that there are other forms of birth control that do not involve hormones. They expressed understanding that ignoring/not following this instruction could result in a DVT which could turn into a deadly pulmonary embolism.     This along with the Modified Caprini risk assessment model for VTE in general surgical patients was used to determine the patient's DVT risk.     From: Herlinda CLARK, Quoc DA, Ghada MARTINEZ, et al. Prevention of VTE in nonorthopedic surgical patients: antithrombotic therapy and prevention of thrombosis, 9th ed: American College of Chest Physicians evidence-based clinical practical guidelines. Chest 2012; 141:e227S. Copyright © 2012. Reproduced with permission from the American College of Chest Physicians.    The below listed DVT prophylaxis regimen was discussed along with SCDs during surgery and bilateral CHAPARRO compression stockings to be used post-op. Length of treatment has been determined to be 10-42 days post-op by the above noted Caprini assessment model. Early ambulation post-op was also discussed and emphasized with the patient.     Patient was instructed to buy and take:  Aspirin 325mg QD x 3 weeks for DVT prophylaxis starting on the evening after surgery.  Patient will also use bilateral TEDs on lower extremities, SCDs during surgery, and early ambulation post-op. If the patient was previously taking 81mg baby aspirin, they  were told to not take it will using the above stated aspirin and to restart the 81mg aspirin after completion of the aspirin dose.      Patient was also told to buy over the counter Prilosec medication and take it once daily for GI protection as long as they are taking NSAIDs or Aspirin.     Published data in Stephanie ALEXANDER et al. J Arthroplasty. Oct; 31(10):2237-40, 2016; showed that aspirin use as prophylaxis during revision total joint arthroplasty was more effective than warfarin in preventing symptomatic venous thromboembolic events and was associated with lower complications.  Patients in the study were also treated with intermittent pneumatic compression devices. Compression stockings would be our method of mechanical prophylaxis, which has been shown to be similar to pneumatic compression in the systematic review, Andreas RJ, et al. Delfina Surg. Feb; 239(2): 162-171, 2004.     Results showed a significantly higher incidence of symptomatic venous thromboembolic events among patients in the warfarin group vs. the aspirin group (1.75% vs. 0.56%). Researchers also noted a bleeding event rate of 1.5% among patients who received warfarin compared with a rate of 0.4% among patients who received aspirin.    Patient denies history of seizures.     I explained to following and the patient expressed understanding:  The patient is currently aware of the COVID19 pandemic and that proceeding with their surgical procedure could potentially increase exposure to coronavirus in the community. The patient understands that there is the possibility of delayed or cancelled appts or PT visits in the future. They understand that infection with the coronavirus could complicate their surgery recovery. They are aware of the current policies and procedures of [facility] and the government regarding the pandemic and they were given the option of delaying my surgery. The patient elects to proceed with surgery at this time.     The patient was  instructed to practice strict social distancing, hand washing/hygiene, respiratory hygiene, and cough etiquette from now until 6 weeks following surgery to reduce the risk of pretty coronavirus.    As there were no other questions to be asked, he was given my business card along with Rogers Martinez MD business card if he has any questions or concerns prior to surgery or in the postop period.

## 2023-11-17 ENCOUNTER — HOSPITAL ENCOUNTER (OUTPATIENT)
Facility: HOSPITAL | Age: 43
Discharge: HOME | End: 2023-11-17
Attending: ORTHOPAEDIC SURGERY | Admitting: ORTHOPAEDIC SURGERY
Payer: COMMERCIAL

## 2023-11-17 ENCOUNTER — ANESTHESIA (OUTPATIENT)
Dept: SURGERY | Facility: HOSPITAL | Age: 43
End: 2023-11-17
Payer: COMMERCIAL

## 2023-11-17 VITALS
OXYGEN SATURATION: 97 % | WEIGHT: 315 LBS | BODY MASS INDEX: 46.65 KG/M2 | DIASTOLIC BLOOD PRESSURE: 70 MMHG | SYSTOLIC BLOOD PRESSURE: 110 MMHG | RESPIRATION RATE: 20 BRPM | HEIGHT: 69 IN | HEART RATE: 54 BPM | TEMPERATURE: 98 F

## 2023-11-17 VITALS — HEART RATE: 52 BPM | SYSTOLIC BLOOD PRESSURE: 132 MMHG | RESPIRATION RATE: 19 BRPM | DIASTOLIC BLOOD PRESSURE: 86 MMHG

## 2023-11-17 DIAGNOSIS — M23.203 OLD TEAR OF MEDIAL MENISCUS OF RIGHT KNEE, UNSPECIFIED TEAR TYPE: Primary | ICD-10-CM

## 2023-11-17 PROCEDURE — 29881 PR KNEE SCOPE SINGLE MENISECECTOMY: ICD-10-PCS | Mod: RT,,, | Performed by: ORTHOPAEDIC SURGERY

## 2023-11-17 PROCEDURE — D9220A PRA ANESTHESIA: Mod: ANES,,, | Performed by: ANESTHESIOLOGY

## 2023-11-17 PROCEDURE — 71000016 HC POSTOP RECOV ADDL HR: Performed by: ORTHOPAEDIC SURGERY

## 2023-11-17 PROCEDURE — 37000009 HC ANESTHESIA EA ADD 15 MINS: Performed by: ORTHOPAEDIC SURGERY

## 2023-11-17 PROCEDURE — 63600175 PHARM REV CODE 636 W HCPCS: Performed by: ANESTHESIOLOGY

## 2023-11-17 PROCEDURE — 71000044 HC DOSC ROUTINE RECOVERY FIRST HOUR: Performed by: ORTHOPAEDIC SURGERY

## 2023-11-17 PROCEDURE — 25000003 PHARM REV CODE 250: Performed by: NURSE ANESTHETIST, CERTIFIED REGISTERED

## 2023-11-17 PROCEDURE — 71000045 HC DOSC ROUTINE RECOVERY EA ADD'L HR: Performed by: ORTHOPAEDIC SURGERY

## 2023-11-17 PROCEDURE — 64447 NJX AA&/STRD FEMORAL NRV IMG: CPT | Performed by: STUDENT IN AN ORGANIZED HEALTH CARE EDUCATION/TRAINING PROGRAM

## 2023-11-17 PROCEDURE — 64447 NJX AA&/STRD FEMORAL NRV IMG: CPT | Mod: 59,RT,, | Performed by: ANESTHESIOLOGY

## 2023-11-17 PROCEDURE — 71000015 HC POSTOP RECOV 1ST HR: Performed by: ORTHOPAEDIC SURGERY

## 2023-11-17 PROCEDURE — 64447 R ADDUCTOR CANAL SINGLE INJECTION: ICD-10-PCS | Mod: 59,RT,, | Performed by: ANESTHESIOLOGY

## 2023-11-17 PROCEDURE — D9220A PRA ANESTHESIA: ICD-10-PCS | Mod: CRNA,,, | Performed by: NURSE ANESTHETIST, CERTIFIED REGISTERED

## 2023-11-17 PROCEDURE — 37000008 HC ANESTHESIA 1ST 15 MINUTES: Performed by: ORTHOPAEDIC SURGERY

## 2023-11-17 PROCEDURE — 25000003 PHARM REV CODE 250: Performed by: STUDENT IN AN ORGANIZED HEALTH CARE EDUCATION/TRAINING PROGRAM

## 2023-11-17 PROCEDURE — 63600175 PHARM REV CODE 636 W HCPCS: Performed by: NURSE ANESTHETIST, CERTIFIED REGISTERED

## 2023-11-17 PROCEDURE — D9220A PRA ANESTHESIA: Mod: CRNA,,, | Performed by: NURSE ANESTHETIST, CERTIFIED REGISTERED

## 2023-11-17 PROCEDURE — D9220A PRA ANESTHESIA: ICD-10-PCS | Mod: ANES,,, | Performed by: ANESTHESIOLOGY

## 2023-11-17 PROCEDURE — 36000711: Performed by: ORTHOPAEDIC SURGERY

## 2023-11-17 PROCEDURE — 27201423 OPTIME MED/SURG SUP & DEVICES STERILE SUPPLY: Performed by: ORTHOPAEDIC SURGERY

## 2023-11-17 PROCEDURE — 29881 ARTHRS KNE SRG MNISECTMY M/L: CPT | Mod: RT,,, | Performed by: ORTHOPAEDIC SURGERY

## 2023-11-17 PROCEDURE — 36000710: Performed by: ORTHOPAEDIC SURGERY

## 2023-11-17 PROCEDURE — 63600175 PHARM REV CODE 636 W HCPCS: Performed by: ORTHOPAEDIC SURGERY

## 2023-11-17 RX ORDER — HYDROMORPHONE HYDROCHLORIDE 1 MG/ML
0.2 INJECTION, SOLUTION INTRAMUSCULAR; INTRAVENOUS; SUBCUTANEOUS EVERY 5 MIN PRN
Status: DISCONTINUED | OUTPATIENT
Start: 2023-11-17 | End: 2023-11-17 | Stop reason: HOSPADM

## 2023-11-17 RX ORDER — SUCCINYLCHOLINE CHLORIDE 20 MG/ML
INJECTION INTRAMUSCULAR; INTRAVENOUS
Status: DISCONTINUED | OUTPATIENT
Start: 2023-11-17 | End: 2023-11-17

## 2023-11-17 RX ORDER — MIDAZOLAM HYDROCHLORIDE 1 MG/ML
INJECTION INTRAMUSCULAR; INTRAVENOUS
Status: DISCONTINUED | OUTPATIENT
Start: 2023-11-17 | End: 2023-11-17

## 2023-11-17 RX ORDER — CEFAZOLIN SODIUM 1 G/3ML
INJECTION, POWDER, FOR SOLUTION INTRAMUSCULAR; INTRAVENOUS
Status: DISCONTINUED | OUTPATIENT
Start: 2023-11-17 | End: 2023-11-17

## 2023-11-17 RX ORDER — ACETAMINOPHEN 10 MG/ML
INJECTION, SOLUTION INTRAVENOUS
Status: DISCONTINUED | OUTPATIENT
Start: 2023-11-17 | End: 2023-11-17

## 2023-11-17 RX ORDER — ONDANSETRON 2 MG/ML
4 INJECTION INTRAMUSCULAR; INTRAVENOUS DAILY PRN
Status: DISCONTINUED | OUTPATIENT
Start: 2023-11-17 | End: 2023-11-17 | Stop reason: HOSPADM

## 2023-11-17 RX ORDER — FENTANYL CITRATE 50 UG/ML
INJECTION, SOLUTION INTRAMUSCULAR; INTRAVENOUS
Status: DISCONTINUED | OUTPATIENT
Start: 2023-11-17 | End: 2023-11-17

## 2023-11-17 RX ORDER — SODIUM CHLORIDE 0.9 % (FLUSH) 0.9 %
3 SYRINGE (ML) INJECTION EVERY 30 MIN PRN
Status: DISCONTINUED | OUTPATIENT
Start: 2023-11-17 | End: 2023-11-17 | Stop reason: HOSPADM

## 2023-11-17 RX ORDER — LIDOCAINE HYDROCHLORIDE 20 MG/ML
INJECTION INTRAVENOUS
Status: DISCONTINUED | OUTPATIENT
Start: 2023-11-17 | End: 2023-11-17

## 2023-11-17 RX ORDER — ROCURONIUM BROMIDE 10 MG/ML
INJECTION, SOLUTION INTRAVENOUS
Status: DISCONTINUED | OUTPATIENT
Start: 2023-11-17 | End: 2023-11-17

## 2023-11-17 RX ORDER — SODIUM CHLORIDE 9 MG/ML
INJECTION, SOLUTION INTRAVENOUS CONTINUOUS
Status: DISCONTINUED | OUTPATIENT
Start: 2023-11-17 | End: 2023-11-17 | Stop reason: HOSPADM

## 2023-11-17 RX ORDER — PROPOFOL 10 MG/ML
VIAL (ML) INTRAVENOUS
Status: DISCONTINUED | OUTPATIENT
Start: 2023-11-17 | End: 2023-11-17

## 2023-11-17 RX ORDER — FENTANYL CITRATE 50 UG/ML
25-200 INJECTION, SOLUTION INTRAMUSCULAR; INTRAVENOUS
Status: DISCONTINUED | OUTPATIENT
Start: 2023-11-17 | End: 2023-11-17 | Stop reason: HOSPADM

## 2023-11-17 RX ORDER — EPINEPHRINE 1 MG/ML
INJECTION, SOLUTION, CONCENTRATE INTRAVENOUS
Status: DISCONTINUED | OUTPATIENT
Start: 2023-11-17 | End: 2023-11-17 | Stop reason: HOSPADM

## 2023-11-17 RX ORDER — ROPIVACAINE HYDROCHLORIDE 5 MG/ML
INJECTION, SOLUTION EPIDURAL; INFILTRATION; PERINEURAL
Status: COMPLETED | OUTPATIENT
Start: 2023-11-17 | End: 2023-11-17

## 2023-11-17 RX ORDER — SODIUM CHLORIDE 9 MG/ML
INJECTION, SOLUTION INTRAVENOUS CONTINUOUS PRN
Status: DISCONTINUED | OUTPATIENT
Start: 2023-11-17 | End: 2023-11-17

## 2023-11-17 RX ORDER — ONDANSETRON 2 MG/ML
INJECTION INTRAMUSCULAR; INTRAVENOUS
Status: DISCONTINUED | OUTPATIENT
Start: 2023-11-17 | End: 2023-11-17

## 2023-11-17 RX ORDER — DEXAMETHASONE SODIUM PHOSPHATE 4 MG/ML
INJECTION, SOLUTION INTRA-ARTICULAR; INTRALESIONAL; INTRAMUSCULAR; INTRAVENOUS; SOFT TISSUE
Status: DISCONTINUED | OUTPATIENT
Start: 2023-11-17 | End: 2023-11-17

## 2023-11-17 RX ORDER — MIDAZOLAM HYDROCHLORIDE 1 MG/ML
.5-4 INJECTION INTRAMUSCULAR; INTRAVENOUS
Status: DISCONTINUED | OUTPATIENT
Start: 2023-11-17 | End: 2023-11-17 | Stop reason: HOSPADM

## 2023-11-17 RX ADMIN — FENTANYL CITRATE 25 MCG: 50 INJECTION, SOLUTION INTRAMUSCULAR; INTRAVENOUS at 07:11

## 2023-11-17 RX ADMIN — LIDOCAINE HYDROCHLORIDE 100 MG: 20 INJECTION INTRAVENOUS at 07:11

## 2023-11-17 RX ADMIN — ACETAMINOPHEN 1000 MG: 10 INJECTION, SOLUTION INTRAVENOUS at 08:11

## 2023-11-17 RX ADMIN — SUCCINYLCHOLINE CHLORIDE 200 MG: 20 INJECTION, SOLUTION INTRAMUSCULAR; INTRAVENOUS at 07:11

## 2023-11-17 RX ADMIN — HYDROMORPHONE HYDROCHLORIDE 0.2 MG: 1 INJECTION, SOLUTION INTRAMUSCULAR; INTRAVENOUS; SUBCUTANEOUS at 08:11

## 2023-11-17 RX ADMIN — PROPOFOL 350 MG: 10 INJECTION, EMULSION INTRAVENOUS at 07:11

## 2023-11-17 RX ADMIN — HYDROMORPHONE HYDROCHLORIDE 0.2 MG: 1 INJECTION, SOLUTION INTRAMUSCULAR; INTRAVENOUS; SUBCUTANEOUS at 09:11

## 2023-11-17 RX ADMIN — ONDANSETRON 4 MG: 2 INJECTION INTRAMUSCULAR; INTRAVENOUS at 08:11

## 2023-11-17 RX ADMIN — ROCURONIUM BROMIDE 10 MG: 10 INJECTION, SOLUTION INTRAVENOUS at 07:11

## 2023-11-17 RX ADMIN — FENTANYL CITRATE 25 MCG: 50 INJECTION, SOLUTION INTRAMUSCULAR; INTRAVENOUS at 06:11

## 2023-11-17 RX ADMIN — DEXAMETHASONE SODIUM PHOSPHATE 4 MG: 4 INJECTION, SOLUTION INTRAMUSCULAR; INTRAVENOUS at 07:11

## 2023-11-17 RX ADMIN — PROPOFOL 50 MG: 10 INJECTION, EMULSION INTRAVENOUS at 07:11

## 2023-11-17 RX ADMIN — SODIUM CHLORIDE: 0.9 INJECTION, SOLUTION INTRAVENOUS at 06:11

## 2023-11-17 RX ADMIN — CEFAZOLIN 3 G: 330 INJECTION, POWDER, FOR SOLUTION INTRAMUSCULAR; INTRAVENOUS at 07:11

## 2023-11-17 RX ADMIN — ROPIVACAINE HYDROCHLORIDE 10 ML: 5 INJECTION EPIDURAL; INFILTRATION; PERINEURAL at 06:11

## 2023-11-17 RX ADMIN — SUGAMMADEX 350 MG: 100 INJECTION, SOLUTION INTRAVENOUS at 08:11

## 2023-11-17 RX ADMIN — ROCURONIUM BROMIDE 20 MG: 10 INJECTION, SOLUTION INTRAVENOUS at 07:11

## 2023-11-17 RX ADMIN — MIDAZOLAM HYDROCHLORIDE 2 MG: 1 INJECTION INTRAMUSCULAR; INTRAVENOUS at 06:11

## 2023-11-17 NOTE — BRIEF OP NOTE
[facility] - Surgery (2nd Fl)  Brief Operative Note    Surgery Date: 11/17/2023     Surgeon(s) and Role:     * Rogers Martinez MD - Primary    Assisting Surgeon: None    Pre-op Diagnosis:  Old tear of medial meniscus of right knee, unspecified tear type [M23.203]  Chondromalacia of right knee [M94.261]    Post-op Diagnosis:  Post-Op Diagnosis Codes:     * Old tear of medial meniscus of right knee, unspecified tear type [M23.203]     * Chondromalacia of right knee [M94.261]    Procedure(s) (LRB):  ARTHROSCOPY, KNEE, WITH MENISCECTOMY (Right)  ARTHROSCOPY, KNEE, WITH CHONDROPLASTY (Right)    Anesthesia: General    Operative Findings: see op note    Estimated Blood Loss: * No values recorded between 11/17/2023  7:37 AM and 11/17/2023  8:26 AM *         Specimens:   Specimen (24h ago, onward)      None              Discharge Note    OUTCOME: Patient tolerated treatment/procedure well without complication and is now ready for discharge.    DISPOSITION: Home or Self Care    FINAL DIAGNOSIS:  <principal problem not specified>    FOLLOWUP: In clinic    DISCHARGE INSTRUCTIONS:  No discharge procedures on file.

## 2023-11-17 NOTE — OP NOTE
Bhupinder Jara - Surgery ([facility])  Surgery Department  Operative Note    SUMMARY     Date of Procedure: 11/17/2023     Procedure: Procedure(s) (LRB):  ARTHROSCOPY, KNEE, WITH MENISCECTOMY (Right)  ARTHROSCOPY, KNEE, WITH CHONDROPLASTY (Right)     Surgeon(s) and Role:     * Rogers Martinez MD - Primary    Assisting Surgeon:   DO Eugenio Mcgill     Pre-Operative Diagnosis: Old tear of medial meniscus of right knee, unspecified tear type [M23.203]  Chondromalacia of right knee [M94.261]    Post-Operative Diagnosis: Post-Op Diagnosis Codes:     * Old tear of medial meniscus of right knee, unspecified tear type [M23.203]     * Chondromalacia of right knee [M94.261]    Anesthesia: General    Technical Procedures Used:     DATE OF PROCEDURE:  11/17/2023      PREOPERATIVE DIAGNOSES:   1. Right knee chondromalacia  2. Right knee lateral meniscus tear  3. Right knee loose bodies     POSTOPERATIVE DIAGNOSES:   1. Right knee chondromalacia  2. Right knee lateral meniscus tear  3. Right knee loose bodies     PROCEDURES:   1. Right knee arthroscopic chondroplasty  2. Right knee arthroscopic partial lateral meniscectomy  3. Right knee arthroscopic removal of loose bodies     SURGEON: Rogers Martinez M.D.      ASSISTANT:   DO Eugenio Mcgill      COMPLICATIONS: None.      POSITION: Supine with arthroscopic leg monae.      ANESTHESIA: General endotracheal plus block.      COMPLICATIONS: None.      TOURNIQUET TIME:  None     EBL:  Minimal     EXAMINATION UNDER ANESTHESIA:   Knee: full range of motion, no evidence of instability.      ARTHROSCOPIC FINDINGS:   1. Complex tear anterior horn / body lateral meniscus.   2. Intact medial meniscus  3. Chondromalacia, patella, trochlea, grade 1/2 extensive     INDICATIONS: The patient is a 43 year-old male with a history of right knee pain. MRI confirms a tear in his meniscus.  After the risks and benefits of surgery were discussed with the patient, including  bleeding, infection, scarring, persistent pain, risk of blood clots (DVT), pulmonary embolism, compartment syndrome, damage to cartilage, damage to neurovascular structures, plus the risks of anesthesia, including, death, stroke, and heart attack, the patient wished to proceed with operative intervention.        DESCRIPTION OF PROCEDURE:      The patient and correct limb were identified and marked in the pre-op holding area.      The patient was brought in the room. After undergoing general endotracheal anesthesia, he was placed on a well-padded operating table. His right lower extremity was prepped and draped in usual sterile fashion. A nonsterile tourniquet was placed high up around the thigh. A well padded arthroscopic leg monae was used during the case. The contralateral leg was placed in a well padded Chicho stirrup with bony prominences all being well padded.       After a pre-op block, the standard inferolateral and inferomedial portals were created. Diagnostic arthroscopy performed.      CHONDROPLASTY: The patellofemoral joint was entered. There was mild to moderate chondromalacia on the trochlea and on the undersurface of the patella. Using a full radius shaver and biter, unstable cartilage flaps were trimmed down to a stable rim.     Several loose bodies was identified in the suprapatellar pouch and adjacent to the medial gutter.  Largest was 1 x 1 cm and appeared to be calcified fragments, free floating within the joint.  They were judged to be irritants to the overlying cartilage and were best to be removed.  Using a grasper through a separate portal, the largest particles were removed and discarded as they was loose calcified fragments.  Smaller debris was removed with oscillating sebastian.     There was a mild synovitis noted within the anterior interval. An VAPR device was used to remove areas of irritating synovium from the overlying trochlea in the anterior interval to allow for visualization to  minimize abrasion.     Attention turned to the medial compartment. Medial femoral condyle, medial meniscus were probed and found to be intact and stable.      Attention was turned to the intercondylar notch. The ACL and PCL were intact.      Attention was turned to the lateral compartment. The lateral meniscus had a complex tear along the central aspect of the anterior horn and body.  Using a biter and shaver, the central 15% was trimmed out. The lateral femoral condyle and lateral tibial plateau were intact.       Portal sites were closed with 4-0 Monocryl, covered with Mastisol, Steri-Strips, Xeroform, 4 x 4 fluffs, ABD pads and thigh-high CHAPARRO hose.     The patient was extubated in the room, transferred to Recovery Room in stable condition accompanied by her physician.  There were no complications in the case.      I was present, scrubbed and did perform all critical portions of the case.        JERROD COLLAZO MD      Description of the Findings of the Procedure: above    Significant Surgical Tasks Conducted by the Assistant(s), if Applicable: none    Complications: No    Estimated Blood Loss (EBL): * No values recorded between 11/17/2023  7:37 AM and 11/17/2023  8:05 AM *           Implants: * No implants in log *    Specimens:   Specimen (24h ago, onward)      None                    Condition: Good    Disposition: PACU - hemodynamically stable.    Attestation: I was present and scrubbed for the entire procedure.    Discharge Note    SUMMARY     Admit Date: 11/17/2023    Discharge Date and Time:  11/17/2023 8:12 AM    Hospital Course (synopsis of major diagnoses, care, treatment, and services provided during the course of the hospital stay): outpatient surgery     Final Diagnosis: Post-Op Diagnosis Codes:     * Old tear of medial meniscus of right knee, unspecified tear type [M23.203]     * Chondromalacia of right knee [M94.261]    Disposition: Home or Self Care    Follow Up/Patient Instructions:      Medications:  Reconciled Home Medications:      Medication List        ASK your doctor about these medications      aspirin 325 MG tablet  Take 1 tablet (325 mg total) by mouth once daily. for 21 days     ergocalciferol 50,000 unit Cap  Commonly known as: ERGOCALCIFEROL  Take 1 capsule (50,000 Units total) by mouth every 7 days. for 12 doses  Ask about: Should I take this medication?     fluticasone propionate 50 mcg/actuation nasal spray  Commonly known as: FLONASE  2 sprays (100 mcg total) by Each Nostril route once daily.     HYDROcodone-acetaminophen 7.5-325 mg per tablet  Commonly known as: NORCO  Take 1 tablet by mouth every 6 (six) hours as needed for Pain.     levocetirizine 5 MG tablet  Commonly known as: XYZAL  Take 1 tablet (5 mg total) by mouth every evening.     omeprazole 40 MG capsule  Commonly known as: PRILOSEC  Take 1 capsule (40 mg total) by mouth every morning. Open capsule and take with apple sauce     promethazine 25 MG tablet  Commonly known as: PHENERGAN  Take 1 tablet (25 mg total) by mouth every 6 (six) hours as needed for Nausea.     * traMADoL 50 mg tablet  Commonly known as: ULTRAM  Take 1 tablet (50 mg total) by mouth every 6 (six) hours as needed for Pain.     * traMADoL 50 mg tablet  Commonly known as: ULTRAM  Take 1-2 tablet(s) EVERY 8 HOURS by oral route as needed.     ursodioL 500 MG tablet  Commonly known as: ALESSANDRO FORTE  Crush one tablet by mouth daily for gall bladder.           * This list has 2 medication(s) that are the same as other medications prescribed for you. Read the directions carefully, and ask your doctor or other care provider to review them with you.                No discharge procedures on file.

## 2023-11-17 NOTE — DISCHARGE INSTRUCTIONS
[address], [place]                                                                                          [phone]                   Postoperative Instructions for Knee Surgery                 Your Surgery Included:   Open               Arthroscopic   [] Ligament Repair       [] Diagnostic           [] ACL     [] PCL     [] MCL     [] PLLC      [] Synovectomy / Plica Removal [] Meniscal Cartilage Repair / Transplantation      [] Lysis of Adhesions / Manipulation [] Articular Cartilage Repair      [] Interval Release           [] Microfracture       [] OATS   [] ACI      [x] Meniscectomy           [] Osteochondral Allograft      [] Meniscal Cartilage Repair  [] Patellar Realignment       [x] Debridement / Chondroplasty         [] Lateral Release   [] Ligament Repair      [] Articular Cartilage Repair          [] Extensor Mechanism             []   Microfracture  []  OATS         []  Cartilage Biopsy [] Tendon Repair          [] Ligament Reconstruction          [] Patella                  [] Quadriceps             []   ACL    []   PCL  [] High Tibial Osteotomy       [] PRP Arthrocentesis  [] Joint Replacement         [] Amniox Arthrocentesis           [] Unicompartmental   [] Patellofemoral                    [] Total Knee                  Call our office ([phone]) immediately if you experience any of the following:       Excessive bleeding or pus like drainage at the incision site       Uncontrollable pain not relieved by pain medication       Excessive swelling or redness at the incision site       Fever above 101.5 degrees not controlled with Tylenol or Motrin       Shortness of Breath       Any foul odor or blistering from the surgery site    FOR EMERGENCIES: If any unusual problems or difficulties occur, call our office at [phone], or page the  at [phone] who will direct your call appropriately    1.   Pain Management: A cold therapy cuff, pain  medications, local injections, and in some cases, regional anesthesia injections are used to manage your post-operative pain. The decision to use each of these options is based on their risks and benefits.     Medications: You were given one or more of the following medication prescriptions before leaving the hospital. Have the prescriptions filled at a pharmacy on your way home and follow the instructions on the bottles. If you need a refill, please call your pharmacy.      Narcotic Medication (usually Vicodin ES, Lortab, Percocet or Nucynta): Begin taking the medication before your knee starts to hurt. Some patients do not like to take any medication, but if you wait until your pain is severe before taking, you will be very uncomfortable for several hours waiting for the narcotic to work. Always take with food.     Nausea / Vomiting: For this issue, we prescribe Phenergan, use this medication as directed.     Cold Therapy: You may have been sent home with a [facility] cold therapy unit and wrap for your knee. Fill with ice and water to the indicated fill line and use throughout the day for the first two days and then as needed to help relieve pain and control swelling.      Regional Anesthesia Injections (Blocks): You may have been given a regional nerve block either before or after surgery. This may make your entire leg numb for 24-36 hours.                            * Proceed with caution when bearing weight on your leg.     2.   Diet: Eat a bland diet for the first day after surgery. Progress your diet as tolerated. Constipation may occur with Narcotic usage, contact our office if you are experiencing constipation.    3.   Activity: Limit your activity during the first 48 hours, keep your leg elevated with pillows under your heel. After the first 48 hours at home, increase your activity level based on your symptoms.    4.   Dressing Change: Remove the dressing on the 3rd day. It is normal for some blood to be  "seen on the dressings. It is also normal for you to see apparent bruising on the skin around your knee when you remove the dressing. If present, leave the steri-strip tape across the incisions. If you are concerned by the drainage or the appearance of your knee, please call one of the numbers listed below.    5.   Showering: You may shower on the 3rd day after surgery with waterproof bandages over small incisions. If you have an incision with Prineo (clear mesh), it does not need to be covered. Do not submerge in any water until after your postoperative appointment in clinic.    6.   Your procedure did not require a post-operative brace.    7.   Your procedure did not require a Continuous Passive Motion (CPM) device.    8.   Weight Bearing: You may have been sent home with crutches. If instructed (see below), use these crutches at all times unless at complete rest.                  [x] Full weight bearing            [x]  NOW      9.  Knee Exercises: Begin these exercises the first day after surgery in order to help you regain your motion and strength. You may do the following marked exercises:     [x] Quad Sets - Begin activating your quadriceps muscle by driving your          knee downward into full knee extension while seated on a table or bed   with a towel rolled and propped under your heel     [x] Straight Leg Raise (SLR) - While pretty your quadriceps muscle, lift     your fully extended leg to the level of your non-operative knee (as shown)     [x] Heel Slides - With the knee straight, slide your heel slowly toward your   buttocks, hold at the endpoint for 10-15 seconds, then slowly straighten     [x] Ankle pumps - With your knee straight, move your ankle in a "pumping"    fashion to activate your calf and leg muscles      10.  Physical Therapy: Physical therapy is an essential component to your recovery from surgery. Your physical therapy will start in 1-3 days.    FIRST POSTOPERATIVE VISIT: As " scheduled.

## 2023-11-17 NOTE — ANESTHESIA PROCEDURE NOTES
Intubation    Date/Time: 11/17/2023 7:21 AM    Performed by: Kiana Roca CRNA  Authorized by: Deann Oates MD    Intubation:     Induction:  Intravenous    Intubated:  Postinduction    Mask Ventilation:  Moderately difficult with oral airway    Attempts:  1    Attempted By:  CRNA    Method of Intubation:  Video laryngoscopy    Blade:  Hernandez 3    Laryngeal View Grade: Grade I - full view of cords      Difficult Airway Encountered?: No      Complications:  None    Airway Device:  Oral endotracheal tube    Airway Device Size:  7.5    Style/Cuff Inflation:  Cuffed (inflated to minimal occlusive pressure)    Inflation Amount (mL):  10    Tube secured:  23    Secured at:  The lips    Placement Verified By:  Capnometry    Complicating Factors:  Small mouth, obesity and poor neck/head extension    Findings Post-Intubation:  BS equal bilateral and atraumatic/condition of teeth unchanged

## 2023-11-17 NOTE — TRANSFER OF CARE
"Anesthesia Transfer of Care Note    Patient: Surjit Valentine    Procedure(s) Performed: Procedure(s) (LRB):  ARTHROSCOPY, KNEE, WITH MENISCECTOMY (Right)  ARTHROSCOPY, KNEE, WITH CHONDROPLASTY (Right)    Patient location: [facility]    Anesthesia Type: general    Transport from OR: Transported from OR on room air with adequate spontaneous ventilation    Post pain: adequate analgesia    Post assessment: no apparent anesthetic complications and tolerated procedure well    Post vital signs: stable    Level of consciousness: awake    Nausea/Vomiting: no nausea/vomiting    Complications: none    Transfer of care protocol was followed      Last vitals: Visit Vitals  /62 (BP Location: Right arm, Patient Position: Lying)   Pulse 65   Temp 36.5 °C (97.7 °F) (Temporal)   Resp 17   Ht 5' 9" (1.753 m)   Wt (!) 163.3 kg (360 lb)   SpO2 97%   BMI 53.16 kg/m²     "

## 2023-11-17 NOTE — PLAN OF CARE
Discharge instructions given and explained to patient and family with verbalization of understanding all instructions. Prescription given and explained next time and doses of each medication. Patients v/s stable, denies n/v and tolerating po, rates pain level tolerable, IV removed, and family at bedside for patient discharge home. Pt will get polar ice machine from Dr. Martinez's office. Denies crutch training in post op- feels comfortable using crutches for balance.

## 2023-11-17 NOTE — ANESTHESIA PROCEDURE NOTES
R Adductor Canal Single Injection    Patient location during procedure: pre-op   Block not for primary anesthetic.  Reason for block: at surgeon's request and post-op pain management   Post-op Pain Location: R Knee Pain   Start time: 11/17/2023 6:46 AM  Timeout: 11/17/2023 6:45 AM   End time: 11/17/2023 6:52 AM    Staffing  Authorizing Provider: Angelina Wharton MD  Performing Provider: Bailey Henley MD    Staffing  Performed by: Bailey Henley MD  Authorized by: Angelina Wharton MD    Preanesthetic Checklist  Completed: patient identified, IV checked, site marked, risks and benefits discussed, surgical consent, monitors and equipment checked, pre-op evaluation and timeout performed  Peripheral Block  Patient position: supine  Prep: ChloraPrep  Patient monitoring: heart rate, cardiac monitor, continuous pulse ox, continuous capnometry and frequent blood pressure checks  Block type: adductor canal  Laterality: right  Injection technique: single shot  Needle  Needle type: Stimuplex   Needle gauge: 21 G  Needle length: 4 in  Needle localization: anatomical landmarks and ultrasound guidance   -ultrasound image captured on disc.  Assessment  Injection assessment: negative aspiration, negative parasthesia and local visualized surrounding nerve  Paresthesia pain: none  Heart rate change: no  Slow fractionated injection: yes    Medications:    Medications: ropivacaine (NAROPIN) injection 0.5% - Perineural   10 mL - 11/17/2023 6:52:00 AM    Additional Notes  20cc of 0.25% Ropivacaine w 1:200k Epi + Dexamethasone + Clonidine additives given. VSS.  [facility] RN monitoring vitals throughout procedure.  Patient tolerated procedure well.

## 2023-11-17 NOTE — ANESTHESIA PREPROCEDURE EVALUATION
[facility]  Anesthesia Pre-Operative Evaluation         Patient Name: Surjit Valentine  YOB: 1980  MRN: [id number]    SUBJECTIVE:     Pre-operative evaluation for Procedure(s) (LRB):  ARTHROSCOPY, KNEE, WITH MENISCECTOMY (Right)  ARTHROSCOPY, KNEE, WITH CHONDROPLASTY (Right)     11/17/2023    Surjit Valentine is a 43 y.o. male w/ a significant PMHx of morbid obesity s/p eb en y 7/3/2023 and GAYLE not on CPAP.     Patient now presents for the above procedure(s).      LDA: None documented.       Prevairway:     Placement Date: 07/03/23; Placement Time: 0950 (created via procedure documentation); Mask Ventilation: Easy; Intubated: Postinduction; Blade: Hernandez #3; Airway Device Size: 7.5; Cuff Inflation: Minimal occlusive pressure; Placement Verified By: Capnometry; Complicating Factors: None; Intubation Findings: Bilateral breath sounds, Atraumatic/Condition of teeth unchanged; Securment: Teeth; Complications: None;       Drips: None documented.   sodium chloride 0.9%         Patient Active Problem List   Diagnosis    GERD without esophagitis    GAYLE (obstructive sleep apnea)    Morbid obesity with body mass index of 60.0-69.9 in adult    Kidney stones    Ureterolithiasis    Urolithiasis    COVID-19    Acute hypoxemic respiratory failure due to COVID-19    Obesity       Review of patient's allergies indicates:  No Known Allergies    Current Inpatient Medications:      No current facility-administered medications on file prior to encounter.     Current Outpatient Medications on File Prior to Encounter   Medication Sig Dispense Refill    azithromycin (Z-EMA) 250 MG tablet       diclofenac sodium (VOLTAREN) 1 % Gel Apply 2 g topically 4 (four) times daily as needed (pain). (Patient not taking: Reported on 11/16/2023) 350 g 2    omeprazole (PRILOSEC) 40 MG capsule Take 1 capsule (40 mg total) by mouth every morning. Open capsule and take with apple sauce (Patient not taking:  Reported on 11/16/2023) 30 capsule 2    oseltamivir (TAMIFLU) 75 MG capsule       traMADoL (ULTRAM) 50 mg tablet Take 1-2 tablet(s) EVERY 8 HOURS by oral route as needed.      ursodioL (ALESSANDRO FORTE) 500 MG tablet Crush one tablet by mouth daily for gall bladder. (Patient not taking: Reported on 11/16/2023) 90 tablet 1       Past Surgical History:   Procedure Laterality Date    APPENDECTOMY  3/2008    CYSTOSCOPY  10/11/2019    Procedure: CYSTOSCOPY;  Surgeon: Gamal Sosa MD;  Location: [facility] OR 1ST FLR;  Service: Urology;;    CYSTOSCOPY W/ URETERAL STENT PLACEMENT Left 10/6/2019    Procedure: CYSTOSCOPY, WITH URETERAL STENT INSERTION;  Surgeon: Gamal Sosa MD;  [facility];  Service: Urology;  Laterality: Left;    ESOPHAGOGASTRODUODENOSCOPY N/A 7/3/2023    Procedure: EGD (ESOPHAGOGASTRODUODENOSCOPY);  Surgeon: Tay Stephen MD;  Location: [facility] OR 2ND FLR;  Service: General;  Laterality: N/A;    LASER LITHOTRIPSY Left 10/11/2019    Procedure: LITHOTRIPSY, USING LASER;  Surgeon: Gamal Sosa MD;  [facility];  Service: Urology;  Laterality: Left;    RETROGRADE PYELOGRAPHY Left 10/6/2019    Procedure: PYELOGRAM, RETROGRADE;  Surgeon: Gamal Sosa MD;  Location: [facility] OR [address] [place] [address];  Service: Urology;  Laterality: Left;    RETROGRADE PYELOGRAPHY Left 10/11/2019    Procedure: PYELOGRAM, RETROGRADE;  Surgeon: Gamal Sosa MD;  Location: [facility] OR [address] [place] [address];  Service: Urology;  Laterality: Left;    URETEROSCOPIC REMOVAL OF URETERIC CALCULUS  10/11/2019    Procedure: REMOVAL, CALCULUS, URETER, URETEROSCOPIC;  Surgeon: Gamal Sosa MD;  Location: [facility] OR [address] [place] [address];  Service: Urology;;    URETEROSCOPY Left 10/6/2019    Procedure: URETEROSCOPY;  Surgeon: Gamal Sosa MD;  Location: [facility] OR [address] [place] [address];  Service: Urology;  Laterality: Left;    URETEROSCOPY Left 10/11/2019    Procedure: URETEROSCOPY;  Surgeon: Gamal Sosa MD;  Location: [facility] OR [address] [place] [address];   "Service: Urology;  Laterality: Left;  1hr    XI ROBOTIC GASTROENTEROSTOMY, TODD-EN-Y N/A 7/3/2023    Procedure: XI ROBOTIC GASTROENTEROSTOMY, TODD-EN-Y (PT is SELF PAY, DO NOT submit to insurance;  Surgeon: Tay Stephen MD;  Location: [facility] OR [address] [place] [address];  Service: General;  Laterality: N/A;       Social History     Socioeconomic History    Marital status: Single   Tobacco Use    Smoking status: Never     Passive exposure: Never    Smokeless tobacco: Former     Types: Chew    Tobacco comments:     2 cans/ week   Substance and Sexual Activity    Alcohol use: Not Currently     Alcohol/week: 3.0 standard drinks of alcohol     Types: 3 Cans of beer per week     Comment: "socially but not usually"    Drug use: No   Social History Narrative    Works full time: [employer]  .  1 son, 14 years old.      Social Determinants of Health     Financial Resource Strain: Low Risk  (7/4/2023)    Overall Financial Resource Strain (CARDIA)     Difficulty of Paying Living Expenses: Not hard at all   Food Insecurity: No Food Insecurity (7/4/2023)    Hunger Vital Sign     Worried About Running Out of Food in the Last Year: Never true     Ran Out of Food in the Last Year: Never true   Transportation Needs: No Transportation Needs (7/4/2023)    PRAPARE - Transportation     Lack of Transportation (Medical): No     Lack of Transportation (Non-Medical): No   Physical Activity: Inactive (7/4/2023)    Exercise Vital Sign     Days of Exercise per Week: 0 days     Minutes of Exercise per Session: 0 min   Stress: No Stress Concern Present (7/4/2023)    [place] [employer] - Occupational Stress Questionnaire     Feeling of Stress : Not at all   Social Connections: Unknown (7/4/2023)    Social Connection and Isolation Panel [NHANES]     Frequency of Communication with Friends and Family: More than three times a week     Frequency of Social Gatherings with Friends and " Family: Once a week     Attends Jain Services: More than 4 times per year     Active Member of Clubs or Organizations: No     Attends Club or Organization Meetings: Never   Housing Stability: Low Risk  (7/4/2023)    Housing Stability Vital Sign     Unable to Pay for Housing in the Last Year: No     Number of Places Lived in the Last Year: 1     Unstable Housing in the Last Year: No       OBJECTIVE:     Vital Signs Range (Last 24H):         Significant Labs:  Lab Results   Component Value Date    WBC 5.81 08/28/2023    HGB 13.4 (L) 08/28/2023    HCT 38.1 (L) 08/28/2023     08/28/2023    CHOL 133 08/28/2023    TRIG 80 08/28/2023    HDL 43 08/28/2023    ALT 26 08/28/2023    AST 26 08/28/2023     08/28/2023    K 3.5 08/28/2023     08/28/2023    CREATININE 0.70 08/28/2023    BUN 8 08/28/2023    CO2 22 (L) 08/28/2023    TSH 3.094 03/27/2023    INR 1.0 12/03/2020    HGBA1C 5.4 03/27/2023       Diagnostic Studies: No relevant studies.    EKG:   Results for orders placed or performed during the hospital encounter of 10/23/23   EKG 12-lead    Collection Time: 10/23/23 11:02 PM    Narrative    Test Reason : R07.9,    Vent. Rate : 066 BPM     Atrial Rate : 066 BPM     P-R Int : 160 ms          QRS Dur : 086 ms      QT Int : 394 ms       P-R-T Axes : 050 000 005 degrees     QTc Int : 413 ms    Normal sinus rhythm  Normal ECG  When compared with ECG of 09-JUL-2023 13:26,  Nonspecific T wave abnormality no longer evident in Lateral leads  Confirmed by Ruslan ABBOTT MD ([id number]) on 10/24/2023 8:09:29 AM    Referred By: SLIMERR   SELF           Confirmed By:Ruslan ABBOTT MD       2D ECHO:  TTE:  No results found for this or any previous visit.    FRANKLIN:  No results found for this or any previous visit.    ASSESSMENT/PLAN:           Pre-op Assessment    I have reviewed the Patient Summary Reports.     I have reviewed the Nursing Notes. I have reviewed the NPO Status.   I have reviewed the Medications.     Review of  Systems  Anesthesia Hx:  No problems with previous Anesthesia   History of prior surgery of interest to airway management or planning:          Denies Family Hx of Anesthesia complications.    Denies Personal Hx of Anesthesia complications.                    Social:  Non-Smoker, No Alcohol Use       Hematology/Oncology:  Hematology Normal   Oncology Normal                                   EENT/Dental:  EENT/Dental Normal           Cardiovascular:  Exercise tolerance: good       Denies CAD.     Denies Dysrhythmias.                                    Pulmonary:    Denies COPD.     Sleep Apnea                Hepatic/GI:      Denies GERD.             Neurological:    Denies Neuromuscular Disease.                                   Endocrine:  Denies Diabetes.         Morbid Obesity / BMI > 40  Psych:  Denies Psychiatric History.                  Physical Exam  General: Well nourished, Cooperative, Alert and Oriented    Airway:  Mallampati: III   Mouth Opening: Normal  TM Distance: Normal  Tongue: Normal  Neck ROM: Normal ROM  beard  Dental:  Intact    Chest/Lungs:  Clear to auscultation, Normal Respiratory Rate    Heart:  Rate: Normal  Rhythm: Regular Rhythm  Sounds: Normal    Abdomen:  Nontender, Soft, Normal        Anesthesia Plan  Type of Anesthesia, risks & benefits discussed:    Anesthesia Type: Gen ETT, Regional  Intra-op Monitoring Plan: Standard ASA Monitors  Post Op Pain Control Plan: multimodal analgesia  Induction:  IV  Airway Plan: Direct, Post-Induction  Informed Consent: Informed consent signed with the Patient and all parties understand the risks and agree with anesthesia plan.  All questions answered.   ASA Score: 3  Day of Surgery Review of History & Physical: H&P Update referred to the surgeon/provider.    Ready For Surgery From Anesthesia Perspective.     .

## 2023-11-20 PROBLEM — M62.81 QUADRICEPS WEAKNESS: Status: ACTIVE | Noted: 2023-11-20

## 2023-11-20 PROBLEM — Z74.09 IMPAIRED FUNCTIONAL MOBILITY, BALANCE, GAIT, AND ENDURANCE: Status: ACTIVE | Noted: 2023-11-20

## 2023-11-20 PROBLEM — M94.261 CHONDROMALACIA, RIGHT KNEE: Status: ACTIVE | Noted: 2023-11-20

## 2023-11-20 PROBLEM — M23.200 OLD COMPLEX TEAR OF LATERAL MENISCUS OF RIGHT KNEE: Status: ACTIVE | Noted: 2023-11-20

## 2023-11-20 PROBLEM — M25.661 DECREASED RANGE OF MOTION (ROM) OF RIGHT KNEE: Status: ACTIVE | Noted: 2023-11-20

## 2023-11-21 NOTE — ANESTHESIA POSTPROCEDURE EVALUATION
Anesthesia Post Evaluation    Patient: Surijt Valentine    Procedure(s) Performed: Procedure(s) (LRB):  ARTHROSCOPY, KNEE, WITH MENISCECTOMY (Right)  ARTHROSCOPY, KNEE, WITH CHONDROPLASTY (Right)    Final Anesthesia Type: general      Patient location during evaluation: PACU  Patient participation: Yes- Able to Participate  Level of consciousness: awake and alert  Post-procedure vital signs: reviewed and stable  Pain management: adequate  Airway patency: patent    PONV status at discharge: No PONV  Anesthetic complications: no      Cardiovascular status: blood pressure returned to baseline  Respiratory status: unassisted, room air and spontaneous ventilation  Hydration status: euvolemic  Follow-up not needed.          Vitals Value Taken Time   /70 11/17/23 1133   Temp 36.7 °C (98 °F) 11/17/23 1145   Pulse 52 11/17/23 1141   Resp 20 11/17/23 1141   SpO2 98 % 11/17/23 1141   Vitals shown include unvalidated device data.      No case tracking events are documented in the log.      Pain/Alma Delia Score: No data recorded

## 2023-11-29 NOTE — PROGRESS NOTES
"CC: Right knee scope post op 2 weeks    Patient is here for his 2 week post op appointment s/p below and is doing well. Patient is doing PT at [facility] and is progressing as expected. Patient is no longer taking pain medication. he denies any chest pain, SOB, fevers, chills, nausea, vomiting, or drainage from incision sites.     DATE OF PROCEDURE:  11/17/2023      PREOPERATIVE DIAGNOSES:   1. Right knee chondromalacia  2. Right knee lateral meniscus tear  3. Right knee loose bodies     POSTOPERATIVE DIAGNOSES:   1. Right knee chondromalacia  2. Right knee lateral meniscus tear  3. Right knee loose bodies     PROCEDURES:   1. Right knee arthroscopic chondroplasty  2. Right knee arthroscopic partial lateral meniscectomy  3. Right knee arthroscopic removal of loose bodies     SURGEON: Rogers Martinez M.D.     PE:    BP (!) 137/90   Pulse 74   Ht 5' 9" (1.753 m)   Wt (!) 163.3 kg (360 lb)   BMI 53.16 kg/m²      Right knee:    Incisions clean/dry/intact  No sign of infection  Mild swelling  Compartments soft  Neurovascular status intact in extremity    AROM 0 to 95 degrees  Decreased quad strength  1+ effusion    Assessment:  2 weeks s/p right knee arthroscopic chondroplasty, loose body removal, and partial lateral meniscectomy    Plan:  1.  Removed steri strips.  Wounds healing well    2.  15 cc's of serosanguinous fluid aspirated from the right knee joint. See procedure note for details.     3.  Physical therapy for quad, ROM, modalities.  HEP for ROM, knee, VMO and hip abductor strengthening.     4.  Pain level acceptable for first post op visit.  Wean off pain medicine by next visit if still taking    5. Return to clinic in 4 weeks at 6 weeks post-op.      All questions were answered. Instructed patient to call with questions or concerns in the interim.       Medical Dictation software was used during the dictation of portions or the entirety of this medical record.  Phonetic or grammatic errors may " exist due to the use of this software. For clarification, refer to the author of the document.

## 2023-11-30 ENCOUNTER — OFFICE VISIT (OUTPATIENT)
Dept: SPORTS MEDICINE | Facility: CLINIC | Age: 43
End: 2023-11-30
Payer: COMMERCIAL

## 2023-11-30 VITALS
HEIGHT: 69 IN | BODY MASS INDEX: 46.65 KG/M2 | WEIGHT: 315 LBS | SYSTOLIC BLOOD PRESSURE: 137 MMHG | HEART RATE: 74 BPM | DIASTOLIC BLOOD PRESSURE: 90 MMHG

## 2023-11-30 DIAGNOSIS — M25.461 EFFUSION OF RIGHT KNEE JOINT: ICD-10-CM

## 2023-11-30 DIAGNOSIS — Z09 SURGERY FOLLOW-UP EXAMINATION: Primary | ICD-10-CM

## 2023-11-30 DIAGNOSIS — Z98.890 S/P ARTHROSCOPIC SURGERY OF RIGHT KNEE: ICD-10-CM

## 2023-11-30 PROCEDURE — 3044F HG A1C LEVEL LT 7.0%: CPT | Mod: CPTII,S$GLB,, | Performed by: PHYSICIAN ASSISTANT

## 2023-11-30 PROCEDURE — 1159F PR MEDICATION LIST DOCUMENTED IN MEDICAL RECORD: ICD-10-PCS | Mod: CPTII,S$GLB,, | Performed by: PHYSICIAN ASSISTANT

## 2023-11-30 PROCEDURE — 3080F PR MOST RECENT DIASTOLIC BLOOD PRESSURE >= 90 MM HG: ICD-10-PCS | Mod: CPTII,S$GLB,, | Performed by: PHYSICIAN ASSISTANT

## 2023-11-30 PROCEDURE — 1160F PR REVIEW ALL MEDS BY PRESCRIBER/CLIN PHARMACIST DOCUMENTED: ICD-10-PCS | Mod: CPTII,S$GLB,, | Performed by: PHYSICIAN ASSISTANT

## 2023-11-30 PROCEDURE — 99024 POSTOP FOLLOW-UP VISIT: CPT | Mod: S$GLB,,, | Performed by: PHYSICIAN ASSISTANT

## 2023-11-30 PROCEDURE — 3075F SYST BP GE 130 - 139MM HG: CPT | Mod: CPTII,S$GLB,, | Performed by: PHYSICIAN ASSISTANT

## 2023-11-30 PROCEDURE — 1159F MED LIST DOCD IN RCRD: CPT | Mod: CPTII,S$GLB,, | Performed by: PHYSICIAN ASSISTANT

## 2023-11-30 PROCEDURE — 99999 PR PBB SHADOW E&M-EST. PATIENT-LVL III: ICD-10-PCS | Mod: PBBFAC,,, | Performed by: PHYSICIAN ASSISTANT

## 2023-11-30 PROCEDURE — 99024 PR POST-OP FOLLOW-UP VISIT: ICD-10-PCS | Mod: S$GLB,,, | Performed by: PHYSICIAN ASSISTANT

## 2023-11-30 PROCEDURE — 99999 PR PBB SHADOW E&M-EST. PATIENT-LVL III: CPT | Mod: PBBFAC,,, | Performed by: PHYSICIAN ASSISTANT

## 2023-11-30 PROCEDURE — 3075F PR MOST RECENT SYSTOLIC BLOOD PRESS GE 130-139MM HG: ICD-10-PCS | Mod: CPTII,S$GLB,, | Performed by: PHYSICIAN ASSISTANT

## 2023-11-30 PROCEDURE — 3080F DIAST BP >= 90 MM HG: CPT | Mod: CPTII,S$GLB,, | Performed by: PHYSICIAN ASSISTANT

## 2023-11-30 PROCEDURE — 1160F RVW MEDS BY RX/DR IN RCRD: CPT | Mod: CPTII,S$GLB,, | Performed by: PHYSICIAN ASSISTANT

## 2023-11-30 PROCEDURE — 3044F PR MOST RECENT HEMOGLOBIN A1C LEVEL <7.0%: ICD-10-PCS | Mod: CPTII,S$GLB,, | Performed by: PHYSICIAN ASSISTANT

## 2023-11-30 NOTE — PROCEDURES
Large Joint Aspiration/Injection: R knee    Date/Time: 11/30/2023 8:00 AM    Performed by: Warner Vallejo PA-C  Authorized by: Warner Vallejo PA-C    Consent Done?:  Yes (Verbal)  Indications:  Joint swelling  Site marked: the procedure site was marked    Timeout: prior to procedure the correct patient, procedure, and site was verified    Prep: patient was prepped and draped in usual sterile fashion      Local anesthesia used?: Yes    Local anesthetic:  Lidocaine 2% without epinephrine  Anesthetic total (ml):  10      Details:  Needle Size:  18 G  Approach:  Superior  Location:  Knee  Site:  R knee  Aspirate amount (mL):  15  Aspirate:  Blood-tinged  Patient tolerance:  Patient tolerated the procedure well with no immediate complications    Aspiration Procedure    A time out was performed, including verification of patient ID, procedure, site and side, availability of information and equipment, review of safety issues, and agreement with consent, the procedure site was marked.    Aspiration:  After time out was performed, the patient was prepped aseptically with povidone-iodine swabsticks. 15cc's of serosanguineous joint fluid was aspirated from the Superolateral  aspect of the right Knee Joint using a 18g x 1.5 needle in sterile fashion without complication.

## 2023-12-12 ENCOUNTER — PATIENT MESSAGE (OUTPATIENT)
Dept: BARIATRICS | Facility: CLINIC | Age: 43
End: 2023-12-12
Payer: COMMERCIAL

## 2023-12-13 ENCOUNTER — PATIENT MESSAGE (OUTPATIENT)
Dept: BARIATRICS | Facility: CLINIC | Age: 43
End: 2023-12-13
Payer: COMMERCIAL

## 2024-01-04 ENCOUNTER — OFFICE VISIT (OUTPATIENT)
Dept: URGENT CARE | Facility: CLINIC | Age: 44
End: 2024-01-04
Payer: COMMERCIAL

## 2024-01-04 VITALS
OXYGEN SATURATION: 98 % | RESPIRATION RATE: 17 BRPM | TEMPERATURE: 99 F | HEART RATE: 77 BPM | WEIGHT: 315 LBS | BODY MASS INDEX: 45.1 KG/M2 | HEIGHT: 70 IN | SYSTOLIC BLOOD PRESSURE: 148 MMHG | DIASTOLIC BLOOD PRESSURE: 79 MMHG

## 2024-01-04 DIAGNOSIS — G47.33 OSA (OBSTRUCTIVE SLEEP APNEA): Chronic | ICD-10-CM

## 2024-01-04 DIAGNOSIS — K21.9 GASTROESOPHAGEAL REFLUX DISEASE, UNSPECIFIED WHETHER ESOPHAGITIS PRESENT: ICD-10-CM

## 2024-01-04 DIAGNOSIS — R68.89 FLU-LIKE SYMPTOMS: Primary | ICD-10-CM

## 2024-01-04 DIAGNOSIS — R05.9 COUGH, UNSPECIFIED TYPE: ICD-10-CM

## 2024-01-04 DIAGNOSIS — Z20.828 EXPOSURE TO THE FLU: ICD-10-CM

## 2024-01-04 PROCEDURE — 87811 SARS-COV-2 COVID19 W/OPTIC: CPT | Mod: QW,S$GLB,, | Performed by: PHYSICIAN ASSISTANT

## 2024-01-04 PROCEDURE — 87502 INFLUENZA DNA AMP PROBE: CPT | Mod: QW,S$GLB,, | Performed by: PHYSICIAN ASSISTANT

## 2024-01-04 PROCEDURE — 99213 OFFICE O/P EST LOW 20 MIN: CPT | Mod: S$GLB,,, | Performed by: PHYSICIAN ASSISTANT

## 2024-01-04 RX ORDER — SERTRALINE HYDROCHLORIDE 25 MG/1
25 TABLET, FILM COATED ORAL DAILY
COMMUNITY

## 2024-01-04 RX ORDER — OSELTAMIVIR PHOSPHATE 75 MG/1
75 CAPSULE ORAL 2 TIMES DAILY
Qty: 10 CAPSULE | Refills: 0 | Status: SHIPPED | OUTPATIENT
Start: 2024-01-04 | End: 2024-01-09

## 2024-01-04 NOTE — PROGRESS NOTES
"Subjective:      Patient ID: Surjit Valentine is a 43 y.o. male.    Vitals:  height is 5' 10" (1.778 m) and weight is 154.2 kg (340 lb) (abnormal). His oral temperature is 99.4 °F (37.4 °C). His blood pressure is 148/79 (abnormal) and his pulse is 77. His respiration is 17 and oxygen saturation is 98%.     Chief Complaint: Cough    Patient presents with cough, nasal congestion , headache /neck pain and body aches . Onset 2 days ago . No other symptoms reported.     Patient provider note starts here:  Patient presents with complaints of cough, nasal congestion, headache, neck pain and body aches x2 days. Reports that his son tested positive for flu yesterday at an outside facility. He was recently in the car with him for an extended period of time and has been exposed. Denies documented revers, chest pain or SOB.     Cough  This is a new problem. Episode onset: 2 days ago. The problem has been unchanged. The problem occurs hourly. The cough is Productive of sputum. Associated symptoms include headaches, myalgias, nasal congestion, postnasal drip and a sore throat. Pertinent negatives include no chest pain, chills, fever, rash, shortness of breath or wheezing. Nothing aggravates the symptoms. He has tried nothing for the symptoms. The treatment provided mild relief. There is no history of asthma, bronchitis or pneumonia.       Constitution: Negative for chills and fever.   HENT:  Positive for congestion, postnasal drip and sore throat.    Neck: Negative for neck pain and neck stiffness.   Cardiovascular:  Negative for chest pain.   Respiratory:  Positive for cough. Negative for chest tightness, shortness of breath and wheezing.    Gastrointestinal:  Negative for abdominal pain, vomiting and diarrhea.   Musculoskeletal:  Positive for muscle ache. Negative for pain.   Skin:  Negative for rash and wound.   Allergic/Immunologic: Negative for itching.   Neurological:  Positive for headaches. Negative for numbness and " tingling.      Objective:     Physical Exam   Constitutional: He is oriented to person, place, and time. He appears well-developed. He is cooperative.  Non-toxic appearance. He does not appear ill. No distress. obesity  HENT:   Head: Normocephalic and atraumatic.   Ears:   Right Ear: Hearing, tympanic membrane, external ear and ear canal normal.   Left Ear: Hearing, tympanic membrane, external ear and ear canal normal.   Nose: Congestion present. No mucosal edema, rhinorrhea or nasal deformity. No epistaxis. Right sinus exhibits no maxillary sinus tenderness and no frontal sinus tenderness. Left sinus exhibits no maxillary sinus tenderness and no frontal sinus tenderness.   Mouth/Throat: Uvula is midline, oropharynx is clear and moist and mucous membranes are normal. No trismus in the jaw. Normal dentition. No uvula swelling. No oropharyngeal exudate, posterior oropharyngeal edema or posterior oropharyngeal erythema.   Eyes: Conjunctivae and lids are normal. No scleral icterus.   Neck: Trachea normal and phonation normal. Neck supple. No edema present. No erythema present. No neck rigidity present.   Cardiovascular: Normal rate, regular rhythm, normal heart sounds and normal pulses.   Pulmonary/Chest: Effort normal and breath sounds normal. No respiratory distress. He has no decreased breath sounds. He has no wheezes. He has no rhonchi.   Abdominal: Normal appearance.   Musculoskeletal: Normal range of motion.         General: No deformity. Normal range of motion.   Neurological: He is alert and oriented to person, place, and time. He exhibits normal muscle tone. Coordination normal.   Skin: Skin is warm, dry, intact, not diaphoretic and not pale.   Psychiatric: His speech is normal and behavior is normal. Judgment and thought content normal.   Nursing note and vitals reviewed.      Assessment:     1. Flu-like symptoms    2. Cough, unspecified type    3. Gastroesophageal reflux disease, unspecified whether  esophagitis present    4. GAYLE (obstructive sleep apnea)    5. Exposure to the flu      Results for orders placed or performed in visit on 01/04/24   POCT Influenza A/B MOLECULAR   Result Value Ref Range    POC Molecular Influenza A Ag Negative Negative, Not Reported    POC Molecular Influenza B Ag Negative Negative, Not Reported     Acceptable Yes    SARS Coronavirus 2 Antigen, POCT Manual Read   Result Value Ref Range    SARS Coronavirus 2 Antigen Negative Negative     Acceptable Yes        Plan:       Flu-like symptoms  -     oseltamivir (TAMIFLU) 75 MG capsule; Take 1 capsule (75 mg total) by mouth 2 (two) times daily. for 5 days  Dispense: 10 capsule; Refill: 0    Cough, unspecified type  -     POCT Influenza A/B MOLECULAR  -     SARS Coronavirus 2 Antigen, POCT Manual Read    Gastroesophageal reflux disease, unspecified whether esophagitis present    GAYLE (obstructive sleep apnea)    Exposure to the flu  -     oseltamivir (TAMIFLU) 75 MG capsule; Take 1 capsule (75 mg total) by mouth 2 (two) times daily. for 5 days  Dispense: 10 capsule; Refill: 0          Medical Decision Making:   History:   Old Medical Records: I decided to obtain old medical records.  Clinical Tests:   Lab Tests: Ordered and Reviewed  Urgent Care Management:  A. Problem List:   -Acute: Flu like symptoms, exposure to flu    -Chronic: GERD, obstructive sleep apnea  B. Differential diagnosis: viral vs bacterial URI, pharyngitis, otitis, COVID 19, influenza, pneumonia  C. Diagnostic Testing Ordered: COVID, Flu   D. Diagnostic Testing Considered: None  E. Independent Historians: None  F. Urgent Care Midlevel Independent Results Interpretation: COVID negative, Flu negative   G. Radiology:  H. Review of Previous Medical Records:  I. Home Medications Reviewed  J. Social Determinants of Health considered  K. Medical Decision Making and Disposition: Patient presents with complaints of flu like symptoms in the setting of  is son testing positive for flu yesterday.  On exam, he is afebrile and not toxic in appearance.  Lungs are clear to auscultation bilaterally his vital signs are stable.  He does have a low-grade fever.  He has in no acute distress.  COVID testing negative.  Flu negative as well however, treating clinically based off of known flu exposure and flu-like symptoms.  ED precautions discussed and encouraged to continue symptomatic treatment.  He verbalized understanding and agreed with this plan.         Patient Instructions   If not allergic,take tylenol (acetominophen) for fever control, chills, or body aches every 4 hours. Do not exceed 4000 mg/ day.If not allergic, take Motrin (Ibuprofen) every 4 hours for fever, chills, pain or inflammation. Do not exceed 2400 mg/day. You can alternate taking tylenol and motrin.     You must understand that you've received an Urgent Care treatment only and that you may be released before all your medical problems are known or treated. You, the patient, will arrange for follow up care as instructed.      Follow up with your PCP or specialty clinic as instructed in the next 2-3 days if not improved or as needed. You can call [phone] to schedule an appointment with appropriate provider.      If you condition worsens, we recommend that you receive another evaluation at the emergency room immediately or contact your primary medical clinic's after hours call service to discuss your concerns.      Please return here or go to the Emergency Department for any concerns or worsening condition.      If you were prescribed a narcotic or controlled substance, do not drive or operate heavy equipment or machinery while taking these medications.

## 2024-01-05 NOTE — PATIENT INSTRUCTIONS
If not allergic,take tylenol (acetominophen) for fever control, chills, or body aches every 4 hours. Do not exceed 4000 mg/ day.If not allergic, take Motrin (Ibuprofen) every 4 hours for fever, chills, pain or inflammation. Do not exceed 2400 mg/day. You can alternate taking tylenol and motrin.     You must understand that you've received an Urgent Care treatment only and that you may be released before all your medical problems are known or treated. You, the patient, will arrange for follow up care as instructed.      Follow up with your PCP or specialty clinic as instructed in the next 2-3 days if not improved or as needed. You can call [phone] to schedule an appointment with appropriate provider.      If you condition worsens, we recommend that you receive another evaluation at the emergency room immediately or contact your primary medical clinic's after hours call service to discuss your concerns.      Please return here or go to the Emergency Department for any concerns or worsening condition.      If you were prescribed a narcotic or controlled substance, do not drive or operate heavy equipment or machinery while taking these medications.

## 2024-01-09 ENCOUNTER — PATIENT MESSAGE (OUTPATIENT)
Dept: BARIATRICS | Facility: CLINIC | Age: 44
End: 2024-01-09
Payer: COMMERCIAL

## 2024-01-19 ENCOUNTER — HOSPITAL ENCOUNTER (EMERGENCY)
Facility: HOSPITAL | Age: 44
Discharge: HOME | End: 2024-01-19
Attending: STUDENT IN AN ORGANIZED HEALTH CARE EDUCATION/TRAINING PROGRAM
Payer: COMMERCIAL

## 2024-01-19 VITALS
WEIGHT: 315 LBS | DIASTOLIC BLOOD PRESSURE: 80 MMHG | RESPIRATION RATE: 16 BRPM | HEART RATE: 70 BPM | BODY MASS INDEX: 47.35 KG/M2 | TEMPERATURE: 98 F | OXYGEN SATURATION: 98 % | SYSTOLIC BLOOD PRESSURE: 140 MMHG

## 2024-01-19 DIAGNOSIS — R10.9 RIGHT FLANK PAIN: Primary | ICD-10-CM

## 2024-01-19 DIAGNOSIS — I10 HYPERTENSION, UNSPECIFIED TYPE: ICD-10-CM

## 2024-01-19 LAB
ALBUMIN SERPL BCP-MCNC: 3.6 G/DL (ref 3.5–5.2)
ALP SERPL-CCNC: 102 U/L (ref 55–135)
ALT SERPL W/O P-5'-P-CCNC: 19 U/L (ref 10–44)
ANION GAP SERPL CALC-SCNC: 6 MMOL/L (ref 8–16)
AST SERPL-CCNC: 21 U/L (ref 10–40)
BASOPHILS # BLD AUTO: 0.06 K/UL (ref 0–0.2)
BASOPHILS NFR BLD: 0.7 % (ref 0–1.9)
BILIRUB SERPL-MCNC: 0.5 MG/DL (ref 0.1–1)
BILIRUB UR QL STRIP: NEGATIVE
BUN SERPL-MCNC: 9 MG/DL (ref 6–20)
CALCIUM SERPL-MCNC: 9.5 MG/DL (ref 8.7–10.5)
CHLORIDE SERPL-SCNC: 110 MMOL/L (ref 95–110)
CLARITY UR REFRACT.AUTO: CLEAR
CO2 SERPL-SCNC: 22 MMOL/L (ref 23–29)
COLOR UR AUTO: YELLOW
CREAT SERPL-MCNC: 0.8 MG/DL (ref 0.5–1.4)
DIFFERENTIAL METHOD BLD: ABNORMAL
EOSINOPHIL # BLD AUTO: 0.1 K/UL (ref 0–0.5)
EOSINOPHIL NFR BLD: 1.5 % (ref 0–8)
ERYTHROCYTE [DISTWIDTH] IN BLOOD BY AUTOMATED COUNT: 13.2 % (ref 11.5–14.5)
EST. GFR  (NO RACE VARIABLE): >60 ML/MIN/1.73 M^2
GLUCOSE SERPL-MCNC: 87 MG/DL (ref 70–110)
GLUCOSE UR QL STRIP: NEGATIVE
HCT VFR BLD AUTO: 41.6 % (ref 40–54)
HGB BLD-MCNC: 13.7 G/DL (ref 14–18)
HGB UR QL STRIP: NEGATIVE
IMM GRANULOCYTES # BLD AUTO: 0.01 K/UL (ref 0–0.04)
IMM GRANULOCYTES NFR BLD AUTO: 0.1 % (ref 0–0.5)
KETONES UR QL STRIP: NEGATIVE
LEUKOCYTE ESTERASE UR QL STRIP: NEGATIVE
LYMPHOCYTES # BLD AUTO: 2.8 K/UL (ref 1–4.8)
LYMPHOCYTES NFR BLD: 32.6 % (ref 18–48)
MCH RBC QN AUTO: 30.8 PG (ref 27–31)
MCHC RBC AUTO-ENTMCNC: 32.9 G/DL (ref 32–36)
MCV RBC AUTO: 94 FL (ref 82–98)
MONOCYTES # BLD AUTO: 0.8 K/UL (ref 0.3–1)
MONOCYTES NFR BLD: 8.6 % (ref 4–15)
NEUTROPHILS # BLD AUTO: 4.9 K/UL (ref 1.8–7.7)
NEUTROPHILS NFR BLD: 56.5 % (ref 38–73)
NITRITE UR QL STRIP: NEGATIVE
NRBC BLD-RTO: 0 /100 WBC
PH UR STRIP: 5 [PH] (ref 5–8)
PLATELET # BLD AUTO: 240 K/UL (ref 150–450)
PMV BLD AUTO: 10.6 FL (ref 9.2–12.9)
POTASSIUM SERPL-SCNC: 4.2 MMOL/L (ref 3.5–5.1)
PROT SERPL-MCNC: 7 G/DL (ref 6–8.4)
PROT UR QL STRIP: NEGATIVE
RBC # BLD AUTO: 4.45 M/UL (ref 4.6–6.2)
SODIUM SERPL-SCNC: 138 MMOL/L (ref 136–145)
SP GR UR STRIP: 1.02 (ref 1–1.03)
URN SPEC COLLECT METH UR: NORMAL
WBC # BLD AUTO: 8.7 K/UL (ref 3.9–12.7)

## 2024-01-19 PROCEDURE — 85025 COMPLETE CBC W/AUTO DIFF WBC: CPT | Performed by: STUDENT IN AN ORGANIZED HEALTH CARE EDUCATION/TRAINING PROGRAM

## 2024-01-19 PROCEDURE — 80053 COMPREHEN METABOLIC PANEL: CPT | Performed by: STUDENT IN AN ORGANIZED HEALTH CARE EDUCATION/TRAINING PROGRAM

## 2024-01-19 PROCEDURE — 99285 EMERGENCY DEPT VISIT HI MDM: CPT | Mod: 25

## 2024-01-19 PROCEDURE — 63600175 PHARM REV CODE 636 W HCPCS: Performed by: STUDENT IN AN ORGANIZED HEALTH CARE EDUCATION/TRAINING PROGRAM

## 2024-01-19 PROCEDURE — 96374 THER/PROPH/DIAG INJ IV PUSH: CPT

## 2024-01-19 PROCEDURE — 81003 URINALYSIS AUTO W/O SCOPE: CPT | Performed by: EMERGENCY MEDICINE

## 2024-01-19 RX ORDER — KETOROLAC TROMETHAMINE 30 MG/ML
15 INJECTION, SOLUTION INTRAMUSCULAR; INTRAVENOUS ONCE
Status: COMPLETED | OUTPATIENT
Start: 2024-01-19 | End: 2024-01-19

## 2024-01-19 RX ORDER — CELECOXIB 100 MG/1
100 CAPSULE ORAL 2 TIMES DAILY
Qty: 14 CAPSULE | Refills: 0 | Status: SHIPPED | OUTPATIENT
Start: 2024-01-19 | End: 2024-01-26

## 2024-01-19 RX ORDER — KETOROLAC TROMETHAMINE 30 MG/ML
15 INJECTION, SOLUTION INTRAMUSCULAR; INTRAVENOUS
Status: DISCONTINUED | OUTPATIENT
Start: 2024-01-19 | End: 2024-01-19

## 2024-01-19 RX ADMIN — KETOROLAC TROMETHAMINE 15 MG: 30 INJECTION, SOLUTION INTRAMUSCULAR; INTRAVENOUS at 05:01

## 2024-01-19 NOTE — ED NOTES
Surjit Valentine, a 43 y.o. male presents to the ED w/ complaint of right sided flank pain, denies  dysuria or hematuria    Triage note:  Chief Complaint   Patient presents with    Flank Pain     Intermittent right flank pain; hx kidney stones     Review of patient's allergies indicates:  No Known Allergies  Past Medical History:   Diagnosis Date    GERD without esophagitis     takes OTC nexium PRN    History of kidney stones     Morbid obesity with body mass index of 60.0-69.9 in adult     GAYLE (obstructive sleep apnea)     did not tolerate CPAP

## 2024-01-19 NOTE — ED PROVIDER NOTES
Encounter Date: 1/19/2024       History     Chief Complaint   Patient presents with    Flank Pain     Intermittent right flank pain; hx kidney stones     43 y.o. male with GERD, history of gastric sleeve, history of kidney stones, GAYLE, morbid obesity presents for right flank pain for the past several days worsening today.  Patient reports he had mild achiness in the right flank in the location where he had prior kidney stone pain.  The pain was mild intermittent, however earlier today he had a severe episode where he had severe spasming pain in the right flank.  The pain did not radiate.  He denies any dysuria, fever/chills, abdominal pain    The history is provided by the patient and medical records.     Review of patient's allergies indicates:  No Known Allergies  Past Medical History:   Diagnosis Date    GERD without esophagitis     takes OTC nexium PRN    History of kidney stones     Morbid obesity with body mass index of 60.0-69.9 in adult     GAYLE (obstructive sleep apnea)     did not tolerate CPAP     Past Surgical History:   Procedure Laterality Date    APPENDECTOMY  3/2008    ARTHROSCOPIC CHONDROPLASTY OF KNEE JOINT Right 11/17/2023    Procedure: ARTHROSCOPY, KNEE, WITH CHONDROPLASTY;  Surgeon: Rogers Martinez MD;  Location: [facility] OR [address] [place] [address];  Service: Orthopedics;  Laterality: Right;    CYSTOSCOPY  10/11/2019    Procedure: CYSTOSCOPY;  Surgeon: Gamal Sosa MD;  Location: [facility] OR [address] [place] [address];  Service: Urology;;    CYSTOSCOPY W/ URETERAL STENT PLACEMENT Left 10/6/2019    Procedure: CYSTOSCOPY, WITH URETERAL STENT INSERTION;  Surgeon: Gamal Sosa MD;  Location: [facility] OR [address] [place] [address];  Service: Urology;  Laterality: Left;    ESOPHAGOGASTRODUODENOSCOPY N/A 7/3/2023    Procedure: EGD (ESOPHAGOGASTRODUODENOSCOPY);  Surgeon: Tay Stephen MD;  Location: [facility] OR [address] [place] [address];  Service: General;  Laterality: N/A;    KNEE ARTHROSCOPY W/ MENISCECTOMY Right 11/17/2023    Procedure: ARTHROSCOPY, KNEE, WITH  MENISCECTOMY;  Surgeon: Rogers Martinez MD;  Location: [facility] OR 2ND FLR;  Service: Orthopedics;  Laterality: Right;  regional without catheter    LASER LITHOTRIPSY Left 10/11/2019    Procedure: LITHOTRIPSY, USING LASER;  Surgeon: Gamal Sosa MD;  Location: [facility] OR 1ST FLR;  Service: Urology;  Laterality: Left;    RETROGRADE PYELOGRAPHY Left 10/6/2019    Procedure: PYELOGRAM, RETROGRADE;  Surgeon: Gamal Sosa MD;  Location: [facility] OR 1ST FLR;  Service: Urology;  Laterality: Left;    RETROGRADE PYELOGRAPHY Left 10/11/2019    Procedure: PYELOGRAM, RETROGRADE;  Surgeon: Gamal Sosa MD;  Location: [facility] OR 1ST FLR;  Service: Urology;  Laterality: Left;    URETEROSCOPIC REMOVAL OF URETERIC CALCULUS  10/11/2019    Procedure: REMOVAL, CALCULUS, URETER, URETEROSCOPIC;  Surgeon: Gamal Sosa MD;  [facility];  Service: Urology;;    URETEROSCOPY Left 10/6/2019    Procedure: URETEROSCOPY;  Surgeon: Gamal Sosa MD;  [facility];  Service: Urology;  Laterality: Left;    URETEROSCOPY Left 10/11/2019    Procedure: URETEROSCOPY;  Surgeon: Gamal Sosa MD;  [facility];  Service: Urology;  Laterality: Left;  1hr    XI ROBOTIC GASTROENTEROSTOMY, TODD-EN-Y N/A 7/3/2023    Procedure: XI ROBOTIC GASTROENTEROSTOMY, TODD-EN-Y (PT is SELF PAY, DO NOT submit to insurance;  Surgeon: Tay Stephen MD;  Location: [facility] OR 2ND FLR;  Service: General;  Laterality: N/A;     Family History   Problem Relation Age of Onset    Heart disease Father     Stroke Father     Hypertension Father     Diabetes Maternal Grandmother     Heart disease Maternal Grandfather     Stroke Paternal Grandmother     Heart disease Paternal Grandfather     Diabetes Mother     Obesity Mother     Obesity Sister     Diabetes Brother     Obesity Brother      Social History     Tobacco Use    Smoking status: Never     Passive exposure: Never    Smokeless tobacco: Former     Types: Chew     "Tobacco comments:     2 cans/ week   Substance Use Topics    Alcohol use: Not Currently     Alcohol/week: 3.0 standard drinks of alcohol     Types: 3 Cans of beer per week     Comment: "socially but not usually"    Drug use: No     Review of Systems   Reason unable to perform ROS: See HPI for relevant ROS.       Physical Exam     Initial Vitals [01/19/24 1601]   BP Pulse Resp Temp SpO2   (!) 160/93 66 18 98.4 °F (36.9 °C) 98 %      MAP       --         Physical Exam    Nursing note and vitals reviewed.  Constitutional:   Alert, speaking full sentences, no acute distress   Eyes: Conjunctivae are normal. No scleral icterus.   Cardiovascular:  Normal rate and regular rhythm.           Pulmonary/Chest: Breath sounds normal. No respiratory distress.   Abdominal:   Abdomen soft and nondistended, no significant abdominal tenderness   Musculoskeletal:      Comments: No tenderness to the midline C, T, or L-spine.  No significant paraspinal tenderness or CVA tenderness     Neurological: He is alert and oriented to person, place, and time. He has normal strength.   Skin: Skin is warm and dry.         ED Course   Procedures  Labs Reviewed   CBC W/ AUTO DIFFERENTIAL - Abnormal; Notable for the following components:       Result Value    RBC 4.45 (*)     Hemoglobin 13.7 (*)     All other components within normal limits   COMPREHENSIVE METABOLIC PANEL - Abnormal; Notable for the following components:    CO2 22 (*)     Anion Gap 6 (*)     All other components within normal limits   URINALYSIS, REFLEX TO URINE CULTURE    Narrative:     Specimen Source->Urine          Imaging Results              CT Renal Stone Study ABD Pelvis WO (Final result)  Result time 01/19/24 18:56:55      Final result by Baldemar Huff MD (01/19/24 18:56:55)                   Impression:      Increase in size and number of bilateral nephrolithiasis without evidence of lower tract stone or obstruction.    Diverticulosis without evidence of " diverticulitis.      Electronically signed by: Baldemar Huff  Date:    01/19/2024  Time:    18:56               Narrative:    EXAMINATION:  CT RENAL STONE STUDY ABD PELVIS WO    CLINICAL HISTORY:  Flank pain, kidney stone suspected;    TECHNIQUE:  Low dose axial images, sagittal and coronal reformations were obtained from the lung bases to the pubic symphysis.  Contrast was not administered.    COMPARISON:  CT abdomen and pelvis 06/28/2022    FINDINGS:  Heart: Normal in size. No pericardial effusion.    Lung Bases: Well aerated, without consolidation or pleural fluid.    Liver: Normal in size and attenuation, with no focal hepatic lesions.    Gallbladder: No calcified gallstones.    Bile Ducts: No evidence of dilated ducts.    Pancreas: No mass or peripancreatic fat stranding.    Spleen: Unremarkable.    Adrenals: Unremarkable.    Kidneys/ Ureters: Multiple bilateral nonobstructing calculi have increased in size since the previous exam with the largest in the right collecting system measuring 11 mm in long axis and the largest in the lower pole of the left kidney measuring 5 mm in long axis.  No hydronephrosis or lower tract stone or obstructive changes present..    Bladder: No evidence of wall thickening.    Reproductive organs: Unremarkable.    GI Tract/Mesentery: No evidence of bowel obstruction or inflammation.  GI staple line is noted in loop of small bowel in the jejunum.  A few scattered diverticula in the descending and sigmoid colon are noted without evidence of diverticulitis.  Appendectomy suture line is noted.  Surgical staple lines in the stomach suggest gastric weight loss surgery.    Peritoneal Space: No ascites. No free air.    Retroperitoneum: No significant adenopathy.    Abdominal wall: Unremarkable.    Vasculature: No significant atherosclerosis or aneurysm.    Bones: No acute fracture.                                       Medications   ketorolac injection 15 mg (15 mg Intravenous Given  1/19/24 8415)     Medical Decision Making  43 y.o. male with GERD, history of gastric sleeve, history of kidney stones, GAYLE, morbid obesity presents for right flank pain for the past several days worsening today  Differentials include muscle strain, kidney stone, pyelonephritis, UTI, doubt spinal cord compression or bacteremia  Patient well-appearing, no significant urinary symptoms, presenting with right-sided flank pain that feels somewhat similar to kidney stones in the past.  No fever/chills  No midline tenderness, no lower extremity weakness  Abdominal exam benign    Amount and/or Complexity of Data Reviewed  Labs: ordered. Decision-making details documented in ED Course.  Radiology: ordered. Decision-making details documented in ED Course.    Risk  Prescription drug management.               ED Course as of 01/19/24 2019 Fri Jan 19, 2024 2001 CT Renal Stone Study ABD Pelvis WO  No obstructing kidney stones, bilateral renal stones are present [OK]   2001 Urinalysis, Reflex to Urine Culture Urine, Clean Catch  No evidence of infection [OK]   2018 Patient with resolution of symptoms.  Patient found to have bilateral renal stones with no obstructing kidney stones.  No other acute findings.  Suspect his pain is musculoskeletal.  Discussed results with patient, recommended symptomatic management, return precautions given [OK]      ED Course User Index  [OK] Giuliano Whitaker MD                           Clinical Impression:  Final diagnoses:  [R10.9] Right flank pain (Primary)  [I10] Hypertension, unspecified type          ED Disposition Condition    Discharge           ED Prescriptions       Medication Sig Dispense Start Date End Date Auth. Provider    celecoxib (CELEBREX) 100 MG capsule Take 1 capsule (100 mg total) by mouth 2 (two) times daily. for 7 days 14 capsule 1/19/2024 1/26/2024 Giuliano Whitaker MD          Follow-up Information       Follow up With Specialties Details Why Contact Info    Your primary  care team        [facility] Emergency Medicine  As needed, fever/chills, inability urinate, worsening severe pain that does not respond to medication, or for any other concerning symptoms [id number]  [facility] [id number] [phone]             Giuliano Whitaker MD  01/19/24 2019

## 2024-01-20 NOTE — DISCHARGE INSTRUCTIONS
For pain take  Tylenol = Acetaminophen every 6hrs as needed  If needed, you can take a short course of Celebrex as prescribed    You can apply hot or cold packs as needed to the painful area, use them for 15 minutes at a time with breaks in between.  You can also apply lidocaine patches as needed    Do not take ibuprofen, naproxen, advil, or aleve every day for more than 2-3 weeks without following up with your primary care provider, as they can cause stomach pain and other complications if used every day over long periods of time

## 2024-01-23 ENCOUNTER — OFFICE VISIT (OUTPATIENT)
Dept: UROLOGY | Facility: CLINIC | Age: 44
End: 2024-01-23
Payer: COMMERCIAL

## 2024-01-23 VITALS — WEIGHT: 315 LBS | BODY MASS INDEX: 45.1 KG/M2 | HEIGHT: 70 IN

## 2024-01-23 DIAGNOSIS — N20.0 RENAL CALCULI: Primary | ICD-10-CM

## 2024-01-23 PROCEDURE — 3008F BODY MASS INDEX DOCD: CPT | Mod: CPTII,S$GLB,, | Performed by: UROLOGY

## 2024-01-23 PROCEDURE — 99213 OFFICE O/P EST LOW 20 MIN: CPT | Mod: S$GLB,,, | Performed by: UROLOGY

## 2024-01-23 PROCEDURE — 99999 PR PBB SHADOW E&M-EST. PATIENT-LVL III: CPT | Mod: PBBFAC,,, | Performed by: UROLOGY

## 2024-01-23 PROCEDURE — 1159F MED LIST DOCD IN RCRD: CPT | Mod: CPTII,S$GLB,, | Performed by: UROLOGY

## 2024-01-23 NOTE — PROGRESS NOTES
Subjective:       Patient ID: Surjit Valentine is a 43 y.o. male.    Chief Complaint: Nephrolithiasis    HPI patient is here with bilateral nonobstructing renal stones is an 11 me mm stone on the right side and a smaller 1 on the left side he went to the emergency room but it was found that he had a pulled muscle.  Urine looks concentrated is negative    Past Medical History:   Diagnosis Date    GERD without esophagitis     takes OTC nexium PRN    History of kidney stones     Morbid obesity with body mass index of 60.0-69.9 in adult     GAYLE (obstructive sleep apnea)     did not tolerate CPAP       Past Surgical History:   Procedure Laterality Date    APPENDECTOMY  3/2008    ARTHROSCOPIC CHONDROPLASTY OF KNEE JOINT Right 11/17/2023    Procedure: ARTHROSCOPY, KNEE, WITH CHONDROPLASTY;  Surgeon: Rogers Martinez MD;  Location: [facility] OR [address] [place] [address];  Service: Orthopedics;  Laterality: Right;    CYSTOSCOPY  10/11/2019    Procedure: CYSTOSCOPY;  Surgeon: Gamal Sosa MD;  Location: [facility] OR [address] [place] [address];  Service: Urology;;    CYSTOSCOPY W/ URETERAL STENT PLACEMENT Left 10/6/2019    Procedure: CYSTOSCOPY, WITH URETERAL STENT INSERTION;  Surgeon: Gamal Sosa MD;  Location: [facility] OR [address] [place] [address];  Service: Urology;  Laterality: Left;    ESOPHAGOGASTRODUODENOSCOPY N/A 7/3/2023    Procedure: EGD (ESOPHAGOGASTRODUODENOSCOPY);  Surgeon: Tay Stephen MD;  Location: [facility] OR [address] [place] [address];  Service: General;  Laterality: N/A;    KNEE ARTHROSCOPY W/ MENISCECTOMY Right 11/17/2023    Procedure: ARTHROSCOPY, KNEE, WITH MENISCECTOMY;  Surgeon: Rogers Martinez MD;  Location: [facility] OR [address] [place] [address];  Service: Orthopedics;  Laterality: Right;  regional without catheter    LASER LITHOTRIPSY Left 10/11/2019    Procedure: LITHOTRIPSY, USING LASER;  Surgeon: Gamal Sosa MD;  Location: [facility] OR [address] [place] [address];  Service: Urology;  Laterality: Left;    RETROGRADE PYELOGRAPHY Left 10/6/2019    Procedure: PYELOGRAM, RETROGRADE;  Surgeon:  "Gamal Sosa MD;  Location: [facility] OR 1ST FLR;  Service: Urology;  Laterality: Left;    RETROGRADE PYELOGRAPHY Left 10/11/2019    Procedure: PYELOGRAM, RETROGRADE;  Surgeon: Gamal Sosa MD;  [facility];  Service: Urology;  Laterality: Left;    URETEROSCOPIC REMOVAL OF URETERIC CALCULUS  10/11/2019    Procedure: REMOVAL, CALCULUS, URETER, URETEROSCOPIC;  Surgeon: Gamal Sosa MD;  [facility];  Service: Urology;;    URETEROSCOPY Left 10/6/2019    Procedure: URETEROSCOPY;  Surgeon: Gamal Sosa MD;  Location: [facility] OR [facility] FLR;  Service: Urology;  Laterality: Left;    URETEROSCOPY Left 10/11/2019    Procedure: URETEROSCOPY;  Surgeon: Gamal Sosa MD;  [facility];  Service: Urology;  Laterality: Left;  1hr    XI ROBOTIC GASTROENTEROSTOMY, TODD-EN-Y N/A 7/3/2023    Procedure: XI ROBOTIC GASTROENTEROSTOMY, TODD-EN-Y (PT is SELF PAY, DO NOT submit to insurance;  Surgeon: Tay Stephen MD;  Location: [facility] OR 2ND FLR;  Service: General;  Laterality: N/A;       Family History   Problem Relation Age of Onset    Heart disease Father     Stroke Father     Hypertension Father     Diabetes Maternal Grandmother     Heart disease Maternal Grandfather     Stroke Paternal Grandmother     Heart disease Paternal Grandfather     Diabetes Mother     Obesity Mother     Obesity Sister     Diabetes Brother     Obesity Brother        Social History     Socioeconomic History    Marital status: Single   Tobacco Use    Smoking status: Never     Passive exposure: Never    Smokeless tobacco: Former     Types: Chew    Tobacco comments:     2 cans/ week   Substance and Sexual Activity    Alcohol use: Not Currently     Alcohol/week: 3.0 standard drinks of alcohol     Types: 3 Cans of beer per week     Comment: "socially but not usually"    Drug use: No   Social History Narrative    Works full time: [employer] : [employer].  .  1 son, " 14 years old.      Social Determinants of Health     Financial Resource Strain: Low Risk  (7/4/2023)    Overall Financial Resource Strain (CARDIA)     Difficulty of Paying Living Expenses: Not hard at all   Food Insecurity: No Food Insecurity (7/4/2023)    Hunger Vital Sign     Worried About Running Out of Food in the Last Year: Never true     Ran Out of Food in the Last Year: Never true   Transportation Needs: No Transportation Needs (7/4/2023)    PRAPARE - Transportation     Lack of Transportation (Medical): No     Lack of Transportation (Non-Medical): No   Physical Activity: Inactive (7/4/2023)    Exercise Vital Sign     Days of Exercise per Week: 0 days     Minutes of Exercise per Session: 0 min   Stress: No Stress Concern Present (7/4/2023)    [place] [employer] - Occupational Stress Questionnaire     Feeling of Stress : Not at all   Social Connections: Unknown (7/4/2023)    Social Connection and Isolation Panel [NHANES]     Frequency of Communication with Friends and Family: More than three times a week     Frequency of Social Gatherings with Friends and Family: Once a week     Attends Sabianist Services: More than 4 times per year     Active Member of Clubs or Organizations: No     Attends Club or Organization Meetings: Never   Housing Stability: Low Risk  (7/4/2023)    Housing Stability Vital Sign     Unable to Pay for Housing in the Last Year: No     Number of Places Lived in the Last Year: 1     Unstable Housing in the Last Year: No       Allergies:  Patient has no known allergies.    Medications:    Current Outpatient Medications:     celecoxib (CELEBREX) 100 MG capsule, Take 1 capsule (100 mg total) by mouth 2 (two) times daily. for 7 days, Disp: 14 capsule, Rfl: 0    sertraline (ZOLOFT) 25 MG tablet, Take 25 mg by mouth once daily., Disp: , Rfl:     aspirin 325 MG tablet, Take 1 tablet (325 mg total) by mouth once daily. for 21 days, Disp: 21 tablet, Rfl: 0     HYDROcodone-acetaminophen (NORCO) 7.5-325 mg per tablet, Take 1 tablet by mouth every 6 (six) hours as needed for Pain. (Patient not taking: Reported on 1/4/2024), Disp: 20 tablet, Rfl: 0    levocetirizine (XYZAL) 5 MG tablet, Take 1 tablet (5 mg total) by mouth every evening. (Patient not taking: Reported on 11/16/2023), Disp: 30 tablet, Rfl: 0    omeprazole (PRILOSEC) 40 MG capsule, Take 1 capsule (40 mg total) by mouth every morning. Open capsule and take with apple sauce (Patient not taking: Reported on 1/4/2024), Disp: 30 capsule, Rfl: 2    promethazine (PHENERGAN) 25 MG tablet, Take 1 tablet (25 mg total) by mouth every 6 (six) hours as needed for Nausea. (Patient not taking: Reported on 1/4/2024), Disp: 20 tablet, Rfl: 0    traMADoL (ULTRAM) 50 mg tablet, Take 1-2 tablet(s) EVERY 8 HOURS by oral route as needed., Disp: , Rfl:     traMADoL (ULTRAM) 50 mg tablet, Take 1 tablet (50 mg total) by mouth every 6 (six) hours as needed for Pain. (Patient not taking: Reported on 1/4/2024), Disp: 20 tablet, Rfl: 0    ursodioL (ALESSANDRO FORTE) 500 MG tablet, Crush one tablet by mouth daily for gall bladder. (Patient not taking: Reported on 1/4/2024), Disp: 90 tablet, Rfl: 1    Review of Systems   Constitutional:  Negative for activity change, appetite change, chills, diaphoresis, fatigue, fever and unexpected weight change.   HENT:  Negative for congestion, dental problem, hearing loss, mouth sores, postnasal drip, rhinorrhea, sinus pressure and trouble swallowing.    Eyes:  Negative for pain, discharge and itching.   Respiratory:  Negative for apnea, cough, choking, chest tightness, shortness of breath and wheezing.    Cardiovascular:  Negative for chest pain, palpitations and leg swelling.   Gastrointestinal:  Negative for abdominal distention, abdominal pain, anal bleeding, blood in stool, constipation, diarrhea, nausea, rectal pain and vomiting.   Endocrine: Negative for polydipsia and polyuria.   Genitourinary:   Negative for decreased urine volume, difficulty urinating, dysuria, enuresis, flank pain, frequency, genital sores, hematuria, penile discharge, penile pain, penile swelling, scrotal swelling, testicular pain and urgency.   Musculoskeletal:  Negative for arthralgias, back pain and myalgias.   Skin:  Negative for color change, rash and wound.   Neurological:  Negative for dizziness, syncope, speech difficulty, light-headedness and headaches.   Hematological:  Negative for adenopathy. Does not bruise/bleed easily.   Psychiatric/Behavioral:  Negative for behavioral problems, confusion, hallucinations and sleep disturbance.        Objective:      Physical Exam  Constitutional:       Appearance: He is well-developed.   HENT:      Head: Normocephalic.   Cardiovascular:      Rate and Rhythm: Normal rate.   Pulmonary:      Effort: Pulmonary effort is normal.   Abdominal:      Palpations: Abdomen is soft.   Genitourinary:     Prostate: Normal.   Skin:     General: Skin is warm.   Neurological:      Mental Status: He is alert.         Assessment:       1. Renal calculi        Plan:       Surjit was seen today for nephrolithiasis.    Diagnoses and all orders for this visit:    Renal calculi  -     X-Ray Abdomen AP 1 View; Future    Patient is going to [facility] for [place] and I will see him back after that to discuss treatment if he wishes

## 2024-01-25 ENCOUNTER — HOSPITAL ENCOUNTER (OUTPATIENT)
Dept: RADIOLOGY | Facility: HOSPITAL | Age: 44
Discharge: HOME | End: 2024-01-25
Attending: UROLOGY
Payer: COMMERCIAL

## 2024-01-25 DIAGNOSIS — N20.0 RENAL CALCULI: ICD-10-CM

## 2024-01-25 PROCEDURE — 74018 RADEX ABDOMEN 1 VIEW: CPT | Mod: TC

## 2024-01-25 PROCEDURE — 74018 RADEX ABDOMEN 1 VIEW: CPT | Mod: 26,,, | Performed by: INTERNAL MEDICINE

## 2024-02-14 ENCOUNTER — PATIENT MESSAGE (OUTPATIENT)
Dept: BARIATRICS | Facility: CLINIC | Age: 44
End: 2024-02-14
Payer: COMMERCIAL

## 2024-02-20 ENCOUNTER — PATIENT MESSAGE (OUTPATIENT)
Dept: BARIATRICS | Facility: CLINIC | Age: 44
End: 2024-02-20
Payer: COMMERCIAL

## 2024-02-29 ENCOUNTER — PATIENT MESSAGE (OUTPATIENT)
Dept: BARIATRICS | Facility: CLINIC | Age: 44
End: 2024-02-29
Payer: COMMERCIAL

## 2024-03-06 ENCOUNTER — PATIENT MESSAGE (OUTPATIENT)
Dept: BARIATRICS | Facility: CLINIC | Age: 44
End: 2024-03-06
Payer: COMMERCIAL

## 2024-03-15 ENCOUNTER — TELEPHONE (OUTPATIENT)
Dept: BARIATRICS | Facility: CLINIC | Age: 44
End: 2024-03-15
Payer: COMMERCIAL

## 2024-04-03 ENCOUNTER — PATIENT MESSAGE (OUTPATIENT)
Dept: BARIATRICS | Facility: CLINIC | Age: 44
End: 2024-04-03
Payer: COMMERCIAL

## 2024-04-09 ENCOUNTER — PATIENT MESSAGE (OUTPATIENT)
Dept: BARIATRICS | Facility: CLINIC | Age: 44
End: 2024-04-09
Payer: COMMERCIAL

## 2024-05-14 ENCOUNTER — PATIENT MESSAGE (OUTPATIENT)
Dept: BARIATRICS | Facility: CLINIC | Age: 44
End: 2024-05-14
Payer: COMMERCIAL

## 2024-05-27 ENCOUNTER — HOSPITAL ENCOUNTER (EMERGENCY)
Facility: HOSPITAL | Age: 44
Discharge: PSYCHIATRIC OR SUBSTANCE USE FACILITY | End: 2024-05-28
Attending: STUDENT IN AN ORGANIZED HEALTH CARE EDUCATION/TRAINING PROGRAM
Payer: COMMERCIAL

## 2024-05-27 DIAGNOSIS — F32.A DEPRESSION, UNSPECIFIED DEPRESSION TYPE: ICD-10-CM

## 2024-05-27 DIAGNOSIS — Z00.8 MEDICAL CLEARANCE FOR PSYCHIATRIC ADMISSION: ICD-10-CM

## 2024-05-27 DIAGNOSIS — R31.29 MICROSCOPIC HEMATURIA: ICD-10-CM

## 2024-05-27 DIAGNOSIS — R45.851 SUICIDAL IDEATION: Primary | ICD-10-CM

## 2024-05-27 LAB
ALBUMIN SERPL BCP-MCNC: 3.8 G/DL (ref 3.5–5.2)
ALP SERPL-CCNC: 108 U/L (ref 55–135)
ALT SERPL W/O P-5'-P-CCNC: 19 U/L (ref 10–44)
AMPHET+METHAMPHET UR QL: NEGATIVE
ANION GAP SERPL CALC-SCNC: 11 MMOL/L (ref 8–16)
APAP SERPL-MCNC: <3 UG/ML (ref 10–20)
AST SERPL-CCNC: 19 U/L (ref 10–40)
BARBITURATES UR QL SCN>200 NG/ML: NEGATIVE
BASOPHILS # BLD AUTO: 0.04 K/UL (ref 0–0.2)
BASOPHILS NFR BLD: 0.5 % (ref 0–1.9)
BENZODIAZ UR QL SCN>200 NG/ML: NEGATIVE
BILIRUB SERPL-MCNC: 0.5 MG/DL (ref 0.1–1)
BILIRUB UR QL STRIP: ABNORMAL
BUN SERPL-MCNC: 14 MG/DL (ref 6–20)
BZE UR QL SCN: NEGATIVE
CALCIUM SERPL-MCNC: 9.5 MG/DL (ref 8.7–10.5)
CANNABINOIDS UR QL SCN: NEGATIVE
CHLORIDE SERPL-SCNC: 112 MMOL/L (ref 95–110)
CLARITY UR REFRACT.AUTO: CLEAR
CO2 SERPL-SCNC: 21 MMOL/L (ref 23–29)
COLOR UR AUTO: YELLOW
CREAT SERPL-MCNC: 0.8 MG/DL (ref 0.5–1.4)
CREAT UR-MCNC: 237 MG/DL (ref 23–375)
DIFFERENTIAL METHOD BLD: ABNORMAL
EOSINOPHIL # BLD AUTO: 0.2 K/UL (ref 0–0.5)
EOSINOPHIL NFR BLD: 1.8 % (ref 0–8)
ERYTHROCYTE [DISTWIDTH] IN BLOOD BY AUTOMATED COUNT: 13.3 % (ref 11.5–14.5)
EST. GFR  (NO RACE VARIABLE): >60 ML/MIN/1.73 M^2
ETHANOL SERPL-MCNC: <10 MG/DL
GLUCOSE SERPL-MCNC: 90 MG/DL (ref 70–110)
GLUCOSE UR QL STRIP: NEGATIVE
HCT VFR BLD AUTO: 38.8 % (ref 40–54)
HCV AB SERPL QL IA: NORMAL
HGB BLD-MCNC: 13.2 G/DL (ref 14–18)
HGB UR QL STRIP: ABNORMAL
HIV 1+2 AB+HIV1 P24 AG SERPL QL IA: NORMAL
IMM GRANULOCYTES # BLD AUTO: 0.02 K/UL (ref 0–0.04)
IMM GRANULOCYTES NFR BLD AUTO: 0.2 % (ref 0–0.5)
KETONES UR QL STRIP: NEGATIVE
LEUKOCYTE ESTERASE UR QL STRIP: NEGATIVE
LYMPHOCYTES # BLD AUTO: 2.8 K/UL (ref 1–4.8)
LYMPHOCYTES NFR BLD: 33.1 % (ref 18–48)
MCH RBC QN AUTO: 30.6 PG (ref 27–31)
MCHC RBC AUTO-ENTMCNC: 34 G/DL (ref 32–36)
MCV RBC AUTO: 90 FL (ref 82–98)
METHADONE UR QL SCN>300 NG/ML: NEGATIVE
MONOCYTES # BLD AUTO: 0.5 K/UL (ref 0.3–1)
MONOCYTES NFR BLD: 6.4 % (ref 4–15)
NEUTROPHILS # BLD AUTO: 4.9 K/UL (ref 1.8–7.7)
NEUTROPHILS NFR BLD: 58 % (ref 38–73)
NITRITE UR QL STRIP: NEGATIVE
NRBC BLD-RTO: 0 /100 WBC
OPIATES UR QL SCN: NEGATIVE
PCP UR QL SCN>25 NG/ML: NEGATIVE
PH UR STRIP: 6 [PH] (ref 5–8)
PLATELET # BLD AUTO: 223 K/UL (ref 150–450)
PMV BLD AUTO: 11.2 FL (ref 9.2–12.9)
POTASSIUM SERPL-SCNC: 3.8 MMOL/L (ref 3.5–5.1)
PROT SERPL-MCNC: 7.2 G/DL (ref 6–8.4)
PROT UR QL STRIP: ABNORMAL
RBC # BLD AUTO: 4.31 M/UL (ref 4.6–6.2)
SODIUM SERPL-SCNC: 144 MMOL/L (ref 136–145)
SP GR UR STRIP: 1.02 (ref 1–1.03)
TOXICOLOGY INFORMATION: NORMAL
TSH SERPL DL<=0.005 MIU/L-ACNC: 1.97 UIU/ML (ref 0.4–4)
URN SPEC COLLECT METH UR: ABNORMAL
WBC # BLD AUTO: 8.39 K/UL (ref 3.9–12.7)

## 2024-05-27 PROCEDURE — 80053 COMPREHEN METABOLIC PANEL: CPT | Performed by: EMERGENCY MEDICINE

## 2024-05-27 PROCEDURE — 80307 DRUG TEST PRSMV CHEM ANLYZR: CPT | Performed by: EMERGENCY MEDICINE

## 2024-05-27 PROCEDURE — 84443 ASSAY THYROID STIM HORMONE: CPT | Performed by: EMERGENCY MEDICINE

## 2024-05-27 PROCEDURE — 87389 HIV-1 AG W/HIV-1&-2 AB AG IA: CPT | Performed by: PHYSICIAN ASSISTANT

## 2024-05-27 PROCEDURE — 85025 COMPLETE CBC W/AUTO DIFF WBC: CPT | Performed by: EMERGENCY MEDICINE

## 2024-05-27 PROCEDURE — 80143 DRUG ASSAY ACETAMINOPHEN: CPT | Performed by: EMERGENCY MEDICINE

## 2024-05-27 PROCEDURE — 86803 HEPATITIS C AB TEST: CPT | Performed by: PHYSICIAN ASSISTANT

## 2024-05-27 PROCEDURE — 82077 ASSAY SPEC XCP UR&BREATH IA: CPT | Performed by: EMERGENCY MEDICINE

## 2024-05-27 PROCEDURE — 81003 URINALYSIS AUTO W/O SCOPE: CPT | Mod: 59 | Performed by: EMERGENCY MEDICINE

## 2024-05-27 NOTE — ED PROVIDER NOTES
"Encounter Date: 5/27/2024       History     Chief Complaint   Patient presents with    Anxiety     Pt states he has been off Lexapro for a couple weeks, now feeling anxious. Pt denies any pain or shortness of breath. Endorses depression and SI.     Suicidal     The history is provided by the patient and medical records. No  was used.     Surjit Valentine is a 43 y.o. male with medical history of GERD, history of gastric RNY, history of kidney stones, GAYLE, morbid obesity  presenting to the ED with the chief complaint of anxiety and depression.     Drove himself to the ED today for evaluation of worsening depression. He was started on Lexapro after knee surgery 6 months ago. Reports he has not taken it for the past 3 weeks for an unclear reason. Reports being temperamental at home and becomes agitated with his girlfriend over little things. 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 He expresses SI as "feeling like I should just end it" but then states he feels suicide to be selfish act and would never do it. Denies history of self-harming behavior. He would like assistance with medication management. His PCP prescribes him the Lexapro, but states it will cost him $400 for an appointment to see her. Reports he would like to see a psychiatrist. Denies ETOH and recreational drug use.     Review of patient's allergies indicates:  No Known Allergies  Past Medical History:   Diagnosis Date    GERD without esophagitis     takes OTC nexium PRN    History of kidney stones     Morbid obesity with body mass index of 60.0-69.9 in adult     GAYLE (obstructive sleep apnea)     did not tolerate CPAP     Past Surgical History:   Procedure Laterality Date    APPENDECTOMY  3/2008    ARTHROSCOPIC CHONDROPLASTY OF KNEE JOINT Right 11/17/2023    Procedure: ARTHROSCOPY, KNEE, WITH CHONDROPLASTY;  Surgeon: " Rogers Martinez MD;  Location: [facility] OR 2ND FLR;  Service: Orthopedics;  Laterality: Right;    CYSTOSCOPY  10/11/2019    Procedure: CYSTOSCOPY;  Surgeon: Gamal Sosa MD;  Location: [facility] OR 1ST FLR;  Service: Urology;;    CYSTOSCOPY W/ URETERAL STENT PLACEMENT Left 10/6/2019    Procedure: CYSTOSCOPY, WITH URETERAL STENT INSERTION;  Surgeon: Gamal Sosa MD;  Location: [facility] OR 1ST FLR;  Service: Urology;  Laterality: Left;    ESOPHAGOGASTRODUODENOSCOPY N/A 7/3/2023    Procedure: EGD (ESOPHAGOGASTRODUODENOSCOPY);  Surgeon: Tay Stephen MD;  Location: [facility] OR 2ND FLR;  Service: General;  Laterality: N/A;    KNEE ARTHROSCOPY W/ MENISCECTOMY Right 11/17/2023    Procedure: ARTHROSCOPY, KNEE, WITH MENISCECTOMY;  Surgeon: Rogers Martinez MD;  Location: [facility] OR 2ND FLR;  Service: Orthopedics;  Laterality: Right;  regional without catheter    LASER LITHOTRIPSY Left 10/11/2019    Procedure: LITHOTRIPSY, USING LASER;  Surgeon: Gamal Sosa MD;  Location: [facility] OR 1ST FLR;  Service: Urology;  Laterality: Left;    RETROGRADE PYELOGRAPHY Left 10/6/2019    Procedure: PYELOGRAM, RETROGRADE;  Surgeon: Gamal Sosa MD;  Location: [facility] OR 1ST FLR;  Service: Urology;  Laterality: Left;    RETROGRADE PYELOGRAPHY Left 10/11/2019    Procedure: PYELOGRAM, RETROGRADE;  Surgeon: Gamal Sosa MD;  Location: [facility] OR 1ST FLR;  Service: Urology;  Laterality: Left;    URETEROSCOPIC REMOVAL OF URETERIC CALCULUS  10/11/2019    Procedure: REMOVAL, CALCULUS, URETER, URETEROSCOPIC;  Surgeon: Gamal Sosa MD;  Location: [facility] OR 1ST FLR;  Service: Urology;;    URETEROSCOPY Left 10/6/2019    Procedure: URETEROSCOPY;  Surgeon: Gamal Sosa MD;  Location: [facility] OR 1ST FLR;  Service: Urology;  Laterality: Left;    URETEROSCOPY Left 10/11/2019    Procedure: URETEROSCOPY;  Surgeon: Gamal Sosa MD;  Location: [facility] OR 1ST FLR;  Service: Urology;  Laterality: Left;  1hr    XI ROBOTIC  "GASTROENTEROSTOMY, TODD-EN-Y N/A 7/3/2023    Procedure: XI ROBOTIC GASTROENTEROSTOMY, TODD-EN-Y (PT is SELF PAY, DO NOT submit to insurance;  Surgeon: Tay Stephen MD;  Location: [facility] OR [address] [place] [address];  Service: General;  Laterality: N/A;     Family History   Problem Relation Name Age of Onset    Heart disease Father      Stroke Father      Hypertension Father      Diabetes Maternal Grandmother      Heart disease Maternal Grandfather      Stroke Paternal Grandmother      Heart disease Paternal Grandfather      Diabetes Mother      Obesity Mother      Obesity Sister      Diabetes Brother      Obesity Brother       Social History     Tobacco Use    Smoking status: Never     Passive exposure: Never    Smokeless tobacco: Former     Types: Chew    Tobacco comments:     2 cans/ week   Substance Use Topics    Alcohol use: Not Currently     Alcohol/week: 3.0 standard drinks of alcohol     Types: 3 Cans of beer per week     Comment: "socially but not usually"    Drug use: No     Review of Systems   Psychiatric/Behavioral:  Positive for dysphoric mood and suicidal ideas.        Physical Exam     Initial Vitals [05/27/24 1629]   BP Pulse Resp Temp SpO2   136/70 65 20 98.2 °F (36.8 °C) 98 %      MAP       --         Physical Exam    Constitutional: He appears well-developed and well-nourished. He is not diaphoretic. He is easily aroused.   HENT:   Head: Normocephalic and atraumatic.   Mouth/Throat: Oropharynx is clear and moist. No oropharyngeal exudate.   Eyes: EOM and lids are normal. Pupils are equal, round, and reactive to light. No scleral icterus.   Neck: Phonation normal. Neck supple. No stridor present.   Normal range of motion.  Cardiovascular:  Normal rate and regular rhythm.           Pulmonary/Chest: Breath sounds normal. No stridor. No respiratory distress. He has no wheezes. He has no rales.   Abdominal: Abdomen is soft. He exhibits no distension. There is no abdominal tenderness. There is no rebound. "   Musculoskeletal:         General: No tenderness or edema. Normal range of motion.      Cervical back: Normal range of motion and neck supple.     Neurological: He is alert, oriented to person, place, and time and easily aroused. He has normal strength. No sensory deficit.   Skin: Skin is warm and dry. No rash noted. No erythema.   Psychiatric: His speech is normal and behavior is normal. He exhibits a depressed mood. He expresses suicidal ideation. He expresses no suicidal plans.         ED Course   Procedures  Labs Reviewed   CBC W/ AUTO DIFFERENTIAL - Abnormal; Notable for the following components:       Result Value    RBC 4.31 (*)     Hemoglobin 13.2 (*)     Hematocrit 38.8 (*)     All other components within normal limits    Narrative:     Release to patient->Immediate   COMPREHENSIVE METABOLIC PANEL - Abnormal; Notable for the following components:    Chloride 112 (*)     CO2 21 (*)     All other components within normal limits    Narrative:     Release to patient->Immediate   URINALYSIS, REFLEX TO URINE CULTURE - Abnormal; Notable for the following components:    Protein, UA Trace (*)     Bilirubin (UA) 1+ (*)     Occult Blood UA Trace (*)     All other components within normal limits    Narrative:     Specimen Source->Urine   ACETAMINOPHEN LEVEL - Abnormal; Notable for the following components:    Acetaminophen (Tylenol), Serum <3.0 (*)     All other components within normal limits    Narrative:     Release to patient->Immediate   HIV 1 / 2 ANTIBODY    Narrative:     Release to patient->Immediate   HEPATITIS C ANTIBODY    Narrative:     Release to patient->Immediate   TSH    Narrative:     Release to patient->Immediate   DRUG SCREEN PANEL, URINE EMERGENCY    Narrative:     Specimen Source->Urine   ALCOHOL,MEDICAL (ETHANOL)    Narrative:     Release to patient->Immediate          Imaging Results    None          Medications - No data to display  Medical Decision Making  43 y.o. male with medical history of  GERD, history of gastric RNY, history of kidney stones, GAYLE, morbid obesity  presenting to the ED for worsening depression, anxiety, medication non-compliance, suicidal ideations.    DDx includes but not limited to psychosis, manic episode, suicidal ideation, medication non-compliance, encephalopathy, social stressors, anxiety, alcohol or drug abuse, metabolic abnormality, delirium.     Patient expresses suicidal ideation, but also states he does not think he would actually ever harm himself. He further expresses hopelessness as he does not know what to do with the way is feeling which led to his ED visit. He also expresses impulsivity with his anger outburst at home. Will place a PEC order and discuss with psychiatry.     Amount and/or Complexity of Data Reviewed  Labs: ordered. Decision-making details documented in ED Course.               ED Course as of 05/27/24 2156   Mon May 27, 2024   2106 Psychiatry consulted regarding continuing PEC. They will come see the patient.  [BA]   2156 Patient signed out to my colleague at the end of my shift with instructions to follow-up pending work-up. Patient signed out with psychiatry recommendations pending. I have discussed the care of this patient with my supervising physician.      [BA]      ED Course User Index  [BA] Oliver Carroll PA-C                           Clinical Impression:  Final diagnoses:  [F32.A] Depression, unspecified depression type (Primary)                 Oliver Carroll PA-C  05/27/24 2157

## 2024-05-27 NOTE — ED NOTES
Bed: [facility]  Expected date:   Expected time:   Means of arrival:   Comments:

## 2024-05-27 NOTE — FIRST PROVIDER EVALUATION
"Medical screening examination initiated.  I have conducted a focused provider triage encounter, findings are as follows:    Brief history of present illness:  has been off of lexapro for a few weeks. Feeling down. No pain. No shortness of breath. Having increasing depression and thoughts of wanting to hurt himself. No active harm.    Vitals:    05/27/24 1629   BP: 136/70   Pulse: 65   Resp: 20   Temp: 98.2 °F (36.8 °C)   TempSrc: Oral   SpO2: 98%   Weight: (!) 149.7 kg (330 lb)   Height: 5' 10" (1.778 m)       Pertinent physical exam:  nontoxic    Brief workup plan:  code watch, to back    Preliminary workup initiated; this workup will be continued and followed by the physician or advanced practice provider that is assigned to the patient when roomed.  "

## 2024-05-28 VITALS
DIASTOLIC BLOOD PRESSURE: 72 MMHG | HEART RATE: 52 BPM | BODY MASS INDEX: 45.1 KG/M2 | OXYGEN SATURATION: 99 % | TEMPERATURE: 98 F | SYSTOLIC BLOOD PRESSURE: 130 MMHG | WEIGHT: 315 LBS | HEIGHT: 70 IN | RESPIRATION RATE: 16 BRPM

## 2024-05-28 LAB — SARS-COV-2 RDRP RESP QL NAA+PROBE: NEGATIVE

## 2024-05-28 PROCEDURE — U0002 COVID-19 LAB TEST NON-CDC: HCPCS | Performed by: STUDENT IN AN ORGANIZED HEALTH CARE EDUCATION/TRAINING PROGRAM

## 2024-05-28 NOTE — ED NOTES
Assisted to & from the restroom where patient voided, specimen tubed to lab.Psychiatric consult @ bedside.

## 2024-05-28 NOTE — ED NOTES
Patient opening eyes as he changes position in bed. He c/o back pain from the bed.  Contacted the charge RN to inquire about a larger bed. Patient is awaiting placement.

## 2024-05-28 NOTE — ED NOTES
Informed of name & location of accepting facility, questions answered. Patient does not want any information given to girlfriend .

## 2024-05-28 NOTE — ED NOTES
Stretcher bed removed & hospital bed provided for comfort. DVC maintained for safety, sitter present.
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
## 2024-05-28 NOTE — ED NOTES
Pt escorted off of the unit on a stretcher by ED and security staff. Pt's PEC, transfer form, and all belongings to include a sealed security envelope given to [facility]. Pt remained calm and cooperative for the transfer.

## 2024-05-28 NOTE — PROVIDER PROGRESS NOTES - EMERGENCY DEPT.
Signout Note    I received signout from the previous provider.     Chief complaint:  Anxiety (Pt states he has been off Lexapro for a couple weeks, now feeling anxious. Pt denies any pain or shortness of breath. Endorses depression and SI. ) and Suicidal      Pertinent history &exam:  Surjit Valentine is a 43 y.o. male with pertinent PMH of depression who presented to emergency department with complaint of anxiety, depression, SI.  Pec filed by previous team.  Labs and urine obtained.  He has microscopic hematuria which can be followed up outpatient, otherwise no acute abnormality.  He was signed out to me pending psychiatry recommendations for final disposition    Vitals:    05/27/24 2058   BP: (!) 118/57   Pulse: (!) 57   Resp:    Temp: 97.8 °F (36.6 °C)       Imaging Studies:    No orders to display       Medications Given:  Medications - No data to display    Pending Items/ MDM:  43 y.o. male with suicidal ideation.    10:31 PM  Patient seen by psychiatry who recommend maintaining pec and pursuing transfer to psychiatric facility.  There is no acute abnormality that precludes his transfer at this time.    Diagnostic Impression:    1. Suicidal ideation    2. Depression, unspecified depression type    3. Medical clearance for psychiatric admission    4. Microscopic hematuria         ED Disposition Condition    Transfer to Psych Facility Stable          ED Prescriptions    None       Follow-up Information    None         Zaria Webster MD  Emergency Medicine

## 2024-05-28 NOTE — ED NOTES
Continues to appear asleep w/ eyes closed, rise/fall of chest noted. NAD, DVC maintained , sitter present.

## 2024-05-28 NOTE — ED NOTES
"The patient is escorted to room # 28 via w/c & escorted by the EDT & hospital security. He is casually attired w/ good grooming & hygiene. He donned a hospital provided gown as the patient's weight exceeds the comfort of the scrubs. His affect is blunted, mood is "depressed." He rates his depression 3/10 stating, "it fluctuates." He denies SI, HI, A/VH.  He endorses noncompliance w/ Lexapro w/ last dose 3-4 wks. ago. 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

## 2024-05-28 NOTE — ED NOTES
The patient is currently resting supine in bed w/ eyes closed,rise/fall of chest noted. The bed is maintained in the lowest locked position for safety.

## 2024-05-28 NOTE — ED NOTES
"The patient states, " I don't think that I should be going any where. They called my girlfriend & she thinks that everybody should have a diagnosis & take a pill." Although he doesn't feel that he should be admitted to a psychiatric service, he's cooperative. DVC is maintained for safety.  "

## 2024-06-11 ENCOUNTER — PATIENT MESSAGE (OUTPATIENT)
Dept: BARIATRICS | Facility: CLINIC | Age: 44
End: 2024-06-11
Payer: COMMERCIAL

## 2024-06-20 ENCOUNTER — OFFICE VISIT (OUTPATIENT)
Dept: INTERNAL MEDICINE | Facility: CLINIC | Age: 44
End: 2024-06-20
Payer: COMMERCIAL

## 2024-06-20 ENCOUNTER — PATIENT MESSAGE (OUTPATIENT)
Dept: PSYCHIATRY | Facility: CLINIC | Age: 44
End: 2024-06-20
Payer: COMMERCIAL

## 2024-06-20 VITALS
SYSTOLIC BLOOD PRESSURE: 132 MMHG | HEART RATE: 62 BPM | DIASTOLIC BLOOD PRESSURE: 78 MMHG | OXYGEN SATURATION: 98 % | WEIGHT: 315 LBS | BODY MASS INDEX: 45.1 KG/M2 | HEIGHT: 70 IN

## 2024-06-20 DIAGNOSIS — Z76.89 ENCOUNTER TO ESTABLISH CARE WITH NEW DOCTOR: ICD-10-CM

## 2024-06-20 DIAGNOSIS — K21.9 GERD WITHOUT ESOPHAGITIS: Chronic | ICD-10-CM

## 2024-06-20 DIAGNOSIS — E66.01 CLASS 3 SEVERE OBESITY DUE TO EXCESS CALORIES WITH SERIOUS COMORBIDITY AND BODY MASS INDEX (BMI) OF 45.0 TO 49.9 IN ADULT: ICD-10-CM

## 2024-06-20 DIAGNOSIS — F41.0 PANIC DISORDER: ICD-10-CM

## 2024-06-20 DIAGNOSIS — Z00.01 ENCOUNTER FOR ANNUAL GENERAL MEDICAL EXAMINATION WITH ABNORMAL FINDINGS IN ADULT: Primary | ICD-10-CM

## 2024-06-20 DIAGNOSIS — Z98.84 HISTORY OF WEIGHT LOSS SURGERY: ICD-10-CM

## 2024-06-20 DIAGNOSIS — Z11.3 ENCOUNTER FOR SCREENING FOR INFECTIONS WITH A PREDOMINANTLY SEXUAL MODE OF TRANSMISSION: ICD-10-CM

## 2024-06-20 PROBLEM — M25.661 DECREASED RANGE OF MOTION (ROM) OF RIGHT KNEE: Status: RESOLVED | Noted: 2023-11-20 | Resolved: 2024-06-20

## 2024-06-20 PROBLEM — M62.81 QUADRICEPS WEAKNESS: Status: RESOLVED | Noted: 2023-11-20 | Resolved: 2024-06-20

## 2024-06-20 PROBLEM — R03.0 ELEVATED BLOOD PRESSURE READING IN OFFICE WITHOUT DIAGNOSIS OF HYPERTENSION: Status: RESOLVED | Noted: 2024-06-20 | Resolved: 2024-06-20

## 2024-06-20 PROBLEM — E66.9 OBESITY: Status: RESOLVED | Noted: 2023-07-03 | Resolved: 2024-06-20

## 2024-06-20 PROBLEM — D64.9 ANEMIA: Status: RESOLVED | Noted: 2024-06-20 | Resolved: 2024-06-20

## 2024-06-20 PROBLEM — N20.0 KIDNEY STONES: Status: RESOLVED | Noted: 2019-10-06 | Resolved: 2024-06-20

## 2024-06-20 PROBLEM — J30.1 HAY FEVER: Status: RESOLVED | Noted: 2024-06-20 | Resolved: 2024-06-20

## 2024-06-20 PROBLEM — N20.9 UROLITHIASIS: Status: RESOLVED | Noted: 2019-10-11 | Resolved: 2024-06-20

## 2024-06-20 PROBLEM — R56.9 SEIZURE: Status: RESOLVED | Noted: 2024-06-20 | Resolved: 2024-06-20

## 2024-06-20 PROBLEM — M23.200 OLD COMPLEX TEAR OF LATERAL MENISCUS OF RIGHT KNEE: Status: RESOLVED | Noted: 2023-11-20 | Resolved: 2024-06-20

## 2024-06-20 PROBLEM — U07.1 ACUTE HYPOXEMIC RESPIRATORY FAILURE DUE TO COVID-19: Status: RESOLVED | Noted: 2020-12-02 | Resolved: 2024-06-20

## 2024-06-20 PROBLEM — U07.1 COVID-19: Status: RESOLVED | Noted: 2020-12-02 | Resolved: 2024-06-20

## 2024-06-20 PROBLEM — M94.261 CHONDROMALACIA, RIGHT KNEE: Status: RESOLVED | Noted: 2023-11-20 | Resolved: 2024-06-20

## 2024-06-20 PROBLEM — J96.01 ACUTE HYPOXEMIC RESPIRATORY FAILURE DUE TO COVID-19: Status: RESOLVED | Noted: 2020-12-02 | Resolved: 2024-06-20

## 2024-06-20 PROBLEM — Z74.09 IMPAIRED FUNCTIONAL MOBILITY, BALANCE, GAIT, AND ENDURANCE: Status: RESOLVED | Noted: 2023-11-20 | Resolved: 2024-06-20

## 2024-06-20 PROBLEM — R73.9 HYPERGLYCEMIA: Status: RESOLVED | Noted: 2024-06-20 | Resolved: 2024-06-20

## 2024-06-20 PROBLEM — E66.813 CLASS 3 SEVERE OBESITY DUE TO EXCESS CALORIES WITH SERIOUS COMORBIDITY AND BODY MASS INDEX (BMI) OF 45.0 TO 49.9 IN ADULT: Status: ACTIVE | Noted: 2023-07-03

## 2024-06-20 PROBLEM — N20.1 URETEROLITHIASIS: Status: RESOLVED | Noted: 2019-10-06 | Resolved: 2024-06-20

## 2024-06-20 PROCEDURE — 99999 PR PBB SHADOW E&M-EST. PATIENT-LVL IV: CPT | Mod: PBBFAC,,, | Performed by: FAMILY MEDICINE

## 2024-06-20 RX ORDER — ESCITALOPRAM OXALATE 20 MG/1
20 TABLET ORAL DAILY
COMMUNITY
Start: 2024-05-31 | End: 2024-06-20 | Stop reason: SDUPTHER

## 2024-06-20 RX ORDER — BUSPIRONE HYDROCHLORIDE 10 MG/1
10 TABLET ORAL 3 TIMES DAILY
Qty: 90 TABLET | Refills: 11 | Status: SHIPPED | OUTPATIENT
Start: 2024-06-20 | End: 2025-06-20

## 2024-06-20 RX ORDER — ESCITALOPRAM OXALATE 20 MG/1
20 TABLET ORAL DAILY
Qty: 90 TABLET | Refills: 3 | Status: SHIPPED | OUTPATIENT
Start: 2024-06-20 | End: 2025-06-15

## 2024-06-20 RX ORDER — TIRZEPATIDE 2.5 MG/.5ML
2.5 INJECTION, SOLUTION SUBCUTANEOUS
Qty: 4 ML | Refills: 0 | Status: SHIPPED | OUTPATIENT
Start: 2024-06-20 | End: 2024-08-09

## 2024-06-20 RX ORDER — ALPRAZOLAM 0.5 MG/1
0.5 TABLET ORAL 3 TIMES DAILY PRN
Qty: 30 TABLET | Refills: 0 | Status: SHIPPED | OUTPATIENT
Start: 2024-06-20 | End: 2024-07-20

## 2024-06-20 NOTE — PROGRESS NOTES
Subjective:     Patient ID: Surjit Valentine is a 43 y.o. male.   Chief Complaint: Annual Exam and Establish Care    HPI:  Patient presents to establish care.  Patient is due for annual exam and labs.    Patient has diagnosis of anxiety/panic disorder.  Currently treated with Lexapro 20 mg.  Requesting refill today.  Reports that his anxiety overall is generally controlled, but he does have some episodes of severe overwhelming panic.  He said he has gone to the ED in the past for these episodes.    Reports a history of weight loss surgery in July 2023.  Reports he has been compliant with dietary changes.  He is pleased with his weight loss so far.  He has a goal to be under 300 lb by the start of the school year in the fall.  He is interested in beginning injectable medical weight loss therapy.  He said he was cleared to do so after 1 year from surgery.  Reports he is physically active.  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    Review of chart shows history of GERD.  Currently not taking any prescribed medications.  He does have OTC meds when needed.    Asking for STI testing today.  No active symptoms, no known exposures.  Review of Problems & History:  Patient Active Problem List   Diagnosis    GERD without esophagitis    GAYLE (obstructive sleep apnea)    Class 3 severe obesity due to excess calories with serious comorbidity and body mass index (BMI) of 45.0 to 49.9 in adult    Panic disorder    History of weight loss surgery      Past Medical History:   Diagnosis Date    Acute hypoxemic respiratory failure due to COVID-19 12/02/2020    Anemia 06/20/2024    BMI 60.0-69.9, adult 08/01/2018    Chondromalacia, right knee 11/20/2023    COVID-19 12/02/2020    Decreased range of motion (ROM) of right knee 11/20/2023    Elevated blood pressure reading in office without diagnosis of hypertension 06/20/2024    GERD without esophagitis     takes OTC nexium PRN     Hay fever 06/20/2024    History of kidney stones     Hyperglycemia 06/20/2024    Impaired functional mobility, balance, gait, and endurance 11/20/2023    Kidney stones 10/06/2019    Morbid obesity with body mass index of 60.0-69.9 in adult     GAYLE (obstructive sleep apnea)     did not tolerate CPAP    Panic disorder 06/20/2024    Quadriceps weakness 11/20/2023    Seizure 06/20/2024    Ureterolithiasis 10/06/2019      Past Surgical History:   Procedure Laterality Date    APPENDECTOMY  3/2008    ARTHROSCOPIC CHONDROPLASTY OF KNEE JOINT Right 11/17/2023    Procedure: ARTHROSCOPY, KNEE, WITH CHONDROPLASTY;  Surgeon: Rogers Martinez MD;  Location: [facility] OR 2ND FLR;  Service: Orthopedics;  Laterality: Right;    CYSTOSCOPY  10/11/2019    Procedure: CYSTOSCOPY;  Surgeon: Gamal Sosa MD;  Location: [facility] OR 1ST FLR;  Service: Urology;;    CYSTOSCOPY W/ URETERAL STENT PLACEMENT Left 10/6/2019    Procedure: CYSTOSCOPY, WITH URETERAL STENT INSERTION;  Surgeon: Gamal Sosa MD;  Location: [facility] OR 1ST FLR;  Service: Urology;  Laterality: Left;    ESOPHAGOGASTRODUODENOSCOPY N/A 7/3/2023    Procedure: EGD (ESOPHAGOGASTRODUODENOSCOPY);  Surgeon: Tay Stephen MD;  Location: [facility] OR 2ND FLR;  Service: General;  Laterality: N/A;    KNEE ARTHROSCOPY W/ MENISCECTOMY Right 11/17/2023    Procedure: ARTHROSCOPY, KNEE, WITH MENISCECTOMY;  Surgeon: Rogers Martinez MD;  Location: [facility] OR 2ND FLR;  Service: Orthopedics;  Laterality: Right;  regional without catheter    LASER LITHOTRIPSY Left 10/11/2019    Procedure: LITHOTRIPSY, USING LASER;  Surgeon: Gamal Sosa MD;  Location: [facility] OR 1ST FLR;  Service: Urology;  Laterality: Left;    RETROGRADE PYELOGRAPHY Left 10/6/2019    Procedure: PYELOGRAM, RETROGRADE;  Surgeon: Gamal Sosa MD;  Location: [facility] OR 1ST FLR;  Service: Urology;  Laterality: Left;    RETROGRADE PYELOGRAPHY Left 10/11/2019    Procedure: PYELOGRAM, RETROGRADE;  Surgeon: Gamal TORRES  "MD Ian;  [facility];  Service: Urology;  Laterality: Left;    URETEROSCOPIC REMOVAL OF URETERIC CALCULUS  10/11/2019    Procedure: REMOVAL, CALCULUS, URETER, URETEROSCOPIC;  Surgeon: Gamal Sosa MD;  [facility];  Service: Urology;;    URETEROSCOPY Left 10/6/2019    Procedure: URETEROSCOPY;  Surgeon: Gamal Sosa MD;  [facility];  Service: Urology;  Laterality: Left;    URETEROSCOPY Left 10/11/2019    Procedure: URETEROSCOPY;  Surgeon: Gamal Sosa MD;  [facility];  Service: Urology;  Laterality: Left;  1hr    XI ROBOTIC GASTROENTEROSTOMY, TODD-EN-Y N/A 7/3/2023    Procedure: XI ROBOTIC GASTROENTEROSTOMY, TODD-EN-Y (PT is SELF PAY, DO NOT submit to insurance;  Surgeon: Tay Stephen MD;  [facility];  Service: General;  Laterality: N/A;      Social History     Socioeconomic History    Marital status: Single   Tobacco Use    Smoking status: Never     Passive exposure: Never    Smokeless tobacco: Current     Types: Chew    Tobacco comments:     2 cans/ week   Substance and Sexual Activity    Alcohol use: Not Currently     Alcohol/week: 3.0 standard drinks of alcohol     Types: 3 Cans of beer per week     Comment: "socially but not usually"    Drug use: No    Sexual activity: Yes     Partners: Female   Social History Narrative    Works full time: [employer] : [employer].  .  1 son, 14 years old.      Social Determinants of Health     Financial Resource Strain: Low Risk  (7/4/2023)    Overall Financial Resource Strain (CARDIA)     Difficulty of Paying Living Expenses: Not hard at all   Food Insecurity: No Food Insecurity (7/4/2023)    Hunger Vital Sign     Worried About Running Out of Food in the Last Year: Never true     Ran Out of Food in the Last Year: Never true   Transportation Needs: No Transportation Needs (7/4/2023)    PRAPARE - Transportation     Lack of Transportation " (Medical): No     Lack of Transportation (Non-Medical): No   Physical Activity: Inactive (7/4/2023)    Exercise Vital Sign     Days of Exercise per Week: 0 days     Minutes of Exercise per Session: 0 min   Stress: No Stress Concern Present (7/4/2023)    [employer] - Occupational Stress Questionnaire     Feeling of Stress : Not at all   Housing Stability: Low Risk  (7/4/2023)    Housing Stability Vital Sign     Unable to Pay for Housing in the Last Year: No     Number of Places Lived in the Last Year: 1     Unstable Housing in the Last Year: No          Medication Review:    Current Outpatient Medications:     ALPRAZolam (XANAX) 0.5 MG tablet, Take 1 tablet (0.5 mg total) by mouth 3 (three) times daily as needed for Anxiety., Disp: 30 tablet, Rfl: 0    aspirin 325 MG tablet, Take 1 tablet (325 mg total) by mouth once daily. for 21 days, Disp: 21 tablet, Rfl: 0    busPIRone (BUSPAR) 10 MG tablet, Take 1 tablet (10 mg total) by mouth 3 (three) times daily., Disp: 90 tablet, Rfl: 11    EScitalopram oxalate (LEXAPRO) 20 MG tablet, Take 1 tablet (20 mg total) by mouth once daily., Disp: 90 tablet, Rfl: 3    tirzepatide, weight loss, (ZEPBOUND) 2.5 mg/0.5 mL PnIj, Inject 2.5 mg into the skin every 7 days. for 8 doses, Disp: 4 mL, Rfl: 0     Review of Systems   Constitutional:  Negative for chills, fatigue and fever.   HENT:  Negative for nasal congestion, ear pain, postnasal drip and sore throat.    Eyes:  Negative for visual disturbance.   Respiratory:  Negative for cough, shortness of breath and wheezing.    Cardiovascular:  Negative for chest pain and palpitations.   Gastrointestinal:  Negative for abdominal pain, change in bowel habit, constipation, diarrhea, nausea and vomiting.   Genitourinary:  Negative for dysuria and hematuria.   Musculoskeletal:  Negative for back pain, leg pain and neck pain.   Neurological:  Negative for dizziness, numbness and headaches.   Hematological:  Negative  "for adenopathy.   Psychiatric/Behavioral:  Negative for dysphoric mood, sleep disturbance and suicidal ideas. The patient is not nervous/anxious.           Objective:      Vitals:    06/20/24 0907   BP: 132/78   BP Location: Left arm   Patient Position: Sitting   BP Method: Medium (Manual)   Pulse: 62   SpO2: 98%   Weight: (!) 149.5 kg (329 lb 9.4 oz)   Height: 5' 10" (1.778 m)      Physical Exam  Vitals and nursing note reviewed.   Constitutional:       General: He is not in acute distress.     Appearance: Normal appearance. He is obese. He is not ill-appearing.   HENT:      Head: Normocephalic and atraumatic.      Right Ear: Tympanic membrane, ear canal and external ear normal. There is no impacted cerumen.      Left Ear: Tympanic membrane, ear canal and external ear normal. There is no impacted cerumen.      Nose: Nose normal. No congestion or rhinorrhea.      Mouth/Throat:      Mouth: Mucous membranes are moist.      Pharynx: Oropharynx is clear. No oropharyngeal exudate or posterior oropharyngeal erythema.   Eyes:      General: No scleral icterus.        Right eye: No discharge.         Left eye: No discharge.      Conjunctiva/sclera: Conjunctivae normal.   Neck:      Thyroid: No thyroid mass, thyromegaly or thyroid tenderness.   Cardiovascular:      Rate and Rhythm: Normal rate and regular rhythm.      Pulses: Normal pulses.      Heart sounds: Normal heart sounds. No murmur heard.     No friction rub. No gallop.   Pulmonary:      Effort: Pulmonary effort is normal. No respiratory distress.      Breath sounds: Normal breath sounds. No wheezing, rhonchi or rales.   Abdominal:      General: Abdomen is flat. Bowel sounds are normal. There is no distension.      Palpations: Abdomen is soft. There is no mass.      Tenderness: There is no abdominal tenderness. There is no guarding.   Musculoskeletal:         General: No swelling or deformity. Normal range of motion.      Cervical back: Normal range of motion and neck " supple. No tenderness.   Lymphadenopathy:      Cervical: No cervical adenopathy.   Skin:     General: Skin is warm and dry.   Neurological:      General: No focal deficit present.      Mental Status: He is alert and oriented to person, place, and time. Mental status is at baseline.      Gait: Gait normal.      Deep Tendon Reflexes: Reflexes normal.   Psychiatric:         Mood and Affect: Mood normal.         Behavior: Behavior normal.         Thought Content: Thought content normal.         Judgment: Judgment normal.           Assessment:       Problem List Items Addressed This Visit          Psychiatric    Panic disorder    Relevant Medications    EScitalopram oxalate (LEXAPRO) 20 MG tablet    busPIRone (BUSPAR) 10 MG tablet    ALPRAZolam (XANAX) 0.5 MG tablet    Other Relevant Orders    Ambulatory referral/consult to Psychology       Endocrine    Class 3 severe obesity due to excess calories with serious comorbidity and body mass index (BMI) of 45.0 to 49.9 in adult    Relevant Medications    tirzepatide, weight loss, (ZEPBOUND) 2.5 mg/0.5 mL PnIj    History of weight loss surgery       GI    GERD without esophagitis (Chronic)     Other Visit Diagnoses       Encounter for annual general medical examination with abnormal findings in adult    -  Primary    Relevant Orders    Comprehensive Metabolic Panel    CBC Auto Differential    Lipid Panel    Hemoglobin A1C    TSH    T4, Free    Encounter to establish care with new doctor        Encounter for screening for infections with a predominantly sexual mode of transmission        Relevant Orders    Hepatitis Panel, Acute    HIV 1/2 Ag/Ab (4th Gen)    C. trachomatis/N. gonorrhoeae by AMP DNA [facility]; Urine    Treponema Pallidium Antibodies IgG, IgM    C. trachomatis/N. gonorrhoeae by AMP DNA [facility]; Urine              Plan:       1. Encounter for annual general medical examination with abnormal findings in adult  Health maintenance updated  Chronic issues  "reviewed  Fasting labs ordered  - Comprehensive Metabolic Panel; Future  - CBC Auto Differential; Future  - Lipid Panel; Future  - Hemoglobin A1C; Future  - TSH; Future  - T4, Free; Future    2. Encounter to establish care with new doctor  Reviewed chronic medical conditions, updated problem list and medication list    3. Class 3 severe obesity due to excess calories with serious comorbidity and body mass index (BMI) of 45.0 to 49.9 in adult  Encourage healthy diet and exercise habits to optimize health and minimize chance of comorbidity development  Discussed risks/benefits/alternatives to medical weight loss  Advised patient that medication is not a "silver bullet" and that real lifestyle changes to diet and exercise will be crucial to having healthy, sustainable weight loss; the medication is meant to be a tool to help achieve that sustainability and prevent stagnation of progress  Discussed typical ramp-up schedule for dosing  Discussed typical side effects and expected results  Discussed possibility of insurance non-coverage and potential significant out-of-pocket expense  Discussed possibility of supply chain interruption, which could hinder ramp-up schedule or disrupt ongoing dosage  PVUA to above  Sending starting rx for Zepbound  Plan to f/u for dose titration in 4 weeks; advised pt to get in touch on week 3 to notify of progress and side effects  - tirzepatide, weight loss, (ZEPBOUND) 2.5 mg/0.5 mL PnIj; Inject 2.5 mg into the skin every 7 days. for 8 doses  Dispense: 4 mL; Refill: 0    4. GERD without esophagitis  Well-controlled, continue management with OTC meds as needed    5. Panic disorder  Discussed therapeutic and medication options   Refill Lexapro as prescribed   Referral to Psychology for 1 on 1 therapy   Prescribed BuSpar 10 mg to take 3 times a day as needed for anxiety beyond baseline  Also prescribed Xanax 0.5 mg to take for extreme episodes of anxiety  Counseled patient on risks/benefits " for each of these medications   Recommend patient use BuSpar as his first-line for anxiety exacerbation, reserving Xanax for severe episodes   PVUA  - EScitalopram oxalate (LEXAPRO) 20 MG tablet; Take 1 tablet (20 mg total) by mouth once daily.  Dispense: 90 tablet; Refill: 3  - Ambulatory referral/consult to Psychology; Future  - busPIRone (BUSPAR) 10 MG tablet; Take 1 tablet (10 mg total) by mouth 3 (three) times daily.  Dispense: 90 tablet; Refill: 11  - ALPRAZolam (XANAX) 0.5 MG tablet; Take 1 tablet (0.5 mg total) by mouth 3 (three) times daily as needed for Anxiety.  Dispense: 30 tablet; Refill: 0    6. History of weight loss surgery  Stable, no acute concerns   See above regarding medical weight loss regimen    7. Encounter for screening for infections with a predominantly sexual mode of transmission  - Hepatitis Panel, Acute; Future  - HIV 1/2 Ag/Ab (4th Gen); Future  - C. trachomatis/N. gonorrhoeae by AMP DNA [facility]; Urine  - Treponema Pallidium Antibodies IgG, IgM; Future  - C. trachomatis/N. gonorrhoeae by AMP DNA [facility]; Urine; Future          Follow up in about 4 weeks (around 7/18/2024) for Medication Titration, Virtual Visit.     Sourav Fabian MD, FAAFP  Family Medicine Physician  [facility]  06/20/2024       This note was produced using voice recognition technology. Some typographical or syntax errors may be present, despite best efforts with proofreading.

## 2024-06-24 ENCOUNTER — LAB VISIT (OUTPATIENT)
Dept: LAB | Facility: HOSPITAL | Age: 44
End: 2024-06-24
Attending: FAMILY MEDICINE
Payer: COMMERCIAL

## 2024-06-24 DIAGNOSIS — Z11.3 ENCOUNTER FOR SCREENING FOR INFECTIONS WITH A PREDOMINANTLY SEXUAL MODE OF TRANSMISSION: ICD-10-CM

## 2024-06-24 DIAGNOSIS — Z00.01 ENCOUNTER FOR ANNUAL GENERAL MEDICAL EXAMINATION WITH ABNORMAL FINDINGS IN ADULT: ICD-10-CM

## 2024-06-24 LAB
ALBUMIN SERPL BCP-MCNC: 3.7 G/DL (ref 3.5–5.2)
ALP SERPL-CCNC: 101 U/L (ref 55–135)
ALT SERPL W/O P-5'-P-CCNC: 26 U/L (ref 10–44)
ANION GAP SERPL CALC-SCNC: 7 MMOL/L (ref 8–16)
AST SERPL-CCNC: 21 U/L (ref 10–40)
BASOPHILS # BLD AUTO: 0.05 K/UL (ref 0–0.2)
BASOPHILS NFR BLD: 0.7 % (ref 0–1.9)
BILIRUB SERPL-MCNC: 0.5 MG/DL (ref 0.1–1)
BUN SERPL-MCNC: 14 MG/DL (ref 6–20)
CALCIUM SERPL-MCNC: 9.3 MG/DL (ref 8.7–10.5)
CHLORIDE SERPL-SCNC: 111 MMOL/L (ref 95–110)
CHOLEST SERPL-MCNC: 174 MG/DL (ref 120–199)
CHOLEST/HDLC SERPL: 3 {RATIO} (ref 2–5)
CO2 SERPL-SCNC: 26 MMOL/L (ref 23–29)
CREAT SERPL-MCNC: 0.9 MG/DL (ref 0.5–1.4)
DIFFERENTIAL METHOD BLD: ABNORMAL
EOSINOPHIL # BLD AUTO: 0.2 K/UL (ref 0–0.5)
EOSINOPHIL NFR BLD: 2.7 % (ref 0–8)
ERYTHROCYTE [DISTWIDTH] IN BLOOD BY AUTOMATED COUNT: 13.7 % (ref 11.5–14.5)
EST. GFR  (NO RACE VARIABLE): >60 ML/MIN/1.73 M^2
ESTIMATED AVG GLUCOSE: 97 MG/DL (ref 68–131)
GLUCOSE SERPL-MCNC: 90 MG/DL (ref 70–110)
HAV IGM SERPL QL IA: NORMAL
HBA1C MFR BLD: 5 % (ref 4–5.6)
HBV CORE IGM SERPL QL IA: NORMAL
HBV SURFACE AG SERPL QL IA: NORMAL
HCT VFR BLD AUTO: 43.4 % (ref 40–54)
HCV AB SERPL QL IA: NORMAL
HDLC SERPL-MCNC: 58 MG/DL (ref 40–75)
HDLC SERPL: 33.3 % (ref 20–50)
HGB BLD-MCNC: 14.1 G/DL (ref 14–18)
HIV 1+2 AB+HIV1 P24 AG SERPL QL IA: NORMAL
IMM GRANULOCYTES # BLD AUTO: 0.01 K/UL (ref 0–0.04)
IMM GRANULOCYTES NFR BLD AUTO: 0.1 % (ref 0–0.5)
LDLC SERPL CALC-MCNC: 102.8 MG/DL (ref 63–159)
LYMPHOCYTES # BLD AUTO: 2.9 K/UL (ref 1–4.8)
LYMPHOCYTES NFR BLD: 38.7 % (ref 18–48)
MCH RBC QN AUTO: 31 PG (ref 27–31)
MCHC RBC AUTO-ENTMCNC: 32.5 G/DL (ref 32–36)
MCV RBC AUTO: 95 FL (ref 82–98)
MONOCYTES # BLD AUTO: 0.7 K/UL (ref 0.3–1)
MONOCYTES NFR BLD: 9 % (ref 4–15)
NEUTROPHILS # BLD AUTO: 3.6 K/UL (ref 1.8–7.7)
NEUTROPHILS NFR BLD: 48.8 % (ref 38–73)
NONHDLC SERPL-MCNC: 116 MG/DL
NRBC BLD-RTO: 0 /100 WBC
PLATELET # BLD AUTO: 234 K/UL (ref 150–450)
PMV BLD AUTO: 11.7 FL (ref 9.2–12.9)
POTASSIUM SERPL-SCNC: 4.3 MMOL/L (ref 3.5–5.1)
PROT SERPL-MCNC: 6.9 G/DL (ref 6–8.4)
RBC # BLD AUTO: 4.55 M/UL (ref 4.6–6.2)
SODIUM SERPL-SCNC: 144 MMOL/L (ref 136–145)
T4 FREE SERPL-MCNC: 0.86 NG/DL (ref 0.71–1.51)
TREPONEMA PALLIDUM IGG+IGM AB [PRESENCE] IN SERUM OR PLASMA BY IMMUNOASSAY: NONREACTIVE
TRIGL SERPL-MCNC: 66 MG/DL (ref 30–150)
TSH SERPL DL<=0.005 MIU/L-ACNC: 2.75 UIU/ML (ref 0.4–4)
WBC # BLD AUTO: 7.36 K/UL (ref 3.9–12.7)

## 2024-06-24 PROCEDURE — 80061 LIPID PANEL: CPT | Performed by: FAMILY MEDICINE

## 2024-06-24 PROCEDURE — 83036 HEMOGLOBIN GLYCOSYLATED A1C: CPT | Performed by: FAMILY MEDICINE

## 2024-06-24 PROCEDURE — 80053 COMPREHEN METABOLIC PANEL: CPT | Performed by: FAMILY MEDICINE

## 2024-06-24 PROCEDURE — 80074 ACUTE HEPATITIS PANEL: CPT | Performed by: FAMILY MEDICINE

## 2024-06-24 PROCEDURE — 86593 SYPHILIS TEST NON-TREP QUANT: CPT | Performed by: FAMILY MEDICINE

## 2024-06-24 PROCEDURE — 84443 ASSAY THYROID STIM HORMONE: CPT | Performed by: FAMILY MEDICINE

## 2024-06-24 PROCEDURE — 84439 ASSAY OF FREE THYROXINE: CPT | Performed by: FAMILY MEDICINE

## 2024-06-24 PROCEDURE — 85025 COMPLETE CBC W/AUTO DIFF WBC: CPT | Performed by: FAMILY MEDICINE

## 2024-06-24 PROCEDURE — 36415 COLL VENOUS BLD VENIPUNCTURE: CPT | Performed by: FAMILY MEDICINE

## 2024-06-24 PROCEDURE — 87389 HIV-1 AG W/HIV-1&-2 AB AG IA: CPT | Performed by: FAMILY MEDICINE

## 2024-07-03 ENCOUNTER — PATIENT MESSAGE (OUTPATIENT)
Dept: BARIATRICS | Facility: CLINIC | Age: 44
End: 2024-07-03
Payer: COMMERCIAL

## 2024-07-09 ENCOUNTER — PATIENT MESSAGE (OUTPATIENT)
Dept: BARIATRICS | Facility: CLINIC | Age: 44
End: 2024-07-09
Payer: COMMERCIAL

## 2024-07-18 ENCOUNTER — OFFICE VISIT (OUTPATIENT)
Dept: INTERNAL MEDICINE | Facility: CLINIC | Age: 44
End: 2024-07-18
Payer: COMMERCIAL

## 2024-07-18 DIAGNOSIS — R82.998 DARK URINE: Primary | ICD-10-CM

## 2024-07-18 DIAGNOSIS — E66.01 CLASS 3 SEVERE OBESITY DUE TO EXCESS CALORIES WITH SERIOUS COMORBIDITY AND BODY MASS INDEX (BMI) OF 45.0 TO 49.9 IN ADULT: ICD-10-CM

## 2024-07-18 PROCEDURE — 99213 OFFICE O/P EST LOW 20 MIN: CPT | Mod: 95,,, | Performed by: FAMILY MEDICINE

## 2024-07-18 PROCEDURE — 3044F HG A1C LEVEL LT 7.0%: CPT | Mod: CPTII,95,, | Performed by: FAMILY MEDICINE

## 2024-07-18 RX ORDER — TIRZEPATIDE 2.5 MG/.5ML
2.5 INJECTION, SOLUTION SUBCUTANEOUS
Qty: 4 ML | Refills: 0 | Status: SHIPPED | OUTPATIENT
Start: 2024-07-18 | End: 2024-09-06

## 2024-07-18 NOTE — PROGRESS NOTES
Subjective:     Patient ID: Surjit Valentine is a 43 y.o. male.   Chief Complaint: No chief complaint on file.    HPI:  The patient location is: [place]  The chief complaint leading to consultation is: follow-up    Visit type: audiovisual    Face to Face time with patient: 5 minutes  12 minutes of total time spent on the encounter, which includes face to face time and non-face to face time preparing to see the patient (eg, review of tests), Obtaining and/or reviewing separately obtained history, Documenting clinical information in the electronic or other health record, Independently interpreting results (not separately reported) and communicating results to the patient/family/caregiver, or Care coordination (not separately reported).         Each patient to whom he or she provides medical services by telemedicine is:  (1) informed of the relationship between the physician and patient and the respective role of any other health care provider with respect to management of the patient; and (2) notified that he or she may decline to receive medical services by telemedicine and may withdraw from such care at any time.    Notes:   Pt evaluated for f/u after initial visit. Has not started Zepbound yet d/t insurance PA needed. Retail pharmacy did not send request over to us for clarification and processing.    Reports dark urine started over the past weekend (5 days ago). No pain, no dysuria. Prior episode with kidney stone - had severe pain at that time. Has been hydrating and urine lightens up but never gets fully normal and it darkens again.       Review of Systems   Constitutional:  Negative for activity change and unexpected weight change.   HENT:  Negative for hearing loss, rhinorrhea and trouble swallowing.    Eyes:  Negative for discharge and visual disturbance.   Respiratory:  Negative for chest tightness and wheezing.    Cardiovascular:  Negative for chest pain and palpitations.   Gastrointestinal:  Negative for  blood in stool, constipation, diarrhea and vomiting.   Endocrine: Negative for polydipsia and polyuria.   Genitourinary:  Negative for difficulty urinating, hematuria and urgency.   Musculoskeletal:  Negative for arthralgias, joint swelling and neck pain.   Neurological:  Negative for weakness and headaches.   Psychiatric/Behavioral:  Negative for confusion and dysphoric mood.           Objective:      There were no vitals filed for this visit.   Physical Exam  Constitutional:       Appearance: Normal appearance.   HENT:      Head: Normocephalic and atraumatic.   Pulmonary:      Effort: Pulmonary effort is normal.   Neurological:      General: No focal deficit present.      Mental Status: He is alert and oriented to person, place, and time. Mental status is at baseline.   Psychiatric:         Mood and Affect: Mood normal.         Behavior: Behavior normal.         Thought Content: Thought content normal.         Judgment: Judgment normal.         As this visit was accomplished virtually, physical exam is limited only to what may be observed via the telehealth audiovisual connection.  Assessment:       Problem List Items Addressed This Visit    None        Plan:       1. Class 3 severe obesity due to excess calories with serious comorbidity and body mass index (BMI) of 45.0 to 49.9 in adult  Re-rx Zepbound  - tirzepatide, weight loss, (ZEPBOUND) 2.5 mg/0.5 mL PnIj; Inject 2.5 mg into the skin every 7 days. for 8 doses  Dispense: 4 mL; Refill: 0    2. Dark urine  Unclear source, would be concerned for rhabdo vs dehydration vs stone vs infection -- appears entirely stable on virtual exam, nonemergent  Ordering UA and blood work for eval; advised pt to get these done as soon as he can  - Urinalysis; Future  - CK; Future  - CBC W/ AUTO DIFFERENTIAL; Future  - COMPREHENSIVE METABOLIC PANEL; Future      No follow-ups on file.     Sourav Fabian MD, FAAFP  Family Medicine Physician  [facility]  07/18/2024      This note was produced using voice recognition technology. Some typographical or syntax errors may be present, despite best efforts with proofreading.

## 2024-07-30 ENCOUNTER — PATIENT MESSAGE (OUTPATIENT)
Dept: INTERNAL MEDICINE | Facility: CLINIC | Age: 44
End: 2024-07-30

## 2024-07-30 ENCOUNTER — TELEPHONE (OUTPATIENT)
Dept: INTERNAL MEDICINE | Facility: CLINIC | Age: 44
End: 2024-07-30
Payer: COMMERCIAL

## 2024-07-30 NOTE — TELEPHONE ENCOUNTER
----- Message from Tarsha Grande sent at 7/30/2024  1:16 PM CDT -----  Regarding: Scheduling  Same day or Urgent appointments do not come to us.  Thank you

## 2024-07-30 NOTE — TELEPHONE ENCOUNTER
----- Message from Sourav Fabian MD sent at 7/29/2024  4:54 PM CDT -----  Pt with dark urine noted to have diogenes blood on UA. Please assist in setting up appointment to be seen by any available provider tomorrow (Tuesday July 30). Thanks!  ----- Message -----  From: Joaquim, [employer]  Sent: 7/23/2024   8:36 AM CDT  To: Sourav Fabian MD

## 2024-07-30 NOTE — TELEPHONE ENCOUNTER
----- Message from Nuno Greene sent at 7/30/2024  3:05 PM CDT -----  Contact: Pt  [phone]  Pt called in regards to jolene scheduled for today pt was not aware it was going to be for today he is at work he would like to know can it be a virtual or rescheduled until tomorrow please advise

## 2024-07-31 ENCOUNTER — OFFICE VISIT (OUTPATIENT)
Dept: INTERNAL MEDICINE | Facility: CLINIC | Age: 44
End: 2024-07-31
Payer: COMMERCIAL

## 2024-07-31 VITALS
BODY MASS INDEX: 45.1 KG/M2 | WEIGHT: 315 LBS | OXYGEN SATURATION: 97 % | SYSTOLIC BLOOD PRESSURE: 130 MMHG | DIASTOLIC BLOOD PRESSURE: 80 MMHG | HEIGHT: 70 IN | HEART RATE: 73 BPM

## 2024-07-31 DIAGNOSIS — N20.0 BILATERAL NEPHROLITHIASIS: ICD-10-CM

## 2024-07-31 DIAGNOSIS — R31.9 HEMATURIA, UNSPECIFIED TYPE: Primary | ICD-10-CM

## 2024-07-31 PROCEDURE — 3008F BODY MASS INDEX DOCD: CPT | Mod: CPTII,S$GLB,, | Performed by: NURSE PRACTITIONER

## 2024-07-31 PROCEDURE — 3079F DIAST BP 80-89 MM HG: CPT | Mod: CPTII,S$GLB,, | Performed by: NURSE PRACTITIONER

## 2024-07-31 PROCEDURE — 99999 PR PBB SHADOW E&M-EST. PATIENT-LVL III: CPT | Mod: PBBFAC,,, | Performed by: NURSE PRACTITIONER

## 2024-07-31 PROCEDURE — 1159F MED LIST DOCD IN RCRD: CPT | Mod: CPTII,S$GLB,, | Performed by: NURSE PRACTITIONER

## 2024-07-31 PROCEDURE — 3075F SYST BP GE 130 - 139MM HG: CPT | Mod: CPTII,S$GLB,, | Performed by: NURSE PRACTITIONER

## 2024-07-31 PROCEDURE — 3044F HG A1C LEVEL LT 7.0%: CPT | Mod: CPTII,S$GLB,, | Performed by: NURSE PRACTITIONER

## 2024-07-31 PROCEDURE — 99213 OFFICE O/P EST LOW 20 MIN: CPT | Mod: S$GLB,,, | Performed by: NURSE PRACTITIONER

## 2024-07-31 RX ORDER — KETOCONAZOLE 20 MG/G
CREAM TOPICAL 2 TIMES DAILY
COMMUNITY
Start: 2024-06-27

## 2024-07-31 RX ORDER — HYDROCORTISONE 25 MG/G
CREAM TOPICAL 2 TIMES DAILY
COMMUNITY
Start: 2024-06-27

## 2024-07-31 RX ORDER — PROMETHAZINE HYDROCHLORIDE AND DEXTROMETHORPHAN HYDROBROMIDE 6.25; 15 MG/5ML; MG/5ML
10 SYRUP ORAL EVERY 6 HOURS PRN
COMMUNITY
Start: 2024-06-30

## 2024-07-31 NOTE — PROGRESS NOTES
INTERNAL MEDICINE PROGRESS/URGENT CARE NOTE    CHIEF COMPLAINT     Chief Complaint   Patient presents with    Follow-up    Hematuria       HPI     Surjit Valentine is a 43 y.o. male who presents for a follow up visit today.    Did virtual visit with PCP 2 weeks ago complaining of dark urine.  It would lighten up after drinking water.  Labs were ordered which showed 3+ hematuria.  CPK normal.  No kidney function abnormality or electrolyte issue.  He does have a history of bilateral kidney stones.  Last CT done in January of 2024 shows multiple bilateral nonobstructing kidney stones that have increased in size since the previous exam.  No with the largest in the left kidney being 5 mL.  Followed up with his urologist who recommended no intervention at that time since he was not having any problems.    He denies any pain or pain 2 weeks ago.  He states he has not seen any dark urine in the past week and a half.  Was told by PCP to make an appointment. He has no complaints today. He is not on any blood thinners.       Patient Active Problem List   Diagnosis    GERD without esophagitis    GAYLE (obstructive sleep apnea)    Bilateral nephrolithiasis    Class 3 severe obesity due to excess calories with serious comorbidity and body mass index (BMI) of 45.0 to 49.9 in adult    Panic disorder    History of weight loss surgery        Past Medical History:  Past Medical History:   Diagnosis Date    Acute hypoxemic respiratory failure due to COVID-19 12/02/2020    Anemia 06/20/2024    Bilateral nephrolithiasis 10/06/2019    CT Renal 1-  Increase in size and number of bilateral nephrolithiasis without evidence of lower tract stone or obstruction.  Diverticulosis without evidence of diverticulitis.      BMI 60.0-69.9, adult 08/01/2018    Chondromalacia, right knee 11/20/2023    COVID-19 12/02/2020    Decreased range of motion (ROM) of right knee 11/20/2023    Elevated blood pressure reading in office without diagnosis of  hypertension 06/20/2024    GERD without esophagitis     takes OTC nexium PRN    Hay fever 06/20/2024    History of kidney stones     Hyperglycemia 06/20/2024    Impaired functional mobility, balance, gait, and endurance 11/20/2023    Kidney stones 10/06/2019    Morbid obesity with body mass index of 60.0-69.9 in adult     GAYLE (obstructive sleep apnea)     did not tolerate CPAP    Panic disorder 06/20/2024    Quadriceps weakness 11/20/2023    Seizure 06/20/2024    Ureterolithiasis 10/06/2019        Past Surgical History:  Past Surgical History:   Procedure Laterality Date    APPENDECTOMY  3/2008    ARTHROSCOPIC CHONDROPLASTY OF KNEE JOINT Right 11/17/2023    Procedure: ARTHROSCOPY, KNEE, WITH CHONDROPLASTY;  Surgeon: Rogers Martinez MD;  Location: [facility] OR 2ND FLR;  Service: Orthopedics;  Laterality: Right;    CYSTOSCOPY  10/11/2019    Procedure: CYSTOSCOPY;  Surgeon: Gamal Sosa MD;  Location: [facility] OR 1ST FLR;  Service: Urology;;    CYSTOSCOPY W/ URETERAL STENT PLACEMENT Left 10/6/2019    Procedure: CYSTOSCOPY, WITH URETERAL STENT INSERTION;  Surgeon: Gamal Sosa MD;  Location: [facility] OR 1ST FLR;  Service: Urology;  Laterality: Left;    ESOPHAGOGASTRODUODENOSCOPY N/A 7/3/2023    Procedure: EGD (ESOPHAGOGASTRODUODENOSCOPY);  Surgeon: Tay Stephen MD;  Location: [facility] OR 2ND FLR;  Service: General;  Laterality: N/A;    KNEE ARTHROSCOPY W/ MENISCECTOMY Right 11/17/2023    Procedure: ARTHROSCOPY, KNEE, WITH MENISCECTOMY;  Surgeon: Rogers Martinez MD;  Location: [facility] OR 2ND FLR;  Service: Orthopedics;  Laterality: Right;  regional without catheter    LASER LITHOTRIPSY Left 10/11/2019    Procedure: LITHOTRIPSY, USING LASER;  Surgeon: Gamal Sosa MD;  Location: [facility] OR 1ST FLR;  Service: Urology;  Laterality: Left;    RETROGRADE PYELOGRAPHY Left 10/6/2019    Procedure: PYELOGRAM, RETROGRADE;  Surgeon: Gamal Sosa MD;  Location: [facility] OR 1ST FLR;  Service: Urology;  Laterality:  Left;    RETROGRADE PYELOGRAPHY Left 10/11/2019    Procedure: PYELOGRAM, RETROGRADE;  Surgeon: Gamal Sosa MD;  [facility];  Service: Urology;  Laterality: Left;    URETEROSCOPIC REMOVAL OF URETERIC CALCULUS  10/11/2019    Procedure: REMOVAL, CALCULUS, URETER, URETEROSCOPIC;  Surgeon: Gamal Sosa MD;  Location: [facility] OR 1ST FLR;  Service: Urology;;    URETEROSCOPY Left 10/6/2019    Procedure: URETEROSCOPY;  Surgeon: Gamal Sosa MD;  Location: [facility] OR 1ST FLR;  Service: Urology;  Laterality: Left;    URETEROSCOPY Left 10/11/2019    Procedure: URETEROSCOPY;  Surgeon: Gamal Sosa MD;  [facility];  Service: Urology;  Laterality: Left;  1hr    XI ROBOTIC GASTROENTEROSTOMY, TODD-EN-Y N/A 7/3/2023    Procedure: XI ROBOTIC GASTROENTEROSTOMY, TODD-EN-Y (PT is SELF PAY, DO NOT submit to insurance;  Surgeon: Tay Stephen MD;  Location: [facility] OR 2ND FLR;  Service: General;  Laterality: N/A;        Allergies:  Review of patient's allergies indicates:  No Known Allergies    Home Medications:    Current Outpatient Medications:     ALPRAZolam (XANAX) 0.5 MG tablet, Take 1 tablet (0.5 mg total) by mouth 3 (three) times daily as needed for Anxiety., Disp: 30 tablet, Rfl: 0    busPIRone (BUSPAR) 10 MG tablet, Take 1 tablet (10 mg total) by mouth 3 (three) times daily., Disp: 90 tablet, Rfl: 11    EScitalopram oxalate (LEXAPRO) 20 MG tablet, Take 1 tablet (20 mg total) by mouth once daily., Disp: 90 tablet, Rfl: 3    hydrocortisone 2.5 % cream, Apply topically 2 (two) times daily., Disp: , Rfl:     ketoconazole (NIZORAL) 2 % cream, Apply topically 2 (two) times daily., Disp: , Rfl:     promethazine-dextromethorphan (PROMETHAZINE-DM) 6.25-15 mg/5 mL Syrp, Take 10 mLs by mouth every 6 (six) hours as needed., Disp: , Rfl:     tirzepatide, weight loss, (ZEPBOUND) 2.5 mg/0.5 mL PnIj, Inject 2.5 mg into the skin every 7 days. for 8 doses (Patient not taking: Reported on  "7/31/2024), Disp: 4 mL, Rfl: 0     Review of Systems:  Review of Systems   Constitutional:  Negative for fever.   HENT:  Negative for congestion and sore throat.    Respiratory:  Negative for cough and shortness of breath.    Cardiovascular:  Negative for chest pain.   Gastrointestinal:  Negative for abdominal pain, constipation, nausea and vomiting.   Genitourinary:  Negative for dysuria, flank pain, frequency, hematuria, scrotal swelling, testicular pain and urgency.   Neurological:  Negative for weakness and headaches.         PHYSICAL EXAM     Vitals:    07/31/24 0901   BP: 130/80   BP Location: Right arm   Patient Position: Sitting   BP Method: Large (Manual)   Pulse: 73   SpO2: 97%   Weight: (!) 150.5 kg (331 lb 12.7 oz)   Height: 5' 10" (1.778 m)      Body mass index is 47.61 kg/m².     Physical Exam  Vitals reviewed.   Constitutional:       Appearance: Normal appearance.   HENT:      Head: Normocephalic.      Mouth/Throat:      Mouth: Mucous membranes are moist.      Pharynx: Oropharynx is clear.   Eyes:      Conjunctiva/sclera: Conjunctivae normal.      Pupils: Pupils are equal, round, and reactive to light.   Cardiovascular:      Rate and Rhythm: Normal rate and regular rhythm.   Pulmonary:      Effort: Pulmonary effort is normal.      Breath sounds: Normal breath sounds.   Abdominal:      Palpations: Abdomen is soft.      Tenderness: There is no right CVA tenderness or left CVA tenderness.      Comments: obese   Musculoskeletal:      Right lower leg: No edema.      Left lower leg: No edema.   Skin:     General: Skin is warm and dry.   Neurological:      General: No focal deficit present.      Mental Status: He is alert.   Psychiatric:         Mood and Affect: Mood normal.         Behavior: Behavior normal.         LABS     Lab Results   Component Value Date    HGBA1C 5.0 06/24/2024     CMP  Sodium   Date Value Ref Range Status   07/23/2024 141 136 - 145 mmol/L Final     Potassium   Date Value Ref Range " Status   07/23/2024 3.8 3.5 - 5.1 mmol/L Final     Chloride   Date Value Ref Range Status   07/23/2024 110 95 - 110 mmol/L Final     CO2   Date Value Ref Range Status   07/23/2024 21 (L) 23 - 29 mmol/L Final     Glucose   Date Value Ref Range Status   07/23/2024 120 (H) 70 - 110 mg/dL Final     BUN   Date Value Ref Range Status   07/23/2024 15 6 - 20 mg/dL Final     Creatinine   Date Value Ref Range Status   07/23/2024 0.9 0.5 - 1.4 mg/dL Final     Calcium   Date Value Ref Range Status   07/23/2024 9.1 8.7 - 10.5 mg/dL Final     Total Protein   Date Value Ref Range Status   07/23/2024 6.6 6.0 - 8.4 g/dL Final     Albumin   Date Value Ref Range Status   07/23/2024 3.6 3.5 - 5.2 g/dL Final     Total Bilirubin   Date Value Ref Range Status   07/23/2024 0.6 0.1 - 1.0 mg/dL Final     Comment:     For infants and newborns, interpretation of results should be based  on gestational age, weight and in agreement with clinical  observations.    Premature Infant recommended reference ranges:  Up to 24 hours.............<8.0 mg/dL  Up to 48 hours............<12.0 mg/dL  3-5 days..................<15.0 mg/dL  6-29 days.................<15.0 mg/dL       Alkaline Phosphatase   Date Value Ref Range Status   07/23/2024 93 55 - 135 U/L Final     AST   Date Value Ref Range Status   07/23/2024 26 10 - 40 U/L Final     ALT   Date Value Ref Range Status   07/23/2024 32 10 - 44 U/L Final     Anion Gap   Date Value Ref Range Status   07/23/2024 10 8 - 16 mmol/L Final     eGFR if    Date Value Ref Range Status   06/28/2022 >60.0 >60 mL/min/1.73 m^2 Final     eGFR if non    Date Value Ref Range Status   06/28/2022 >60.0 >60 mL/min/1.73 m^2 Final     Comment:     Calculation used to obtain the estimated glomerular filtration  rate (eGFR) is the CKD-EPI equation.        Lab Results   Component Value Date    WBC 6.22 07/23/2024    HGB 13.3 (L) 07/23/2024    HCT 38.8 (L) 07/23/2024    MCV 91 07/23/2024      07/23/2024     Lab Results   Component Value Date    CHOL 174 06/24/2024    CHOL 133 08/28/2023    CHOL 176 03/27/2023     Lab Results   Component Value Date    HDL 58 06/24/2024    HDL 43 08/28/2023    HDL 62 03/27/2023     Lab Results   Component Value Date    LDLCALC 102.8 06/24/2024    LDLCALC 74.0 08/28/2023    LDLCALC 101.0 03/27/2023     Lab Results   Component Value Date    TRIG 66 06/24/2024    TRIG 80 08/28/2023    TRIG 65 03/27/2023     Lab Results   Component Value Date    CHOLHDL 33.3 06/24/2024    CHOLHDL 32.3 08/28/2023    CHOLHDL 35.2 03/27/2023     Lab Results   Component Value Date    TSH 2.750 06/24/2024    I9FXAEP 94 03/27/2023    S9NPEGU 7.2 03/27/2023       ASSESSMENT     1. Hematuria, unspecified type    2. Bilateral nephrolithiasis           PLAN  Urine dip still shows 1+ blood. Otherwise clear. He has no pain at this time and feels fine. Will refer to urology for further evaluation and mgmnt. He will contact me sooner if he starts to get pain, fever, nv.    1. Hematuria, unspecified type  - Ambulatory referral/consult to Urology; Future    2. Bilateral nephrolithiasis       Follow up with PCP     Patient was counseled on when to seek emergent care. Patient's plan/treatment was discussed including medications and possible side effects. Verbalized understanding of all instructions.     This note was partly generated with [employer] voice recognition software. I apologize for any possible typographical errors.          LULA Lynn  Department of Internal Medicine - [facility]  07/31/2024

## 2024-08-12 ENCOUNTER — PATIENT MESSAGE (OUTPATIENT)
Dept: BARIATRICS | Facility: CLINIC | Age: 44
End: 2024-08-12
Payer: COMMERCIAL

## 2024-08-15 ENCOUNTER — OFFICE VISIT (OUTPATIENT)
Dept: UROLOGY | Facility: CLINIC | Age: 44
End: 2024-08-15
Payer: COMMERCIAL

## 2024-08-15 VITALS
SYSTOLIC BLOOD PRESSURE: 130 MMHG | DIASTOLIC BLOOD PRESSURE: 80 MMHG | HEIGHT: 70 IN | BODY MASS INDEX: 45.1 KG/M2 | HEART RATE: 73 BPM | WEIGHT: 315 LBS

## 2024-08-15 DIAGNOSIS — R31.9 HEMATURIA, UNSPECIFIED TYPE: Primary | ICD-10-CM

## 2024-08-15 PROCEDURE — 99999 PR PBB SHADOW E&M-EST. PATIENT-LVL IV: CPT | Mod: PBBFAC,,, | Performed by: UROLOGY

## 2024-08-15 NOTE — PROGRESS NOTES
Subjective:       Patient ID: Surjit Valentine is a 43 y.o. male.    Chief Complaint: Hematuria (/Pt here for micro hematuria w/o pain. )    Today  In July, patient saw PCP for Reports of dark urine which lasted 3 days. He had no pain, no dysuria. However, he does work outside in the heat. He also endorses a prior episode with kidney stone (2019)- had severe pain at that time. At that time CT renal stone study showed  a left 8.6 x 7 mm proximal ureteral stone with minimal hydronephrosis and a 7 mm left lower pole. No right sided stones. He followed up once more 6 months ago but elected not to do anything.  Today he presents to clinic because he is worried about the 100 RBC on microurine analysis. He is not experiencing any symptoms of renal colic. Cr is stable at baseline.     Previously  Hematuria  Irritative symptoms do not include frequency or urgency. Pertinent negatives include no abdominal pain, chills, dysuria, fever, flank pain, nausea or vomiting.    patient is here with bilateral nonobstructing renal stones is an 11 me mm stone on the right side and a smaller 1 on the left side he went to the emergency room but it was found that he had a pulled muscle.  Urine looks concentrated is negative    Past Medical History:   Diagnosis Date    Acute hypoxemic respiratory failure due to COVID-19 12/02/2020    Anemia 06/20/2024    Bilateral nephrolithiasis 10/06/2019    CT Renal 1-  Increase in size and number of bilateral nephrolithiasis without evidence of lower tract stone or obstruction.  Diverticulosis without evidence of diverticulitis.      BMI 60.0-69.9, adult 08/01/2018    Chondromalacia, right knee 11/20/2023    COVID-19 12/02/2020    Decreased range of motion (ROM) of right knee 11/20/2023    Elevated blood pressure reading in office without diagnosis of hypertension 06/20/2024    GERD without esophagitis     takes OTC nexium PRN    Hay fever 06/20/2024    History of kidney stones      Hyperglycemia 06/20/2024    Impaired functional mobility, balance, gait, and endurance 11/20/2023    Kidney stones 10/06/2019    Morbid obesity with body mass index of 60.0-69.9 in adult     GAYLE (obstructive sleep apnea)     did not tolerate CPAP    Panic disorder 06/20/2024    Quadriceps weakness 11/20/2023    Seizure 06/20/2024    Ureterolithiasis 10/06/2019       Past Surgical History:   Procedure Laterality Date    APPENDECTOMY  3/2008    ARTHROSCOPIC CHONDROPLASTY OF KNEE JOINT Right 11/17/2023    Procedure: ARTHROSCOPY, KNEE, WITH CHONDROPLASTY;  Surgeon: Rogers Martinez MD;  Location: [facility] OR 2ND FLR;  Service: Orthopedics;  Laterality: Right;    CYSTOSCOPY  10/11/2019    Procedure: CYSTOSCOPY;  Surgeon: Gamal Sosa MD;  Location: [facility] OR 1ST FLR;  Service: Urology;;    CYSTOSCOPY W/ URETERAL STENT PLACEMENT Left 10/6/2019    Procedure: CYSTOSCOPY, WITH URETERAL STENT INSERTION;  Surgeon: Gamal Sosa MD;  Location: [facility] OR 1ST FLR;  Service: Urology;  Laterality: Left;    ESOPHAGOGASTRODUODENOSCOPY N/A 7/3/2023    Procedure: EGD (ESOPHAGOGASTRODUODENOSCOPY);  Surgeon: Tay Stephen MD;  Location: [facility] OR 2ND FLR;  Service: General;  Laterality: N/A;    KNEE ARTHROSCOPY W/ MENISCECTOMY Right 11/17/2023    Procedure: ARTHROSCOPY, KNEE, WITH MENISCECTOMY;  Surgeon: Rogers Martinez MD;  Location: [facility] OR 2ND FLR;  Service: Orthopedics;  Laterality: Right;  regional without catheter    LASER LITHOTRIPSY Left 10/11/2019    Procedure: LITHOTRIPSY, USING LASER;  Surgeon: Gamal Sosa MD;  Location: [facility] OR 1ST FLR;  Service: Urology;  Laterality: Left;    RETROGRADE PYELOGRAPHY Left 10/6/2019    Procedure: PYELOGRAM, RETROGRADE;  Surgeon: Gamal Sosa MD;  Location: [facility] OR 1ST FLR;  Service: Urology;  Laterality: Left;    RETROGRADE PYELOGRAPHY Left 10/11/2019    Procedure: PYELOGRAM, RETROGRADE;  Surgeon: Gamal Sosa MD;  Location: [facility] OR 1ST FLR;  Service:  "Urology;  Laterality: Left;    URETEROSCOPIC REMOVAL OF URETERIC CALCULUS  10/11/2019    Procedure: REMOVAL, CALCULUS, URETER, URETEROSCOPIC;  Surgeon: Gamal Sosa MD;  [facility];  Service: Urology;;    URETEROSCOPY Left 10/6/2019    Procedure: URETEROSCOPY;  Surgeon: Gamal Sosa MD;  [facility];  Service: Urology;  Laterality: Left;    URETEROSCOPY Left 10/11/2019    Procedure: URETEROSCOPY;  Surgeon: Gamal Soas MD;  [facility];  Service: Urology;  Laterality: Left;  1hr    XI ROBOTIC GASTROENTEROSTOMY, TODD-EN-Y N/A 7/3/2023    Procedure: XI ROBOTIC GASTROENTEROSTOMY, TODD-EN-Y (PT is SELF PAY, DO NOT submit to insurance;  Surgeon: Tay Stephen MD;  Location: [facility] OR [address] [place] [address];  Service: General;  Laterality: N/A;       Family History   Problem Relation Name Age of Onset    Heart disease Father      Stroke Father      Hypertension Father      Diabetes Maternal Grandmother      Heart disease Maternal Grandfather      Stroke Paternal Grandmother      Heart disease Paternal Grandfather      Diabetes Mother      Obesity Mother      Obesity Sister      Diabetes Brother      Obesity Brother         Social History     Socioeconomic History    Marital status: Single   Tobacco Use    Smoking status: Never     Passive exposure: Never    Smokeless tobacco: Current     Types: Chew    Tobacco comments:     2 cans/ week   Substance and Sexual Activity    Alcohol use: Not Currently     Alcohol/week: 3.0 standard drinks of alcohol     Types: 3 Cans of beer per week     Comment: "socially but not usually"    Drug use: No    Sexual activity: Yes     Partners: Female   Social History Narrative    Works full time: [employer] [place] [employer]  .  1 son, 14 years old.      Social Determinants of Health     Financial Resource Strain: Low Risk  (7/18/2024)    Overall Financial Resource Strain (CARDIA)     Difficulty of Paying " Living Expenses: Not hard at all   Food Insecurity: No Food Insecurity (7/18/2024)    Hunger Vital Sign     Worried About Running Out of Food in the Last Year: Never true     Ran Out of Food in the Last Year: Never true   Transportation Needs: No Transportation Needs (7/4/2023)    PRAPARE - Transportation     Lack of Transportation (Medical): No     Lack of Transportation (Non-Medical): No   Physical Activity: Sufficiently Active (7/18/2024)    Exercise Vital Sign     Days of Exercise per Week: 5 days     Minutes of Exercise per Session: 40 min   Stress: Stress Concern Present (7/18/2024)    [employer] - Occupational Stress Questionnaire     Feeling of Stress : Very much   Housing Stability: Low Risk  (7/4/2023)    Housing Stability Vital Sign     Unable to Pay for Housing in the Last Year: No     Number of Places Lived in the Last Year: 1     Unstable Housing in the Last Year: No       Allergies:  Patient has no known allergies.    Medications:    Current Outpatient Medications:     ALPRAZolam (XANAX) 0.5 MG tablet, Take 1 tablet (0.5 mg total) by mouth 3 (three) times daily as needed for Anxiety., Disp: 30 tablet, Rfl: 0    busPIRone (BUSPAR) 10 MG tablet, Take 1 tablet (10 mg total) by mouth 3 (three) times daily., Disp: 90 tablet, Rfl: 11    EScitalopram oxalate (LEXAPRO) 20 MG tablet, Take 1 tablet (20 mg total) by mouth once daily., Disp: 90 tablet, Rfl: 3    hydrocortisone 2.5 % cream, Apply topically 2 (two) times daily., Disp: , Rfl:     ketoconazole (NIZORAL) 2 % cream, Apply topically 2 (two) times daily., Disp: , Rfl:     promethazine-dextromethorphan (PROMETHAZINE-DM) 6.25-15 mg/5 mL Syrp, Take 10 mLs by mouth every 6 (six) hours as needed., Disp: , Rfl:     tirzepatide, weight loss, (ZEPBOUND) 2.5 mg/0.5 mL PnIj, Inject 2.5 mg into the skin every 7 days. for 8 doses (Patient not taking: Reported on 7/31/2024), Disp: 4 mL, Rfl: 0    Review of Systems   Constitutional:   Negative for activity change, appetite change, chills, diaphoresis, fatigue, fever and unexpected weight change.   HENT:  Negative for congestion, dental problem, hearing loss, mouth sores, postnasal drip, rhinorrhea, sinus pressure and trouble swallowing.    Eyes:  Negative for pain, discharge and itching.   Respiratory:  Negative for apnea, cough, choking, chest tightness, shortness of breath and wheezing.    Cardiovascular:  Negative for chest pain, palpitations and leg swelling.   Gastrointestinal:  Negative for abdominal distention, abdominal pain, anal bleeding, blood in stool, constipation, diarrhea, nausea, rectal pain and vomiting.   Endocrine: Negative for polydipsia and polyuria.   Genitourinary:  Positive for hematuria. Negative for decreased urine volume, difficulty urinating, dysuria, enuresis, flank pain, frequency, genital sores, penile discharge, penile pain, penile swelling, scrotal swelling, testicular pain and urgency.   Musculoskeletal:  Negative for arthralgias, back pain and myalgias.   Skin:  Negative for color change, rash and wound.   Neurological:  Negative for dizziness, syncope, speech difficulty, light-headedness and headaches.   Hematological:  Negative for adenopathy. Does not bruise/bleed easily.   Psychiatric/Behavioral:  Negative for behavioral problems, confusion, hallucinations and sleep disturbance.        Objective:      Physical Exam  Constitutional:       Appearance: He is well-developed.   HENT:      Head: Normocephalic.   Cardiovascular:      Rate and Rhythm: Normal rate.   Pulmonary:      Effort: Pulmonary effort is normal.   Abdominal:      Palpations: Abdomen is soft.   Genitourinary:     Prostate: Normal.   Skin:     General: Skin is warm.   Neurological:      Mental Status: He is alert.         Assessment:       1. Hematuria, unspecified type        Plan:       Surjit was seen today for hematuria.    Diagnoses and all orders for this visit:    Hematuria, unspecified  type  -     Ambulatory referral/consult to Urology    -Complete KUB and CT RSS  -RTC to further work up stone management

## 2024-09-03 ENCOUNTER — PATIENT MESSAGE (OUTPATIENT)
Dept: BARIATRICS | Facility: CLINIC | Age: 44
End: 2024-09-03
Payer: COMMERCIAL

## 2024-09-04 ENCOUNTER — PATIENT MESSAGE (OUTPATIENT)
Dept: INTERNAL MEDICINE | Facility: CLINIC | Age: 44
End: 2024-09-04
Payer: COMMERCIAL

## 2024-09-10 ENCOUNTER — PATIENT MESSAGE (OUTPATIENT)
Dept: BARIATRICS | Facility: CLINIC | Age: 44
End: 2024-09-10
Payer: COMMERCIAL

## 2024-09-18 ENCOUNTER — TELEPHONE (OUTPATIENT)
Dept: SPORTS MEDICINE | Facility: CLINIC | Age: 44
End: 2024-09-18
Payer: COMMERCIAL

## 2024-09-18 NOTE — TELEPHONE ENCOUNTER
Spoke to patient. His right bicep down to his elbow is painful and bruised for a week. No known mechanism of injury

## 2024-10-01 ENCOUNTER — PATIENT MESSAGE (OUTPATIENT)
Dept: BARIATRICS | Facility: CLINIC | Age: 44
End: 2024-10-01
Payer: COMMERCIAL

## 2024-10-05 ENCOUNTER — HOSPITAL ENCOUNTER (EMERGENCY)
Facility: HOSPITAL | Age: 44
Discharge: LEFT BEFORE DISCHARGE | End: 2024-10-05
Attending: STUDENT IN AN ORGANIZED HEALTH CARE EDUCATION/TRAINING PROGRAM
Payer: COMMERCIAL

## 2024-10-05 VITALS
TEMPERATURE: 99 F | OXYGEN SATURATION: 99 % | HEART RATE: 80 BPM | DIASTOLIC BLOOD PRESSURE: 101 MMHG | SYSTOLIC BLOOD PRESSURE: 142 MMHG | BODY MASS INDEX: 45.1 KG/M2 | WEIGHT: 315 LBS | HEIGHT: 70 IN | RESPIRATION RATE: 20 BRPM

## 2024-10-05 DIAGNOSIS — M79.606 LEG PAIN: ICD-10-CM

## 2024-10-05 DIAGNOSIS — R06.02 SOB (SHORTNESS OF BREATH): Primary | ICD-10-CM

## 2024-10-05 DIAGNOSIS — Z77.120 CONTACT WITH AND (SUSPECTED) EXPOSURE TO MOLD (TOXIC): ICD-10-CM

## 2024-10-05 DIAGNOSIS — M79.604 LEG PAIN, RIGHT: ICD-10-CM

## 2024-10-05 LAB
ALBUMIN SERPL BCP-MCNC: 3.9 G/DL (ref 3.5–5.2)
ALP SERPL-CCNC: 103 U/L (ref 55–135)
ALT SERPL W/O P-5'-P-CCNC: 43 U/L (ref 10–44)
ANION GAP SERPL CALC-SCNC: 11 MMOL/L (ref 8–16)
AST SERPL-CCNC: 31 U/L (ref 10–40)
BASOPHILS # BLD AUTO: 0.05 K/UL (ref 0–0.2)
BASOPHILS NFR BLD: 0.6 % (ref 0–1.9)
BILIRUB SERPL-MCNC: 0.3 MG/DL (ref 0.1–1)
BNP SERPL-MCNC: 10 PG/ML (ref 0–99)
BUN SERPL-MCNC: 13 MG/DL (ref 6–20)
CALCIUM SERPL-MCNC: 9.4 MG/DL (ref 8.7–10.5)
CHLORIDE SERPL-SCNC: 108 MMOL/L (ref 95–110)
CO2 SERPL-SCNC: 23 MMOL/L (ref 23–29)
CREAT SERPL-MCNC: 0.8 MG/DL (ref 0.5–1.4)
DIFFERENTIAL METHOD BLD: ABNORMAL
EOSINOPHIL # BLD AUTO: 0.2 K/UL (ref 0–0.5)
EOSINOPHIL NFR BLD: 1.9 % (ref 0–8)
ERYTHROCYTE [DISTWIDTH] IN BLOOD BY AUTOMATED COUNT: 12.7 % (ref 11.5–14.5)
EST. GFR  (NO RACE VARIABLE): >60 ML/MIN/1.73 M^2
GLUCOSE SERPL-MCNC: 100 MG/DL (ref 70–110)
HCT VFR BLD AUTO: 40.5 % (ref 40–54)
HGB BLD-MCNC: 14.4 G/DL (ref 14–18)
IMM GRANULOCYTES # BLD AUTO: 0.01 K/UL (ref 0–0.04)
IMM GRANULOCYTES NFR BLD AUTO: 0.1 % (ref 0–0.5)
LYMPHOCYTES # BLD AUTO: 2.2 K/UL (ref 1–4.8)
LYMPHOCYTES NFR BLD: 28 % (ref 18–48)
MCH RBC QN AUTO: 31.9 PG (ref 27–31)
MCHC RBC AUTO-ENTMCNC: 35.6 G/DL (ref 32–36)
MCV RBC AUTO: 90 FL (ref 82–98)
MONOCYTES # BLD AUTO: 0.6 K/UL (ref 0.3–1)
MONOCYTES NFR BLD: 8 % (ref 4–15)
NEUTROPHILS # BLD AUTO: 4.7 K/UL (ref 1.8–7.7)
NEUTROPHILS NFR BLD: 61.4 % (ref 38–73)
NRBC BLD-RTO: 0 /100 WBC
PLATELET # BLD AUTO: 210 K/UL (ref 150–450)
PMV BLD AUTO: 10.6 FL (ref 9.2–12.9)
POTASSIUM SERPL-SCNC: 4.1 MMOL/L (ref 3.5–5.1)
PROT SERPL-MCNC: 7.5 G/DL (ref 6–8.4)
RBC # BLD AUTO: 4.51 M/UL (ref 4.6–6.2)
SODIUM SERPL-SCNC: 142 MMOL/L (ref 136–145)
TROPONIN I SERPL DL<=0.01 NG/ML-MCNC: <0.006 NG/ML (ref 0–0.03)
WBC # BLD AUTO: 7.71 K/UL (ref 3.9–12.7)

## 2024-10-05 PROCEDURE — 94640 AIRWAY INHALATION TREATMENT: CPT

## 2024-10-05 PROCEDURE — 80053 COMPREHEN METABOLIC PANEL: CPT | Performed by: PHYSICIAN ASSISTANT

## 2024-10-05 PROCEDURE — 93010 ELECTROCARDIOGRAM REPORT: CPT | Mod: ,,, | Performed by: INTERNAL MEDICINE

## 2024-10-05 PROCEDURE — 25000242 PHARM REV CODE 250 ALT 637 W/ HCPCS: Performed by: PHYSICIAN ASSISTANT

## 2024-10-05 PROCEDURE — 85025 COMPLETE CBC W/AUTO DIFF WBC: CPT | Performed by: PHYSICIAN ASSISTANT

## 2024-10-05 PROCEDURE — 83880 ASSAY OF NATRIURETIC PEPTIDE: CPT | Performed by: PHYSICIAN ASSISTANT

## 2024-10-05 PROCEDURE — 93005 ELECTROCARDIOGRAM TRACING: CPT

## 2024-10-05 PROCEDURE — 84484 ASSAY OF TROPONIN QUANT: CPT | Performed by: PHYSICIAN ASSISTANT

## 2024-10-05 PROCEDURE — 99285 EMERGENCY DEPT VISIT HI MDM: CPT | Mod: 25

## 2024-10-05 RX ORDER — ALBUTEROL SULFATE 90 UG/1
1-2 INHALANT RESPIRATORY (INHALATION) EVERY 6 HOURS PRN
Qty: 6.7 G | Refills: 0 | Status: SHIPPED | OUTPATIENT
Start: 2024-10-05

## 2024-10-05 RX ORDER — IPRATROPIUM BROMIDE AND ALBUTEROL SULFATE 2.5; .5 MG/3ML; MG/3ML
3 SOLUTION RESPIRATORY (INHALATION)
Status: COMPLETED | OUTPATIENT
Start: 2024-10-05 | End: 2024-10-05

## 2024-10-05 RX ADMIN — IPRATROPIUM BROMIDE AND ALBUTEROL SULFATE 3 ML: 2.5; .5 SOLUTION RESPIRATORY (INHALATION) at 04:10

## 2024-10-05 NOTE — DISCHARGE INSTRUCTIONS
There were no serious findings on your blood work or chest x-ray.  Use your new albuterol inhaler as needed for shortness of breath.  Use proper personal protective equipment when dealing with Follow-up with your

## 2024-10-05 NOTE — ED TRIAGE NOTES
Pt arrives to ED reporting SOB for about one week. Pt states he has been remodeling a house with a lot of mold in it and not wearing a mask while renovating. Pt reports a non productive cough and chest tightness

## 2024-10-05 NOTE — ED PROVIDER NOTES
"Encounter Date: 10/5/2024       History     Chief Complaint   Patient presents with    Shortness of Breath     SOB and chest tightness x1 week while working on a home restoration.      43-year-old male with medical comorbidities significant for morbid obesity, GERD presents to the ED with chief complaint of shortness of breath.  Patient is currently working on a home renovation.  He states that they did find mold in the home.  He has had some chest discomfort that he describes as pressure but mostly feels that it is due to feeling constricted with his breathing.  Symptoms began couple of days ago.  Denies fever or cough.  He states that some of the other workers have also had different types of symptoms including shortness of breath, cough, swelling and tearing of the eyes that they are concerned is attributed to the mold.  Patient denies in the leg swelling, but states that he has had pain in his right calf muscle that has been ongoing for a couple of weeks.  He mostly feels it at night.  Denies fever, cough, history of VTE.  He does note that his father had a heart attack possibly in his 40s.  His father passed away at around age 50, but not from cardiac cause.  Patient states that his father had "open heart surgery" but is unsure of the details.       Review of patient's allergies indicates:  No Known Allergies  Past Medical History:   Diagnosis Date    Acute hypoxemic respiratory failure due to COVID-19 12/02/2020    Anemia 06/20/2024    Bilateral nephrolithiasis 10/06/2019    CT Renal 1-  Increase in size and number of bilateral nephrolithiasis without evidence of lower tract stone or obstruction.  Diverticulosis without evidence of diverticulitis.      BMI 60.0-69.9, adult 08/01/2018    Chondromalacia, right knee 11/20/2023    COVID-19 12/02/2020    Decreased range of motion (ROM) of right knee 11/20/2023    Elevated blood pressure reading in office without diagnosis of hypertension 06/20/2024    GERD " without esophagitis     takes OTC nexium PRN    Hay fever 06/20/2024    History of kidney stones     Hyperglycemia 06/20/2024    Impaired functional mobility, balance, gait, and endurance 11/20/2023    Kidney stones 10/06/2019    Morbid obesity with body mass index of 60.0-69.9 in adult     GAYLE (obstructive sleep apnea)     did not tolerate CPAP    Panic disorder 06/20/2024    Quadriceps weakness 11/20/2023    Seizure 06/20/2024    Ureterolithiasis 10/06/2019     Past Surgical History:   Procedure Laterality Date    APPENDECTOMY  3/2008    ARTHROSCOPIC CHONDROPLASTY OF KNEE JOINT Right 11/17/2023    Procedure: ARTHROSCOPY, KNEE, WITH CHONDROPLASTY;  Surgeon: Rogers Martinez MD;  Location: [facility] OR [address] [place] [address];  Service: Orthopedics;  Laterality: Right;    CYSTOSCOPY  10/11/2019    Procedure: CYSTOSCOPY;  Surgeon: Gamal Sosa MD;  Location: [facility] OR [address] [place] [address];  Service: Urology;;    CYSTOSCOPY W/ URETERAL STENT PLACEMENT Left 10/6/2019    Procedure: CYSTOSCOPY, WITH URETERAL STENT INSERTION;  Surgeon: Gamal Sosa MD;  Location: [facility] OR [address] [place] [address];  Service: Urology;  Laterality: Left;    ESOPHAGOGASTRODUODENOSCOPY N/A 7/3/2023    Procedure: EGD (ESOPHAGOGASTRODUODENOSCOPY);  Surgeon: Tay Stephen MD;  [facility];  Service: General;  Laterality: N/A;    KNEE ARTHROSCOPY W/ MENISCECTOMY Right 11/17/2023    Procedure: ARTHROSCOPY, KNEE, WITH MENISCECTOMY;  Surgeon: Rogers Martinez MD;  [facility];  Service: Orthopedics;  Laterality: Right;  regional without catheter    LASER LITHOTRIPSY Left 10/11/2019    Procedure: LITHOTRIPSY, USING LASER;  Surgeon: Gamal Sosa MD;  [facility];  Service: Urology;  Laterality: Left;    RETROGRADE PYELOGRAPHY Left 10/6/2019    Procedure: PYELOGRAM, RETROGRADE;  Surgeon: Gamal Sosa MD;  [facility];  Service: Urology;  Laterality: Left;    RETROGRADE PYELOGRAPHY Left 10/11/2019     "Procedure: PYELOGRAM, RETROGRADE;  Surgeon: Gamal Sosa MD;  Location: [facility] OR 1ST FLR;  Service: Urology;  Laterality: Left;    URETEROSCOPIC REMOVAL OF URETERIC CALCULUS  10/11/2019    Procedure: REMOVAL, CALCULUS, URETER, URETEROSCOPIC;  Surgeon: Gamal Sosa MD;  Location: [facility] OR 1ST FLR;  Service: Urology;;    URETEROSCOPY Left 10/6/2019    Procedure: URETEROSCOPY;  Surgeon: Gamal Sosa MD;  Location: [facility] OR 1ST FLR;  Service: Urology;  Laterality: Left;    URETEROSCOPY Left 10/11/2019    Procedure: URETEROSCOPY;  Surgeon: Gamal Sosa MD;  Location: [facility] OR 1ST FLR;  Service: Urology;  Laterality: Left;  1hr    XI ROBOTIC GASTROENTEROSTOMY, TODD-EN-Y N/A 7/3/2023    Procedure: XI ROBOTIC GASTROENTEROSTOMY, TODD-EN-Y (PT is SELF PAY, DO NOT submit to insurance;  Surgeon: Tay Stephen MD;  Location: [facility] OR 2ND FLR;  Service: General;  Laterality: N/A;     Family History   Problem Relation Name Age of Onset    Heart disease Father      Stroke Father      Hypertension Father      Diabetes Maternal Grandmother      Heart disease Maternal Grandfather      Stroke Paternal Grandmother      Heart disease Paternal Grandfather      Diabetes Mother      Obesity Mother      Obesity Sister      Diabetes Brother      Obesity Brother       Social History     Tobacco Use    Smoking status: Never     Passive exposure: Never    Smokeless tobacco: Current     Types: Chew    Tobacco comments:     2 cans/ week   Substance Use Topics    Alcohol use: Not Currently     Alcohol/week: 3.0 standard drinks of alcohol     Types: 3 Cans of beer per week     Comment: "socially but not usually"    Drug use: No     Review of Systems    Physical Exam     Initial Vitals [10/05/24 1536]   BP Pulse Resp Temp SpO2   (!) 142/101 78 (!) 22 98.6 °F (37 °C) 99 %      MAP       --         Physical Exam    Nursing note and vitals reviewed.  Constitutional: He appears well-developed and well-nourished. He is Obese .  " Non-toxic appearance. He does not appear ill. No distress.   HENT:   Head: Normocephalic and atraumatic.   Neck: Neck supple.   Normal range of motion.  Cardiovascular:  Normal rate and regular rhythm.     Exam reveals no gallop, no distant heart sounds and no friction rub.       No murmur heard.  Pulmonary/Chest: Effort normal and breath sounds normal. No accessory muscle usage. No tachypnea. No respiratory distress. He has no decreased breath sounds. He has no wheezes. He has no rhonchi. He has no rales.   Abdominal: He exhibits no distension.   Musculoskeletal:         General: No edema.      Cervical back: Normal range of motion and neck supple.     Neurological: He is alert.   Skin: No rash noted.         ED Course   Procedures  Labs Reviewed   CBC W/ AUTO DIFFERENTIAL - Abnormal       Result Value    WBC 7.71      RBC 4.51 (*)     Hemoglobin 14.4      Hematocrit 40.5      MCV 90      MCH 31.9 (*)     MCHC 35.6      RDW 12.7      Platelets 210      MPV 10.6      Immature Granulocytes 0.1      Gran # (ANC) 4.7      Immature Grans (Abs) 0.01      Lymph # 2.2      Mono # 0.6      Eos # 0.2      Baso # 0.05      nRBC 0      Gran % 61.4      Lymph % 28.0      Mono % 8.0      Eosinophil % 1.9      Basophil % 0.6      Differential Method Automated     COMPREHENSIVE METABOLIC PANEL    Sodium 142      Potassium 4.1      Chloride 108      CO2 23      Glucose 100      BUN 13      Creatinine 0.8      Calcium 9.4      Total Protein 7.5      Albumin 3.9      Total Bilirubin 0.3      Alkaline Phosphatase 103      AST 31      ALT 43      eGFR >60.0      Anion Gap 11     TROPONIN I    Troponin I <0.006     B-TYPE NATRIURETIC PEPTIDE    BNP 10            Imaging Results              X-Ray Chest PA And Lateral (Final result)  Result time 10/05/24 17:04:27      Final result by Oliver Perez MD (10/05/24 17:04:27)                   Impression:      No radiographic evidence of pneumonia or other source of cough/shortness of  breath, noting that early/mild viral pneumonia or nonspecific bronchitis may be radiographically occult.      Electronically signed by: Oliver Perez MD  Date:    10/05/2024  Time:    17:04               Narrative:    EXAMINATION:  XR CHEST PA AND LATERAL    CLINICAL HISTORY:  shortness of breath;    TECHNIQUE:  PA and lateral views of the chest were performed.    COMPARISON:  Chest radiograph 10/24/2023, CT renal stone study 01/19/2024    FINDINGS:  Trachea is relatively midline and patent.The lungs are symmetrically well expanded and clear, with normal appearance of pulmonary vasculature and no pleural effusion or pneumothorax.    The cardiac silhouette is normal in size. The hilar and mediastinal contours are normal limits.    Osseous structures appear stable without acute findings.                                       Medications   albuterol-ipratropium 2.5 mg-0.5 mg/3 mL nebulizer solution 3 mL (3 mLs Nebulization Given 10/5/24 1622)     Medical Decision Making  43-year-old male presents to the ED with shortness of breath for the past couple of days noting exposure to mold while doing a home renovation.  Hypertensive at 142/101.  No history of hypertension.  He appears in no acute distress.  Afebrile.  O2 sats 99% on room air.  Lungs clear to auscultation bilaterally.   My differential diagnosis includes but is not limited to:  Reactive airway due to possible mold exposure, pneumonia, pneumonitis, ACS  Plan:  Chest pain workup, chest x-ray, breathing treatment, US RLE    See ED course notes below.    Amount and/or Complexity of Data Reviewed  Labs: ordered.  Radiology: ordered.  ECG/medicine tests:  Decision-making details documented in ED Course.    Risk  Prescription drug management.               ED Course as of 10/05/24 2002   Sat Oct 05, 2024   1542 EKG with normal sinus rhythm, rate 71, no STEMI [NN]   1553 EKG 12-lead [EH]   2001 Patient eloped from the ED at some time after being transported from the ED  to the ultrasound unit but prior to arriving to the ultrasound unit.  Was able to contact the patient who confirmed that he left.  Reports that he felt better after the breathing treatment.  I will send Rx for albuterol to his preferred pharmacy.  Advised to return to the ED for any new or worsening symptoms.  [EH]      ED Course User Index  [EH] Giselle Miller PA-C  [NN] Fannie Tejada MD                           Clinical Impression:  Final diagnoses:  [R06.02] SOB (shortness of breath) (Primary)  [M79.606] Leg pain  [M79.604] Leg pain, right  [Z77.120] Contact with and (suspected) exposure to mold (toxic)          ED Disposition Condition    Eloped Stable                Giselle Miller PA-C  10/05/24 2003

## 2024-10-06 LAB
OHS QRS DURATION: 84 MS
OHS QTC CALCULATION: 434 MS

## 2024-10-06 NOTE — ED NOTES
Pt eloped from ER. Phone number on file called by BEBA and Angela, pt states that he would not like to return. Pt states that IV was removed prior to elopement.

## 2024-10-08 ENCOUNTER — PATIENT MESSAGE (OUTPATIENT)
Dept: BARIATRICS | Facility: CLINIC | Age: 44
End: 2024-10-08
Payer: COMMERCIAL

## 2024-10-20 ENCOUNTER — OFFICE VISIT (OUTPATIENT)
Dept: URGENT CARE | Facility: CLINIC | Age: 44
End: 2024-10-20
Payer: COMMERCIAL

## 2024-10-20 VITALS
OXYGEN SATURATION: 96 % | SYSTOLIC BLOOD PRESSURE: 113 MMHG | DIASTOLIC BLOOD PRESSURE: 78 MMHG | TEMPERATURE: 98 F | HEART RATE: 60 BPM | BODY MASS INDEX: 45.1 KG/M2 | WEIGHT: 315 LBS | HEIGHT: 70 IN | RESPIRATION RATE: 18 BRPM

## 2024-10-20 DIAGNOSIS — R05.1 ACUTE COUGH: Primary | ICD-10-CM

## 2024-10-20 DIAGNOSIS — R09.81 NASAL CONGESTION: ICD-10-CM

## 2024-10-20 DIAGNOSIS — J02.9 SORE THROAT: ICD-10-CM

## 2024-10-20 PROCEDURE — 99214 OFFICE O/P EST MOD 30 MIN: CPT | Mod: S$GLB,,, | Performed by: NURSE PRACTITIONER

## 2024-10-20 RX ORDER — PROMETHAZINE HYDROCHLORIDE AND DEXTROMETHORPHAN HYDROBROMIDE 6.25; 15 MG/5ML; MG/5ML
10 SYRUP ORAL EVERY 6 HOURS PRN
Qty: 118 ML | Refills: 0 | Status: SHIPPED | OUTPATIENT
Start: 2024-10-20 | End: 2024-10-27

## 2024-10-20 RX ORDER — AZELASTINE 1 MG/ML
1 SPRAY, METERED NASAL 2 TIMES DAILY
Qty: 30 ML | Refills: 0 | Status: SHIPPED | OUTPATIENT
Start: 2024-10-20 | End: 2025-10-20

## 2024-10-20 RX ORDER — LEVOCETIRIZINE DIHYDROCHLORIDE 5 MG/1
5 TABLET, FILM COATED ORAL NIGHTLY
Qty: 30 TABLET | Refills: 11 | Status: SHIPPED | OUTPATIENT
Start: 2024-10-20 | End: 2025-10-20

## 2024-10-20 NOTE — PATIENT INSTRUCTIONS
"Patient Instructions   PLEASE READ YOUR DISCHARGE INSTRUCTIONS ENTIRELY AS IT CONTAINS IMPORTANT INFORMATION.        Please drink plenty of fluids. You body needs increased water but other beverages may aid in comfort.  You will know that you have had enough water to be hydrated when your urine is clear or at least a very pale yellow. Nasal saline may help with removal of mucus as well.  Ibuprofen is preferred for aches and pains as well as fever reduction. If you do not have high blood pressure, then you may use a decongestant such as pseudoephedrine or one of the above medications that have the letter, "-D" following it.  Hot tea with honey can help with sore throats as the heat with reduce the inflammation and the honey will coat your throat to help it feel better. Prescription pain medicine should be used for the significant throat sxs you are experiencing. Do not drive after taking this medication.     Lastly, good hand washing and cough hygiene (cough into your elbow) will help prevent the spread of the illness.  A general rule is that you are no longer contagious once you have been without a fever for over 24 hours without requiring fever reducing medications.   Please get plenty of rest.     Please return here or go to the Emergency Department for any concerns or worsening of condition.     Please take an over the counter antihistamine medication (allegra/Claritin/Zyrtec) of your choice as directed, they may help with some of the runny nose symptoms if you are having them.       Can continue mucinex. Use mucinex (guaifenisin) to break up mucous up to 2400mg/day to loosen any mucous. The mucinex DM pill has a cough suppressant that can be used at night to stop the tickle at the back of your throat. You may use Mucinex to help thin thick secretions to allow you to expel them but it only works if you drink more water.     If not allergic, please take over the counter Tylenol (Acetaminophen) and/or Motrin " (Ibuprofen) as directed for control of pain and/or fever.  Please follow up with your primary care doctor or specialist as needed.     Sore throat recommendations: Warm fluids, warm salt water gargles, throat lozenges, tea, honey, soup, rest, hydration.     Use over the counter flonase: one spray each nostril twice daily OR two sprays each nostril once daily.      Sinus rinses DO NOT USE TAP WATER, if you must, water must be a rolling boil for 1 minute, let it cool, then use.  May use distilled water, or over the counter nasal saline rinses.  Vics vapor rub in shower to help open nasal passages.  May use nasal gel to keep passages moisturized.  May use Nasal saline sprays during the day for added relief of congestion.   For those who go to the gym, please do not use the sauna or steam room now to clear sinuses.     If you  smoke, please stop smoking.        Please return or see your primary care doctor if you develop new or worsening symptoms.      Please arrange follow up with your primary medical clinic as soon as possible. You must understand that you've received an Urgent Care treatment only and that you may be released before all of your medical problems are known or treated. You, the patient, will arrange for follow up as instructed. If your symptoms worsen or fail to improve you should go to the Emergency Room.        You must understand that you've received an Urgent Care treatment only and that you may be released before all your medical problems are known or treated. You, the patient, will arrange for follow up care as instructed.  Follow up with your PCP or specialty clinic as directed in the next 1-2 weeks if not improved or as needed.  You can call [phone] to schedule an appointment with the appropriate provider.  If your condition worsens we recommend that you receive another evaluation at the emergency room immediately or contact your primary medical clinics after hours call service to discuss  your concerns.  Please return here or go to the Emergency Department for any concerns or worsening of condition.    If you were prescribed a narcotic or controlled medication, do not drive or operate heavy equipment or machinery while taking these medications.    Thank you for choosing [facility]!    Our goal in the Urgent Care is to always provide outstanding medical care. You may receive a survey by mail or e-mail in the next week regarding your experience today. We would greatly appreciate you completing and returning the survey. Your feedback provides us with a way to recognize our staff who provide very good care, and it helps us learn how to improve when your experience was below our aspiration of excellence.      We appreciate you trusting us with your medical care. We hope you feel better soon. We will be happy to take care of you for all of your future medical needs.   This note was prepared using voice-recognition software.  Although efforts are made to proofread the note, some errors may persist in the final document.     Sincerely,    Rigoberto Dutta DNP, FNP-C

## 2024-10-20 NOTE — PROGRESS NOTES
"Subjective:      Patient ID: Surjit Valentine is a 43 y.o. male.    Vitals:  height is 5' 10" (1.778 m) and weight is 149.7 kg (330 lb) (abnormal). His oral temperature is 97.9 °F (36.6 °C). His blood pressure is 113/78 and his pulse is 60. His respiration is 18 and oxygen saturation is 96%.     Chief Complaint: Sore Throat (Sore throat, congestion, sinus pressure - Entered by patient)  42 y/o male presents with a complaint of sinus pressure, nasal congestion, sore throat, headaches, and a non-productive cough.  Onset of symptoms, 4 days.     Sinus Problem  This is a new problem. The current episode started in the past 7 days. The problem is unchanged. There has been no fever. Associated symptoms include congestion, coughing, headaches, sinus pressure and a sore throat. Pertinent negatives include no chills, diaphoresis, ear pain, hoarse voice, neck pain, shortness of breath, sneezing or swollen glands. Past treatments include acetaminophen. The treatment provided no relief.     Constitution: Negative for chills, sweating, fever and generalized weakness.   HENT:  Positive for congestion, postnasal drip, sinus pressure and sore throat. Negative for ear pain, facial swelling, nosebleeds, sinus pain, trouble swallowing and voice change.    Neck: Negative for neck pain.   Cardiovascular:  Negative for chest pain and palpitations.   Respiratory:  Positive for cough. Negative for chest tightness, sputum production, bloody sputum, shortness of breath, stridor, wheezing and asthma.    Gastrointestinal:  Negative for abdominal pain, nausea and vomiting.   Skin:  Negative for rash.   Allergic/Immunologic: Negative for asthma and sneezing.   Neurological:  Positive for headaches. Negative for dizziness and history of migraines.      Objective:     Physical Exam   Constitutional: He is oriented to person, place, and time. He appears well-developed. He is cooperative.  Non-toxic appearance. He does not appear ill. No " distress.   HENT:   Head: Normocephalic and atraumatic.   Ears:   Right Ear: Hearing, tympanic membrane, external ear and ear canal normal. no impacted cerumen  Left Ear: Hearing, tympanic membrane, external ear and ear canal normal. no impacted cerumen  Nose: Mucosal edema, rhinorrhea and congestion present. No sinus tenderness or nasal deformity. No epistaxis. Right sinus exhibits no maxillary sinus tenderness and no frontal sinus tenderness. Left sinus exhibits no maxillary sinus tenderness and no frontal sinus tenderness.   Mouth/Throat: Uvula is midline, oropharynx is clear and moist and mucous membranes are normal. No trismus in the jaw. Normal dentition. No uvula swelling. No oropharyngeal exudate, posterior oropharyngeal edema, posterior oropharyngeal erythema, tonsillar abscesses or cobblestoning.   Eyes: Conjunctivae and lids are normal. No scleral icterus.   Neck: Trachea normal and phonation normal. Neck supple. No edema present. No erythema present. No neck rigidity present.   Cardiovascular: Normal rate, regular rhythm, normal heart sounds and normal pulses.   Pulmonary/Chest: Effort normal and breath sounds normal. No stridor. No respiratory distress. He has no decreased breath sounds. He has no wheezes. He has no rhonchi. He has no rales. He exhibits no tenderness.   Abdominal: Normal appearance.   Musculoskeletal: Normal range of motion.         General: No deformity. Normal range of motion.   Neurological: He is alert and oriented to person, place, and time. He exhibits normal muscle tone. Coordination normal.   Skin: Skin is warm, dry, intact, not diaphoretic and not pale.   Psychiatric: His speech is normal and behavior is normal. Judgment and thought content normal.   Nursing note and vitals reviewed.    Assessment:     1. Acute cough    2. Sore throat    3. Nasal congestion      Plan:     Acute cough  -     SARS Coronavirus 2 Antigen, POCT Manual Read  -     POCT Strep A, Molecular  -      "promethazine-dextromethorphan (PROMETHAZINE-DM) 6.25-15 mg/5 mL Syrp; Take 10 mLs by mouth every 6 (six) hours as needed (cough).  Dispense: 118 mL; Refill: 0    Sore throat  -     diphenhydrAMINE-aluminum-magnesium hydroxide-simethicone-LIDOcaine viscous HCl 2%; Swish and spit 15 mLs every 4 (four) hours as needed (sore throat).  Dispense: 120 each; Refill: 0    Nasal congestion  -     azelastine (ASTELIN) 137 mcg (0.1 %) nasal spray; 1 spray (137 mcg total) by Nasal route 2 (two) times daily.  Dispense: 30 mL; Refill: 0  -     levocetirizine (XYZAL) 5 MG tablet; Take 1 tablet (5 mg total) by mouth every evening.  Dispense: 30 tablet; Refill: 11      Patient Instructions   PLEASE READ YOUR DISCHARGE INSTRUCTIONS ENTIRELY AS IT CONTAINS IMPORTANT INFORMATION.        Please drink plenty of fluids. You body needs increased water but other beverages may aid in comfort.  You will know that you have had enough water to be hydrated when your urine is clear or at least a very pale yellow. Nasal saline may help with removal of mucus as well.  Ibuprofen is preferred for aches and pains as well as fever reduction. If you do not have high blood pressure, then you may use a decongestant such as pseudoephedrine or one of the above medications that have the letter, "-D" following it.  Hot tea with honey can help with sore throats as the heat with reduce the inflammation and the honey will coat your throat to help it feel better. Prescription pain medicine should be used for the significant throat sxs you are experiencing. Do not drive after taking this medication.     Lastly, good hand washing and cough hygiene (cough into your elbow) will help prevent the spread of the illness.  A general rule is that you are no longer contagious once you have been without a fever for over 24 hours without requiring fever reducing medications.   Please get plenty of rest.     Please return here or go to the Emergency Department for any concerns " or worsening of condition.     Please take an over the counter antihistamine medication (allegra/Claritin/Zyrtec) of your choice as directed, they may help with some of the runny nose symptoms if you are having them.       Can continue mucinex. Use mucinex (guaifenisin) to break up mucous up to 2400mg/day to loosen any mucous. The mucinex DM pill has a cough suppressant that can be used at night to stop the tickle at the back of your throat. You may use Mucinex to help thin thick secretions to allow you to expel them but it only works if you drink more water.     If not allergic, please take over the counter Tylenol (Acetaminophen) and/or Motrin (Ibuprofen) as directed for control of pain and/or fever.  Please follow up with your primary care doctor or specialist as needed.     Sore throat recommendations: Warm fluids, warm salt water gargles, throat lozenges, tea, honey, soup, rest, hydration.     Use over the counter flonase: one spray each nostril twice daily OR two sprays each nostril once daily.      Sinus rinses DO NOT USE TAP WATER, if you must, water must be a rolling boil for 1 minute, let it cool, then use.  May use distilled water, or over the counter nasal saline rinses.  Vics vapor rub in shower to help open nasal passages.  May use nasal gel to keep passages moisturized.  May use Nasal saline sprays during the day for added relief of congestion.   For those who go to the gym, please do not use the sauna or steam room now to clear sinuses.     If you  smoke, please stop smoking.        Please return or see your primary care doctor if you develop new or worsening symptoms.      Please arrange follow up with your primary medical clinic as soon as possible. You must understand that you've received an Urgent Care treatment only and that you may be released before all of your medical problems are known or treated. You, the patient, will arrange for follow up as instructed. If your symptoms worsen or fail to  improve you should go to the Emergency Room.

## 2024-11-02 ENCOUNTER — PATIENT MESSAGE (OUTPATIENT)
Dept: BARIATRICS | Facility: CLINIC | Age: 44
End: 2024-11-02
Payer: COMMERCIAL

## 2024-11-12 ENCOUNTER — PATIENT MESSAGE (OUTPATIENT)
Dept: BARIATRICS | Facility: CLINIC | Age: 44
End: 2024-11-12
Payer: COMMERCIAL

## 2024-11-13 ENCOUNTER — HOSPITAL ENCOUNTER (EMERGENCY)
Facility: HOSPITAL | Age: 44
Discharge: HOME | End: 2024-11-13
Attending: EMERGENCY MEDICINE
Payer: COMMERCIAL

## 2024-11-13 VITALS
BODY MASS INDEX: 42.95 KG/M2 | TEMPERATURE: 98 F | OXYGEN SATURATION: 100 % | DIASTOLIC BLOOD PRESSURE: 83 MMHG | HEIGHT: 70 IN | WEIGHT: 300 LBS | SYSTOLIC BLOOD PRESSURE: 126 MMHG | RESPIRATION RATE: 18 BRPM | HEART RATE: 53 BPM

## 2024-11-13 DIAGNOSIS — N20.0 NEPHROLITHIASIS: ICD-10-CM

## 2024-11-13 DIAGNOSIS — N13.30 HYDRONEPHROSIS, RIGHT: ICD-10-CM

## 2024-11-13 DIAGNOSIS — N13.9 OBSTRUCTIVE UROPATHY: ICD-10-CM

## 2024-11-13 DIAGNOSIS — R16.0 HEPATOMEGALY: ICD-10-CM

## 2024-11-13 DIAGNOSIS — R10.9 LEFT FLANK PAIN: Primary | ICD-10-CM

## 2024-11-13 LAB
ALBUMIN SERPL BCP-MCNC: 3.8 G/DL (ref 3.5–5.2)
ALP SERPL-CCNC: 92 U/L (ref 40–150)
ALT SERPL W/O P-5'-P-CCNC: 29 U/L (ref 10–44)
ANION GAP SERPL CALC-SCNC: 8 MMOL/L (ref 8–16)
AST SERPL-CCNC: 23 U/L (ref 10–40)
BASOPHILS # BLD AUTO: 0.06 K/UL (ref 0–0.2)
BASOPHILS NFR BLD: 0.7 % (ref 0–1.9)
BILIRUB SERPL-MCNC: 0.4 MG/DL (ref 0.1–1)
BILIRUB UR QL STRIP: NEGATIVE
BUN SERPL-MCNC: 12 MG/DL (ref 6–20)
CALCIUM SERPL-MCNC: 9.2 MG/DL (ref 8.7–10.5)
CHLORIDE SERPL-SCNC: 105 MMOL/L (ref 95–110)
CLARITY UR REFRACT.AUTO: CLEAR
CO2 SERPL-SCNC: 25 MMOL/L (ref 23–29)
COLOR UR AUTO: YELLOW
CREAT SERPL-MCNC: 0.8 MG/DL (ref 0.5–1.4)
DIFFERENTIAL METHOD BLD: ABNORMAL
EOSINOPHIL # BLD AUTO: 0.2 K/UL (ref 0–0.5)
EOSINOPHIL NFR BLD: 2.6 % (ref 0–8)
ERYTHROCYTE [DISTWIDTH] IN BLOOD BY AUTOMATED COUNT: 12.7 % (ref 11.5–14.5)
EST. GFR  (NO RACE VARIABLE): >60 ML/MIN/1.73 M^2
GLUCOSE SERPL-MCNC: 91 MG/DL (ref 70–110)
GLUCOSE UR QL STRIP: NEGATIVE
HCT VFR BLD AUTO: 38.9 % (ref 40–54)
HGB BLD-MCNC: 13.8 G/DL (ref 14–18)
HGB UR QL STRIP: NEGATIVE
IMM GRANULOCYTES # BLD AUTO: 0.02 K/UL (ref 0–0.04)
IMM GRANULOCYTES NFR BLD AUTO: 0.2 % (ref 0–0.5)
KETONES UR QL STRIP: NEGATIVE
LEUKOCYTE ESTERASE UR QL STRIP: NEGATIVE
LYMPHOCYTES # BLD AUTO: 2.7 K/UL (ref 1–4.8)
LYMPHOCYTES NFR BLD: 30.9 % (ref 18–48)
MCH RBC QN AUTO: 31.9 PG (ref 27–31)
MCHC RBC AUTO-ENTMCNC: 35.5 G/DL (ref 32–36)
MCV RBC AUTO: 90 FL (ref 82–98)
MONOCYTES # BLD AUTO: 0.7 K/UL (ref 0.3–1)
MONOCYTES NFR BLD: 8.1 % (ref 4–15)
NEUTROPHILS # BLD AUTO: 5 K/UL (ref 1.8–7.7)
NEUTROPHILS NFR BLD: 57.5 % (ref 38–73)
NITRITE UR QL STRIP: NEGATIVE
NRBC BLD-RTO: 0 /100 WBC
PH UR STRIP: 6 [PH] (ref 5–8)
PLATELET # BLD AUTO: 216 K/UL (ref 150–450)
PMV BLD AUTO: 10.7 FL (ref 9.2–12.9)
POTASSIUM SERPL-SCNC: 4.1 MMOL/L (ref 3.5–5.1)
PROT SERPL-MCNC: 7.1 G/DL (ref 6–8.4)
PROT UR QL STRIP: NEGATIVE
RBC # BLD AUTO: 4.32 M/UL (ref 4.6–6.2)
SODIUM SERPL-SCNC: 138 MMOL/L (ref 136–145)
SP GR UR STRIP: 1.01 (ref 1–1.03)
URN SPEC COLLECT METH UR: NORMAL
WBC # BLD AUTO: 8.61 K/UL (ref 3.9–12.7)

## 2024-11-13 PROCEDURE — 85025 COMPLETE CBC W/AUTO DIFF WBC: CPT | Performed by: PHYSICIAN ASSISTANT

## 2024-11-13 PROCEDURE — 80053 COMPREHEN METABOLIC PANEL: CPT | Performed by: PHYSICIAN ASSISTANT

## 2024-11-13 PROCEDURE — 99285 EMERGENCY DEPT VISIT HI MDM: CPT | Mod: 25

## 2024-11-13 PROCEDURE — 63600175 PHARM REV CODE 636 W HCPCS: Performed by: PHYSICIAN ASSISTANT

## 2024-11-13 PROCEDURE — 81003 URINALYSIS AUTO W/O SCOPE: CPT | Performed by: PHYSICIAN ASSISTANT

## 2024-11-13 PROCEDURE — 96374 THER/PROPH/DIAG INJ IV PUSH: CPT

## 2024-11-13 PROCEDURE — 96375 TX/PRO/DX INJ NEW DRUG ADDON: CPT

## 2024-11-13 PROCEDURE — 25000003 PHARM REV CODE 250: Performed by: PHYSICIAN ASSISTANT

## 2024-11-13 PROCEDURE — 51798 US URINE CAPACITY MEASURE: CPT

## 2024-11-13 RX ORDER — OXYCODONE HYDROCHLORIDE 5 MG/1
5 TABLET ORAL EVERY 6 HOURS PRN
Qty: 12 TABLET | Refills: 0 | Status: SHIPPED | OUTPATIENT
Start: 2024-11-13

## 2024-11-13 RX ORDER — ONDANSETRON HYDROCHLORIDE 2 MG/ML
4 INJECTION, SOLUTION INTRAVENOUS
Status: COMPLETED | OUTPATIENT
Start: 2024-11-13 | End: 2024-11-13

## 2024-11-13 RX ORDER — DEXTROMETHORPHAN HYDROBROMIDE, GUAIFENESIN 5; 100 MG/5ML; MG/5ML
650 LIQUID ORAL EVERY 8 HOURS
Qty: 30 TABLET | Refills: 0 | Status: SHIPPED | OUTPATIENT
Start: 2024-11-13

## 2024-11-13 RX ORDER — KETOROLAC TROMETHAMINE 30 MG/ML
15 INJECTION, SOLUTION INTRAMUSCULAR; INTRAVENOUS
Status: COMPLETED | OUTPATIENT
Start: 2024-11-13 | End: 2024-11-13

## 2024-11-13 RX ORDER — ONDANSETRON 4 MG/1
4 TABLET, ORALLY DISINTEGRATING ORAL 2 TIMES DAILY
Qty: 12 TABLET | Refills: 0 | Status: SHIPPED | OUTPATIENT
Start: 2024-11-13

## 2024-11-13 RX ORDER — TAMSULOSIN HYDROCHLORIDE 0.4 MG/1
0.4 CAPSULE ORAL
Status: COMPLETED | OUTPATIENT
Start: 2024-11-13 | End: 2024-11-13

## 2024-11-13 RX ORDER — TAMSULOSIN HYDROCHLORIDE 0.4 MG/1
0.4 CAPSULE ORAL DAILY
Qty: 30 CAPSULE | Refills: 0 | Status: SHIPPED | OUTPATIENT
Start: 2024-11-13 | End: 2024-12-13

## 2024-11-13 RX ORDER — OXYCODONE HYDROCHLORIDE 5 MG/1
5 TABLET ORAL
Status: COMPLETED | OUTPATIENT
Start: 2024-11-13 | End: 2024-11-13

## 2024-11-13 RX ADMIN — OXYCODONE 5 MG: 5 TABLET ORAL at 04:11

## 2024-11-13 RX ADMIN — KETOROLAC TROMETHAMINE 15 MG: 30 INJECTION, SOLUTION INTRAMUSCULAR; INTRAVENOUS at 02:11

## 2024-11-13 RX ADMIN — TAMSULOSIN HYDROCHLORIDE 0.4 MG: 0.4 CAPSULE ORAL at 04:11

## 2024-11-13 RX ADMIN — ONDANSETRON 4 MG: 2 INJECTION INTRAMUSCULAR; INTRAVENOUS at 02:11

## 2024-11-13 NOTE — ED PROVIDER NOTES
Encounter Date: 11/13/2024       History     Chief Complaint   Patient presents with    Difficilt urination     Hx kidney stones, having flank pain x3 days. Difficulty urinating since yesterday     44-year-old male with a PMHx nephrolithiasis, GERD, seizure, obesity, anemia presents to ED with left flank pain x3 days.  He has associated nausea, vomiting (one episode yesterday), difficulty urinating.  He has not been able to urinated since yesterday though reports poor po intake. He has a history of kidney stones and this feels similar to prior episodes. He denies dysuria, hematuria, fever, abdominal pain.     The history is provided by the patient.     Review of patient's allergies indicates:  No Known Allergies  Past Medical History:   Diagnosis Date    Acute hypoxemic respiratory failure due to COVID-19 12/02/2020    Anemia 06/20/2024    Bilateral nephrolithiasis 10/06/2019    CT Renal 1-  Increase in size and number of bilateral nephrolithiasis without evidence of lower tract stone or obstruction.  Diverticulosis without evidence of diverticulitis.      BMI 60.0-69.9, adult 08/01/2018    Chondromalacia, right knee 11/20/2023    COVID-19 12/02/2020    Decreased range of motion (ROM) of right knee 11/20/2023    Elevated blood pressure reading in office without diagnosis of hypertension 06/20/2024    GERD without esophagitis     takes OTC nexium PRN    Hay fever 06/20/2024    History of kidney stones     Hyperglycemia 06/20/2024    Impaired functional mobility, balance, gait, and endurance 11/20/2023    Kidney stones 10/06/2019    Morbid obesity with body mass index of 60.0-69.9 in adult     GAYLE (obstructive sleep apnea)     did not tolerate CPAP    Panic disorder 06/20/2024    Quadriceps weakness 11/20/2023    Seizure 06/20/2024    Ureterolithiasis 10/06/2019     Past Surgical History:   Procedure Laterality Date    APPENDECTOMY  3/2008    ARTHROSCOPIC CHONDROPLASTY OF KNEE JOINT Right 11/17/2023     Procedure: ARTHROSCOPY, KNEE, WITH CHONDROPLASTY;  Surgeon: Rogers Martinez MD;  Location: [facility] OR 2ND FLR;  Service: Orthopedics;  Laterality: Right;    CYSTOSCOPY  10/11/2019    Procedure: CYSTOSCOPY;  Surgeon: Gamal Sosa MD;  Location: [facility] OR 1ST FLR;  Service: Urology;;    CYSTOSCOPY W/ URETERAL STENT PLACEMENT Left 10/6/2019    Procedure: CYSTOSCOPY, WITH URETERAL STENT INSERTION;  Surgeon: Gamal Sosa MD;  Location: [facility] OR 1ST FLR;  Service: Urology;  Laterality: Left;    ESOPHAGOGASTRODUODENOSCOPY N/A 7/3/2023    Procedure: EGD (ESOPHAGOGASTRODUODENOSCOPY);  Surgeon: Tay Stephen MD;  Location: [facility] OR 2ND FLR;  Service: General;  Laterality: N/A;    KNEE ARTHROSCOPY W/ MENISCECTOMY Right 11/17/2023    Procedure: ARTHROSCOPY, KNEE, WITH MENISCECTOMY;  Surgeon: Rogers Martinez MD;  Location: [facility] OR 2ND FLR;  Service: Orthopedics;  Laterality: Right;  regional without catheter    LASER LITHOTRIPSY Left 10/11/2019    Procedure: LITHOTRIPSY, USING LASER;  Surgeon: Gamal Sosa MD;  Location: [facility] OR 1ST FLR;  Service: Urology;  Laterality: Left;    RETROGRADE PYELOGRAPHY Left 10/6/2019    Procedure: PYELOGRAM, RETROGRADE;  Surgeon: Gamal Sosa MD;  Location: [facility] OR 1ST FLR;  Service: Urology;  Laterality: Left;    RETROGRADE PYELOGRAPHY Left 10/11/2019    Procedure: PYELOGRAM, RETROGRADE;  Surgeon: Gamal Sosa MD;  Location: [facility] OR 1ST FLR;  Service: Urology;  Laterality: Left;    URETEROSCOPIC REMOVAL OF URETERIC CALCULUS  10/11/2019    Procedure: REMOVAL, CALCULUS, URETER, URETEROSCOPIC;  Surgeon: Gamal Sosa MD;  Location: [facility] OR 1ST FLR;  Service: Urology;;    URETEROSCOPY Left 10/6/2019    Procedure: URETEROSCOPY;  Surgeon: Gamal Sosa MD;  Location: [facility] OR 1ST FLR;  Service: Urology;  Laterality: Left;    URETEROSCOPY Left 10/11/2019    Procedure: URETEROSCOPY;  Surgeon: Gamal Sosa MD;  Location: [facility] OR [address] [place] [address];  Service:  "Urology;  Laterality: Left;  1hr    XI ROBOTIC GASTROENTEROSTOMY, TODD-EN-Y N/A 7/3/2023    Procedure: XI ROBOTIC GASTROENTEROSTOMY, TODD-EN-Y (PT is SELF PAY, DO NOT submit to insurance;  Surgeon: Tay Stephen MD;  Location: [facility] OR [address] [place] [address];  Service: General;  Laterality: N/A;     Family History   Problem Relation Name Age of Onset    Heart disease Father      Stroke Father      Hypertension Father      Diabetes Maternal Grandmother      Heart disease Maternal Grandfather      Stroke Paternal Grandmother      Heart disease Paternal Grandfather      Diabetes Mother      Obesity Mother      Obesity Sister      Diabetes Brother      Obesity Brother       Social History     Tobacco Use    Smoking status: Never     Passive exposure: Never    Smokeless tobacco: Current     Types: Chew    Tobacco comments:     2 cans/ week   Substance Use Topics    Alcohol use: Not Currently     Alcohol/week: 3.0 standard drinks of alcohol     Types: 3 Cans of beer per week     Comment: "socially but not usually"    Drug use: No     Review of Systems   Constitutional:  Negative for fever.   Gastrointestinal:  Positive for nausea and vomiting. Negative for abdominal pain.   Genitourinary:  Positive for difficulty urinating and flank pain. Negative for dysuria and hematuria.       Physical Exam     Initial Vitals [11/13/24 1217]   BP Pulse Resp Temp SpO2   134/83 60 18 98.3 °F (36.8 °C) 100 %      MAP       --         Physical Exam    Nursing note and vitals reviewed.  Constitutional: He appears well-developed and well-nourished. He is not diaphoretic. No distress.   HENT:   Head: Normocephalic and atraumatic.   Nose: Nose normal.   Eyes: Conjunctivae and EOM are normal.   Neck: Neck supple.   Cardiovascular:  Normal rate.           Pulmonary/Chest: No respiratory distress.   Abdominal: Abdomen is soft. He exhibits no distension. There is no abdominal tenderness.   No right CVA tenderness.  There is left CVA tenderness. There is " no guarding.   Musculoskeletal:      Cervical back: Neck supple.     Neurological: He is alert and oriented to person, place, and time.   Skin: No rash noted.   Psychiatric: He has a normal mood and affect. His behavior is normal. Judgment and thought content normal.         ED Course   Procedures  Labs Reviewed   CBC W/ AUTO DIFFERENTIAL - Abnormal       Result Value    WBC 8.61      RBC 4.32 (*)     Hemoglobin 13.8 (*)     Hematocrit 38.9 (*)     MCV 90      MCH 31.9 (*)     MCHC 35.5      RDW 12.7      Platelets 216      MPV 10.7      Immature Granulocytes 0.2      Gran # (ANC) 5.0      Immature Grans (Abs) 0.02      Lymph # 2.7      Mono # 0.7      Eos # 0.2      Baso # 0.06      nRBC 0      Gran % 57.5      Lymph % 30.9      Mono % 8.1      Eosinophil % 2.6      Basophil % 0.7      Differential Method Automated     COMPREHENSIVE METABOLIC PANEL    Sodium 138      Potassium 4.1      Chloride 105      CO2 25      Glucose 91      BUN 12      Creatinine 0.8      Calcium 9.2      Total Protein 7.1      Albumin 3.8      Total Bilirubin 0.4      Alkaline Phosphatase 92      AST 23      ALT 29      eGFR >60.0      Anion Gap 8     URINALYSIS, REFLEX TO URINE CULTURE    Specimen UA Urine, Clean Catch      Color, UA Yellow      Appearance, UA Clear      pH, UA 6.0      Specific Gravity, UA 1.015      Protein, UA Negative      Glucose, UA Negative      Ketones, UA Negative      Bilirubin (UA) Negative      Occult Blood UA Negative      Nitrite, UA Negative      Leukocytes, UA Negative      Narrative:     Specimen Source->Urine          Imaging Results              CT Renal Stone Study ABD Pelvis WO (Final result)  Result time 11/13/24 15:09:44      Final result by Oliver Perez MD (11/13/24 15:09:44)                   Impression:      1. Newly developed mild right-sided hydronephrosis secondary to a 7 mm calculus at the UPJ.  2. Additional bilateral nephrolithiasis, increased in burden from prior.  3. Hepatomegaly.  4.  Remote operative change of gastric bypass and small-bowel with appendectomy.  5. Diverticulosis coli.      Electronically signed by: Oliver Perez MD  Date:    11/13/2024  Time:    15:09               Narrative:    EXAMINATION:  CT RENAL STONE STUDY ABD PELVIS WO    CLINICAL HISTORY:  Flank pain, kidney stone suspected;left flank pain;    TECHNIQUE:  Low dose axial images, sagittal and coronal reformations were obtained from the lung bases to the pubic symphysis.  Contrast was not administered.    COMPARISON:  CT renal stone study most recent 01/19/2024    FINDINGS:  Lack of IV contrast limits evaluation of soft tissue and vascular structures.    Imaged lung bases are clear.  Base of the heart is within normal limits.    Liver is enlarged without focal process.  Noncontrast appearance of the gallbladder, pancreas, spleen and bilateral adrenal glands are within normal limits.  No significant biliary ductal dilatation.    Bilateral kidneys are normal in size, shape and location.  Mild right-sided hydronephrosis likely secondary to a 7 mm calculus at the ureteropelvic junction (UPJ).  Multiple bilateral subcentimeter nephroliths, appears increased in burden from prior.  No radiopaque calculus seen within the left ureter or urinary bladder.  No left hydronephrosis.  No significant perinephric stranding at either kidney.  Ureters are otherwise normal in course and caliber.  Urinary bladder is well distended without wall thickening or adjacent inflammatory change.    Prostate and seminal vesicles are within normal limits.    Remote operative change of prior gastric bypass without acute complication.  Remote operative change of small bowel at the left abdomen.  Remote appendectomy.  Few scattered colonic diverticula without diverticulitis.  No evidence of bowel obstruction or acute bowel inflammation.  No bowel pneumatosis or portal venous gas.    No ascites, free air or lymphadenopathy by CT criteria.  No significant  calcific atherosclerosis.  Aorta tapers normally.  Stable without acute findings.                                       Medications   ketorolac injection 15 mg (15 mg Intravenous Given 11/13/24 1425)   ondansetron injection 4 mg (4 mg Intravenous Given 11/13/24 1425)   oxyCODONE immediate release tablet 5 mg (5 mg Oral Given 11/13/24 1601)   tamsulosin 24 hr capsule 0.4 mg (0.4 mg Oral Given 11/13/24 1601)     Medical Decision Making  44-year-old male with a PMHx nephrolithiasis, GERD, seizure, obesity, anemia presents to ED with left flank pain x3 days.  Nontoxic appearing. Hemodynamically stable. Afebrile. Exam as above. I will initiate workup and reassess.    Ddx:  Obstructive uropathy, pyelonephritis, UTI, gastroenteritis      Patient able to give urine sample while in the ED. Sarmiento/catheter was not needed    CT renal stone study with mild right-sided hydronephrosis secondary to his 7 mm calculus at the UPJ.  Additionally, an increase burden of bilateral nephrolithiasis was noted from prior studies.  Patient denies right flank pain.  He does not have right-sided CVA tenderness on exam.  Creatinine at baseline.  Urinalysis is negative for blood, nitrites, leukocytes. No leukocytosis. He is not nauseated at this time.  His pain is controlled.  He has an unremarkable abdominal exam. He is able to urinate. At this time I will discharge and give trial of passage.  I started patient on Flomax daily.  A short course of oxycodone, Zofran, Flomax was sent to pharmacy of choice.  I recommended follow up with his urologist given stone burden and size of stone. Strict ED precautions given to return immediately for new, worsening, or concerning symptoms    Amount and/or Complexity of Data Reviewed  Labs: ordered. Decision-making details documented in ED Course.  Radiology: ordered.    Risk  OTC drugs.  Prescription drug management.               ED Course as of 11/14/24 1825 Wed Nov 13, 2024   1428 WBC: 8.61 [HM]   1428  Creatinine: 0.8 [HM]   1428 eGFR: >60.0 [HM]   1439 Hemoglobin(!): 13.8 [HM]   1439 Hematocrit(!): 38.9 [HM]   1439 Blood, UA: Negative [HM]   1439 NITRITE UA: Negative [HM]   1439 Leukocyte Esterase, UA: Negative [HM]      ED Course User Index  [HM] Luci Yeung PA-C                           Clinical Impression:  Final diagnoses:  [R10.9] Left flank pain (Primary)  [N13.9] Obstructive uropathy  [N13.30] Hydronephrosis, right  [R16.0] Hepatomegaly  [N20.0] Nephrolithiasis          ED Disposition Condition    Discharge Stable          ED Prescriptions       Medication Sig Dispense Start Date End Date Auth. Provider    oxyCODONE (ROXICODONE) 5 MG immediate release tablet Take 1 tablet (5 mg total) by mouth every 6 (six) hours as needed. 12 tablet 11/13/2024 -- Luci Yeung PA-C    ondansetron (ZOFRAN-ODT) 4 MG TbDL Take 1 tablet (4 mg total) by mouth 2 (two) times daily. 12 tablet 11/13/2024 -- Luci Yeung PA-C    tamsulosin (FLOMAX) 0.4 mg Cap Take 1 capsule (0.4 mg total) by mouth once daily. 30 capsule 11/13/2024 12/13/2024 Luci Yeung PA-C    acetaminophen (TYLENOL) 650 MG TbSR Take 1 tablet (650 mg total) by mouth every 8 (eight) hours. 30 tablet 11/13/2024 -- Luci Yeung PA-C          Follow-up Information       Follow up With Specialties Details Why Contact Info    Abdiaziz Murguia Jr., MD Urology Schedule an appointment as soon as possible for a visit   [address] [facility] [address]  [phone]      [facility] - Emergency Dept Emergency Medicine  If symptoms worsen [facility] [facility] [address] [phone]             Luci Yeung PA-C  11/14/24 [id number]

## 2024-11-13 NOTE — DISCHARGE INSTRUCTIONS
You have a 7 mm kidney stone at the right UPJ and Newly developed mild right-sided hydronephrosis     Please follow up with Urology for right sided kidney stone      Recommend straining your urine    You were given a short course of oxycodone.  This medication has a addictive and sedating properties.  Caution while taking this medication.  Do not drive or drink alcohol while taking this medication     I recommend tylenol over the counter and as needed for pain control      Continue to take Flomax until you passed kidney stone    Strict ED precautions given to return immediately for new, worsening, or concerning symptoms   Including but not limited to intractable nausea and vomiting, intractable pain, difficulty urinating, fever and flank pain

## 2024-11-13 NOTE — Clinical Note
"Surjit Salguerodamien Valentine was seen and treated in our emergency department on 11/13/2024.  He may return to work on 11/15/2024.       If you have any questions or concerns, please don't hesitate to call.      Luci Yeung PA-C" Opzelura Counseling:  I discussed with the patient the risks of Opzelura including but not limited to nasopharngitis, bronchitis, ear infection, eosinophila, hives, diarrhea, folliculitis, tonsillitis, and rhinorrhea.  Taken orally, this medication has been linked to serious infections; higher rate of mortality; malignancy and lymphoproliferative disorders; major adverse cardiovascular events; thrombosis; thrombocytopenia, anemia, and neutropenia; and lipid elevations.

## 2024-11-14 ENCOUNTER — TELEPHONE (OUTPATIENT)
Dept: UROLOGY | Facility: CLINIC | Age: 44
End: 2024-11-14
Payer: COMMERCIAL

## 2024-11-14 NOTE — TELEPHONE ENCOUNTER
----- Message from Nurse Pandya sent at 11/14/2024  9:03 AM CST -----  Regarding: FW: ER f/u  Contact: [phone]    ----- Message -----  From: Tenisha Nunes  Sent: 11/14/2024   8:29 AM CST  To: Shantal TRACY Jr Staff  Subject: ER f/u                                           Surjit Valentine calling regarding Hospital Follow Up (message) for # pt is calling to speak with nurse regarding an appt f/u from ER visit yesterday for kidney stones he was given medication and everything and needs to know if he needs to come in and see provider pls advise

## 2024-11-14 NOTE — TELEPHONE ENCOUNTER
"pt states er doctor told him to "go home and pass the stone" pt has had stones prior and is concerned. Pt was given appt for 11-20 and told to call us or go to the er if he has uncontrolled pain. Pt advised that there is an on call dr all weekend if he needs to call. Other wise continue flomax and fluids and keep appt on wed.   "

## 2024-11-18 ENCOUNTER — OFFICE VISIT (OUTPATIENT)
Dept: INTERNAL MEDICINE | Facility: CLINIC | Age: 44
End: 2024-11-18
Payer: COMMERCIAL

## 2024-11-18 DIAGNOSIS — Z71.1 PERSON WITH FEARED COMPLAINT IN WHOM NO DIAGNOSIS WAS MADE: Primary | ICD-10-CM

## 2024-11-18 PROCEDURE — 99499 UNLISTED E&M SERVICE: CPT | Mod: 95,,, | Performed by: FAMILY MEDICINE

## 2024-11-18 NOTE — PROGRESS NOTES
Patient was scheduled for virtual today. Checked in online but patient was not available during the appointment slot time in the audiovisual leora. Staff to reach out to reschedule if needed. No LOS.    Sourav Fabian MD, [facility]  Family Medicine Physician  [facility]  11/18/2024

## 2024-11-19 ENCOUNTER — TELEPHONE (OUTPATIENT)
Dept: INTERNAL MEDICINE | Facility: CLINIC | Age: 44
End: 2024-11-19
Payer: COMMERCIAL

## 2024-12-03 ENCOUNTER — PATIENT MESSAGE (OUTPATIENT)
Dept: BARIATRICS | Facility: CLINIC | Age: 44
End: 2024-12-03
Payer: COMMERCIAL

## 2024-12-10 ENCOUNTER — PATIENT MESSAGE (OUTPATIENT)
Dept: BARIATRICS | Facility: CLINIC | Age: 44
End: 2024-12-10
Payer: COMMERCIAL

## 2025-01-06 ENCOUNTER — PATIENT MESSAGE (OUTPATIENT)
Dept: BARIATRICS | Facility: CLINIC | Age: 45
End: 2025-01-06
Payer: COMMERCIAL

## 2025-01-14 ENCOUNTER — PATIENT MESSAGE (OUTPATIENT)
Dept: BARIATRICS | Facility: CLINIC | Age: 45
End: 2025-01-14
Payer: COMMERCIAL

## 2025-01-23 ENCOUNTER — PATIENT MESSAGE (OUTPATIENT)
Dept: BARIATRICS | Facility: CLINIC | Age: 45
End: 2025-01-23
Payer: COMMERCIAL

## 2025-01-24 ENCOUNTER — PATIENT MESSAGE (OUTPATIENT)
Dept: BARIATRICS | Facility: CLINIC | Age: 45
End: 2025-01-24
Payer: COMMERCIAL

## 2025-02-10 ENCOUNTER — PATIENT MESSAGE (OUTPATIENT)
Dept: BARIATRICS | Facility: CLINIC | Age: 45
End: 2025-02-10
Payer: COMMERCIAL

## 2025-02-12 ENCOUNTER — OFFICE VISIT (OUTPATIENT)
Dept: SPORTS MEDICINE | Facility: CLINIC | Age: 45
End: 2025-02-12
Payer: COMMERCIAL

## 2025-02-12 ENCOUNTER — HOSPITAL ENCOUNTER (OUTPATIENT)
Dept: RADIOLOGY | Facility: HOSPITAL | Age: 45
Discharge: HOME | End: 2025-02-12
Attending: PHYSICIAN ASSISTANT
Payer: COMMERCIAL

## 2025-02-12 VITALS
DIASTOLIC BLOOD PRESSURE: 88 MMHG | HEIGHT: 70 IN | WEIGHT: 315 LBS | BODY MASS INDEX: 45.1 KG/M2 | SYSTOLIC BLOOD PRESSURE: 123 MMHG | HEART RATE: 66 BPM

## 2025-02-12 DIAGNOSIS — M76.61 ACHILLES TENDINITIS OF RIGHT LOWER EXTREMITY: ICD-10-CM

## 2025-02-12 DIAGNOSIS — M25.571 RIGHT ANKLE PAIN, UNSPECIFIED CHRONICITY: ICD-10-CM

## 2025-02-12 DIAGNOSIS — M25.571 ACUTE RIGHT ANKLE PAIN: Primary | ICD-10-CM

## 2025-02-12 PROCEDURE — 99214 OFFICE O/P EST MOD 30 MIN: CPT | Mod: S$GLB,,, | Performed by: PHYSICIAN ASSISTANT

## 2025-02-12 PROCEDURE — 1160F RVW MEDS BY RX/DR IN RCRD: CPT | Mod: CPTII,S$GLB,, | Performed by: PHYSICIAN ASSISTANT

## 2025-02-12 PROCEDURE — 1159F MED LIST DOCD IN RCRD: CPT | Mod: CPTII,S$GLB,, | Performed by: PHYSICIAN ASSISTANT

## 2025-02-12 PROCEDURE — 73610 X-RAY EXAM OF ANKLE: CPT | Mod: TC,RT

## 2025-02-12 PROCEDURE — 3079F DIAST BP 80-89 MM HG: CPT | Mod: CPTII,S$GLB,, | Performed by: PHYSICIAN ASSISTANT

## 2025-02-12 PROCEDURE — 99999 PR PBB SHADOW E&M-EST. PATIENT-LVL IV: CPT | Mod: PBBFAC,,, | Performed by: PHYSICIAN ASSISTANT

## 2025-02-12 PROCEDURE — 3008F BODY MASS INDEX DOCD: CPT | Mod: CPTII,S$GLB,, | Performed by: PHYSICIAN ASSISTANT

## 2025-02-12 PROCEDURE — 3074F SYST BP LT 130 MM HG: CPT | Mod: CPTII,S$GLB,, | Performed by: PHYSICIAN ASSISTANT

## 2025-02-12 PROCEDURE — 73610 X-RAY EXAM OF ANKLE: CPT | Mod: 26,RT,, | Performed by: RADIOLOGY

## 2025-02-12 RX ORDER — CELECOXIB 200 MG/1
200 CAPSULE ORAL 2 TIMES DAILY
Qty: 60 CAPSULE | Refills: 1 | Status: SHIPPED | OUTPATIENT
Start: 2025-02-12

## 2025-02-12 NOTE — PROGRESS NOTES
CC:  Right ankle/heel pain     Patient is a 44-year-old male who presents today for initial evaluation of right ankle/heel pain.  Patient states that he jumped off of the back of a truck this past Saturday and immediately felt pain in the right Achilles tendon.  He denies hearing or feeling a pop at the time of the injury.  Over the past couple of days he has had increased soreness and difficulty walking long distances due to the pain.  Pain is described as a soreness/tightness with occasional bursts of sharp pain with dorsiflexion and plantar flexion.  He notes some associated weakness with movement of the ankle as well and does not have as much power when pushing off.  He brought this to the attention of the  at [facility] [employer] who recommended he come in today for an appointment.  He denies any prior injuries or surgeries on the right ankle.  He has been taking over-the-counter medications and resting with little relief.    PAST MEDICAL HISTORY:   Past Medical History:   Diagnosis Date    Acute hypoxemic respiratory failure due to COVID-19 12/02/2020    Anemia 06/20/2024    Bilateral nephrolithiasis 10/06/2019    CT Renal 1-  Increase in size and number of bilateral nephrolithiasis without evidence of lower tract stone or obstruction.  Diverticulosis without evidence of diverticulitis.      BMI 60.0-69.9, adult 08/01/2018    Chondromalacia, right knee 11/20/2023    COVID-19 12/02/2020    Decreased range of motion (ROM) of right knee 11/20/2023    Elevated blood pressure reading in office without diagnosis of hypertension 06/20/2024    GERD without esophagitis     takes OTC nexium PRN    Hay fever 06/20/2024    History of kidney stones     Hyperglycemia 06/20/2024    Impaired functional mobility, balance, gait, and endurance 11/20/2023    Kidney stones 10/06/2019    Morbid obesity with body mass index of 60.0-69.9 in adult     GAYLE (obstructive sleep apnea)     did not tolerate CPAP     Panic disorder 06/20/2024    Quadriceps weakness 11/20/2023    Seizure 06/20/2024    Ureterolithiasis 10/06/2019     PAST SURGICAL HISTORY:   Past Surgical History:   Procedure Laterality Date    APPENDECTOMY  3/2008    ARTHROSCOPIC CHONDROPLASTY OF KNEE JOINT Right 11/17/2023    Procedure: ARTHROSCOPY, KNEE, WITH CHONDROPLASTY;  Surgeon: Rogers Martinez MD;  Location: [facility] OR 2ND FLR;  Service: Orthopedics;  Laterality: Right;    CYSTOSCOPY  10/11/2019    Procedure: CYSTOSCOPY;  Surgeon: Gamal Sosa MD;  Location: [facility] OR 1ST FLR;  Service: Urology;;    CYSTOSCOPY W/ URETERAL STENT PLACEMENT Left 10/6/2019    Procedure: CYSTOSCOPY, WITH URETERAL STENT INSERTION;  Surgeon: Gamal Sosa MD;  Location: [facility] OR 1ST FLR;  Service: Urology;  Laterality: Left;    ESOPHAGOGASTRODUODENOSCOPY N/A 7/3/2023    Procedure: EGD (ESOPHAGOGASTRODUODENOSCOPY);  Surgeon: Tay Stephen MD;  Location: [facility] OR 2ND FLR;  Service: General;  Laterality: N/A;    KNEE ARTHROSCOPY W/ MENISCECTOMY Right 11/17/2023    Procedure: ARTHROSCOPY, KNEE, WITH MENISCECTOMY;  Surgeon: Rogers Martinez MD;  Location: [facility] OR 2ND FLR;  Service: Orthopedics;  Laterality: Right;  regional without catheter    LASER LITHOTRIPSY Left 10/11/2019    Procedure: LITHOTRIPSY, USING LASER;  Surgeon: Gamal Sosa MD;  Location: [facility] OR 1ST FLR;  Service: Urology;  Laterality: Left;    RETROGRADE PYELOGRAPHY Left 10/6/2019    Procedure: PYELOGRAM, RETROGRADE;  Surgeon: Gamal Sosa MD;  Location: [facility] OR 1ST FLR;  Service: Urology;  Laterality: Left;    RETROGRADE PYELOGRAPHY Left 10/11/2019    Procedure: PYELOGRAM, RETROGRADE;  Surgeon: Gamal Sosa MD;  Location: [facility] OR 1ST FLR;  Service: Urology;  Laterality: Left;    URETEROSCOPIC REMOVAL OF URETERIC CALCULUS  10/11/2019    Procedure: REMOVAL, CALCULUS, URETER, URETEROSCOPIC;  Surgeon: Gamal Sosa MD;  Location: [facility] OR 1ST FLR;  Service: Urology;;     "URETEROSCOPY Left 10/6/2019    Procedure: URETEROSCOPY;  Surgeon: Gamal Sosa MD;  Location: [facility] OR 1ST FLR;  Service: Urology;  Laterality: Left;    URETEROSCOPY Left 10/11/2019    Procedure: URETEROSCOPY;  Surgeon: Gamal Sosa MD;  Location: [facility] OR 1ST FLR;  Service: Urology;  Laterality: Left;  1hr    XI ROBOTIC GASTROENTEROSTOMY, TODD-EN-Y N/A 7/3/2023    Procedure: XI ROBOTIC GASTROENTEROSTOMY, TODD-EN-Y (PT is SELF PAY, DO NOT submit to insurance;  Surgeon: Tay Stephen MD;  Location: [facility] OR 2ND FLR;  Service: General;  Laterality: N/A;     FAMILY HISTORY:   Family History   Problem Relation Name Age of Onset    Heart disease Father      Stroke Father      Hypertension Father      Diabetes Maternal Grandmother      Heart disease Maternal Grandfather      Stroke Paternal Grandmother      Heart disease Paternal Grandfather      Diabetes Mother      Obesity Mother      Obesity Sister      Diabetes Brother      Obesity Brother       SOCIAL HISTORY:   Social History     Socioeconomic History    Marital status: Single   Tobacco Use    Smoking status: Never     Passive exposure: Never    Smokeless tobacco: Current     Types: Chew    Tobacco comments:     2 cans/ week   Substance and Sexual Activity    Alcohol use: Not Currently     Alcohol/week: 3.0 standard drinks of alcohol     Types: 3 Cans of beer per week     Comment: "socially but not usually"    Drug use: No    Sexual activity: Yes     Partners: Female   Social History Narrative    Works full time: [employer] : [employer]  .  1 son, 14 years old.      Social Drivers of Health     Financial Resource Strain: Low Risk  (7/18/2024)    Overall Financial Resource Strain (CARDIA)     Difficulty of Paying Living Expenses: Not hard at all   Food Insecurity: No Food Insecurity (7/18/2024)    Hunger Vital Sign     Worried About Running Out of Food in the Last Year: Never true     Ran Out of Food in the Last " Year: Never true   Transportation Needs: No Transportation Needs (7/4/2023)    PRAPARE - Transportation     Lack of Transportation (Medical): No     Lack of Transportation (Non-Medical): No   Physical Activity: Sufficiently Active (7/18/2024)    Exercise Vital Sign     Days of Exercise per Week: 5 days     Minutes of Exercise per Session: 40 min   Stress: Stress Concern Present (7/18/2024)    [employer] [place] [employer] - Occupational Stress Questionnaire     Feeling of Stress : Very much   Housing Stability: Low Risk  (7/4/2023)    Housing Stability Vital Sign     Unable to Pay for Housing in the Last Year: No     Number of Places Lived in the Last Year: 1     Unstable Housing in the Last Year: No       MEDICATIONS:   Current Outpatient Medications:     acetaminophen (TYLENOL) 650 MG TbSR, Take 1 tablet (650 mg total) by mouth every 8 (eight) hours., Disp: 30 tablet, Rfl: 0    albuterol (PROVENTIL/VENTOLIN HFA) 90 mcg/actuation inhaler, Inhale 1-2 puffs into the lungs every 6 (six) hours as needed for Shortness of Breath. Rescue, Disp: 6.7 g, Rfl: 0    azelastine (ASTELIN) 137 mcg (0.1 %) nasal spray, 1 spray (137 mcg total) by Nasal route 2 (two) times daily., Disp: 30 mL, Rfl: 0    busPIRone (BUSPAR) 10 MG tablet, Take 1 tablet (10 mg total) by mouth 3 (three) times daily., Disp: 90 tablet, Rfl: 11    diphenhydrAMINE-aluminum-magnesium hydroxide-simethicone-LIDOcaine viscous HCl 2%, Swish and spit 15 mLs every 4 (four) hours as needed (sore throat)., Disp: 120 each, Rfl: 0    EScitalopram oxalate (LEXAPRO) 20 MG tablet, Take 1 tablet (20 mg total) by mouth once daily., Disp: 90 tablet, Rfl: 3    hydrocortisone 2.5 % cream, Apply topically 2 (two) times daily., Disp: , Rfl:     ketoconazole (NIZORAL) 2 % cream, Apply topically 2 (two) times daily., Disp: , Rfl:     levocetirizine (XYZAL) 5 MG tablet, Take 1 tablet (5 mg total) by mouth every evening., Disp: 30 tablet, Rfl: 11    ondansetron  "(ZOFRAN-ODT) 4 MG TbDL, Take 1 tablet (4 mg total) by mouth 2 (two) times daily., Disp: 12 tablet, Rfl: 0    oxyCODONE (ROXICODONE) 5 MG immediate release tablet, Take 1 tablet (5 mg total) by mouth every 6 (six) hours as needed., Disp: 12 tablet, Rfl: 0    ALPRAZolam (XANAX) 0.5 MG tablet, Take 1 tablet (0.5 mg total) by mouth 3 (three) times daily as needed for Anxiety., Disp: 30 tablet, Rfl: 0    celecoxib (CELEBREX) 200 MG capsule, Take 1 capsule (200 mg total) by mouth 2 (two) times daily., Disp: 60 capsule, Rfl: 1    tamsulosin (FLOMAX) 0.4 mg Cap, Take 1 capsule (0.4 mg total) by mouth once daily., Disp: 30 capsule, Rfl: 0  ALLERGIES: Review of patient's allergies indicates:  No Known Allergies    VITAL SIGNS: /88   Pulse 66   Ht 5' 10" (1.778 m)   Wt (!) 160.1 kg (352 lb 13.5 oz)   BMI 50.63 kg/m²      Review of Systems   Constitution: Negative. Negative for chills, fever and night sweats.   HENT: Negative for congestion and headaches.    Eyes: Negative for blurred vision, left vision loss and right vision loss.   Cardiovascular: Negative for chest pain and syncope.   Respiratory: Negative for cough and shortness of breath.    Endocrine: Negative for polydipsia, polyphagia and polyuria.   Hematologic/Lymphatic: Negative for bleeding problem. Does not bruise/bleed easily.   Skin: Negative for dry skin, itching and rash.   Musculoskeletal: Negative for falls and muscle weakness.   Gastrointestinal: Negative for abdominal pain and bowel incontinence.   Genitourinary: Negative for bladder incontinence and nocturia.   Neurological: Negative for disturbances in coordination, loss of balance and seizures.   Psychiatric/Behavioral: Negative for depression. The patient does not have insomnia.    Allergic/Immunologic: Negative for hives and persistent infections.       PHYSICAL EXAMINATION    General:  The patient is alert and oriented x 3.  Mood is pleasant.  Observation of ears, eyes and nose reveal no " gross abnormalities.  No labored breathing observed.    Right Foot and Ankle Exam    INSPECTION:      ALIGNMENT:  Gait:    Antalgic   Hindfoot  Normal    Scars:   None    Midfoot: Normal  Swelling:   mild     Forefoot: Normal  Color:   Normal      Atrophy:  None    Collective Ankle-Hindfoot Alignment    Heel / Toe Walking: + pain   Good -plantigrade (PG), well aligned             TENDERNESS:  lATERAL:    anterior:  Sinus tarsi:  None  Anteromedial joint line:  none  Syndesmosis:  none  Anterolateral joint line:  +  ATFL:   none  Talonavicular:    none   CFL:   none  Anterior tibialis:   none  Anterolateral gutter: none  Extensor tendons:   none  Fibula:   none  Peroneal tendons: none  POSTERIOR:  Peroneal tubercle.  None  Medial/lateral achilles:  +       Medial/lateral achilles insertion: +  MEDIAL:      Deltoid:  none  CALCANEUS:  Malleolus:  none  Retrocalcaneal:   +  PTT:   none  Medial achilles:   +  Navicular:  none  Lateral achilles:   +       Calcaneal tuberosity:   +  FOOT:    Calcaneal cuboid  none MT / MT heads:  none   Navicular   none  Medial cord origin PF:  none  Cuneiforms:   none  Web space:   none  Lisfranc    none  Tarsal tunnel:   none  Base of the fifth metatarsal  none Tinels sign   neg        RANGE OF MOTION:  RIGHT/ LEFT   STRENGTH: (affected)  Ankle DF/PF:  10*/30* 15/45    Anterior tibialis: 5/5     Eversion/Inversion: 15/25* 15/25  Posterior tibialis: 5/5   Midfoot ABD/ADD: 10/10 10/10  Gastroc-soleus: 4/5*   First MTP DF/PF: 60/25 60/25  Peroneals:  5/5         EHL:   5/5   (* = pain)     FHL:   5/5         (* = pain)      SPECIAL TESTS:   ANKLE INSTABILITY: (*pain)    Anterior drawer:   Normal     (C-W contralateral side)     Inversion:   30°     Eversion  10°            Collective Instability: (Ant-post and varus-valgus)     Stable        PROVOCATIVE TESTING:    Forced DF/ER: No pain at syndesmosis.    Mid-leg squeeze  No pain at syndesmosis    Forced DF:  No pain anterior joint  line.      Forced PF:  No pain posterior ankle.     Forced INV:  Pain present laterally    Forced EV:  No pain medial     Nortons sign: Normal ankle plantar flexion.     Resisted peroneal No subluxation or pain    1st-2nd MT toggle No pain at Lisfranc    MT-T torque  No pain at Lisfranc     NEUROLOGIC TESTING:  All dermatomes foot, ankle and leg have normal sensation light touch  Ankle Reflexes 2+, symmetric   Negative Babinski and No Clonus    VASCULAR:  2+ pulses PT/DT with brisk capillary refill toes.    Other Findings:  Negative calcaneal squeeze test    XRAYS left ankle (2/12/2025):  Right Ankle 3 views (AP, lateral,mortise)  were ordered and reviewed.   No evidence of any fracture or dislocation.  The osseous structures appear well mineralized and well aligned. No mortise displacement.  Large calcaneal spur and degenerative changes noted to the mid foot and tibiotalar joints. Soft tissues appear normal    ASSESSMENT:   Right ankle/heel pain  Achilles tendinitis, right      PLAN:    I made the decision to obtain old records of the patient including previous notes and imaging. New imaging was ordered today of the extremity or extremities evaluated. I independently reviewed and interpreted the radiographs and/or MRIs today as well as prior imaging, if available.    We discussed at length different conservative treatment options.  These include anti-inflammatories, acetaminophen, rest, ice, heat, formal physical therapy including strengthening and stretching exercises, home exercise programs, dry needling, boot/brace wear and immobilization, nighttime splinting, etc.      Recommend conservative treatment at this time.  I instructed patient a plantar fascia/Achilles tendon stretching program.  Instructed patient to perform this 3 times daily.  We also discussed other conservative management options such as nighttime splinting to help stretch the Achilles and plantar fascia.    Prescription for Celebrex 200 mg to  take 1-2 times daily as needed for pain.  Advised patient to refrain from taking other anti-inflammatories while taking Celebrex, however may alternate with acetaminophen if needed.    Patient would like to follow up as needed.  He will let us know if symptoms worsen or fail to improve.      This note was generated with the assistance of ambient listening technology. Verbal consent was obtained by the patient and accompanying visitor(s) for the recording of patient appointment to facilitate this note. I attest to having reviewed and edited the generated note for accuracy, though some syntax or spelling errors may persist. Please contact the author of this note for any clarification.

## 2025-02-18 ENCOUNTER — HOSPITAL ENCOUNTER (EMERGENCY)
Facility: HOSPITAL | Age: 45
Discharge: HOME | End: 2025-02-18
Attending: EMERGENCY MEDICINE
Payer: COMMERCIAL

## 2025-02-18 VITALS
HEART RATE: 49 BPM | DIASTOLIC BLOOD PRESSURE: 81 MMHG | RESPIRATION RATE: 18 BRPM | OXYGEN SATURATION: 95 % | HEIGHT: 70 IN | SYSTOLIC BLOOD PRESSURE: 137 MMHG | TEMPERATURE: 98 F | BODY MASS INDEX: 42.95 KG/M2 | WEIGHT: 300 LBS

## 2025-02-18 DIAGNOSIS — N20.0 KIDNEY STONE: ICD-10-CM

## 2025-02-18 DIAGNOSIS — R10.9 RIGHT FLANK PAIN: Primary | ICD-10-CM

## 2025-02-18 LAB
ALBUMIN SERPL BCP-MCNC: 3.6 G/DL (ref 3.5–5.2)
ALP SERPL-CCNC: 83 U/L (ref 40–150)
ALT SERPL W/O P-5'-P-CCNC: 31 U/L (ref 10–44)
ANION GAP SERPL CALC-SCNC: 11 MMOL/L (ref 8–16)
AST SERPL-CCNC: 32 U/L (ref 10–40)
BACTERIA #/AREA URNS AUTO: ABNORMAL /HPF
BASOPHILS # BLD AUTO: 0.04 K/UL (ref 0–0.2)
BASOPHILS NFR BLD: 0.5 % (ref 0–1.9)
BILIRUB SERPL-MCNC: 0.5 MG/DL (ref 0.1–1)
BILIRUB UR QL STRIP: NEGATIVE
BUN SERPL-MCNC: 16 MG/DL (ref 6–20)
BUN SERPL-MCNC: 19 MG/DL (ref 6–30)
CALCIUM SERPL-MCNC: 8.9 MG/DL (ref 8.7–10.5)
CHLORIDE SERPL-SCNC: 108 MMOL/L (ref 95–110)
CHLORIDE SERPL-SCNC: 109 MMOL/L (ref 95–110)
CLARITY UR REFRACT.AUTO: CLEAR
CO2 SERPL-SCNC: 21 MMOL/L (ref 23–29)
COLOR UR AUTO: YELLOW
CREAT SERPL-MCNC: 1 MG/DL (ref 0.5–1.4)
CREAT SERPL-MCNC: 1.1 MG/DL (ref 0.5–1.4)
DIFFERENTIAL METHOD BLD: ABNORMAL
EOSINOPHIL # BLD AUTO: 0.2 K/UL (ref 0–0.5)
EOSINOPHIL NFR BLD: 2.1 % (ref 0–8)
ERYTHROCYTE [DISTWIDTH] IN BLOOD BY AUTOMATED COUNT: 13.2 % (ref 11.5–14.5)
EST. GFR  (NO RACE VARIABLE): >60 ML/MIN/1.73 M^2
GLUCOSE SERPL-MCNC: 85 MG/DL (ref 70–110)
GLUCOSE SERPL-MCNC: 97 MG/DL (ref 70–110)
GLUCOSE UR QL STRIP: NEGATIVE
HCT VFR BLD AUTO: 40.1 % (ref 40–54)
HCT VFR BLD CALC: 38 %PCV (ref 36–54)
HCV AB SERPL QL IA: NORMAL
HGB BLD-MCNC: 13.7 G/DL (ref 14–18)
HGB UR QL STRIP: ABNORMAL
HIV 1+2 AB+HIV1 P24 AG SERPL QL IA: NORMAL
IMM GRANULOCYTES # BLD AUTO: 0.01 K/UL (ref 0–0.04)
IMM GRANULOCYTES NFR BLD AUTO: 0.1 % (ref 0–0.5)
KETONES UR QL STRIP: NEGATIVE
LEUKOCYTE ESTERASE UR QL STRIP: NEGATIVE
LYMPHOCYTES # BLD AUTO: 2.2 K/UL (ref 1–4.8)
LYMPHOCYTES NFR BLD: 27.8 % (ref 18–48)
MCH RBC QN AUTO: 31.4 PG (ref 27–31)
MCHC RBC AUTO-ENTMCNC: 34.2 G/DL (ref 32–36)
MCV RBC AUTO: 92 FL (ref 82–98)
MICROSCOPIC COMMENT: ABNORMAL
MONOCYTES # BLD AUTO: 0.7 K/UL (ref 0.3–1)
MONOCYTES NFR BLD: 9.1 % (ref 4–15)
NEUTROPHILS # BLD AUTO: 4.7 K/UL (ref 1.8–7.7)
NEUTROPHILS NFR BLD: 60.4 % (ref 38–73)
NITRITE UR QL STRIP: NEGATIVE
NRBC BLD-RTO: 0 /100 WBC
PH UR STRIP: 6 [PH] (ref 5–8)
PLATELET # BLD AUTO: 201 K/UL (ref 150–450)
PMV BLD AUTO: 12 FL (ref 9.2–12.9)
POC IONIZED CALCIUM: 1.08 MMOL/L (ref 1.06–1.42)
POC TCO2 (MEASURED): 25 MMOL/L (ref 23–29)
POTASSIUM BLD-SCNC: 4 MMOL/L (ref 3.5–5.1)
POTASSIUM SERPL-SCNC: 4 MMOL/L (ref 3.5–5.1)
PROT SERPL-MCNC: 6.7 G/DL (ref 6–8.4)
PROT UR QL STRIP: ABNORMAL
RBC # BLD AUTO: 4.36 M/UL (ref 4.6–6.2)
RBC #/AREA URNS AUTO: 23 /HPF (ref 0–4)
SAMPLE: NORMAL
SODIUM BLD-SCNC: 142 MMOL/L (ref 136–145)
SODIUM SERPL-SCNC: 141 MMOL/L (ref 136–145)
SP GR UR STRIP: >1.03 (ref 1–1.03)
SQUAMOUS #/AREA URNS AUTO: 1 /HPF
URN SPEC COLLECT METH UR: ABNORMAL
WBC # BLD AUTO: 7.73 K/UL (ref 3.9–12.7)
WBC #/AREA URNS AUTO: 5 /HPF (ref 0–5)

## 2025-02-18 PROCEDURE — 96361 HYDRATE IV INFUSION ADD-ON: CPT

## 2025-02-18 PROCEDURE — 25000003 PHARM REV CODE 250

## 2025-02-18 PROCEDURE — 96374 THER/PROPH/DIAG INJ IV PUSH: CPT

## 2025-02-18 PROCEDURE — 96375 TX/PRO/DX INJ NEW DRUG ADDON: CPT

## 2025-02-18 PROCEDURE — 82330 ASSAY OF CALCIUM: CPT | Mod: 59

## 2025-02-18 PROCEDURE — 99285 EMERGENCY DEPT VISIT HI MDM: CPT | Mod: 25

## 2025-02-18 PROCEDURE — 80053 COMPREHEN METABOLIC PANEL: CPT

## 2025-02-18 PROCEDURE — 81001 URINALYSIS AUTO W/SCOPE: CPT

## 2025-02-18 PROCEDURE — 63600175 PHARM REV CODE 636 W HCPCS

## 2025-02-18 PROCEDURE — 86803 HEPATITIS C AB TEST: CPT | Performed by: PHYSICIAN ASSISTANT

## 2025-02-18 PROCEDURE — 96376 TX/PRO/DX INJ SAME DRUG ADON: CPT

## 2025-02-18 PROCEDURE — 85025 COMPLETE CBC W/AUTO DIFF WBC: CPT

## 2025-02-18 PROCEDURE — 87389 HIV-1 AG W/HIV-1&-2 AB AG IA: CPT | Performed by: PHYSICIAN ASSISTANT

## 2025-02-18 PROCEDURE — 80047 BASIC METABLC PNL IONIZED CA: CPT

## 2025-02-18 RX ORDER — MORPHINE SULFATE 4 MG/ML
4 INJECTION, SOLUTION INTRAMUSCULAR; INTRAVENOUS
Refills: 0 | Status: COMPLETED | OUTPATIENT
Start: 2025-02-18 | End: 2025-02-18

## 2025-02-18 RX ORDER — ONDANSETRON HYDROCHLORIDE 2 MG/ML
4 INJECTION, SOLUTION INTRAVENOUS
Status: COMPLETED | OUTPATIENT
Start: 2025-02-18 | End: 2025-02-18

## 2025-02-18 RX ORDER — KETOROLAC TROMETHAMINE 30 MG/ML
15 INJECTION, SOLUTION INTRAMUSCULAR; INTRAVENOUS
Status: COMPLETED | OUTPATIENT
Start: 2025-02-18 | End: 2025-02-18

## 2025-02-18 RX ORDER — OXYCODONE AND ACETAMINOPHEN 10; 325 MG/1; MG/1
1 TABLET ORAL EVERY 8 HOURS PRN
Qty: 25 TABLET | Refills: 0 | Status: SHIPPED | OUTPATIENT
Start: 2025-02-18 | End: 2025-02-19

## 2025-02-18 RX ADMIN — KETOROLAC TROMETHAMINE 15 MG: 30 INJECTION, SOLUTION INTRAMUSCULAR; INTRAVENOUS at 05:02

## 2025-02-18 RX ADMIN — ONDANSETRON 4 MG: 2 INJECTION INTRAMUSCULAR; INTRAVENOUS at 05:02

## 2025-02-18 RX ADMIN — SODIUM CHLORIDE 1000 ML: 9 INJECTION, SOLUTION INTRAVENOUS at 06:02

## 2025-02-18 RX ADMIN — MORPHINE SULFATE 4 MG: 4 INJECTION INTRAVENOUS at 05:02

## 2025-02-18 RX ADMIN — MORPHINE SULFATE 4 MG: 4 INJECTION INTRAVENOUS at 07:02

## 2025-02-18 NOTE — PROVIDER PROGRESS NOTES - EMERGENCY DEPT.
Encounter Date: 2/18/2025    ED Physician Progress Notes          Physician Attestation Statement for Resident:  As the supervising MD   Physician Attestation Statement: I have personally seen and examined this patient.       I agree with the history unless otherwise noted.     As the supervising MD I agree with the PE unless otherwise noted.      I have reviewed and agree with the residents interpretation of the following unless otherwise noted:   I have personally reviewed and interpreted the patients laboratory studies: Cr WNL, UA ***  I have personally reviewed and interpreted imaging studies: CT a/p with evidence of ? 10mm stone in R ureter with associated hydro      I have also reviewed the following:   external records: CT 11/24 with 7mm stone in R UPJ    As the supervising MD I agree with the treatment, course, plan, and disposition unless otherwise noted.

## 2025-02-18 NOTE — ASSESSMENT & PLAN NOTE
Surjit Mac is a 44-year-old male with a right UPJ stone.    - No concern for UTI or ANU at this time.   - Pain well controlled  - Nausea well-controlled  - Discussed outpatient ureteroscopy in the next 1-2 weeks with the patient.  He was amenable to plan to go straight to surgery.  - Will arrange outpatient follow up with Dr. Martinez for and ureteroscopy  - Okay for discharge from urologic perspective   - Rest of care per ED

## 2025-02-18 NOTE — DISCHARGE INSTRUCTIONS
You have been given a prescription for oxycodone, a pain medication that will help reduce the pain secondary to your kidney stones.  You may take it up to 3 times a day, once every 8 hours.  Additionally, you have been given follow up with the Urology doctors to see them in clinic.  Please plan to continue to follow up with them.  If you start to develop a fever or significantly worsening pain that does not resolve with the pain medicine, that would be an indication to come back to the emergency department for further evaluation.

## 2025-02-18 NOTE — SUBJECTIVE & OBJECTIVE
Past Medical History:   Diagnosis Date    Acute hypoxemic respiratory failure due to COVID-19 12/02/2020    Anemia 06/20/2024    Bilateral nephrolithiasis 10/06/2019    CT Renal 1-  Increase in size and number of bilateral nephrolithiasis without evidence of lower tract stone or obstruction.  Diverticulosis without evidence of diverticulitis.      BMI 60.0-69.9, adult 08/01/2018    Chondromalacia, right knee 11/20/2023    COVID-19 12/02/2020    Decreased range of motion (ROM) of right knee 11/20/2023    Elevated blood pressure reading in office without diagnosis of hypertension 06/20/2024    GERD without esophagitis     takes OTC nexium PRN    Hay fever 06/20/2024    History of kidney stones     Hyperglycemia 06/20/2024    Impaired functional mobility, balance, gait, and endurance 11/20/2023    Kidney stones 10/06/2019    Morbid obesity with body mass index of 60.0-69.9 in adult     GAYLE (obstructive sleep apnea)     did not tolerate CPAP    Panic disorder 06/20/2024    Quadriceps weakness 11/20/2023    Seizure 06/20/2024    Ureterolithiasis 10/06/2019       Past Surgical History:   Procedure Laterality Date    APPENDECTOMY  3/2008    ARTHROSCOPIC CHONDROPLASTY OF KNEE JOINT Right 11/17/2023    Procedure: ARTHROSCOPY, KNEE, WITH CHONDROPLASTY;  Surgeon: Rogers Martinez MD;  Location: [facility] OR [address] [place] [address];  Service: Orthopedics;  Laterality: Right;    CYSTOSCOPY  10/11/2019    Procedure: CYSTOSCOPY;  Surgeon: Gamal Sosa MD;  Location: [facility] OR [address] [place] [address];  Service: Urology;;    CYSTOSCOPY W/ URETERAL STENT PLACEMENT Left 10/6/2019    Procedure: CYSTOSCOPY, WITH URETERAL STENT INSERTION;  Surgeon: Gamal Sosa MD;  Location: [facility] OR [address] [place] [address];  Service: Urology;  Laterality: Left;    ESOPHAGOGASTRODUODENOSCOPY N/A 7/3/2023    Procedure: EGD (ESOPHAGOGASTRODUODENOSCOPY);  Surgeon: Tay Stephen MD;  Location: [facility] OR [address] [place] [address];  Service: General;  Laterality: N/A;    KNEE ARTHROSCOPY W/  MENISCECTOMY Right 11/17/2023    Procedure: ARTHROSCOPY, KNEE, WITH MENISCECTOMY;  Surgeon: Rogers Martinez MD;  Location: [facility] OR 2ND FLR;  Service: Orthopedics;  Laterality: Right;  regional without catheter    LASER LITHOTRIPSY Left 10/11/2019    Procedure: LITHOTRIPSY, USING LASER;  Surgeon: Gamal Sosa MD;  Location: [facility] OR 1ST FLR;  Service: Urology;  Laterality: Left;    RETROGRADE PYELOGRAPHY Left 10/6/2019    Procedure: PYELOGRAM, RETROGRADE;  Surgeon: Gamal Sosa MD;  Location: [facility] OR 1ST FLR;  Service: Urology;  Laterality: Left;    RETROGRADE PYELOGRAPHY Left 10/11/2019    Procedure: PYELOGRAM, RETROGRADE;  Surgeon: Gamal Sosa MD;  Location: [facility] OR 1ST FLR;  Service: Urology;  Laterality: Left;    URETEROSCOPIC REMOVAL OF URETERIC CALCULUS  10/11/2019    Procedure: REMOVAL, CALCULUS, URETER, URETEROSCOPIC;  Surgeon: Gamal Sosa MD;  [facility];  Service: Urology;;    URETEROSCOPY Left 10/6/2019    Procedure: URETEROSCOPY;  Surgeon: Gamal Sosa MD;  [facility];  Service: Urology;  Laterality: Left;    URETEROSCOPY Left 10/11/2019    Procedure: URETEROSCOPY;  Surgeon: Gamal Sosa MD;  [facility];  Service: Urology;  Laterality: Left;  1hr    XI ROBOTIC GASTROENTEROSTOMY, TODD-EN-Y N/A 7/3/2023    Procedure: XI ROBOTIC GASTROENTEROSTOMY, TODD-EN-Y (PT is SELF PAY, DO NOT submit to insurance;  Surgeon: Tay Stephen MD;  Location: [facility] OR 2ND FLR;  Service: General;  Laterality: N/A;       Review of patient's allergies indicates:  No Known Allergies    Family History       Problem Relation (Age of Onset)    Diabetes Maternal Grandmother, Mother, Brother    Heart disease Father, Maternal Grandfather, Paternal Grandfather    Hypertension Father    Obesity Mother, Sister, Brother    Stroke Father, Paternal Grandmother            Tobacco Use    Smoking status: Never     Passive exposure: Never    Smokeless  "tobacco: Current     Types: Chew    Tobacco comments:     2 cans/ week   Substance and Sexual Activity    Alcohol use: Not Currently     Alcohol/week: 3.0 standard drinks of alcohol     Types: 3 Cans of beer per week     Comment: "socially but not usually"    Drug use: No    Sexual activity: Yes     Partners: Female       Review of Systems  See HPI  Objective:     Temp:  [97.7 °F (36.5 °C)-98.1 °F (36.7 °C)] 98 °F (36.7 °C)  Pulse:  [46-56] 49  Resp:  [16-18] 18  SpO2:  [95 %-98 %] 95 %  BP: (137-154)/(81-91) 137/81  Weight: 136.1 kg (300 lb)  Body mass index is 43.05 kg/m².    Date 02/18/25 0700 - 02/19/25 0659(Discharged)   Shift 6784-9664 6002-6673 5087-1012 24 Hour Total   INTAKE   IV Piggyback 1000   1000   Shift Total(mL/kg) 1000(7.3)   1000(7.3)   OUTPUT   Shift Total(mL/kg)       Weight (kg) 136.1 136.1 136.1 136.1          Drains       None                    Physical Exam  Constitutional:       General: He is not in acute distress.     Appearance: Normal appearance. He is not ill-appearing.   HENT:      Head: Normocephalic and atraumatic.      Mouth/Throat:      Mouth: Mucous membranes are moist.   Eyes:      Extraocular Movements: Extraocular movements intact.   Cardiovascular:      Rate and Rhythm: Normal rate.   Pulmonary:      Effort: Pulmonary effort is normal.   Abdominal:      Palpations: Abdomen is soft.   Musculoskeletal:      Cervical back: Normal range of motion.   Skin:     General: Skin is warm and dry.   Neurological:      Mental Status: He is alert and oriented to person, place, and time.   Psychiatric:         Mood and Affect: Mood normal.         Behavior: Behavior normal.          Significant Labs:    BMP:  Recent Labs   Lab 02/18/25  0636      K 4.0      CO2 21*   BUN 16   CREATININE 1.0   CALCIUM 8.9       CBC:  Recent Labs   Lab 02/18/25  0520 02/18/25  0636   WBC  --  7.73   HGB  --  13.7*   HCT 38 40.1   PLT  --  201       Urine Studies:   Recent Labs   Lab 02/18/25  0749 "   COLORU Yellow   APPEARANCEUA Clear   PHUR 6.0   SPECGRAV >1.030*   PROTEINUA Trace*   GLUCUA Negative   KETONESU Negative   BILIRUBINUA Negative   OCCULTUA 2+*   NITRITE Negative   LEUKOCYTESUR Negative   RBCUA 23*   WBCUA 5   BACTERIA Rare   SQUAMEPITHEL 1     All pertinent labs results from the past 24 hours have been reviewed.    Significant Imaging:  All pertinent imaging results/findings from the past 24 hours have been reviewed.

## 2025-02-18 NOTE — ED NOTES
I-STAT Chem-8+ Results:   Value Reference Range   Sodium 142 136-145 mmol/L   Potassium  4.0 3.5-5.1 mmol/L   Chloride 108  mmol/L   Ionized Calcium 1.08 1.06-1.42 mmol/L   CO2 (measured) 25 23-29 mmol/L   Glucose 97  mg/dL   BUN 19 6-30 mg/dL   Creatinine 1.1 0.5-1.4 mg/dL   Hematocrit 38 36-54%

## 2025-02-18 NOTE — CONSULTS
[facility] - Emergency Dept  Urology  Consult Note    Patient Name: Surjit Valentine  MRN: [id number]  Admission Date: 2/18/2025  Hospital Length of Stay: 0   Code Status: Prior   Attending Provider: No att. providers found   Consulting Provider: Chase Granger MD  Primary Care Physician: Sourav Fabian MD  Principal Problem:<principal problem not specified>    Consults    Subjective:     HPI:  Surjit Valentine is a 44-year-old male with history of nephrolithiasis previously followed by Dr. Chávez.  He was diagnosed with a right UPJ stone in November of 2024.  He states he attempted to arrange follow up with Dr. Roman but was unable.  He presented to the ED today due to acute onset right flank pain around 2:00 a.m. which woke him from sleep.  He endorses nausea at home but no vomiting, fever, chills.  Urology consulted for right UPJ stone.    On assessment afebrile vital signs stable.  WBC 7, creatinine 1.0, baseline 0.9.  UA nitrite negative with rare bacteria, 1 SEC. large left lower pole nonobstructing stone.  CT renal stone study demonstrated 1.2 cm right UPJ stone as well as a large nonobstructing left lower pole stone.    Past Medical History:   Diagnosis Date    Acute hypoxemic respiratory failure due to COVID-19 12/02/2020    Anemia 06/20/2024    Bilateral nephrolithiasis 10/06/2019    CT Renal 1-  Increase in size and number of bilateral nephrolithiasis without evidence of lower tract stone or obstruction.  Diverticulosis without evidence of diverticulitis.      BMI 60.0-69.9, adult 08/01/2018    Chondromalacia, right knee 11/20/2023    COVID-19 12/02/2020    Decreased range of motion (ROM) of right knee 11/20/2023    Elevated blood pressure reading in office without diagnosis of hypertension 06/20/2024    GERD without esophagitis     takes OTC nexium PRN    Hay fever 06/20/2024    History of kidney stones     Hyperglycemia 06/20/2024    Impaired functional mobility, balance, gait, and endurance 11/20/2023     Kidney stones 10/06/2019    Morbid obesity with body mass index of 60.0-69.9 in adult     GAYLE (obstructive sleep apnea)     did not tolerate CPAP    Panic disorder 06/20/2024    Quadriceps weakness 11/20/2023    Seizure 06/20/2024    Ureterolithiasis 10/06/2019       Past Surgical History:   Procedure Laterality Date    APPENDECTOMY  3/2008    ARTHROSCOPIC CHONDROPLASTY OF KNEE JOINT Right 11/17/2023    Procedure: ARTHROSCOPY, KNEE, WITH CHONDROPLASTY;  Surgeon: Rogers Martinez MD;  Location: [facility] OR 2ND FLR;  Service: Orthopedics;  Laterality: Right;    CYSTOSCOPY  10/11/2019    Procedure: CYSTOSCOPY;  Surgeon: Gamal Sosa MD;  Location: [facility] OR 1ST FLR;  Service: Urology;;    CYSTOSCOPY W/ URETERAL STENT PLACEMENT Left 10/6/2019    Procedure: CYSTOSCOPY, WITH URETERAL STENT INSERTION;  Surgeon: Gamal Sosa MD;  Location: [facility] OR 1ST FLR;  Service: Urology;  Laterality: Left;    ESOPHAGOGASTRODUODENOSCOPY N/A 7/3/2023    Procedure: EGD (ESOPHAGOGASTRODUODENOSCOPY);  Surgeon: Tay Stephen MD;  Location: [facility] OR 2ND FLR;  Service: General;  Laterality: N/A;    KNEE ARTHROSCOPY W/ MENISCECTOMY Right 11/17/2023    Procedure: ARTHROSCOPY, KNEE, WITH MENISCECTOMY;  Surgeon: Rogers Martinez MD;  Location: [facility] OR 2ND FLR;  Service: Orthopedics;  Laterality: Right;  regional without catheter    LASER LITHOTRIPSY Left 10/11/2019    Procedure: LITHOTRIPSY, USING LASER;  Surgeon: Gamal Sosa MD;  Location: [facility] OR 1ST FLR;  Service: Urology;  Laterality: Left;    RETROGRADE PYELOGRAPHY Left 10/6/2019    Procedure: PYELOGRAM, RETROGRADE;  Surgeon: Gamal Sosa MD;  Location: [facility] OR 1ST FLR;  Service: Urology;  Laterality: Left;    RETROGRADE PYELOGRAPHY Left 10/11/2019    Procedure: PYELOGRAM, RETROGRADE;  Surgeon: Gamal Sosa MD;  Location: [facility] OR 1ST FLR;  Service: Urology;  Laterality: Left;    URETEROSCOPIC REMOVAL OF URETERIC CALCULUS  10/11/2019    Procedure:  "REMOVAL, CALCULUS, URETER, URETEROSCOPIC;  Surgeon: Gamal Sosa MD;  Location: [facility] OR 1ST FLR;  Service: Urology;;    URETEROSCOPY Left 10/6/2019    Procedure: URETEROSCOPY;  Surgeon: Gamal Sosa MD;  Location: [facility] OR 1ST FLR;  Service: Urology;  Laterality: Left;    URETEROSCOPY Left 10/11/2019    Procedure: URETEROSCOPY;  Surgeon: Gamal Sosa MD;  Location: [facility] OR 1ST FLR;  Service: Urology;  Laterality: Left;  1hr    XI ROBOTIC GASTROENTEROSTOMY, TODD-EN-Y N/A 7/3/2023    Procedure: XI ROBOTIC GASTROENTEROSTOMY, TODD-EN-Y (PT is SELF PAY, DO NOT submit to insurance;  Surgeon: Tay Stephen MD;  Location: [facility] OR 2ND FLR;  Service: General;  Laterality: N/A;       Review of patient's allergies indicates:  No Known Allergies    Family History       Problem Relation (Age of Onset)    Diabetes Maternal Grandmother, Mother, Brother    Heart disease Father, Maternal Grandfather, Paternal Grandfather    Hypertension Father    Obesity Mother, Sister, Brother    Stroke Father, Paternal Grandmother            Tobacco Use    Smoking status: Never     Passive exposure: Never    Smokeless tobacco: Current     Types: Chew    Tobacco comments:     2 cans/ week   Substance and Sexual Activity    Alcohol use: Not Currently     Alcohol/week: 3.0 standard drinks of alcohol     Types: 3 Cans of beer per week     Comment: "socially but not usually"    Drug use: No    Sexual activity: Yes     Partners: Female       Review of Systems  See HPI  Objective:     Temp:  [97.7 °F (36.5 °C)-98.1 °F (36.7 °C)] 98 °F (36.7 °C)  Pulse:  [46-56] 49  Resp:  [16-18] 18  SpO2:  [95 %-98 %] 95 %  BP: (137-154)/(81-91) 137/81  Weight: 136.1 kg (300 lb)  Body mass index is 43.05 kg/m².    Date 02/18/25 0700 - 02/19/25 0659(Discharged)   Shift 7377-1442 8116-8588 0110-4948 24 Hour Total   INTAKE   IV Piggyback 1000   1000   Shift Total(mL/kg) 1000(7.3)   1000(7.3)   OUTPUT   Shift Total(mL/kg)       Weight (kg) 136.1 " 136.1 136.1 136.1          Drains       None                    Physical Exam  Constitutional:       General: He is not in acute distress.     Appearance: Normal appearance. He is not ill-appearing.   HENT:      Head: Normocephalic and atraumatic.      Mouth/Throat:      Mouth: Mucous membranes are moist.   Eyes:      Extraocular Movements: Extraocular movements intact.   Cardiovascular:      Rate and Rhythm: Normal rate.   Pulmonary:      Effort: Pulmonary effort is normal.   Abdominal:      Palpations: Abdomen is soft.   Musculoskeletal:      Cervical back: Normal range of motion.   Skin:     General: Skin is warm and dry.   Neurological:      Mental Status: He is alert and oriented to person, place, and time.   Psychiatric:         Mood and Affect: Mood normal.         Behavior: Behavior normal.          Significant Labs:    BMP:  Recent Labs   Lab 02/18/25  0636      K 4.0      CO2 21*   BUN 16   CREATININE 1.0   CALCIUM 8.9       CBC:  Recent Labs   Lab 02/18/25  0520 02/18/25  0636   WBC  --  7.73   HGB  --  13.7*   HCT 38 40.1   PLT  --  201       Urine Studies:   Recent Labs   Lab 02/18/25  0749   COLORU Yellow   APPEARANCEUA Clear   PHUR 6.0   SPECGRAV >1.030*   PROTEINUA Trace*   GLUCUA Negative   KETONESU Negative   BILIRUBINUA Negative   OCCULTUA 2+*   NITRITE Negative   LEUKOCYTESUR Negative   RBCUA 23*   WBCUA 5   BACTERIA Rare   SQUAMEPITHEL 1     All pertinent labs results from the past 24 hours have been reviewed.    Significant Imaging:  All pertinent imaging results/findings from the past 24 hours have been reviewed.                    Assessment and Plan:     Bilateral nephrolithiasis  Surjit Mac is a 44-year-old male with a right UPJ stone.    - No concern for UTI or ANU at this time.   - Pain well controlled  - Nausea well-controlled  - Discussed outpatient ureteroscopy in the next 1-2 weeks with the patient.  He was amenable to plan to go straight to surgery.  - Will arrange  outpatient follow up with Dr. Martinez for and ureteroscopy  - Link for discharge from urologic perspective   - Rest of care per ED          Chase Granger MD  Urology  [facility] - Emergency Dept    Agree with the above.

## 2025-02-18 NOTE — ED PROVIDER NOTES
"Encounter Date: 2/18/2025       History     Chief Complaint   Patient presents with    Abdominal Pain    Flank Pain     Pt has c/o R flank pain and R sided abd pain beginning around 2 am. Hx of kidney stones states "feels same." Denies N/V     44 y.o. male with a PMH of several prior kidney stones (some requiring stent) presents to [facility] for evaluation of right flank pain that began around 2 AM, is severe, sharp, radiating around his right side, and has no provoking or alleviating factors. He reports it feels the same as prior kidney stones. He endorses nausea. He denies fever, chills, chest pain, emesis, diarrhea, gross hematuria, dysuria, penile discharge, or testicular pain/swelling.         The history is provided by the patient and medical records.     Review of patient's allergies indicates:  No Known Allergies  Past Medical History:   Diagnosis Date    Acute hypoxemic respiratory failure due to COVID-19 12/02/2020    Anemia 06/20/2024    Bilateral nephrolithiasis 10/06/2019    CT Renal 1-  Increase in size and number of bilateral nephrolithiasis without evidence of lower tract stone or obstruction.  Diverticulosis without evidence of diverticulitis.      BMI 60.0-69.9, adult 08/01/2018    Chondromalacia, right knee 11/20/2023    COVID-19 12/02/2020    Decreased range of motion (ROM) of right knee 11/20/2023    Elevated blood pressure reading in office without diagnosis of hypertension 06/20/2024    GERD without esophagitis     takes OTC nexium PRN    Hay fever 06/20/2024    History of kidney stones     Hyperglycemia 06/20/2024    Impaired functional mobility, balance, gait, and endurance 11/20/2023    Kidney stones 10/06/2019    Morbid obesity with body mass index of 60.0-69.9 in adult     GAYLE (obstructive sleep apnea)     did not tolerate CPAP    Panic disorder 06/20/2024    Quadriceps weakness 11/20/2023    Seizure 06/20/2024    Ureterolithiasis 10/06/2019     Past Surgical " History:   Procedure Laterality Date    APPENDECTOMY  3/2008    ARTHROSCOPIC CHONDROPLASTY OF KNEE JOINT Right 11/17/2023    Procedure: ARTHROSCOPY, KNEE, WITH CHONDROPLASTY;  Surgeon: Rogers Martinez MD;  Location: [facility] OR 2ND FLR;  Service: Orthopedics;  Laterality: Right;    CYSTOSCOPY  10/11/2019    Procedure: CYSTOSCOPY;  Surgeon: Gamal Sosa MD;  Location: [facility] OR 1ST FLR;  Service: Urology;;    CYSTOSCOPY W/ URETERAL STENT PLACEMENT Left 10/6/2019    Procedure: CYSTOSCOPY, WITH URETERAL STENT INSERTION;  Surgeon: Gamal Sosa MD;  Location: [facility] OR 1ST FLR;  Service: Urology;  Laterality: Left;    ESOPHAGOGASTRODUODENOSCOPY N/A 7/3/2023    Procedure: EGD (ESOPHAGOGASTRODUODENOSCOPY);  Surgeon: Tay Stephen MD;  Location: [facility] OR 2ND FLR;  Service: General;  Laterality: N/A;    KNEE ARTHROSCOPY W/ MENISCECTOMY Right 11/17/2023    Procedure: ARTHROSCOPY, KNEE, WITH MENISCECTOMY;  Surgeon: Rogers Martinez MD;  Location: [facility] OR 2ND FLR;  Service: Orthopedics;  Laterality: Right;  regional without catheter    LASER LITHOTRIPSY Left 10/11/2019    Procedure: LITHOTRIPSY, USING LASER;  Surgeon: Gamal Sosa MD;  Location: [facility] OR 1ST FLR;  Service: Urology;  Laterality: Left;    RETROGRADE PYELOGRAPHY Left 10/6/2019    Procedure: PYELOGRAM, RETROGRADE;  Surgeon: Gamal Sosa MD;  Location: [facility] OR 1ST FLR;  Service: Urology;  Laterality: Left;    RETROGRADE PYELOGRAPHY Left 10/11/2019    Procedure: PYELOGRAM, RETROGRADE;  Surgeon: Gamal Sosa MD;  Location: [facility] OR 1ST FLR;  Service: Urology;  Laterality: Left;    URETEROSCOPIC REMOVAL OF URETERIC CALCULUS  10/11/2019    Procedure: REMOVAL, CALCULUS, URETER, URETEROSCOPIC;  Surgeon: Gamal Sosa MD;  Location: [facility] OR 1ST FLR;  Service: Urology;;    URETEROSCOPY Left 10/6/2019    Procedure: URETEROSCOPY;  Surgeon: Gamal Sosa MD;  Location: [facility] OR 1ST FLR;  Service: Urology;  Laterality: Left;     URETEROSCOPY Left 10/11/2019    Procedure: URETEROSCOPY;  Surgeon: Gamal Sosa MD;  [facility];  Service: Urology;  Laterality: Left;  1hr    XI ROBOTIC GASTROENTEROSTOMY, TODD-EN-Y N/A 7/3/2023    Procedure: XI ROBOTIC GASTROENTEROSTOMY, TODD-EN-Y (PT is SELF PAY, DO NOT submit to insurance;  Surgeon: Tay Stephen MD;  [facility];  Service: General;  Laterality: N/A;     Family History   Problem Relation Name Age of Onset    Heart disease Father      Stroke Father      Hypertension Father      Diabetes Maternal Grandmother      Heart disease Maternal Grandfather      Stroke Paternal Grandmother      Heart disease Paternal Grandfather      Diabetes Mother      Obesity Mother      Obesity Sister      Diabetes Brother      Obesity Brother       Social History[1]  Review of Systems    Physical Exam     Initial Vitals   BP Pulse Resp Temp SpO2   02/18/25 0604 02/18/25 0450 02/18/25 0450 02/18/25 0450 02/18/25 0450   (!) 146/86 (!) 56 18 97.7 °F (36.5 °C) 98 %      MAP       --                Physical Exam    Nursing note and vitals reviewed.  Constitutional: He appears well-developed and well-nourished. He is cooperative. No distress.   Appears uncomfortable secondary to pain    HENT:   Head: Normocephalic. Mouth/Throat: Oropharynx is clear and moist.   Eyes: Pupils are equal, round, and reactive to light. No scleral icterus.   Neck: Neck supple.   Cardiovascular:  Normal rate and regular rhythm.           Pulmonary/Chest: Breath sounds normal. No stridor. No respiratory distress.   Abdominal: Abdomen is soft. He exhibits no distension. There is abdominal tenderness (right cva tenderness to palpation).   Musculoskeletal:         General: No edema.      Cervical back: Neck supple.     Neurological: He is alert. GCS score is 15. GCS eye subscore is 4. GCS verbal subscore is 5. GCS motor subscore is 6.   Skin: Skin is warm and dry. No rash noted.         ED Course    Procedures  Labs Reviewed   URINALYSIS, REFLEX TO URINE CULTURE - Abnormal       Result Value    Specimen UA Urine, Clean Catch      Color, UA Yellow      Appearance, UA Clear      pH, UA 6.0      Specific Gravity, UA >1.030 (*)     Protein, UA Trace (*)     Glucose, UA Negative      Ketones, UA Negative      Bilirubin (UA) Negative      Occult Blood UA 2+ (*)     Nitrite, UA Negative      Leukocytes, UA Negative      Narrative:     Specimen Source->Urine   COMPREHENSIVE METABOLIC PANEL - Abnormal    Sodium 141      Potassium 4.0      Chloride 109      CO2 21 (*)     Glucose 85      BUN 16      Creatinine 1.0      Calcium 8.9      Total Protein 6.7      Albumin 3.6      Total Bilirubin 0.5      Alkaline Phosphatase 83      AST 32      ALT 31      eGFR >60.0      Anion Gap 11     CBC W/ AUTO DIFFERENTIAL - Abnormal    WBC 7.73      RBC 4.36 (*)     Hemoglobin 13.7 (*)     Hematocrit 40.1      MCV 92      MCH 31.4 (*)     MCHC 34.2      RDW 13.2      Platelets 201      MPV 12.0      Immature Granulocytes 0.1      Gran # (ANC) 4.7      Immature Grans (Abs) 0.01      Lymph # 2.2      Mono # 0.7      Eos # 0.2      Baso # 0.04      nRBC 0      Gran % 60.4      Lymph % 27.8      Mono % 9.1      Eosinophil % 2.1      Basophil % 0.5      Differential Method Automated     URINALYSIS MICROSCOPIC - Abnormal    RBC, UA 23 (*)     WBC, UA 5      Bacteria Rare      Squam Epithel, UA 1      Microscopic Comment SEE COMMENT      Narrative:     Specimen Source->Urine   HEPATITIS C ANTIBODY    Hepatitis C Ab Non-reactive      Narrative:     Release to patient->Immediate   HIV 1 / 2 ANTIBODY    HIV 1/2 Ag/Ab Non-reactive      Narrative:     Release to patient->Immediate   ISTAT PROCEDURE    POC Glucose 97      POC BUN 19      POC Creatinine 1.1      POC Sodium 142      POC Potassium 4.0      POC Chloride 108      POC TCO2 (MEASURED) 25      POC Ionized Calcium 1.08      POC Hematocrit 38      Sample NENA     ISTAT CHEM8           Imaging Results               CT Renal Stone Study ABD Pelvis WO (Final result)  Result time 02/18/25 07:04:32      Final result by Sergio Montaño MD (02/18/25 07:04:32)                   Impression:      1. 12 mm calculus at the right ureteropelvic junction, with mild upstream right hydroureteronephrosis.  Overall appearance similar to prior.  2. Nonobstructing nephroliths bilaterally, the largest measuring 8 mm in a left inferior calyx.  3. Additional findings as above.  This report was flagged in Epic as abnormal.    Electronically signed by resident: David Huynh  Date:    02/18/2025  Time:    05:51    Electronically signed by: Sergio Montaño  Date:    02/18/2025  Time:    07:04               Narrative:    EXAMINATION:  CT RENAL STONE STUDY ABD PELVIS WO    CLINICAL HISTORY:  Flank pain, kidney stone suspected;    TECHNIQUE:  Axial CT images of the abdomen and pelvis were obtained via helical, multi detector CT technique.  No intravenous contrast material was administered.    COMPARISON:  11/13/2024.    FINDINGS:  Heart: No cardiomegaly or pericardial effusion.    Lung Bases: No focal consolidation, pleural effusion, or pneumothorax.    Liver: Enlarged without focal hepatic abnormality.    Gallbladder: No calcified gallstones.    Bile Ducts: No intra or extrahepatic biliary ductal dilation.    Pancreas: No pancreatic mass lesion or duct dilatation.    Spleen: Normal.    Adrenals: Normal.    Genitourinary: 12 mm calculus at the right ureteropelvic junction (sagittal image 104), with mild upstream right hydroureteronephrosis.  Overall appearance similar to prior.  Additional nonobstructing bilateral nephroliths, the largest measuring 8 mm in a left inferior calyx.  No left hydroureteronephrosis.  Bladder demonstrates smooth contours without bladder wall thickening.    Reproductive organs: Normal.    GI tract/Mesentery: Operative change of Pradeep-en-Y gastric bypass.  No evidence for bowel obstruction or  inflammation.  Appendectomy.  Few colonic diverticula.  Nonspecific central mesenteric haziness in prominent number lymph nodes, possibly related to panniculitis, as before.    Peritoneal Space: No abdominopelvic ascites or intraperitoneal free air.    Lymph nodes: No significant adenopathy.    Abdominal wall: Normal.    Vasculature: Abdominal aorta is normal in caliber without significant calcific atherosclerosis.    Bones: No acute displaced fracture or bony destructive process. Flowing anterior osteophytes spanning multiple contiguous vertebral bodies, which may be seen with diffuse idiopathic skeletal hyperostosis.                                       Medications   ondansetron injection 4 mg (4 mg Intravenous Given 2/18/25 0531)   morphine injection 4 mg (4 mg Intravenous Given 2/18/25 0532)   ketorolac injection 15 mg (15 mg Intravenous Given 2/18/25 0533)   sodium chloride 0.9% bolus 1,000 mL 1,000 mL (0 mLs Intravenous Stopped 2/18/25 0753)   morphine injection 4 mg (4 mg Intravenous Given 2/18/25 0754)     Medical Decision Making  45 y/o presents with severe right flank pain and nausea.     Patient is afebrile, hemodynamically stable, and in no acute distress on arrival.   Differential diagnoses considered include, but not limited to: kidney stone, ANU, UTI     IVF, morphine, toradol, and zofran given with improvement in pain.   CT with 12 mm obstructive stone. Istat creatinine 1.1, increased from prior 0.8 consistent with ANU. Consulted and discussed with urology who will evaluate the patient at bedside. Patient signed out to oncoming team, Dr. Stehi and Dr. Rehman, pending UA, CMP, CBC, and urology recommendations. Discussed findings and plan with patient who expressed understanding and agreement.     Amount and/or Complexity of Data Reviewed  Labs: ordered. Decision-making details documented in ED Course.  Radiology: ordered. Decision-making details documented in ED Course.    Risk  Prescription drug  "management.               ED Course as of 02/18/25 0904   Tue Feb 18, 2025   0608 POC Creatinine: 1.1 [OW]   0713 CT Renal Stone Study ABD Pelvis WO(!)  12 mm calculus at the right ureteropelvic junction with mild upstream right hydroureteronephrosis.   [OW]      ED Course User Index  [OW] Barbra Johnson MD                           Clinical Impression:  Final diagnoses:  [R10.9] Right flank pain (Primary)  [N20.0] Kidney stone                   [1]   Social History  Tobacco Use    Smoking status: Never     Passive exposure: Never    Smokeless tobacco: Current     Types: Chew    Tobacco comments:     2 cans/ week   Substance Use Topics    Alcohol use: Not Currently     Alcohol/week: 3.0 standard drinks of alcohol     Types: 3 Cans of beer per week     Comment: "socially but not usually"    Drug use: No        Barbra Johnson MD  Resident  02/18/25 0904    "

## 2025-02-18 NOTE — HPI
Surjit Valentine is a 44-year-old male with history of nephrolithiasis previously followed by Dr. Chávez.  He was diagnosed with a right UPJ stone in November of 2024.  He states he attempted to arrange follow up with Dr. Roman but was unable.  He presented to the ED today due to acute onset right flank pain around 2:00 a.m. which woke him from sleep.  He endorses nausea at home but no vomiting, fever, chills.  Urology consulted for right UPJ stone.    On assessment afebrile vital signs stable.  WBC 7, creatinine 1.0, baseline 0.9.  UA nitrite negative with rare bacteria, 1 SEC. large left lower pole nonobstructing stone.  CT renal stone study demonstrated 1.2 cm right UPJ stone as well as a large nonobstructing left lower pole stone.

## 2025-02-18 NOTE — PROVIDER PROGRESS NOTES - EMERGENCY DEPT.
Encounter Date: 2/18/2025    ED Physician Progress Notes          Physician Note:   Signout Note     Chief complaint:  Right flank pain     Per sign out and chart review:  Surjit Valentine is a 44 y.o. male, PMH current nephrolithiasis, presenting with complaints of right-sided flank pain.  Notes that it feels similar to nephrolithiasis that he has had in the past.      During ED stay patient received:  Medications   ondansetron injection 4 mg (4 mg Intravenous Given 2/18/25 0531)   morphine injection 4 mg (4 mg Intravenous Given 2/18/25 0532)   ketorolac injection 15 mg (15 mg Intravenous Given 2/18/25 0533)   sodium chloride 0.9% bolus 1,000 mL 1,000 mL (0 mLs Intravenous Stopped 2/18/25 0753)   morphine injection 4 mg (4 mg Intravenous Given 2/18/25 0754)        Pt signed out to me pending:  Urinalysis/urology recommendations.     Update/ Disposition:  Patient noted to be hemodynamically stable on my initial evaluation of him.  He noted that his pain had returned from the previously given pain medication so I gave him an additional 4 mg morphine.  Laboratory workup shown to otherwise be unremarkable however CT scan significant for a while mm partially obstructing kidney stone in the right ureteropelvic junction with mild hydronephrosis.  Urology was consulted and after evaluating the patient, they felt outpatient follow up with a cystoscopy would be appropriate.  I gave him a prescription for oxycodone to go home with.  Specific return precautions were given.  Stable for discharge at this time.     Patient, caregiver and/or family understands the plan and verbalized agreement. All questions answered.      Diagnostic Impression:    Right-sided nephrolithiasis        Attending Attestation:   Physician Attestation Statement for Resident:  As the supervising MD   Physician Attestation Statement: I have personally seen and examined this patient.   I agree with the above history.  -:   As the supervising MD I agree  with the above PE.     As the supervising MD I agree with the above treatment, course, plan, and disposition.

## 2025-02-19 ENCOUNTER — PATIENT MESSAGE (OUTPATIENT)
Dept: UROLOGY | Facility: CLINIC | Age: 45
End: 2025-02-19
Payer: COMMERCIAL

## 2025-02-19 ENCOUNTER — TELEPHONE (OUTPATIENT)
Dept: UROLOGY | Facility: CLINIC | Age: 45
End: 2025-02-19
Payer: COMMERCIAL

## 2025-02-19 ENCOUNTER — TELEPHONE (OUTPATIENT)
Dept: INTERNAL MEDICINE | Facility: CLINIC | Age: 45
End: 2025-02-19
Payer: COMMERCIAL

## 2025-02-19 DIAGNOSIS — N20.0 RENAL CALCULI: Primary | ICD-10-CM

## 2025-02-19 RX ORDER — HYDROCODONE BITARTRATE AND ACETAMINOPHEN 5; 325 MG/1; MG/1
1 TABLET ORAL EVERY 6 HOURS PRN
Qty: 10 TABLET | Refills: 0 | Status: SHIPPED | OUTPATIENT
Start: 2025-02-19 | End: 2025-02-22 | Stop reason: ALTCHOICE

## 2025-02-19 RX ORDER — ONDANSETRON 4 MG/1
4 TABLET, ORALLY DISINTEGRATING ORAL 2 TIMES DAILY
Qty: 12 TABLET | Refills: 0 | Status: SHIPPED | OUTPATIENT
Start: 2025-02-19

## 2025-02-19 RX ORDER — KETOROLAC TROMETHAMINE 10 MG/1
10 TABLET, FILM COATED ORAL EVERY 6 HOURS PRN
Qty: 10 TABLET | Refills: 0 | Status: SHIPPED | OUTPATIENT
Start: 2025-02-19 | End: 2025-02-22 | Stop reason: ALTCHOICE

## 2025-02-19 NOTE — TELEPHONE ENCOUNTER
Let's set patient on for Friday before mardi gras.     C2  Cysto, bilateral ureteroscopy, holmium laser litho, stent removal  Bilateral renal stones  General  2.25 hours  Urine culture week prior

## 2025-02-19 NOTE — TELEPHONE ENCOUNTER
----- Message from Chase Granger sent at 2/18/2025  9:10 AM CST -----  Dr. Martinez, This patient was seen in the ED for a 1 cm right UPJ stone and non-obstructing left lower pole stone. He was not infected and had baseline kidney function. He needs follow up to discuss surgery or go straight to surgery (he was ok with this). Thanks!Chase Granger MDUrology PGY-3

## 2025-02-19 NOTE — TELEPHONE ENCOUNTER
Spoke with patient, on surgery date 02/28/ will send preop thru portal. Will get urine culture on02/20/25

## 2025-02-19 NOTE — TELEPHONE ENCOUNTER
----- Message from Bel sent at 2/19/2025 11:20 AM CST -----  Contact: [phone]  [email or web address] Patient is calling for Medical Advice regarding: Kidney stone How long has patient had these symptoms: 28 hours Pharmacy name and phone#:[id number] - [place] - [address] Phone: [phone] wants a call back or [facility]:Comments: Patient would like to request pain med and help setting up for surgery Please advise patient replies from provider may take up to 48 hours.

## 2025-02-19 NOTE — TELEPHONE ENCOUNTER
[facility] RHEUMATOLOGY            PROGRESS NOTE      Subjective:       Patient ID:   NAME: Madeline Voss : 1967     53 y.o. female    Referring Doc: No ref. provider found  Other Physicians:    Chief Complaint:  Rheumatoid Arthritis      History of Present Illness:     Patient returns today for a regularly scheduled follow-up visit for rheumatoid arthritis      The patient is experiencing low back pain radiating to right leg.  She had MRI done and has our referral to see neurosurgeon.  Patient is also experiencing pain in 1st carpometacarpal joints, wrist,nees and all major joints.  No prolonged morning stiffness or effusions.          ROS:   GEN:  No  fever, night sweats . weight is stable   + fatigue  SKIN: no rashes, no bruising, no ulcerations, no Raynaud's  HEENT: no HA's, No visual changes, no mucosal ulcers, no sicca symptoms,  CV:   no CP, SOB, PND, NATARAJAN, no orthopnea, no palpitations  PULM: normal with no SOB, cough, hemoptysis, sputum or pleuritic pain  GI:  no abdominal pain, nausea, vomiting, constipation, diarrhea, melanotic stools, BRBPR, hematemesis, no dysphagia  :   no dysuria  NEURO: no paresthesias, headaches, visual disturbances, muscle weakness  MUSCULOSKELETAL:no joint swelling, prolonged AM stiffness, no back pain, no muscle pain  Allergies:  Review of patient's allergies indicates:  No Known Allergies    Medications:    Current Outpatient Medications:     diflunisaL (DOLOBID) 500 mg Tab, Take 1 tablet (500 mg total) by mouth every meal as needed., Disp: 90 tablet, Rfl: 3    methotrexate 2.5 MG Tab, 6 po once a week, Disp: 24 tablet, Rfl: 3    methylPREDNISolone (MEDROL DOSEPACK) 4 mg tablet, use as directed, Disp: 1 Package, Rfl: 1    traMADoL (ULTRAM) 50 mg tablet, 1-2 po q 8 hrs prn, Disp: 90 tablet, Rfl: 2    PMHx/PSHx Updates:        Objective:     Vitals:  Blood pressure 105/66, temperature 97.1 °F (36.2 °C), weight 63.6 kg (140 lb 3.2 oz).    Physical Examination:   GEN:  Spoke to Pt and Pt stated reason he called today was taken care of by urology doctor and no further accistant was needed or necessary    no apparent distress, comfortable; AAOx3  SKIN: no rashes,no ulceration, no Raynaud's, no petechiae, no SQ nodules,  HEAD: normal  EYES: no pallor, no icterus,  NECK: no masses, thyroid normal, trachea midline, no LAD/LN's, supple  CV: RRR with no murmur; l S1 and S2 reg. ,no gallop no rubs,   CHEST: Normal respiratory effort; CTAB; normal breath sounds; no wheeze or crackles  MUSC/Skeletal: ROM normal; no crepitus; joints without synovitis,  no deformities  No joint swelling or tenderness of PIP, MCP, wrist, elbow, shoulder, or knee joints. Tenderness R 1 CMC joint  EXTREM: no clubbing, cyanosis, no edema,normal  pulses   NEURO: grossly intact; motor WNL; AAOx3; ,  PSYCH: normal mood, affect and behavior  LYMPH: normal cervical, supraclavicular          Labs:   Lab Results   Component Value Date    WBC 5.28 06/04/2020    HGB 12.7 06/04/2020    HCT 37.8 06/04/2020    MCV 95 06/04/2020     06/04/2020    CMP  @LASTLAB(NA,K,CL,CO2,GLU,BUN,Creatinine,Calcium,PROT,Albumin,Bilitot,Alkphos,AST,ALT,CRP,ESR,RF,CCP,GEORGE,SSA,CPK,uric acid) )@  I have reviewed all available lab results and radiology reports.    Radiology/Diagnostic Studies:        Assessment/Plan:   (1) 53 y.o. female with diagnosis of rheumatoid arthritis.More symptomatic  Acute arthritis right 1st carpometacarpal joint  This was injected with 0.5 cc Kenalog 0.5 cc dexamethasone      Medrol Dosepak  TSH        Discussion:     I have explained all of the above in detail and the patient understands all of the current recommendation(s). I have answered all questions to the best of my ability and to their complete satisfaction.       The patient is to continue with the current management plan         RTC in   3 weeks      Electronically signed by Karime Little MD        Answers for HPI/ROS submitted by the patient on 7/29/2020   fever: No  eye redness: No  headaches: No  shortness of breath: No  chest pain: No  trouble swallowing: No  diarrhea:  No  constipation: No  unexpected weight change: No  genital sore: No  dysuria: No  During the last 3 days, have you had a skin rash?: No  Bruises or bleeds easily: No  cough: No

## 2025-02-19 NOTE — TELEPHONE ENCOUNTER
----- Message from Leena sent at 2/19/2025 11:54 AM CST -----  Contact: Patient  [phone]  .[email or web address] Patient is calling for Medical Advice regarding:PLEASE RETURN CALLHow long has patient had these symptoms:Pharmacy name and phone#:[facility] a call back or [facility]:Comments: [phone] Please advise patient replies from provider may take up to 48 hours.

## 2025-02-20 ENCOUNTER — LAB VISIT (OUTPATIENT)
Dept: LAB | Facility: HOSPITAL | Age: 45
End: 2025-02-20
Attending: UROLOGY
Payer: COMMERCIAL

## 2025-02-20 ENCOUNTER — TELEPHONE (OUTPATIENT)
Dept: UROLOGY | Facility: CLINIC | Age: 45
End: 2025-02-20
Payer: COMMERCIAL

## 2025-02-20 DIAGNOSIS — N20.0 RENAL CALCULI: ICD-10-CM

## 2025-02-20 DIAGNOSIS — N20.0 RIGHT RENAL STONE: Primary | ICD-10-CM

## 2025-02-20 PROCEDURE — 87086 URINE CULTURE/COLONY COUNT: CPT | Performed by: UROLOGY

## 2025-02-20 NOTE — TELEPHONE ENCOUNTER
----- Message from Nurse Napier sent at 2/20/2025 11:40 AM CST -----  Regarding: FW: Change Surgery Date  Contact: JOEY CH [[id number]]    ----- Message -----  From: Gisela Bolanos  Sent: 2/20/2025   8:30 AM CST  To: Juan MALONEY Staff  Subject: Change Surgery Date                              CONSULT/ADVISORYName of Caller: JOEY CH [[id number]] Contact Preference: JOEY CH [[id number]]Nature of Call:  Pt is calling to say he wants his surgery procedure date change to ASAP.  Please call pt to discuss date change.  Thank you

## 2025-02-20 NOTE — TELEPHONE ENCOUNTER
2/25/25  Cysto  Right ureteroscopy, holmium laser litho, stent placement, possible RPG  Right renal stone  General  Time 2 hours  Urine culture prior.    Put on Monday 2/24/25.

## 2025-02-21 ENCOUNTER — PATIENT MESSAGE (OUTPATIENT)
Dept: UROLOGY | Facility: CLINIC | Age: 45
End: 2025-02-21
Payer: COMMERCIAL

## 2025-02-21 ENCOUNTER — TELEPHONE (OUTPATIENT)
Dept: UROLOGY | Facility: CLINIC | Age: 45
End: 2025-02-21
Payer: COMMERCIAL

## 2025-02-21 NOTE — PRE-PROCEDURE INSTRUCTIONS
PreOp Instructions given:   - Verbal medication information (what to hold and what to take)   - NPO guidelines 2400  - Arrival place directions given; time to be given the day before procedure by the   Surgeon's Office 1000 [facility]  - Bathing with antibacterial soap   - Don't wear any jewelry or bring any valuables AM of surgery   - No makeup or moisturizer to face   - No perfume/cologne, powder, lotions or aftershave   Pt. verbalized understanding.   Pt denies any h/o Anesthesia/Sedation complications or side effects.

## 2025-02-21 NOTE — TELEPHONE ENCOUNTER
Spoke with patient with arrival time for surgery on Monday 1000am, reveiwed preop instructions also sent thru portal.

## 2025-02-22 ENCOUNTER — HOSPITAL ENCOUNTER (EMERGENCY)
Facility: HOSPITAL | Age: 45
Discharge: HOME | End: 2025-02-22
Attending: EMERGENCY MEDICINE
Payer: COMMERCIAL

## 2025-02-22 VITALS
WEIGHT: 300 LBS | BODY MASS INDEX: 42.95 KG/M2 | TEMPERATURE: 98 F | HEART RATE: 65 BPM | RESPIRATION RATE: 19 BRPM | HEIGHT: 70 IN | SYSTOLIC BLOOD PRESSURE: 132 MMHG | DIASTOLIC BLOOD PRESSURE: 72 MMHG | OXYGEN SATURATION: 99 %

## 2025-02-22 DIAGNOSIS — N20.0 NEPHROLITHIASIS: Primary | ICD-10-CM

## 2025-02-22 LAB
ALBUMIN SERPL BCP-MCNC: 3.3 G/DL (ref 3.5–5.2)
ALP SERPL-CCNC: 77 U/L (ref 40–150)
ALT SERPL W/O P-5'-P-CCNC: 16 U/L (ref 10–44)
ANION GAP SERPL CALC-SCNC: 8 MMOL/L (ref 8–16)
AST SERPL-CCNC: 23 U/L (ref 10–40)
BACTERIA UR CULT: NORMAL
BACTERIA UR CULT: NORMAL
BASOPHILS # BLD AUTO: 0.05 K/UL (ref 0–0.2)
BASOPHILS NFR BLD: 0.7 % (ref 0–1.9)
BILIRUB SERPL-MCNC: 0.4 MG/DL (ref 0.1–1)
BILIRUB UR QL STRIP: NEGATIVE
BUN SERPL-MCNC: 21 MG/DL (ref 6–20)
CALCIUM SERPL-MCNC: 8.3 MG/DL (ref 8.7–10.5)
CHLORIDE SERPL-SCNC: 109 MMOL/L (ref 95–110)
CLARITY UR REFRACT.AUTO: CLEAR
CO2 SERPL-SCNC: 21 MMOL/L (ref 23–29)
COLOR UR AUTO: YELLOW
CREAT SERPL-MCNC: 1.2 MG/DL (ref 0.5–1.4)
DIFFERENTIAL METHOD BLD: ABNORMAL
EOSINOPHIL # BLD AUTO: 0.2 K/UL (ref 0–0.5)
EOSINOPHIL NFR BLD: 2.6 % (ref 0–8)
ERYTHROCYTE [DISTWIDTH] IN BLOOD BY AUTOMATED COUNT: 13.1 % (ref 11.5–14.5)
EST. GFR  (NO RACE VARIABLE): >60 ML/MIN/1.73 M^2
GLUCOSE SERPL-MCNC: 82 MG/DL (ref 70–110)
GLUCOSE UR QL STRIP: NEGATIVE
HCT VFR BLD AUTO: 41.1 % (ref 40–54)
HGB BLD-MCNC: 13.7 G/DL (ref 14–18)
HGB UR QL STRIP: ABNORMAL
IMM GRANULOCYTES # BLD AUTO: 0.02 K/UL (ref 0–0.04)
IMM GRANULOCYTES NFR BLD AUTO: 0.3 % (ref 0–0.5)
KETONES UR QL STRIP: NEGATIVE
LEUKOCYTE ESTERASE UR QL STRIP: NEGATIVE
LYMPHOCYTES # BLD AUTO: 1.7 K/UL (ref 1–4.8)
LYMPHOCYTES NFR BLD: 24.5 % (ref 18–48)
MCH RBC QN AUTO: 30.6 PG (ref 27–31)
MCHC RBC AUTO-ENTMCNC: 33.3 G/DL (ref 32–36)
MCV RBC AUTO: 92 FL (ref 82–98)
MONOCYTES # BLD AUTO: 0.6 K/UL (ref 0.3–1)
MONOCYTES NFR BLD: 9.3 % (ref 4–15)
NEUTROPHILS # BLD AUTO: 4.3 K/UL (ref 1.8–7.7)
NEUTROPHILS NFR BLD: 62.6 % (ref 38–73)
NITRITE UR QL STRIP: NEGATIVE
NRBC BLD-RTO: 0 /100 WBC
PH UR STRIP: 6 [PH] (ref 5–8)
PLATELET # BLD AUTO: 182 K/UL (ref 150–450)
PMV BLD AUTO: 11.3 FL (ref 9.2–12.9)
POTASSIUM SERPL-SCNC: 4.3 MMOL/L (ref 3.5–5.1)
PROT SERPL-MCNC: 6.6 G/DL (ref 6–8.4)
PROT UR QL STRIP: NEGATIVE
RBC # BLD AUTO: 4.47 M/UL (ref 4.6–6.2)
SODIUM SERPL-SCNC: 138 MMOL/L (ref 136–145)
SP GR UR STRIP: 1.02 (ref 1–1.03)
URN SPEC COLLECT METH UR: ABNORMAL
WBC # BLD AUTO: 6.89 K/UL (ref 3.9–12.7)

## 2025-02-22 PROCEDURE — 96361 HYDRATE IV INFUSION ADD-ON: CPT

## 2025-02-22 PROCEDURE — 25000003 PHARM REV CODE 250: Performed by: EMERGENCY MEDICINE

## 2025-02-22 PROCEDURE — 99284 EMERGENCY DEPT VISIT MOD MDM: CPT | Mod: 25

## 2025-02-22 PROCEDURE — 63600175 PHARM REV CODE 636 W HCPCS: Mod: JZ,TB

## 2025-02-22 PROCEDURE — 96374 THER/PROPH/DIAG INJ IV PUSH: CPT

## 2025-02-22 PROCEDURE — 80053 COMPREHEN METABOLIC PANEL: CPT

## 2025-02-22 PROCEDURE — 85025 COMPLETE CBC W/AUTO DIFF WBC: CPT

## 2025-02-22 PROCEDURE — 25000003 PHARM REV CODE 250

## 2025-02-22 PROCEDURE — 96376 TX/PRO/DX INJ SAME DRUG ADON: CPT

## 2025-02-22 PROCEDURE — 81003 URINALYSIS AUTO W/O SCOPE: CPT

## 2025-02-22 PROCEDURE — 96375 TX/PRO/DX INJ NEW DRUG ADDON: CPT

## 2025-02-22 RX ORDER — TAMSULOSIN HYDROCHLORIDE 0.4 MG/1
0.4 CAPSULE ORAL DAILY
Qty: 7 CAPSULE | Refills: 0 | Status: SHIPPED | OUTPATIENT
Start: 2025-02-22 | End: 2025-03-01

## 2025-02-22 RX ORDER — HYDROMORPHONE HYDROCHLORIDE 1 MG/ML
0.5 INJECTION, SOLUTION INTRAMUSCULAR; INTRAVENOUS; SUBCUTANEOUS
Refills: 0 | Status: COMPLETED | OUTPATIENT
Start: 2025-02-22 | End: 2025-02-22

## 2025-02-22 RX ORDER — ONDANSETRON 4 MG/1
4 TABLET, FILM COATED ORAL EVERY 6 HOURS PRN
Qty: 20 TABLET | Refills: 0 | Status: SHIPPED | OUTPATIENT
Start: 2025-02-22 | End: 2025-03-17

## 2025-02-22 RX ORDER — TAMSULOSIN HYDROCHLORIDE 0.4 MG/1
0.4 CAPSULE ORAL DAILY
Qty: 10 CAPSULE | Refills: 0 | Status: CANCELLED | OUTPATIENT
Start: 2025-02-22 | End: 2026-02-22

## 2025-02-22 RX ORDER — KETOROLAC TROMETHAMINE 10 MG/1
10 TABLET, FILM COATED ORAL EVERY 6 HOURS PRN
Qty: 12 TABLET | Refills: 0 | Status: SHIPPED | OUTPATIENT
Start: 2025-02-22 | End: 2025-02-22 | Stop reason: SINTOL

## 2025-02-22 RX ORDER — OXYCODONE HYDROCHLORIDE 5 MG/1
5 TABLET ORAL EVERY 4 HOURS PRN
Refills: 0 | Status: CANCELLED | OUTPATIENT
Start: 2025-02-22

## 2025-02-22 RX ORDER — OXYCODONE AND ACETAMINOPHEN 10; 325 MG/1; MG/1
1 TABLET ORAL EVERY 4 HOURS PRN
Qty: 12 TABLET | Refills: 0 | Status: SHIPPED | OUTPATIENT
Start: 2025-02-22

## 2025-02-22 RX ORDER — ACETAMINOPHEN 500 MG
1000 TABLET ORAL
Status: COMPLETED | OUTPATIENT
Start: 2025-02-22 | End: 2025-02-22

## 2025-02-22 RX ORDER — ONDANSETRON HYDROCHLORIDE 2 MG/ML
4 INJECTION, SOLUTION INTRAVENOUS EVERY 6 HOURS PRN
Status: DISCONTINUED | OUTPATIENT
Start: 2025-02-22 | End: 2025-02-22 | Stop reason: HOSPADM

## 2025-02-22 RX ORDER — KETOROLAC TROMETHAMINE 30 MG/ML
10 INJECTION, SOLUTION INTRAMUSCULAR; INTRAVENOUS
Status: COMPLETED | OUTPATIENT
Start: 2025-02-22 | End: 2025-02-22

## 2025-02-22 RX ORDER — ONDANSETRON 4 MG/1
4 TABLET, FILM COATED ORAL EVERY 6 HOURS PRN
Qty: 20 TABLET | Refills: 0 | Status: SHIPPED | OUTPATIENT
Start: 2025-02-22 | End: 2025-02-22

## 2025-02-22 RX ADMIN — SODIUM CHLORIDE 1000 ML: 9 INJECTION, SOLUTION INTRAVENOUS at 08:02

## 2025-02-22 RX ADMIN — ONDANSETRON 4 MG: 2 INJECTION INTRAMUSCULAR; INTRAVENOUS at 09:02

## 2025-02-22 RX ADMIN — KETOROLAC TROMETHAMINE 10 MG: 30 INJECTION, SOLUTION INTRAMUSCULAR; INTRAVENOUS at 08:02

## 2025-02-22 RX ADMIN — HYDROMORPHONE HYDROCHLORIDE 0.5 MG: 1 INJECTION, SOLUTION INTRAMUSCULAR; INTRAVENOUS; SUBCUTANEOUS at 12:02

## 2025-02-22 RX ADMIN — ACETAMINOPHEN 1000 MG: 500 TABLET ORAL at 08:02

## 2025-02-22 RX ADMIN — HYDROMORPHONE HYDROCHLORIDE 0.5 MG: 1 INJECTION, SOLUTION INTRAMUSCULAR; INTRAVENOUS; SUBCUTANEOUS at 09:02

## 2025-02-22 NOTE — ED PROVIDER NOTES
Source of History:  Patient and chart review    Chief complaint:  Nephrolithiasis (Scheduled for lithotripsy on Monday. Endorses nausea and vomiting since yesterday afternoon, & dysuria. + R side flank pain.)    HPI:  Surjit Valentine is a 44 y.o. male with a past medical history of bilateral nephrolithiasis, GAYLE, and obesity who presents to the emergency department with a chief complaint of uncontrolled right-sided flank pain with associated persistent nausea/vomiting since 1pm yesterday. Patient was seen in the ED 2/18/2025 at which time he was diagnosed with a 12mm non-obstructing calculus at the R UPJ and discharged with opioid analgesia.  Patient reports taking provided analgesia with minimal relief and presents the ED now secondary to intractable pain. Patient is scheduled for an outpatient lithotripsy for 2/24/25. Denies recent fevers/chills, hematuria, shortness of breath, and chest pain    Review of patient's allergies indicates:  No Known Allergies    Medications Ordered Prior to Encounter[1]    PMH:  As per HPI and below:  Past Medical History:   Diagnosis Date    Acute hypoxemic respiratory failure due to COVID-19 12/02/2020    Anemia 06/20/2024    Bilateral nephrolithiasis 10/06/2019    CT Renal 1-  Increase in size and number of bilateral nephrolithiasis without evidence of lower tract stone or obstruction.  Diverticulosis without evidence of diverticulitis.      BMI 60.0-69.9, adult 08/01/2018    Chondromalacia, right knee 11/20/2023    COVID-19 12/02/2020    Decreased range of motion (ROM) of right knee 11/20/2023    Elevated blood pressure reading in office without diagnosis of hypertension 06/20/2024    GERD without esophagitis     takes OTC nexium PRN    Hay fever 06/20/2024    History of kidney stones     Hyperglycemia 06/20/2024    Impaired functional mobility, balance, gait, and endurance 11/20/2023    Kidney stones 10/06/2019    Morbid obesity with body mass index of 60.0-69.9 in  adult     GAYLE (obstructive sleep apnea)     did not tolerate CPAP    Panic disorder 06/20/2024    Quadriceps weakness 11/20/2023    Seizure 06/20/2024    Ureterolithiasis 10/06/2019     Past Surgical History:   Procedure Laterality Date    APPENDECTOMY  3/2008    ARTHROSCOPIC CHONDROPLASTY OF KNEE JOINT Right 11/17/2023    Procedure: ARTHROSCOPY, KNEE, WITH CHONDROPLASTY;  Surgeon: Rogers Martinez MD;  Location: [facility] OR 2ND FLR;  Service: Orthopedics;  Laterality: Right;    CYSTOSCOPY  10/11/2019    Procedure: CYSTOSCOPY;  Surgeon: Gamal Sosa MD;  Location: [facility] OR 1ST FLR;  Service: Urology;;    CYSTOSCOPY W/ URETERAL STENT PLACEMENT Left 10/6/2019    Procedure: CYSTOSCOPY, WITH URETERAL STENT INSERTION;  Surgeon: Gamal Sosa MD;  Location: [facility] OR 1ST FLR;  Service: Urology;  Laterality: Left;    ESOPHAGOGASTRODUODENOSCOPY N/A 7/3/2023    Procedure: EGD (ESOPHAGOGASTRODUODENOSCOPY);  Surgeon: Tay Stephen MD;  Location: [facility] OR 2ND FLR;  Service: General;  Laterality: N/A;    KNEE ARTHROSCOPY W/ MENISCECTOMY Right 11/17/2023    Procedure: ARTHROSCOPY, KNEE, WITH MENISCECTOMY;  Surgeon: Rogers Martinez MD;  Location: [facility] OR 2ND FLR;  Service: Orthopedics;  Laterality: Right;  regional without catheter    LASER LITHOTRIPSY Left 10/11/2019    Procedure: LITHOTRIPSY, USING LASER;  Surgeon: aGmal Sosa MD;  Location: [facility] OR 1ST FLR;  Service: Urology;  Laterality: Left;    RETROGRADE PYELOGRAPHY Left 10/6/2019    Procedure: PYELOGRAM, RETROGRADE;  Surgeon: Gamal Sosa MD;  Location: [facility] OR 1ST FLR;  Service: Urology;  Laterality: Left;    RETROGRADE PYELOGRAPHY Left 10/11/2019    Procedure: PYELOGRAM, RETROGRADE;  Surgeon: Gamal Sosa MD;  Location: [facility] OR 1ST FLR;  Service: Urology;  Laterality: Left;    URETEROSCOPIC REMOVAL OF URETERIC CALCULUS  10/11/2019    Procedure: REMOVAL, CALCULUS, URETER, URETEROSCOPIC;  Surgeon: Gamal Sosa MD;  Location:  "[facility] OR 1ST FLR;  Service: Urology;;    URETEROSCOPY Left 10/6/2019    Procedure: URETEROSCOPY;  Surgeon: Gamal Sosa MD;  [facility];  Service: Urology;  Laterality: Left;    URETEROSCOPY Left 10/11/2019    Procedure: URETEROSCOPY;  Surgeon: Gamal Sosa MD;  [facility];  Service: Urology;  Laterality: Left;  1hr    XI ROBOTIC GASTROENTEROSTOMY, TODD-EN-Y N/A 7/3/2023    Procedure: XI ROBOTIC GASTROENTEROSTOMY, TODD-EN-Y (PT is SELF PAY, DO NOT submit to insurance;  Surgeon: Tay Stephen MD;  Location: [facility] OR 2ND FLR;  Service: General;  Laterality: N/A;       Social History     Socioeconomic History    Marital status: Single   Tobacco Use    Smoking status: Never     Passive exposure: Never    Smokeless tobacco: Current     Types: Chew    Tobacco comments:     2 cans/ week   Substance and Sexual Activity    Alcohol use: Not Currently     Alcohol/week: 3.0 standard drinks of alcohol     Types: 3 Cans of beer per week     Comment: "socially but not usually"    Drug use: No    Sexual activity: Yes     Partners: Female   Social History Narrative    Works full time: [employer] : [employer]  .  1 son, 14 years old.      Social Drivers of Health     Financial Resource Strain: Low Risk  (7/18/2024)    Overall Financial Resource Strain (CARDIA)     Difficulty of Paying Living Expenses: Not hard at all   Food Insecurity: No Food Insecurity (7/18/2024)    Hunger Vital Sign     Worried About Running Out of Food in the Last Year: Never true     Ran Out of Food in the Last Year: Never true   Transportation Needs: No Transportation Needs (7/4/2023)    PRAPARE - Transportation     Lack of Transportation (Medical): No     Lack of Transportation (Non-Medical): No   Physical Activity: Sufficiently Active (7/18/2024)    Exercise Vital Sign     Days of Exercise per Week: 5 days     Minutes of Exercise per Session: 40 min   Stress: Stress " Concern Present (7/18/2024)    [employer] - Occupational Stress Questionnaire     Feeling of Stress : Very much   Housing Stability: Low Risk  (7/4/2023)    Housing Stability Vital Sign     Unable to Pay for Housing in the Last Year: No     Number of Places Lived in the Last Year: 1     Unstable Housing in the Last Year: No       Family History   Problem Relation Name Age of Onset    Heart disease Father      Stroke Father      Hypertension Father      Diabetes Maternal Grandmother      Heart disease Maternal Grandfather      Stroke Paternal Grandmother      Heart disease Paternal Grandfather      Diabetes Mother      Obesity Mother      Obesity Sister      Diabetes Brother      Obesity Brother         Physical Exam:      Vitals:    02/22/25 1439   BP: 132/72   Pulse: 65   Resp: 19   Temp: 98 °F (36.7 °C)     Physical Exam    Gen: No acute distress, uncomfortable appearing  Mental Status:  Alert and oriented, answering questions appropriately  Skin: Warm, dry. No rashes seen.  Eyes: No conjunctival injection.  Pulm: No increased work of breathing.  No significant tachypnea.  No conversational dyspnea.    CV: Regular rate  Abd: Soft. Not distended. Nontender to palpation. No guarding or rebound. No CVAT  MSK: No deformities. WWP.  Neuro: Awake. Speech normal. No focal neuro deficit observed.    Procedures  Laboratory Studies:  Labs Reviewed   CBC W/ AUTO DIFFERENTIAL - Abnormal       Result Value    WBC 6.89      RBC 4.47 (*)     Hemoglobin 13.7 (*)     Hematocrit 41.1      MCV 92      MCH 30.6      MCHC 33.3      RDW 13.1      Platelets 182      MPV 11.3      Immature Granulocytes 0.3      Gran # (ANC) 4.3      Immature Grans (Abs) 0.02      Lymph # 1.7      Mono # 0.6      Eos # 0.2      Baso # 0.05      nRBC 0      Gran % 62.6      Lymph % 24.5      Mono % 9.3      Eosinophil % 2.6      Basophil % 0.7      Differential Method Automated     URINALYSIS, REFLEX TO URINE CULTURE -  Abnormal    Specimen UA Urine, Clean Catch      Color, UA Yellow      Appearance, UA Clear      pH, UA 6.0      Specific Gravity, UA 1.025      Protein, UA Negative      Glucose, UA Negative      Ketones, UA Negative      Bilirubin (UA) Negative      Occult Blood UA Trace (*)     Nitrite, UA Negative      Leukocytes, UA Negative      Narrative:     Specimen Source->Urine   COMPREHENSIVE METABOLIC PANEL - Abnormal    Sodium 138      Potassium 4.3      Chloride 109      CO2 21 (*)     Glucose 82      BUN 21 (*)     Creatinine 1.2      Calcium 8.3 (*)     Total Protein 6.6      Albumin 3.3 (*)     Total Bilirubin 0.4      Alkaline Phosphatase 77      AST 23      ALT 16      eGFR >60.0      Anion Gap 8         Imaging Results    None         Medications Given:  Medications   ketorolac injection 9.999 mg (9.999 mg Intravenous Given 2/22/25 0816)   acetaminophen tablet 1,000 mg (1,000 mg Oral Given 2/22/25 0816)   sodium chloride 0.9% bolus 1,000 mL 1,000 mL (0 mLs Intravenous Stopped 2/22/25 0924)   HYDROmorphone injection 0.5 mg (0.5 mg Intravenous Given 2/22/25 0917)   HYDROmorphone injection 0.5 mg (0.5 mg Intravenous Given 2/22/25 1252)       ED Course as of 02/24/25 1529   Sat Feb 22, 2025   1040 Labs reassuring, baseline renal function [AC]      ED Course User Index  [AC] Warner Muro MD     Discussed with attending physician Dr. Chavira    MDM:    Surjit Valentine is a 44 y.o. male with above medical history who presents with intractable right-sided flank pain.   Patient is afebrile, hemodynamically stable, and in no acute distress on arrival. Exam significant for bilateral flank pain that is unchanged with palpable. Benign cardiac, pulmonary, and abdominal exam.     Differential diagnoses considered include, but not limited to: non-obstructive vs obstructive nephrolithiasis, intractable pain, ANU    Labs significant for no leukocytosis, creatinine/BUN within normal range, in no and infection on  urine analysis.  Patient offered admission however reports feeling comfortable going home with increase analgesia.  Analgesia discussed.  Toradol and associated NSAID use discouraged given history of Pradeep-en-Y.  Risks, benefits, and alternatives of opioid analgesia discussed.  Return precautions discussed patient acknowledged understanding.  Low suspicion for infectious etiology given reassuring labs and absence of systemic symptoms        Medical Decision Making  Amount and/or Complexity of Data Reviewed  Labs: ordered.    Risk  OTC drugs.  Prescription drug management.           Diagnostic Impression:    1. Nephrolithiasis         ED Disposition Condition    Discharge Stable          ED Prescriptions       Medication Sig Dispense Start Date End Date Auth. Provider    ondansetron (ZOFRAN) 4 MG tablet  (Status: Discontinued) Take 1 tablet (4 mg total) by mouth every 6 (six) hours as needed for Nausea. 20 tablet 2/22/2025 2/22/2025 Warner Muro MD    ketorolac (TORADOL) 10 mg tablet  (Status: Discontinued) Take 1 tablet (10 mg total) by mouth every 6 (six) hours as needed for Pain. 12 tablet 2/22/2025 2/22/2025 Warner Muro MD    oxyCODONE-acetaminophen (PERCOCET)  mg per tablet Take 1 tablet by mouth every 4 (four) hours as needed for Pain. 12 tablet 2/22/2025 -- Barbra Johnson MD    tamsulosin (FLOMAX) 0.4 mg Cap Take 1 capsule (0.4 mg total) by mouth once daily. for 7 days 7 capsule 2/22/2025 3/1/2025 Barbra Johnson MD    ondansetron (ZOFRAN) 4 MG tablet Take 1 tablet (4 mg total) by mouth every 6 (six) hours as needed for Nausea. 20 tablet 2/22/2025 3/17/2025 Barbra Johnson MD          Follow-up Information       Follow up With Specialties Details Why Contact Info    Sourav Fabian MD Internal Medicine   [address]  [place] [address]  [phone]      [facility] - Emergency Dept Emergency Medicine  If symptoms worsen [address] [facility]  [address]  [phone]            Patient and/or family understands the plan and is in agreement, verbalized understanding, questions answered    Warner Muro MD  Resident  Emergency Medicine        Attending Attestation:   Physician Attestation Statement for Resident:  As the supervising MD   Physician Attestation Statement: I have personally seen and examined this patient.   I agree with the above history.  -:   As the supervising MD I agree with the above PE.     As the supervising MD I agree with the above treatment, course, plan, and disposition.                                         Warner Muro MD  Resident  02/22/25 0605         [1]   Current Facility-Administered Medications on File Prior to Encounter   Medication Dose Route Frequency Provider Last Rate Last Admin    0.9% NaCl infusion   Intravenous Continuous Song Solitario MD 10 mL/hr at 02/24/25 1052 New Bag at 02/24/25 1052    ceFAZolin 2 g  2 g Intravenous On Call Procedure Song Solitario MD        HYDROmorphone injection 0.2 mg  0.2 mg Intravenous Q5 Min PRN Jalil Yoon Jr., MD   0.2 mg at 02/24/25 1252     Current Outpatient Medications on File Prior to Encounter   Medication Sig Dispense Refill    acetaminophen (TYLENOL) 650 MG TbSR Take 1 tablet (650 mg total) by mouth every 8 (eight) hours. 30 tablet 0    albuterol (PROVENTIL/VENTOLIN HFA) 90 mcg/actuation inhaler Inhale 1-2 puffs into the lungs every 6 (six) hours as needed for Shortness of Breath. Rescue 6.7 g 0    ALPRAZolam (XANAX) 0.5 MG tablet Take 1 tablet (0.5 mg total) by mouth 3 (three) times daily as needed for Anxiety. 30 tablet 0    azelastine (ASTELIN) 137 mcg (0.1 %) nasal spray 1 spray (137 mcg total) by Nasal route 2 (two) times daily. 30 mL 0    busPIRone (BUSPAR) 10 MG tablet Take 1 tablet (10 mg total) by mouth 3 (three) times daily. (Patient not taking: Reported on 2/21/2025) 90 tablet 11    celecoxib (CELEBREX) 200 MG capsule Take 1  capsule (200 mg total) by mouth 2 (two) times daily. 60 capsule 1    diphenhydrAMINE-aluminum-magnesium hydroxide-simethicone-LIDOcaine viscous HCl 2% Swish and spit 15 mLs every 4 (four) hours as needed (sore throat). (Patient not taking: Reported on 2/21/2025) 120 each 0    EScitalopram oxalate (LEXAPRO) 20 MG tablet Take 1 tablet (20 mg total) by mouth once daily. 90 tablet 3    hydrocortisone 2.5 % cream Apply topically 2 (two) times daily.      ketoconazole (NIZORAL) 2 % cream Apply topically 2 (two) times daily. (Patient not taking: Reported on 2/21/2025)      ketorolac (TORADOL) 10 mg tablet Take 1 tablet (10 mg total) by mouth every 6 (six) hours as needed for Pain. 12 tablet 0    levocetirizine (XYZAL) 5 MG tablet Take 1 tablet (5 mg total) by mouth every evening. (Patient not taking: Reported on 2/21/2025) 30 tablet 11    ondansetron (ZOFRAN-ODT) 4 MG TbDL Take 1 tablet (4 mg total) by mouth 2 (two) times daily. 12 tablet 0    oxybutynin (DITROPAN) 5 MG Tab Take 1 tablet (5 mg total) by mouth 3 (three) times daily. for 7 days 21 tablet 0    phenazopyridine (PYRIDIUM) 100 MG tablet Take 1 tablet (100 mg total) by mouth 3 (three) times daily as needed for Pain. 21 tablet 0    sulfamethoxazole-trimethoprim 800-160mg (BACTRIM DS) 800-160 mg Tab Take 1 tablet by mouth 2 (two) times daily. for 14 days 28 tablet 0    tamsulosin (FLOMAX) 0.4 mg Cap Take 1 capsule (0.4 mg total) by mouth every evening. for 30 doses 30 capsule 0        Gisela Chavira MD  02/24/25 7110

## 2025-02-22 NOTE — H&P (VIEW-ONLY)
[facility]  Urology  Consult Note    Patient Name: Surjit Valentine  MRN: [id number]  Admission Date: 2/22/2025  Hospital Length of Stay: 0   Code Status: Prior   Attending Provider: Gisela Chavira MD   Consulting Provider: Jared Griffiths DO  Primary Care Physician: Sourav Fabian MD  Principal Problem:<principal problem not specified>    Inpatient consult to Urology  Consult performed by: Jared Griffiths DO  Consult ordered by: Gisela Chavira MD  Reason for consult: UPJ stone and pain          Subjective:     HPI:  Surjit Valentine is a 44 y.o. male with a past medical history of nephrolithiasis, GAYLE, and obesity who presents to the emergency department with a chief complaint of right-sided flank pain, with associate nausea and vomiting. Previous seen on 2/18/25 for 12 mm right UPJ stone and discharged on MET. Urology consulted for ureteral stone and pain.    On assessment the pateint is AFVSS. Complains of right flank pain, nausea and vomiting due to pain. Denies fevers, chills.     WBC 6.89, Hg 13.7. Cr 1.2 consistent with baseline of 0.7-1.2. UA nitrite negative with trace blood.     CT renal stone study demonstrated 1.2 cm right UPJ stone as well as a large nonobstructing left lower pole stone.         Past Medical History:   Diagnosis Date    Acute hypoxemic respiratory failure due to COVID-19 12/02/2020    Anemia 06/20/2024    Bilateral nephrolithiasis 10/06/2019    CT Renal 1-  Increase in size and number of bilateral nephrolithiasis without evidence of lower tract stone or obstruction.  Diverticulosis without evidence of diverticulitis.      BMI 60.0-69.9, adult 08/01/2018    Chondromalacia, right knee 11/20/2023    COVID-19 12/02/2020    Decreased range of motion (ROM) of right knee 11/20/2023    Elevated blood pressure reading in office without diagnosis of hypertension 06/20/2024    GERD without esophagitis     takes OTC nexium PRN    Hay fever 06/20/2024    History of kidney  stones     Hyperglycemia 06/20/2024    Impaired functional mobility, balance, gait, and endurance 11/20/2023    Kidney stones 10/06/2019    Morbid obesity with body mass index of 60.0-69.9 in adult     GAYLE (obstructive sleep apnea)     did not tolerate CPAP    Panic disorder 06/20/2024    Quadriceps weakness 11/20/2023    Seizure 06/20/2024    Ureterolithiasis 10/06/2019       Past Surgical History:   Procedure Laterality Date    APPENDECTOMY  3/2008    ARTHROSCOPIC CHONDROPLASTY OF KNEE JOINT Right 11/17/2023    Procedure: ARTHROSCOPY, KNEE, WITH CHONDROPLASTY;  Surgeon: Rogers Martinez MD;  Location: [facility] OR 2ND FLR;  Service: Orthopedics;  Laterality: Right;    CYSTOSCOPY  10/11/2019    Procedure: CYSTOSCOPY;  Surgeon: Gamal Ssoa MD;  Location: [facility] OR 1ST FLR;  Service: Urology;;    CYSTOSCOPY W/ URETERAL STENT PLACEMENT Left 10/6/2019    Procedure: CYSTOSCOPY, WITH URETERAL STENT INSERTION;  Surgeon: Gamal Sosa MD;  Location: [facility] OR 1ST FLR;  Service: Urology;  Laterality: Left;    ESOPHAGOGASTRODUODENOSCOPY N/A 7/3/2023    Procedure: EGD (ESOPHAGOGASTRODUODENOSCOPY);  Surgeon: Tay Stephen MD;  Location: [facility] OR 2ND FLR;  Service: General;  Laterality: N/A;    KNEE ARTHROSCOPY W/ MENISCECTOMY Right 11/17/2023    Procedure: ARTHROSCOPY, KNEE, WITH MENISCECTOMY;  Surgeon: Rogers Martinez MD;  Location: [facility] OR 2ND FLR;  Service: Orthopedics;  Laterality: Right;  regional without catheter    LASER LITHOTRIPSY Left 10/11/2019    Procedure: LITHOTRIPSY, USING LASER;  Surgeon: Gamal Sosa MD;  Location: [facility] OR 1ST FLR;  Service: Urology;  Laterality: Left;    RETROGRADE PYELOGRAPHY Left 10/6/2019    Procedure: PYELOGRAM, RETROGRADE;  Surgeon: Gamal Sosa MD;  Location: [facility] OR 1ST FLR;  Service: Urology;  Laterality: Left;    RETROGRADE PYELOGRAPHY Left 10/11/2019    Procedure: PYELOGRAM, RETROGRADE;  Surgeon: Gamal Sosa MD;  [facility];   "Service: Urology;  Laterality: Left;    URETEROSCOPIC REMOVAL OF URETERIC CALCULUS  10/11/2019    Procedure: REMOVAL, CALCULUS, URETER, URETEROSCOPIC;  Surgeon: Gamal Sosa MD;  [facility]  Service: Urology;;    URETEROSCOPY Left 10/6/2019    Procedure: URETEROSCOPY;  Surgeon: Gamal Sosa MD;  [facility];  Service: Urology;  Laterality: Left;    URETEROSCOPY Left 10/11/2019    Procedure: URETEROSCOPY;  Surgeon: Gamal Sosa MD;  [facility];  Service: Urology;  Laterality: Left;  1hr    XI ROBOTIC GASTROENTEROSTOMY, TODD-EN-Y N/A 7/3/2023    Procedure: XI ROBOTIC GASTROENTEROSTOMY, TODD-EN-Y (PT is SELF PAY, DO NOT submit to insurance;  Surgeon: Tay Stephen MD;  Location: [facility] OR [address] [place] [address];  Service: General;  Laterality: N/A;       Review of patient's allergies indicates:  No Known Allergies    Family History       Problem Relation (Age of Onset)    Diabetes Maternal Grandmother, Mother, Brother    Heart disease Father, Maternal Grandfather, Paternal Grandfather    Hypertension Father    Obesity Mother, Sister, Brother    Stroke Father, Paternal Grandmother            Tobacco Use    Smoking status: Never     Passive exposure: Never    Smokeless tobacco: Current     Types: Chew    Tobacco comments:     2 cans/ week   Substance and Sexual Activity    Alcohol use: Not Currently     Alcohol/week: 3.0 standard drinks of alcohol     Types: 3 Cans of beer per week     Comment: "socially but not usually"    Drug use: No    Sexual activity: Yes     Partners: Female       Review of Systems   Constitutional:  Negative for chills and fever.   Gastrointestinal:  Positive for nausea and vomiting.   Genitourinary:  Positive for flank pain. Negative for difficulty urinating.       Objective:     Temp:  [98 °F (36.7 °C)-98.2 °F (36.8 °C)] 98 °F (36.7 °C)  Pulse:  [53-72] 65  Resp:  [18-20] 18  SpO2:  [98 %-99 %] 99 %  BP: (132-181)/(72-86) 132/72  Weight: " "136.1 kg (300 lb)  Body mass index is 43.05 kg/m².    Date 02/22/25 0700 - 02/23/25 0659   Shift 3189-8551 4500-7822 2323-5480 24 Hour Total   INTAKE   IV Piggyback 999   999   Shift Total(mL/kg) 999(7.3)   999(7.3)   OUTPUT   Shift Total(mL/kg)       Weight (kg) 136.1 136.1 136.1 136.1          Drains       None                    Physical Exam  Constitutional:       General: He is not in acute distress.     Appearance: Normal appearance.   HENT:      Head: Normocephalic and atraumatic.   Eyes:      Extraocular Movements: Extraocular movements intact.      Pupils: Pupils are equal, round, and reactive to light.   Cardiovascular:      Rate and Rhythm: Normal rate.   Pulmonary:      Effort: Pulmonary effort is normal. No respiratory distress.   Abdominal:      General: Abdomen is flat. There is no distension.      Tenderness: There is no abdominal tenderness. There is right CVA tenderness. There is no left CVA tenderness.   Skin:     Coloration: Skin is not jaundiced.   Neurological:      General: No focal deficit present.      Mental Status: He is alert and oriented to person, place, and time.   Psychiatric:         Mood and Affect: Mood normal.         Behavior: Behavior normal.          Significant Labs:    BMP:  Recent Labs   Lab 02/18/25  0636 02/22/25  0954    138   K 4.0 4.3    109   CO2 21* 21*   BUN 16 21*   CREATININE 1.0 1.2   CALCIUM 8.9 8.3*       CBC:  Recent Labs   Lab 02/18/25  0520 02/18/25  0636 02/22/25  0810   WBC  --  7.73 6.89   HGB  --  13.7* 13.7*   HCT 38 40.1 41.1   PLT  --  201 182       Blood Culture: No results for input(s): "LABBLOO" in the last 168 hours.  CMP:   Recent Labs   Lab 02/18/25  0636 02/22/25  0954   GLU 85 82    138   K 4.0 4.3    109   CO2 21* 21*   BUN 16 21*   CREATININE 1.0 1.2   CALCIUM 8.9 8.3*     Urine Culture:   Recent Labs   Lab 02/20/25  1154   LABURIN Multiple organisms isolated. None in predominance.  Repeat if  clinically necessary. "     Urine Studies:   Recent Labs   Lab 02/18/25  0749 02/22/25  0804   COLORU Yellow Yellow   APPEARANCEUA Clear Clear   PHUR 6.0 6.0   SPECGRAV >1.030* 1.025   PROTEINUA Trace* Negative   GLUCUA Negative Negative   KETONESU Negative Negative   BILIRUBINUA Negative Negative   OCCULTUA 2+* Trace*   NITRITE Negative Negative   LEUKOCYTESUR Negative Negative   RBCUA 23*  --    WBCUA 5  --    BACTERIA Rare  --    SQUAMEPITHEL 1  --        Significant Imaging:  CT: I have reviewed all results within the past 24 hours and my personal findings are:  as noted in HPI                    Assessment and Plan:     Bilateral nephrolithiasis  Surjit Valentine is a 44 y.o. male with a past medical history of nephrolithiasis, GAYLE, and obesity who presents to the emergency department with a chief complaint of right-sided flank pain, with associate nausea and vomiting. Previous seen on 2/18/25 for 12 mm right UPJ stone and discharged on MET. Urology consulted for ureteral stone and pain.    -- Pain improved. Nausea and vomiting controlled.   -- Recommend discharge on Flomax, Toradol, Oxycodone and Zofran  -- Strain all urine  -- Patient given strict return to ED precautions  -- Follow up for ureteroscopy on Monday 2/24  -- Urology to sign off.         VTE Risk Mitigation (From admission, onward)      None            Thank you for your consult. I will sign off. Please contact us if you have any additional questions.    Jared Griffiths, DO  Urology  [facility] - [facility] Dept

## 2025-02-22 NOTE — DISCHARGE INSTRUCTIONS
You can take Tylenol 1000mg every 6 hours, Toradol 10mg every 6 hours, and Oxycodone every 4 hours as needed for pain. Do not exceed 4000mg of Tylenol or 40mg of Toradol in a 24-hour period from all sources. Do not drink alcohol or take sedating medications with Oxycodone. Do not drive or operate heavy machinery while taking Oxycodone. Do not take another NSAID (ibuprofen, naproxen, aspirin etc.) while taking Toradol. Do not take Toradol for more than 5 days.    Return to emergency department if you pain is poorly controlled, or you have associated fever/chills, nausea/vomiting, shortness of breath, or chest pain.

## 2025-02-22 NOTE — ED NOTES
Patient complains of right sided flank pain and nausea with minimal relief with home meds/prescriptions. Known kidney stones. States he is scheduled to have kidney stones removed this Monday.

## 2025-02-22 NOTE — H&P (VIEW-ONLY)
[facility] Emergency Dept  Urology  Consult Note    Patient Name: Surjit Valentine  MRN: [id number]  Admission Date: 2/22/2025  Hospital Length of Stay: 0   Code Status: Prior   Attending Provider: Gisela Chavira MD   Consulting Provider: Jared Griffihts DO  Primary Care Physician: Sourav Fabian MD  Principal Problem:<principal problem not specified>    Inpatient consult to Urology  Consult performed by: Jared Griffiths DO  Consult ordered by: Gisela Chavira MD  Reason for consult: UPJ stone and pain          Subjective:     HPI:  Surjit Valentine is a 44 y.o. male with a past medical history of nephrolithiasis, GAYLE, and obesity who presents to the emergency department with a chief complaint of right-sided flank pain, with associate nausea and vomiting. Previous seen on 2/18/25 for 12 mm right UPJ stone and discharged on MET. Urology consulted for ureteral stone and pain.    On assessment the pateint is AFVSS. Complains of right flank pain, nausea and vomiting due to pain. Denies fevers, chills.     WBC 6.89, Hg 13.7. Cr 1.2 consistent with baseline of 0.7-1.2. UA nitrite negative with trace blood.     CT renal stone study demonstrated 1.2 cm right UPJ stone as well as a large nonobstructing left lower pole stone.         Past Medical History:   Diagnosis Date    Acute hypoxemic respiratory failure due to COVID-19 12/02/2020    Anemia 06/20/2024    Bilateral nephrolithiasis 10/06/2019    CT Renal 1-  Increase in size and number of bilateral nephrolithiasis without evidence of lower tract stone or obstruction.  Diverticulosis without evidence of diverticulitis.      BMI 60.0-69.9, adult 08/01/2018    Chondromalacia, right knee 11/20/2023    COVID-19 12/02/2020    Decreased range of motion (ROM) of right knee 11/20/2023    Elevated blood pressure reading in office without diagnosis of hypertension 06/20/2024    GERD without esophagitis     takes OTC nexium PRN    Hay fever 06/20/2024    History of kidney  stones     Hyperglycemia 06/20/2024    Impaired functional mobility, balance, gait, and endurance 11/20/2023    Kidney stones 10/06/2019    Morbid obesity with body mass index of 60.0-69.9 in adult     GAYLE (obstructive sleep apnea)     did not tolerate CPAP    Panic disorder 06/20/2024    Quadriceps weakness 11/20/2023    Seizure 06/20/2024    Ureterolithiasis 10/06/2019       Past Surgical History:   Procedure Laterality Date    APPENDECTOMY  3/2008    ARTHROSCOPIC CHONDROPLASTY OF KNEE JOINT Right 11/17/2023    Procedure: ARTHROSCOPY, KNEE, WITH CHONDROPLASTY;  Surgeon: Rogers Martinez MD;  Location: [facility] OR 2ND FLR;  Service: Orthopedics;  Laterality: Right;    CYSTOSCOPY  10/11/2019    Procedure: CYSTOSCOPY;  Surgeon: Gamal Sosa MD;  Location: [facility] OR 1ST FLR;  Service: Urology;;    CYSTOSCOPY W/ URETERAL STENT PLACEMENT Left 10/6/2019    Procedure: CYSTOSCOPY, WITH URETERAL STENT INSERTION;  Surgeon: Gamal Sosa MD;  Location: [facility] OR 1ST FLR;  Service: Urology;  Laterality: Left;    ESOPHAGOGASTRODUODENOSCOPY N/A 7/3/2023    Procedure: EGD (ESOPHAGOGASTRODUODENOSCOPY);  Surgeon: Tay Stephen MD;  Location: [facility] OR 2ND FLR;  Service: General;  Laterality: N/A;    KNEE ARTHROSCOPY W/ MENISCECTOMY Right 11/17/2023    Procedure: ARTHROSCOPY, KNEE, WITH MENISCECTOMY;  Surgeon: Rogers Martinez MD;  Location: [facility] OR 2ND FLR;  Service: Orthopedics;  Laterality: Right;  regional without catheter    LASER LITHOTRIPSY Left 10/11/2019    Procedure: LITHOTRIPSY, USING LASER;  Surgeon: Gamal Sosa MD;  Location: [facility] OR 1ST FLR;  Service: Urology;  Laterality: Left;    RETROGRADE PYELOGRAPHY Left 10/6/2019    Procedure: PYELOGRAM, RETROGRADE;  Surgeon: Gamal Sosa MD;  Location: [facility] OR 1ST FLR;  Service: Urology;  Laterality: Left;    RETROGRADE PYELOGRAPHY Left 10/11/2019    Procedure: PYELOGRAM, RETROGRADE;  Surgeon: Gamal Sosa MD;  [facility]   "Service: Urology;  Laterality: Left;    URETEROSCOPIC REMOVAL OF URETERIC CALCULUS  10/11/2019    Procedure: REMOVAL, CALCULUS, URETER, URETEROSCOPIC;  Surgeon: Gamal Sosa MD;  [facility]  Service: Urology;;    URETEROSCOPY Left 10/6/2019    Procedure: URETEROSCOPY;  Surgeon: Gamal Sosa MD;  [facility];  Service: Urology;  Laterality: Left;    URETEROSCOPY Left 10/11/2019    Procedure: URETEROSCOPY;  Surgeon: Gamal Sosa MD;  [facility];  Service: Urology;  Laterality: Left;  1hr    XI ROBOTIC GASTROENTEROSTOMY, TODD-EN-Y N/A 7/3/2023    Procedure: XI ROBOTIC GASTROENTEROSTOMY, TODD-EN-Y (PT is SELF PAY, DO NOT submit to insurance;  Surgeon: Tay Stephen MD;  Location: [facility] OR [address] [place] [address];  Service: General;  Laterality: N/A;       Review of patient's allergies indicates:  No Known Allergies    Family History       Problem Relation (Age of Onset)    Diabetes Maternal Grandmother, Mother, Brother    Heart disease Father, Maternal Grandfather, Paternal Grandfather    Hypertension Father    Obesity Mother, Sister, Brother    Stroke Father, Paternal Grandmother            Tobacco Use    Smoking status: Never     Passive exposure: Never    Smokeless tobacco: Current     Types: Chew    Tobacco comments:     2 cans/ week   Substance and Sexual Activity    Alcohol use: Not Currently     Alcohol/week: 3.0 standard drinks of alcohol     Types: 3 Cans of beer per week     Comment: "socially but not usually"    Drug use: No    Sexual activity: Yes     Partners: Female       Review of Systems   Constitutional:  Negative for chills and fever.   Gastrointestinal:  Positive for nausea and vomiting.   Genitourinary:  Positive for flank pain. Negative for difficulty urinating.       Objective:     Temp:  [98 °F (36.7 °C)-98.2 °F (36.8 °C)] 98 °F (36.7 °C)  Pulse:  [53-72] 65  Resp:  [18-20] 18  SpO2:  [98 %-99 %] 99 %  BP: (132-181)/(72-86) 132/72  Weight: " "136.1 kg (300 lb)  Body mass index is 43.05 kg/m².    Date 02/22/25 0700 - 02/23/25 0659   Shift 1853-0393 8045-2366 2066-6688 24 Hour Total   INTAKE   IV Piggyback 999   999   Shift Total(mL/kg) 999(7.3)   999(7.3)   OUTPUT   Shift Total(mL/kg)       Weight (kg) 136.1 136.1 136.1 136.1          Drains       None                    Physical Exam  Constitutional:       General: He is not in acute distress.     Appearance: Normal appearance.   HENT:      Head: Normocephalic and atraumatic.   Eyes:      Extraocular Movements: Extraocular movements intact.      Pupils: Pupils are equal, round, and reactive to light.   Cardiovascular:      Rate and Rhythm: Normal rate.   Pulmonary:      Effort: Pulmonary effort is normal. No respiratory distress.   Abdominal:      General: Abdomen is flat. There is no distension.      Tenderness: There is no abdominal tenderness. There is right CVA tenderness. There is no left CVA tenderness.   Skin:     Coloration: Skin is not jaundiced.   Neurological:      General: No focal deficit present.      Mental Status: He is alert and oriented to person, place, and time.   Psychiatric:         Mood and Affect: Mood normal.         Behavior: Behavior normal.          Significant Labs:    BMP:  Recent Labs   Lab 02/18/25  0636 02/22/25  0954    138   K 4.0 4.3    109   CO2 21* 21*   BUN 16 21*   CREATININE 1.0 1.2   CALCIUM 8.9 8.3*       CBC:  Recent Labs   Lab 02/18/25  0520 02/18/25  0636 02/22/25  0810   WBC  --  7.73 6.89   HGB  --  13.7* 13.7*   HCT 38 40.1 41.1   PLT  --  201 182       Blood Culture: No results for input(s): "LABBLOO" in the last 168 hours.  CMP:   Recent Labs   Lab 02/18/25  0636 02/22/25  0954   GLU 85 82    138   K 4.0 4.3    109   CO2 21* 21*   BUN 16 21*   CREATININE 1.0 1.2   CALCIUM 8.9 8.3*     Urine Culture:   Recent Labs   Lab 02/20/25  1154   LABURIN Multiple organisms isolated. None in predominance.  Repeat if  clinically necessary. "     Urine Studies:   Recent Labs   Lab 02/18/25  0749 02/22/25  0804   COLORU Yellow Yellow   APPEARANCEUA Clear Clear   PHUR 6.0 6.0   SPECGRAV >1.030* 1.025   PROTEINUA Trace* Negative   GLUCUA Negative Negative   KETONESU Negative Negative   BILIRUBINUA Negative Negative   OCCULTUA 2+* Trace*   NITRITE Negative Negative   LEUKOCYTESUR Negative Negative   RBCUA 23*  --    WBCUA 5  --    BACTERIA Rare  --    SQUAMEPITHEL 1  --        Significant Imaging:  CT: I have reviewed all results within the past 24 hours and my personal findings are:  as noted in HPI                    Assessment and Plan:     Bilateral nephrolithiasis  Surjit Valentine is a 44 y.o. male with a past medical history of nephrolithiasis, GAYLE, and obesity who presents to the emergency department with a chief complaint of right-sided flank pain, with associate nausea and vomiting. Previous seen on 2/18/25 for 12 mm right UPJ stone and discharged on MET. Urology consulted for ureteral stone and pain.    -- Pain improved. Nausea and vomiting controlled.   -- Recommend discharge on Flomax, Toradol, Oxycodone and Zofran  -- Strain all urine  -- Patient given strict return to ED precautions  -- Follow up for ureteroscopy on Monday 2/24  -- Urology to sign off.         VTE Risk Mitigation (From admission, onward)      None            Thank you for your consult. I will sign off. Please contact us if you have any additional questions.    Jared Griffiths, DO  Urology  [facility]

## 2025-02-22 NOTE — SUBJECTIVE & OBJECTIVE
Past Medical History:   Diagnosis Date    Acute hypoxemic respiratory failure due to COVID-19 12/02/2020    Anemia 06/20/2024    Bilateral nephrolithiasis 10/06/2019    CT Renal 1-  Increase in size and number of bilateral nephrolithiasis without evidence of lower tract stone or obstruction.  Diverticulosis without evidence of diverticulitis.      BMI 60.0-69.9, adult 08/01/2018    Chondromalacia, right knee 11/20/2023    COVID-19 12/02/2020    Decreased range of motion (ROM) of right knee 11/20/2023    Elevated blood pressure reading in office without diagnosis of hypertension 06/20/2024    GERD without esophagitis     takes OTC nexium PRN    Hay fever 06/20/2024    History of kidney stones     Hyperglycemia 06/20/2024    Impaired functional mobility, balance, gait, and endurance 11/20/2023    Kidney stones 10/06/2019    Morbid obesity with body mass index of 60.0-69.9 in adult     GAYLE (obstructive sleep apnea)     did not tolerate CPAP    Panic disorder 06/20/2024    Quadriceps weakness 11/20/2023    Seizure 06/20/2024    Ureterolithiasis 10/06/2019       Past Surgical History:   Procedure Laterality Date    APPENDECTOMY  3/2008    ARTHROSCOPIC CHONDROPLASTY OF KNEE JOINT Right 11/17/2023    Procedure: ARTHROSCOPY, KNEE, WITH CHONDROPLASTY;  Surgeon: Rogers Martinez MD;  Location: [facility] OR [address] [place] [address];  Service: Orthopedics;  Laterality: Right;    CYSTOSCOPY  10/11/2019    Procedure: CYSTOSCOPY;  Surgeon: Gamal Sosa MD;  Location: [facility] OR [address] [place] [address];  Service: Urology;;    CYSTOSCOPY W/ URETERAL STENT PLACEMENT Left 10/6/2019    Procedure: CYSTOSCOPY, WITH URETERAL STENT INSERTION;  Surgeon: Gamal Sosa MD;  Location: [facility] OR [address] [place] [address];  Service: Urology;  Laterality: Left;    ESOPHAGOGASTRODUODENOSCOPY N/A 7/3/2023    Procedure: EGD (ESOPHAGOGASTRODUODENOSCOPY);  Surgeon: Tay Stephen MD;  Location: [facility] OR [address] [place] [address];  Service: General;  Laterality: N/A;    KNEE ARTHROSCOPY W/  MENISCECTOMY Right 11/17/2023    Procedure: ARTHROSCOPY, KNEE, WITH MENISCECTOMY;  Surgeon: Rogers Martinez MD;  Location: [facility] OR 2ND FLR;  Service: Orthopedics;  Laterality: Right;  regional without catheter    LASER LITHOTRIPSY Left 10/11/2019    Procedure: LITHOTRIPSY, USING LASER;  Surgeon: Gamal Sosa MD;  Location: [facility] OR 1ST FLR;  Service: Urology;  Laterality: Left;    RETROGRADE PYELOGRAPHY Left 10/6/2019    Procedure: PYELOGRAM, RETROGRADE;  Surgeon: Gamal Sosa MD;  Location: [facility] OR 1ST FLR;  Service: Urology;  Laterality: Left;    RETROGRADE PYELOGRAPHY Left 10/11/2019    Procedure: PYELOGRAM, RETROGRADE;  Surgeon: Gamal Sosa MD;  Location: [facility] OR 1ST FLR;  Service: Urology;  Laterality: Left;    URETEROSCOPIC REMOVAL OF URETERIC CALCULUS  10/11/2019    Procedure: REMOVAL, CALCULUS, URETER, URETEROSCOPIC;  Surgeon: Gamal Sosa MD;  [facility];  Service: Urology;;    URETEROSCOPY Left 10/6/2019    Procedure: URETEROSCOPY;  Surgeon: Gamal Sosa MD;  [facility];  Service: Urology;  Laterality: Left;    URETEROSCOPY Left 10/11/2019    Procedure: URETEROSCOPY;  Surgeon: Gamal Sosa MD;  [facility];  Service: Urology;  Laterality: Left;  1hr    XI ROBOTIC GASTROENTEROSTOMY, TODD-EN-Y N/A 7/3/2023    Procedure: XI ROBOTIC GASTROENTEROSTOMY, TODD-EN-Y (PT is SELF PAY, DO NOT submit to insurance;  Surgeon: Tay Stephen MD;  Location: [facility] OR 2ND FLR;  Service: General;  Laterality: N/A;       Review of patient's allergies indicates:  No Known Allergies    Family History       Problem Relation (Age of Onset)    Diabetes Maternal Grandmother, Mother, Brother    Heart disease Father, Maternal Grandfather, Paternal Grandfather    Hypertension Father    Obesity Mother, Sister, Brother    Stroke Father, Paternal Grandmother            Tobacco Use    Smoking status: Never     Passive exposure: Never    Smokeless  "tobacco: Current     Types: Chew    Tobacco comments:     2 cans/ week   Substance and Sexual Activity    Alcohol use: Not Currently     Alcohol/week: 3.0 standard drinks of alcohol     Types: 3 Cans of beer per week     Comment: "socially but not usually"    Drug use: No    Sexual activity: Yes     Partners: Female       Review of Systems   Constitutional:  Negative for chills and fever.   Gastrointestinal:  Positive for nausea and vomiting.   Genitourinary:  Positive for flank pain. Negative for difficulty urinating.       Objective:     Temp:  [98 °F (36.7 °C)-98.2 °F (36.8 °C)] 98 °F (36.7 °C)  Pulse:  [53-72] 65  Resp:  [18-20] 18  SpO2:  [98 %-99 %] 99 %  BP: (132-181)/(72-86) 132/72  Weight: 136.1 kg (300 lb)  Body mass index is 43.05 kg/m².    Date 02/22/25 0700 - 02/23/25 0659   Shift 6401-6823 2031-1368 9613-1491 24 Hour Total   INTAKE   IV Piggyback 999   999   Shift Total(mL/kg) 999(7.3)   999(7.3)   OUTPUT   Shift Total(mL/kg)       Weight (kg) 136.1 136.1 136.1 136.1          Drains       None                    Physical Exam  Constitutional:       General: He is not in acute distress.     Appearance: Normal appearance.   HENT:      Head: Normocephalic and atraumatic.   Eyes:      Extraocular Movements: Extraocular movements intact.      Pupils: Pupils are equal, round, and reactive to light.   Cardiovascular:      Rate and Rhythm: Normal rate.   Pulmonary:      Effort: Pulmonary effort is normal. No respiratory distress.   Abdominal:      General: Abdomen is flat. There is no distension.      Tenderness: There is no abdominal tenderness. There is right CVA tenderness. There is no left CVA tenderness.   Skin:     Coloration: Skin is not jaundiced.   Neurological:      General: No focal deficit present.      Mental Status: He is alert and oriented to person, place, and time.   Psychiatric:         Mood and Affect: Mood normal.         Behavior: Behavior normal.          Significant Labs:    BMP:  Recent " "Labs   Lab 02/18/25  0636 02/22/25  0954    138   K 4.0 4.3    109   CO2 21* 21*   BUN 16 21*   CREATININE 1.0 1.2   CALCIUM 8.9 8.3*       CBC:  Recent Labs   Lab 02/18/25  0520 02/18/25  0636 02/22/25  0810   WBC  --  7.73 6.89   HGB  --  13.7* 13.7*   HCT 38 40.1 41.1   PLT  --  201 182       Blood Culture: No results for input(s): "LABBLOO" in the last 168 hours.  CMP:   Recent Labs   Lab 02/18/25  0636 02/22/25  0954   GLU 85 82    138   K 4.0 4.3    109   CO2 21* 21*   BUN 16 21*   CREATININE 1.0 1.2   CALCIUM 8.9 8.3*     Urine Culture:   Recent Labs   Lab 02/20/25  1154   LABURIN Multiple organisms isolated. None in predominance.  Repeat if  clinically necessary.     Urine Studies:   Recent Labs   Lab 02/18/25  0749 02/22/25  0804   COLORU Yellow Yellow   APPEARANCEUA Clear Clear   PHUR 6.0 6.0   SPECGRAV >1.030* 1.025   PROTEINUA Trace* Negative   GLUCUA Negative Negative   KETONESU Negative Negative   BILIRUBINUA Negative Negative   OCCULTUA 2+* Trace*   NITRITE Negative Negative   LEUKOCYTESUR Negative Negative   RBCUA 23*  --    WBCUA 5  --    BACTERIA Rare  --    SQUAMEPITHEL 1  --        Significant Imaging:  CT: I have reviewed all results within the past 24 hours and my personal findings are:  as noted in HPI                  "

## 2025-02-22 NOTE — CONSULTS
[facility] - Emergency Dept  Urology  Consult Note    Patient Name: Surjit Valentine  MRN: [id number]  Admission Date: 2/22/2025  Hospital Length of Stay: 0   Code Status: Prior   Attending Provider: Gisela Chavira MD   Consulting Provider: Jared Griffiths DO  Primary Care Physician: Sourav Fabian MD  Principal Problem:<principal problem not specified>    Inpatient consult to Urology  Consult performed by: Jared Griffiths DO  Consult ordered by: Gisela Chavira MD  Reason for consult: UPJ stone and pain          Subjective:     HPI:  Surjit Valentine is a 44 y.o. male with a past medical history of nephrolithiasis, GAYLE, and obesity who presents to the emergency department with a chief complaint of right-sided flank pain, with associate nausea and vomiting. Previous seen on 2/18/25 for 12 mm right UPJ stone and discharged on MET. Urology consulted for ureteral stone and pain.    On assessment the pateint is AFVSS. Complains of right flank pain, nausea and vomiting due to pain. Denies fevers, chills.     WBC 6.89, Hg 13.7. Cr 1.2 consistent with baseline of 0.7-1.2. UA nitrite negative with trace blood.     CT renal stone study demonstrated 1.2 cm right UPJ stone as well as a large nonobstructing left lower pole stone.         Past Medical History:   Diagnosis Date    Acute hypoxemic respiratory failure due to COVID-19 12/02/2020    Anemia 06/20/2024    Bilateral nephrolithiasis 10/06/2019    CT Renal 1-  Increase in size and number of bilateral nephrolithiasis without evidence of lower tract stone or obstruction.  Diverticulosis without evidence of diverticulitis.      BMI 60.0-69.9, adult 08/01/2018    Chondromalacia, right knee 11/20/2023    COVID-19 12/02/2020    Decreased range of motion (ROM) of right knee 11/20/2023    Elevated blood pressure reading in office without diagnosis of hypertension 06/20/2024    GERD without esophagitis     takes OTC nexium PRN    Hay fever 06/20/2024    History of kidney  stones     Hyperglycemia 06/20/2024    Impaired functional mobility, balance, gait, and endurance 11/20/2023    Kidney stones 10/06/2019    Morbid obesity with body mass index of 60.0-69.9 in adult     GAYLE (obstructive sleep apnea)     did not tolerate CPAP    Panic disorder 06/20/2024    Quadriceps weakness 11/20/2023    Seizure 06/20/2024    Ureterolithiasis 10/06/2019       Past Surgical History:   Procedure Laterality Date    APPENDECTOMY  3/2008    ARTHROSCOPIC CHONDROPLASTY OF KNEE JOINT Right 11/17/2023    Procedure: ARTHROSCOPY, KNEE, WITH CHONDROPLASTY;  Surgeon: Rogers Martinez MD;  Location: [facility] OR 2ND FLR;  Service: Orthopedics;  Laterality: Right;    CYSTOSCOPY  10/11/2019    Procedure: CYSTOSCOPY;  Surgeon: Gamal Sosa MD;  Location: [facility] OR 1ST FLR;  Service: Urology;;    CYSTOSCOPY W/ URETERAL STENT PLACEMENT Left 10/6/2019    Procedure: CYSTOSCOPY, WITH URETERAL STENT INSERTION;  Surgeon: Gamal Sosa MD;  Location: [facility] OR 1ST FLR;  Service: Urology;  Laterality: Left;    ESOPHAGOGASTRODUODENOSCOPY N/A 7/3/2023    Procedure: EGD (ESOPHAGOGASTRODUODENOSCOPY);  Surgeon: Tay Stephen MD;  Location: [facility] OR 2ND FLR;  Service: General;  Laterality: N/A;    KNEE ARTHROSCOPY W/ MENISCECTOMY Right 11/17/2023    Procedure: ARTHROSCOPY, KNEE, WITH MENISCECTOMY;  Surgeon: Rogers Martinez MD;  Location: [facility] OR 2ND FLR;  Service: Orthopedics;  Laterality: Right;  regional without catheter    LASER LITHOTRIPSY Left 10/11/2019    Procedure: LITHOTRIPSY, USING LASER;  Surgeon: Gamal Sosa MD;  Location: [facility] OR 1ST FLR;  Service: Urology;  Laterality: Left;    RETROGRADE PYELOGRAPHY Left 10/6/2019    Procedure: PYELOGRAM, RETROGRADE;  Surgeon: Gamal Sosa MD;  Location: [facility] OR 1ST FLR;  Service: Urology;  Laterality: Left;    RETROGRADE PYELOGRAPHY Left 10/11/2019    Procedure: PYELOGRAM, RETROGRADE;  Surgeon: Gamal Sosa MD;  [facility];   "Service: Urology;  Laterality: Left;    URETEROSCOPIC REMOVAL OF URETERIC CALCULUS  10/11/2019    Procedure: REMOVAL, CALCULUS, URETER, URETEROSCOPIC;  Surgeon: Gamal Sosa MD;  [facility];  Service: Urology;;    URETEROSCOPY Left 10/6/2019    Procedure: URETEROSCOPY;  Surgeon: Gamal Sosa MD;  [facility];  Service: Urology;  Laterality: Left;    URETEROSCOPY Left 10/11/2019    Procedure: URETEROSCOPY;  Surgeon: Gamal Sosa MD;  [facility];  Service: Urology;  Laterality: Left;  1hr    XI ROBOTIC GASTROENTEROSTOMY, TODD-EN-Y N/A 7/3/2023    Procedure: XI ROBOTIC GASTROENTEROSTOMY, TODD-EN-Y (PT is SELF PAY, DO NOT submit to insurance;  Surgeon: Tay Stephen MD;  Location: [facility] OR [address] [place] [address];  Service: General;  Laterality: N/A;       Review of patient's allergies indicates:  No Known Allergies    Family History       Problem Relation (Age of Onset)    Diabetes Maternal Grandmother, Mother, Brother    Heart disease Father, Maternal Grandfather, Paternal Grandfather    Hypertension Father    Obesity Mother, Sister, Brother    Stroke Father, Paternal Grandmother            Tobacco Use    Smoking status: Never     Passive exposure: Never    Smokeless tobacco: Current     Types: Chew    Tobacco comments:     2 cans/ week   Substance and Sexual Activity    Alcohol use: Not Currently     Alcohol/week: 3.0 standard drinks of alcohol     Types: 3 Cans of beer per week     Comment: "socially but not usually"    Drug use: No    Sexual activity: Yes     Partners: Female       Review of Systems   Constitutional:  Negative for chills and fever.   Gastrointestinal:  Positive for nausea and vomiting.   Genitourinary:  Positive for flank pain. Negative for difficulty urinating.       Objective:     Temp:  [98 °F (36.7 °C)-98.2 °F (36.8 °C)] 98 °F (36.7 °C)  Pulse:  [53-72] 65  Resp:  [18-20] 18  SpO2:  [98 %-99 %] 99 %  BP: (132-181)/(72-86) 132/72  Weight: " "136.1 kg (300 lb)  Body mass index is 43.05 kg/m².    Date 02/22/25 0700 - 02/23/25 0659   Shift 4645-7723 1947-6976 8026-1691 24 Hour Total   INTAKE   IV Piggyback 999   999   Shift Total(mL/kg) 999(7.3)   999(7.3)   OUTPUT   Shift Total(mL/kg)       Weight (kg) 136.1 136.1 136.1 136.1          Drains       None                    Physical Exam  Constitutional:       General: He is not in acute distress.     Appearance: Normal appearance.   HENT:      Head: Normocephalic and atraumatic.   Eyes:      Extraocular Movements: Extraocular movements intact.      Pupils: Pupils are equal, round, and reactive to light.   Cardiovascular:      Rate and Rhythm: Normal rate.   Pulmonary:      Effort: Pulmonary effort is normal. No respiratory distress.   Abdominal:      General: Abdomen is flat. There is no distension.      Tenderness: There is no abdominal tenderness. There is right CVA tenderness. There is no left CVA tenderness.   Skin:     Coloration: Skin is not jaundiced.   Neurological:      General: No focal deficit present.      Mental Status: He is alert and oriented to person, place, and time.   Psychiatric:         Mood and Affect: Mood normal.         Behavior: Behavior normal.          Significant Labs:    BMP:  Recent Labs   Lab 02/18/25  0636 02/22/25  0954    138   K 4.0 4.3    109   CO2 21* 21*   BUN 16 21*   CREATININE 1.0 1.2   CALCIUM 8.9 8.3*       CBC:  Recent Labs   Lab 02/18/25  0520 02/18/25  0636 02/22/25  0810   WBC  --  7.73 6.89   HGB  --  13.7* 13.7*   HCT 38 40.1 41.1   PLT  --  201 182       Blood Culture: No results for input(s): "LABBLOO" in the last 168 hours.  CMP:   Recent Labs   Lab 02/18/25  0636 02/22/25  0954   GLU 85 82    138   K 4.0 4.3    109   CO2 21* 21*   BUN 16 21*   CREATININE 1.0 1.2   CALCIUM 8.9 8.3*     Urine Culture:   Recent Labs   Lab 02/20/25  1154   LABURIN Multiple organisms isolated. None in predominance.  Repeat if  clinically necessary. "     Urine Studies:   Recent Labs   Lab 02/18/25  0749 02/22/25  0804   COLORU Yellow Yellow   APPEARANCEUA Clear Clear   PHUR 6.0 6.0   SPECGRAV >1.030* 1.025   PROTEINUA Trace* Negative   GLUCUA Negative Negative   KETONESU Negative Negative   BILIRUBINUA Negative Negative   OCCULTUA 2+* Trace*   NITRITE Negative Negative   LEUKOCYTESUR Negative Negative   RBCUA 23*  --    WBCUA 5  --    BACTERIA Rare  --    SQUAMEPITHEL 1  --        Significant Imaging:  CT: I have reviewed all results within the past 24 hours and my personal findings are:  as noted in HPI                    Assessment and Plan:     Bilateral nephrolithiasis  Surjit Valentine is a 44 y.o. male with a past medical history of nephrolithiasis, GAYLE, and obesity who presents to the emergency department with a chief complaint of right-sided flank pain, with associate nausea and vomiting. Previous seen on 2/18/25 for 12 mm right UPJ stone and discharged on MET. Urology consulted for ureteral stone and pain.    -- Pain improved. Nausea and vomiting controlled.   -- Recommend discharge on Flomax, Toradol, Oxycodone and Zofran  -- Strain all urine  -- Patient given strict return to ED precautions  -- Follow up for ureteroscopy on Monday 2/24  -- Urology to sign off.         VTE Risk Mitigation (From admission, onward)      None            Thank you for your consult. I will sign off. Please contact us if you have any additional questions.    Jared Griffiths, DO  Urology  [facility] - Emergency Dept

## 2025-02-22 NOTE — HPI
Surjit Valentine is a 44 y.o. male with a past medical history of nephrolithiasis, GAYLE, and obesity who presents to the emergency department with a chief complaint of right-sided flank pain, with associate nausea and vomiting. Previous seen on 2/18/25 for 12 mm right UPJ stone and discharged on MET. Urology consulted for ureteral stone and pain.    On assessment the pateint is AFVSS. Complains of right flank pain, nausea and vomiting due to pain. Denies fevers, chills.     WBC 6.89, Hg 13.7. Cr 1.2 consistent with baseline of 0.7-1.2. UA nitrite negative with trace blood.     CT renal stone study demonstrated 1.2 cm right UPJ stone as well as a large nonobstructing left lower pole stone.

## 2025-02-22 NOTE — ASSESSMENT & PLAN NOTE
Surjit Valentine is a 44 y.o. male with a past medical history of nephrolithiasis, GAYLE, and obesity who presents to the emergency department with a chief complaint of right-sided flank pain, with associate nausea and vomiting. Previous seen on 2/18/25 for 12 mm right UPJ stone and discharged on MET. Urology consulted for ureteral stone and pain.    -- Pain improved. Nausea and vomiting controlled.   -- Recommend discharge on Flomax, Toradol, Oxycodone and Zofran  -- Strain all urine  -- Patient given strict return to ED precautions  -- Follow up for ureteroscopy on Monday 2/24  -- Urology to sign off.

## 2025-02-24 ENCOUNTER — ANESTHESIA (OUTPATIENT)
Dept: SURGERY | Facility: HOSPITAL | Age: 45
End: 2025-02-24
Payer: COMMERCIAL

## 2025-02-24 ENCOUNTER — HOSPITAL ENCOUNTER (OUTPATIENT)
Facility: HOSPITAL | Age: 45
Discharge: HOME | End: 2025-02-24
Attending: UROLOGY | Admitting: UROLOGY
Payer: COMMERCIAL

## 2025-02-24 ENCOUNTER — PATIENT MESSAGE (OUTPATIENT)
Dept: UROLOGY | Facility: CLINIC | Age: 45
End: 2025-02-24

## 2025-02-24 ENCOUNTER — ANESTHESIA EVENT (OUTPATIENT)
Dept: SURGERY | Facility: HOSPITAL | Age: 45
End: 2025-02-24
Payer: COMMERCIAL

## 2025-02-24 VITALS
HEIGHT: 70 IN | DIASTOLIC BLOOD PRESSURE: 79 MMHG | OXYGEN SATURATION: 95 % | HEART RATE: 53 BPM | WEIGHT: 315 LBS | TEMPERATURE: 98 F | RESPIRATION RATE: 18 BRPM | SYSTOLIC BLOOD PRESSURE: 129 MMHG | BODY MASS INDEX: 45.1 KG/M2

## 2025-02-24 DIAGNOSIS — N20.0 BILATERAL RENAL STONES: Primary | ICD-10-CM

## 2025-02-24 DIAGNOSIS — N20.0 NEPHROLITHIASIS: Primary | ICD-10-CM

## 2025-02-24 PROCEDURE — C2617 STENT, NON-COR, TEM W/O DEL: HCPCS | Performed by: UROLOGY

## 2025-02-24 PROCEDURE — 52332 CYSTOSCOPY AND TREATMENT: CPT | Mod: 50,51,, | Performed by: UROLOGY

## 2025-02-24 PROCEDURE — 37000009 HC ANESTHESIA EA ADD 15 MINS: Performed by: UROLOGY

## 2025-02-24 PROCEDURE — 37000008 HC ANESTHESIA 1ST 15 MINUTES: Performed by: UROLOGY

## 2025-02-24 PROCEDURE — 63600175 PHARM REV CODE 636 W HCPCS: Mod: JZ,TB | Performed by: ANESTHESIOLOGY

## 2025-02-24 PROCEDURE — 25000003 PHARM REV CODE 250: Performed by: NURSE ANESTHETIST, CERTIFIED REGISTERED

## 2025-02-24 PROCEDURE — 74420 UROGRAPHY RTRGR +-KUB: CPT | Mod: 26,,, | Performed by: UROLOGY

## 2025-02-24 PROCEDURE — D9220A PRA ANESTHESIA: Mod: CRNA,,, | Performed by: NURSE ANESTHETIST, CERTIFIED REGISTERED

## 2025-02-24 PROCEDURE — 36000706: Performed by: UROLOGY

## 2025-02-24 PROCEDURE — 52351 CYSTOURETERO & OR PYELOSCOPE: CPT | Mod: ,,, | Performed by: UROLOGY

## 2025-02-24 PROCEDURE — 27201423 OPTIME MED/SURG SUP & DEVICES STERILE SUPPLY: Performed by: UROLOGY

## 2025-02-24 PROCEDURE — 25000003 PHARM REV CODE 250

## 2025-02-24 PROCEDURE — 36000707: Performed by: UROLOGY

## 2025-02-24 PROCEDURE — 71000044 HC DOSC ROUTINE RECOVERY FIRST HOUR: Performed by: UROLOGY

## 2025-02-24 PROCEDURE — 71000015 HC POSTOP RECOV 1ST HR: Performed by: UROLOGY

## 2025-02-24 PROCEDURE — C1769 GUIDE WIRE: HCPCS | Performed by: UROLOGY

## 2025-02-24 PROCEDURE — D9220A PRA ANESTHESIA: Mod: ANES,,, | Performed by: ANESTHESIOLOGY

## 2025-02-24 PROCEDURE — 63600175 PHARM REV CODE 636 W HCPCS: Performed by: NURSE ANESTHETIST, CERTIFIED REGISTERED

## 2025-02-24 PROCEDURE — 87086 URINE CULTURE/COLONY COUNT: CPT | Performed by: UROLOGY

## 2025-02-24 PROCEDURE — C1894 INTRO/SHEATH, NON-LASER: HCPCS | Performed by: UROLOGY

## 2025-02-24 DEVICE — STENT URETERAL UNIV 6FR 26CM: Type: IMPLANTABLE DEVICE | Site: URETER | Status: FUNCTIONAL

## 2025-02-24 DEVICE — STENT URETERAL UNIV 6FR 28CM: Type: IMPLANTABLE DEVICE | Site: URETER | Status: FUNCTIONAL

## 2025-02-24 RX ORDER — SODIUM CHLORIDE 9 MG/ML
INJECTION, SOLUTION INTRAVENOUS CONTINUOUS
Status: DISCONTINUED | OUTPATIENT
Start: 2025-02-24 | End: 2025-02-24 | Stop reason: HOSPADM

## 2025-02-24 RX ORDER — KETOROLAC TROMETHAMINE 30 MG/ML
INJECTION, SOLUTION INTRAMUSCULAR; INTRAVENOUS
Status: DISCONTINUED | OUTPATIENT
Start: 2025-02-24 | End: 2025-02-24

## 2025-02-24 RX ORDER — CEFAZOLIN 2 G/1
2 INJECTION, POWDER, FOR SOLUTION INTRAMUSCULAR; INTRAVENOUS
Status: DISCONTINUED | OUTPATIENT
Start: 2025-02-24 | End: 2025-02-24 | Stop reason: HOSPADM

## 2025-02-24 RX ORDER — ONDANSETRON HYDROCHLORIDE 2 MG/ML
INJECTION, SOLUTION INTRAVENOUS
Status: DISCONTINUED | OUTPATIENT
Start: 2025-02-24 | End: 2025-02-24

## 2025-02-24 RX ORDER — SULFAMETHOXAZOLE AND TRIMETHOPRIM 800; 160 MG/1; MG/1
1 TABLET ORAL 2 TIMES DAILY
Qty: 28 TABLET | Refills: 0 | Status: SHIPPED | OUTPATIENT
Start: 2025-02-24 | End: 2025-03-10

## 2025-02-24 RX ORDER — HYDROMORPHONE HYDROCHLORIDE 1 MG/ML
0.2 INJECTION, SOLUTION INTRAMUSCULAR; INTRAVENOUS; SUBCUTANEOUS EVERY 5 MIN PRN
Refills: 0 | Status: DISCONTINUED | OUTPATIENT
Start: 2025-02-24 | End: 2025-02-24 | Stop reason: HOSPADM

## 2025-02-24 RX ORDER — FENTANYL CITRATE 50 UG/ML
INJECTION, SOLUTION INTRAMUSCULAR; INTRAVENOUS
Status: DISCONTINUED | OUTPATIENT
Start: 2025-02-24 | End: 2025-02-24

## 2025-02-24 RX ORDER — PROPOFOL 10 MG/ML
VIAL (ML) INTRAVENOUS
Status: DISCONTINUED | OUTPATIENT
Start: 2025-02-24 | End: 2025-02-24

## 2025-02-24 RX ORDER — DEXAMETHASONE SODIUM PHOSPHATE 4 MG/ML
INJECTION, SOLUTION INTRA-ARTICULAR; INTRALESIONAL; INTRAMUSCULAR; INTRAVENOUS; SOFT TISSUE
Status: DISCONTINUED | OUTPATIENT
Start: 2025-02-24 | End: 2025-02-24

## 2025-02-24 RX ORDER — ROCURONIUM BROMIDE 10 MG/ML
INJECTION, SOLUTION INTRAVENOUS
Status: DISCONTINUED | OUTPATIENT
Start: 2025-02-24 | End: 2025-02-24

## 2025-02-24 RX ORDER — MIDAZOLAM HYDROCHLORIDE 1 MG/ML
INJECTION INTRAMUSCULAR; INTRAVENOUS
Status: DISCONTINUED | OUTPATIENT
Start: 2025-02-24 | End: 2025-02-24

## 2025-02-24 RX ORDER — LIDOCAINE HYDROCHLORIDE 20 MG/ML
INJECTION INTRAVENOUS
Status: DISCONTINUED | OUTPATIENT
Start: 2025-02-24 | End: 2025-02-24

## 2025-02-24 RX ORDER — TAMSULOSIN HYDROCHLORIDE 0.4 MG/1
0.4 CAPSULE ORAL NIGHTLY
Qty: 30 CAPSULE | Refills: 0 | Status: SHIPPED | OUTPATIENT
Start: 2025-02-24 | End: 2025-03-26

## 2025-02-24 RX ORDER — OXYBUTYNIN CHLORIDE 5 MG/1
5 TABLET ORAL 3 TIMES DAILY
Qty: 21 TABLET | Refills: 0 | Status: SHIPPED | OUTPATIENT
Start: 2025-02-24 | End: 2025-03-03

## 2025-02-24 RX ORDER — KETOROLAC TROMETHAMINE 10 MG/1
10 TABLET, FILM COATED ORAL EVERY 6 HOURS PRN
Qty: 12 TABLET | Refills: 0 | Status: SHIPPED | OUTPATIENT
Start: 2025-02-24

## 2025-02-24 RX ORDER — PHENAZOPYRIDINE HYDROCHLORIDE 100 MG/1
100 TABLET, FILM COATED ORAL 3 TIMES DAILY PRN
Qty: 21 TABLET | Refills: 0 | Status: SHIPPED | OUTPATIENT
Start: 2025-02-24 | End: 2025-03-03

## 2025-02-24 RX ORDER — CEFAZOLIN SODIUM 1 G/3ML
INJECTION, POWDER, FOR SOLUTION INTRAMUSCULAR; INTRAVENOUS
Status: DISCONTINUED | OUTPATIENT
Start: 2025-02-24 | End: 2025-02-24

## 2025-02-24 RX ADMIN — ROCURONIUM BROMIDE 50 MG: 10 INJECTION, SOLUTION INTRAVENOUS at 11:02

## 2025-02-24 RX ADMIN — KETOROLAC TROMETHAMINE 30 MG: 30 INJECTION, SOLUTION INTRAMUSCULAR; INTRAVENOUS at 12:02

## 2025-02-24 RX ADMIN — ONDANSETRON 4 MG: 2 INJECTION INTRAMUSCULAR; INTRAVENOUS at 12:02

## 2025-02-24 RX ADMIN — FENTANYL CITRATE 100 MCG: 50 INJECTION, SOLUTION INTRAMUSCULAR; INTRAVENOUS at 11:02

## 2025-02-24 RX ADMIN — DEXAMETHASONE SODIUM PHOSPHATE 4 MG: 4 INJECTION, SOLUTION INTRAMUSCULAR; INTRAVENOUS at 11:02

## 2025-02-24 RX ADMIN — MIDAZOLAM HYDROCHLORIDE 2 MG: 2 INJECTION, SOLUTION INTRAMUSCULAR; INTRAVENOUS at 11:02

## 2025-02-24 RX ADMIN — SUGAMMADEX 400 MG: 100 INJECTION, SOLUTION INTRAVENOUS at 12:02

## 2025-02-24 RX ADMIN — SODIUM CHLORIDE: 9 INJECTION, SOLUTION INTRAVENOUS at 10:02

## 2025-02-24 RX ADMIN — SODIUM CHLORIDE: 0.9 INJECTION, SOLUTION INTRAVENOUS at 11:02

## 2025-02-24 RX ADMIN — HYDROMORPHONE HYDROCHLORIDE 0.2 MG: 1 INJECTION, SOLUTION INTRAMUSCULAR; INTRAVENOUS; SUBCUTANEOUS at 12:02

## 2025-02-24 RX ADMIN — CEFAZOLIN 3 G: 330 INJECTION, POWDER, FOR SOLUTION INTRAMUSCULAR; INTRAVENOUS at 11:02

## 2025-02-24 RX ADMIN — PROPOFOL 250 MG: 10 INJECTION, EMULSION INTRAVENOUS at 11:02

## 2025-02-24 RX ADMIN — LIDOCAINE HYDROCHLORIDE 100 MG: 20 INJECTION INTRAVENOUS at 11:02

## 2025-02-24 NOTE — DISCHARGE INSTRUCTIONS
Postoperative Instructions for Stone Surgery    1. Ureteral Stent:     - You have a ureteral stent in place. You will be contacted for a separate office appointment to have this stent removed. It is typically removed in a short office-based procedure by placing a small camera into the bladder and grasping the stent to remove it. This procedure typically lasts 1-2 minutes.    2. Hydration and Fluid Intake:     - It is essential to drink plenty of fluids following the ureteroscopy procedure to promote flushing of the urinary system and prevent dehydration.     - Aim to drink at least 8 to 10 glasses of water or other non-caffeinated, non-alcoholic beverages daily, unless instructed otherwise by your healthcare provider.    3. Pain Management:     - You may experience mild to moderate discomfort or pain after the ureteroscopy procedure. This is usually manageable with over-the-counter pain relievers such as acetaminophen or nonsteroidal anti-inflammatory drugs (NSAIDs).     - You may experience mild bladder and flank discomfort especially when voiding due to your ureteral stent. This may be alleviated with medications that were prescribed to you such as oxybutynin and/or pyridium. You may take these as needed for any urinary discomfort while a stent. Please note this discomfort is temporary and should subside once the stent is removed.     - If prescribed pain medication, take it as instructed, following the prescribed dosage and frequency.    4. Urinary Symptoms:     - It is common to experience some urinary symptoms after the procedure, such as urgency, frequency, burning sensation, or blood in the urine (hematuria). These symptoms should gradually improve within a few days.     - If the symptoms worsen or if you notice significant blood clots or inability to urinate, contact your healthcare provider.    5. Activity and Rest:     - It is advisable to take it easy and get plenty of rest for the first few days  following the procedure. Avoid strenuous activities, heavy lifting, or vigorous exercise for 1-2 days.     - Gradually resume normal activities as you feel comfortable, but listen to your body and avoid overexertion.    6. Diet and Nutrition:     - Follow any dietary recommendations provided by your healthcare provider. In general, maintain a balanced diet with adequate fiber intake to prevent constipation.     - Avoid excessive consumption of salt, spicy foods, and caffeinated or carbonated beverages, as these may irritate the urinary system.    7. Follow-up Appointments:     - Attend all scheduled follow-up appointments with your healthcare provider to monitor your recovery and assess the success of the stone removal.     - You will be contacted via phone or the patient portal about any follow up appointments and tests/imaging in about 1-2 weeks.     - Additional imaging or laboratory tests may be ordered to evaluate the effectiveness of the procedure.    8. Signs of Complications:     - Be aware of potential complications and contact your healthcare provider if you experience any of the following: persistent or worsening pain, fever, chills, excessive fatigue, severe bleeding, inability to pass urine, or signs of infection.    9. Medication Compliance:     - If prescribed any medications, such as antibiotics or medications to prevent stone recurrence, take them as instructed by your healthcare provider. Follow the recommended dosage and complete the full course of medication.    If you have any additional questions, call [phone] and ask for your provider. If after hours (night or weekends) ask for urology on call and you will be directed to the appropriate provider.

## 2025-02-24 NOTE — TRANSFER OF CARE
"Anesthesia Transfer of Care Note    Patient: Surjit Valentine    Procedure(s) Performed: Procedure(s) (LRB):  PYELOGRAM, RETROGRADE (Bilateral)  CYSTOSCOPY, WITH URETERAL STENT INSERTION (Bilateral)  URETEROSCOPY (Right)    Patient location: PACU    Anesthesia Type: general    Transport from OR: Transported from OR on 6-10 L/min O2 by face mask with adequate spontaneous ventilation    Post pain: adequate analgesia    Post assessment: no apparent anesthetic complications and tolerated procedure well    Post vital signs: stable    Level of consciousness: awake, alert and oriented    Nausea/Vomiting: no nausea/vomiting    Complications: none    Transfer of care protocol was followed      Last vitals: Visit Vitals  /82 (Patient Position: Lying)   Pulse (!) 58   Temp 36.7 °C (98 °F) (Temporal)   Resp 16   Ht 5' 10" (1.778 m)   Wt (!) 145.5 kg (320 lb 12.3 oz)   SpO2 100%   BMI 46.03 kg/m²     "

## 2025-02-24 NOTE — ANESTHESIA PREPROCEDURE EVALUATION
[facility]  Anesthesia Pre-Operative Evaluation         Patient Name/: Surjit Valentine, 1980  MRN: [id number]    SUBJECTIVE:     Pre-operative evaluation for Procedure(s) (LRB):  CYSTOURETEROSCOPY, WITH HOLMIUM LASER LITHOTRIPSY OF URETERAL CALCULUS AND STENT INSERTION (Bilateral)  PYELOGRAM, RETROGRADE (Bilateral)     2025    Surjit Valentine is a 44 y.o. male     Patient now presents for the above procedure(s).    ________________________________________  No results found for this or any previous visit.    ________________________________________    LDA:        Drips:       Problem List[1]    Review of patient's allergies indicates:  No Known Allergies    Current Inpatient Medications:       Medications Ordered Prior to Encounter[2]    Past Surgical History:   Procedure Laterality Date    APPENDECTOMY  3/2008    ARTHROSCOPIC CHONDROPLASTY OF KNEE JOINT Right 2023    Procedure: ARTHROSCOPY, KNEE, WITH CHONDROPLASTY;  Surgeon: Rogers Martinez MD;  Location: [address];  Service: Orthopedics;  Laterality: Right;    CYSTOSCOPY  10/11/2019    Procedure: CYSTOSCOPY;  Surgeon: Gamal Sosa MD;  Location: [address];  Service: Urology;;    CYSTOSCOPY W/ URETERAL STENT PLACEMENT Left 10/6/2019    Procedure: CYSTOSCOPY, WITH URETERAL STENT INSERTION;  Surgeon: Gamal Sosa MD;  Location: [address];  Service: Urology;  Laterality: Left;    ESOPHAGOGASTRODUODENOSCOPY N/A 7/3/2023    Procedure: EGD (ESOPHAGOGASTRODUODENOSCOPY);  Surgeon: Tay Stephen MD;  Location: [address];  Service: General;  Laterality: N/A;    KNEE ARTHROSCOPY W/ MENISCECTOMY Right 2023    Procedure: ARTHROSCOPY, KNEE, WITH MENISCECTOMY;  Surgeon: Rogers Martinez MD;  Location: [facility] OR 2ND FLR;  Service: Orthopedics;  Laterality: Right;  regional without catheter    LASER LITHOTRIPSY Left 10/11/2019    Procedure: LITHOTRIPSY, USING LASER;  Surgeon:  Gamal Sosa MD;  Location: [facility] OR 1ST FLR;  Service: Urology;  Laterality: Left;    RETROGRADE PYELOGRAPHY Left 10/6/2019    Procedure: PYELOGRAM, RETROGRADE;  Surgeon: Gamal Sosa MD;  Location: [facility] OR 1ST FLR;  Service: Urology;  Laterality: Left;    RETROGRADE PYELOGRAPHY Left 10/11/2019    Procedure: PYELOGRAM, RETROGRADE;  Surgeon: Gamal Sosa MD;  Location: [facility] OR 1ST FLR;  Service: Urology;  Laterality: Left;    URETEROSCOPIC REMOVAL OF URETERIC CALCULUS  10/11/2019    Procedure: REMOVAL, CALCULUS, URETER, URETEROSCOPIC;  Surgeon: Gamal Sosa MD;  Location: [facility] OR 1ST FLR;  Service: Urology;;    URETEROSCOPY Left 10/6/2019    Procedure: URETEROSCOPY;  Surgeon: Gamal Sosa MD;  Location: [facility] OR 1ST FLR;  Service: Urology;  Laterality: Left;    URETEROSCOPY Left 10/11/2019    Procedure: URETEROSCOPY;  Surgeon: Gamal Sosa MD;  [facility];  Service: Urology;  Laterality: Left;  1hr    XI ROBOTIC GASTROENTEROSTOMY, TODD-EN-Y N/A 7/3/2023    Procedure: XI ROBOTIC GASTROENTEROSTOMY, TODD-EN-Y (PT is SELF PAY, DO NOT submit to insurance;  Surgeon: Tay Stephen MD;  Location: [facility] OR 2ND FLR;  Service: General;  Laterality: N/A;       Social History:  Tobacco Use: High Risk (2/22/2025)    Patient History     Smoking Tobacco Use: Never     Smokeless Tobacco Use: Current     Passive Exposure: Never       Alcohol Use: Not At Risk (7/18/2024)    AUDIT-C     Frequency of Alcohol Consumption: 2-4 times a month     Average Number of Drinks: 1 or 2     Frequency of Binge Drinking: Never       OBJECTIVE:     Vital Signs Range:  BMI Readings from Last 1 Encounters:   02/22/25 43.05 kg/m²               Significant Labs:        Component Value Date/Time    WBC 6.89 02/22/2025 0810    HGB 13.7 (L) 02/22/2025 0810    HCT 41.1 02/22/2025 0810    HCT 38 02/18/2025 0520     02/22/2025 0810     02/22/2025 0954    K 4.3 02/22/2025 0954      02/22/2025 0954    CO2 21 (L) 02/22/2025 0954    GLU 82 02/22/2025 0954    BUN 21 (H) 02/22/2025 0954    CREATININE 1.2 02/22/2025 0954    MG 1.8 07/09/2023 1449    PHOS 2.7 07/09/2023 1449    CALCIUM 8.3 (L) 02/22/2025 0954    ALBUMIN 3.3 (L) 02/22/2025 0954    PROT 6.6 02/22/2025 0954    ALKPHOS 77 02/22/2025 0954    BILITOT 0.4 02/22/2025 0954    AST 23 02/22/2025 0954    ALT 16 02/22/2025 0954    INR 1.0 12/03/2020 1322    HGBA1C 5.0 06/24/2024 0703        Please see Results Review for additional labs.     Diagnostic Studies: No relevant studies.    EKG:   Results for orders placed or performed during the hospital encounter of 10/05/24   EKG 12-lead    Collection Time: 10/05/24  3:38 PM   Result Value Ref Range    QRS Duration 84 ms    [facility] QTC Calculation 434 ms    Narrative    Test Reason : R06.02,    Vent. Rate : 071 BPM     Atrial Rate : 071 BPM     P-R Int : 160 ms          QRS Dur : 084 ms      QT Int : 400 ms       P-R-T Axes : 045 -04 037 degrees     QTc Int : 434 ms    Normal sinus rhythm  Low voltage QRS  Borderline Abnormal ECG  When compared with ECG of 23-OCT-2023 23:02,  No significant change was found  Confirmed by Ruslan ABBOTT MD ([id number]) on 10/6/2024 10:09:33 AM    Referred By: [email or web address]   SELF           Confirmed By:Ruslan ABBOTT MD       ECHO:  See subjective, if available.      ASSESSMENT/PLAN:                                                                                                                  02/24/2025  Surjit Valentine is a 44 y.o., male.      Pre-op Assessment          Review of Systems  Pulmonary:        Sleep Apnea                Renal/:  Chronic Renal Disease                Hepatic/GI:     GERD                Musculoskeletal:  Arthritis               Neurological:       Seizures                                Psych:  Psychiatric History                  Physical Exam  General: Cooperative, Alert and Oriented    Airway:  Mallampati: III         Anesthesia Plan  Type of  Anesthesia, risks & benefits discussed:    Anesthesia Type: Gen ETT  Intra-op Monitoring Plan: Standard ASA Monitors  Induction:  IV  Informed Consent: Informed consent signed with the Patient and all parties understand the risks and agree with anesthesia plan.  All questions answered.   ASA Score: 3  Day of Surgery Review of History & Physical: H&P Update referred to the surgeon/provider.    Ready For Surgery From Anesthesia Perspective.     .           [1]   Patient Active Problem List  Diagnosis    GERD without esophagitis    GAYLE (obstructive sleep apnea)    Bilateral nephrolithiasis    Class 3 severe obesity due to excess calories with serious comorbidity and body mass index (BMI) of 45.0 to 49.9 in adult    Panic disorder    History of weight loss surgery   [2]   No current facility-administered medications on file prior to encounter.     Current Outpatient Medications on File Prior to Encounter   Medication Sig Dispense Refill    acetaminophen (TYLENOL) 650 MG TbSR Take 1 tablet (650 mg total) by mouth every 8 (eight) hours. 30 tablet 0    albuterol (PROVENTIL/VENTOLIN HFA) 90 mcg/actuation inhaler Inhale 1-2 puffs into the lungs every 6 (six) hours as needed for Shortness of Breath. Rescue 6.7 g 0    ALPRAZolam (XANAX) 0.5 MG tablet Take 1 tablet (0.5 mg total) by mouth 3 (three) times daily as needed for Anxiety. 30 tablet 0    azelastine (ASTELIN) 137 mcg (0.1 %) nasal spray 1 spray (137 mcg total) by Nasal route 2 (two) times daily. 30 mL 0    busPIRone (BUSPAR) 10 MG tablet Take 1 tablet (10 mg total) by mouth 3 (three) times daily. (Patient not taking: Reported on 2/21/2025) 90 tablet 11    celecoxib (CELEBREX) 200 MG capsule Take 1 capsule (200 mg total) by mouth 2 (two) times daily. 60 capsule 1    diphenhydrAMINE-aluminum-magnesium hydroxide-simethicone-LIDOcaine viscous HCl 2% Swish and spit 15 mLs every 4 (four) hours as needed (sore throat). (Patient not taking: Reported on 2/21/2025) 120 each 0     EScitalopram oxalate (LEXAPRO) 20 MG tablet Take 1 tablet (20 mg total) by mouth once daily. 90 tablet 3    hydrocortisone 2.5 % cream Apply topically 2 (two) times daily.      ketoconazole (NIZORAL) 2 % cream Apply topically 2 (two) times daily. (Patient not taking: Reported on 2/21/2025)      levocetirizine (XYZAL) 5 MG tablet Take 1 tablet (5 mg total) by mouth every evening. (Patient not taking: Reported on 2/21/2025) 30 tablet 11    ondansetron (ZOFRAN-ODT) 4 MG TbDL Take 1 tablet (4 mg total) by mouth 2 (two) times daily. 12 tablet 0

## 2025-02-24 NOTE — INTERVAL H&P NOTE
The patient has been examined and the H&P has been reviewed:    I concur with the findings and no changes have occurred since H&P was written.    Procedure risks, benefits and alternative options discussed and understood by patient/family.      There are no hospital problems to display for this patient.     Alert-The patient is alert, awake and responds to voice. The patient is oriented to time, place, and person. The triage nurse is able to obtain subjective information.

## 2025-02-24 NOTE — ANESTHESIA PROCEDURE NOTES
Intubation    Date/Time: 2/24/2025 11:23 AM    Performed by: Erica Steen CRNA  Authorized by: Skyler Garcia MD    Intubation:     Induction:  Intravenous    Intubated:  Postinduction    Mask Ventilation:  Moderately difficult with oral airway    Attempts:  1    Attempted By:  Student    Method of Intubation:  Video laryngoscopy    Blade:  Hernandez 3    Laryngeal View Grade: Grade I - full view of cords      Difficult Airway Encountered?: No      Complications:  None    Airway Device:  Oral endotracheal tube    Airway Device Size:  7.5    Style/Cuff Inflation:  Cuffed (inflated to minimal occlusive pressure)    Tube secured:  22    Secured at:  The lips    Placement Verified By:  Capnometry    Complicating Factors:  None    Findings Post-Intubation:  BS equal bilateral

## 2025-02-24 NOTE — DISCHARGE SUMMARY
[facility] (1st Fl)  Discharge Note  Short Stay    Procedure(s) (LRB):  PYELOGRAM, RETROGRADE (Bilateral)  CYSTOSCOPY, WITH URETERAL STENT INSERTION (Bilateral)  URETEROSCOPY (Right)      OUTCOME: Patient tolerated treatment/procedure well without complication and is now ready for discharge.    DISPOSITION: Home or Self Care    FINAL DIAGNOSIS:  Right renal stone    FOLLOWUP: In clinic    DISCHARGE INSTRUCTIONS:    Discharge Procedure Orders   No dressing needed     Notify your health care provider if you experience any of the following:  temperature >100.4     Notify your health care provider if you experience any of the following:  persistent nausea and vomiting or diarrhea     Notify your health care provider if you experience any of the following:  severe uncontrolled pain     Notify your health care provider if you experience any of the following:  difficulty breathing or increased cough     Notify your health care provider if you experience any of the following:  severe persistent headache     Notify your health care provider if you experience any of the following:  worsening rash     Notify your health care provider if you experience any of the following:  persistent dizziness, light-headedness, or visual disturbances     Activity as tolerated        TIME SPENT ON DISCHARGE:   15 minutes

## 2025-02-24 NOTE — OP NOTE
[facility] [place]  Operative Note    Date: 02/24/2025    Pre-Op Diagnosis: bilateral renal stones, right UPJ stone    Patient Active Problem List    Diagnosis Date Noted    Panic disorder 06/20/2024    History of weight loss surgery 06/20/2024    Class 3 severe obesity due to excess calories with serious comorbidity and body mass index (BMI) of 45.0 to 49.9 in adult 07/03/2023    Right renal stone 10/06/2019    GERD without esophagitis     GAYLE (obstructive sleep apnea)        Post-Op Diagnosis: same    Procedure(s) Performed:   1. Right ureteroscopy  2. Cystoscopy  3. Bilateral retrograde pyelogram  4. Bilateral ureteral stent placement  5. Fluoro < 1 hr    Specimen(s): urine for culture    Staff Surgeon: Erica Martinez MD    Assistant Surgeon: Song Solitario MD    Anesthesia: General endotracheal anesthesia    Indications: Surjit Valentine is a 44 y.o. male with a right UPJ and b/l renal stone presenting for definitive stone management. He is not pre-stented.    Findings:  1. Stones visible on  film.  2. Right ureter with distal and proximal narrowing, unable to fully visualize the proximal ureteral stone with the ureteral scope.  Access sheath unable to be passed to the level of the proximal ureter on the right.  3. Moderate amount of pyonephrosis identified from the right ureter, further attempts of stone extraction were halted at this point.  Urine culture was sent.  Urine dipstick from this urine was nitrite negative though with many leukocytes.  4. Right retrograde pyelogram with mild to moderate hydronephrosis with ureter normal in course and caliber.  Left retrograde pyelogram with normal ureter in course and caliber and without hydronephrosis.  5. Bilateral ureteral stents placed in good position using standard fashion placement.    Estimated Blood Loss: min    Drains:   1. Right 6 Fr x 28 cm JJ ureteral stent without strings  2. Left 6 Fr x 26 cm JJ ureteral stent without  strings    Procedure in detail:  After risks, benefits, and possible complications were explained, the patient elected to undergo the procedure and informed consent was obtained. All questions were answered in the sonia-operative area. The patient was transferred to the cystoscopy suite and placed in the supine position. SCDs were applied and working. Anesthesia was administered. The patient was then placed in the dorsal lithotomy position and prepped and draped in the usual sterile fashion. Time out was performed, and sonia-procedural antibiotics were confirmed.     The stone was visible on  film. A rigid cystoscope in a 22 Fr sheath was introduced into the patient's urethra. This passed easily. The entire urethra was visualized which showed no strictures or masses. Cystoscopy revealed the ureteral orifices in the normal anatomic location bilaterally.    A motion wire was passed up the right ureteral orifice and up into the kidney. This passed easily and placement was confirmed fluoroscopically. The cystoscope was removed keeping the wire in place.    An 8 Fr rigid ureteroscope was passed into the patient's bladder alongside the wire under direct vision. It was then passed through the right ureteral orifice alongside the wire. A stone was encountered at the level of the right proximal ureter, though due to significant narrowing in the proximal ureter we are unable to safely target the stone for extraction.     A glide wire was inserted through the ureteroscope and into the kidney with fluoroscopic confirmation. The ureteroscope was removed keeping both wires in place.  A ureteral access sheath was advanced over the glide wire to the level of the distal ureter continuous fluoroscopy.  Due to narrowing within the distal ureter, this was unable advanced further to the level of stone.     The 22 Angolan rigid cystoscope was reassembled and passed over the wire back to the level of the UO.  At this point we  appreciated pyonephrosis and moderate amounts from the right UO.  A 5 Nepalese open ureteral catheter was advanced over the wire to the level just past the proximal ureteral stone.  Hydronephrotic drip was present and this urine was tested which revealed leukocyte positive urine.  Given these findings we decided to hold further attempts of ureteroscopy.  Contrast was injected into the open-ended ureteral catheter to perform a retrograde pyelogram with the above findings.  The 5 Nepalese open ureteral catheter was then removed and a 6 by 28 double-J ureteral stent without strings was then placed over the wire under direct vision and fluoroscopy.    We then turned our attention to the left UO.  A motion wire was then passed through the scope to the level of the left renal pelvis.  Again a minimal amount of pyonephrosis was appreciated at this time as well.  A decision again was made to hold attempts of ureteroscopy on the left after these findings.  A 5 Nepalese open-ended ureteral catheter was then placed over the wire to the level of the renal pelvis in the left.  Contrast was injected to perform a retrograde pyelogram with the above findings.  The 5 Nepalese open-ended ureteral catheter was then removed with replacement of the motion wire.    A 6 Fr x 26 cm JJ ureteral stent without strings was passed over the wire and up into the renal pelvis using fluoro. When the coil appeared to be in good position in the kidney and the radio-opaque marker of the pusher was at the inferior pubis, the wire was removed under continuous fluoro. Good coils were seen in the kidney and the bladder using fluoro.    The patient tolerated the procedure well and was transferred to the recovery room in stable condition.    Disposition:  The patient will be discharged home from PACU.  He will follow up Dr. Martinez in the next several weeks for definitive bilateral stone management following a period of stenting.    Song Solitario MD

## 2025-02-25 ENCOUNTER — PATIENT MESSAGE (OUTPATIENT)
Dept: UROLOGY | Facility: CLINIC | Age: 45
End: 2025-02-25
Payer: COMMERCIAL

## 2025-02-25 ENCOUNTER — TELEPHONE (OUTPATIENT)
Dept: UROLOGY | Facility: CLINIC | Age: 45
End: 2025-02-25
Payer: COMMERCIAL

## 2025-02-25 LAB — BACTERIA UR CULT: NO GROWTH

## 2025-02-25 NOTE — ANESTHESIA PAT ROS NOTE
02/25/2025  Surjit Valentine is a 44 y.o., male.      Pre-op Assessment    I have reviewed the NPO Status.   I have reviewed the Medications.     Review of Systems  Anesthesia Hx:  No problems with previous Anesthesia             Denies Family Hx of Anesthesia complications.    Denies Personal Hx of Anesthesia complications.                    Social:  Non-Smoker, No Alcohol Use, Dip/Chew 2 cans per week      Hematology/Oncology:       -- Anemia:                                  Pulmonary:       Denies Shortness of breath.  Sleep Apnea (does not tolerate cpap (also has lost a lot of weight post bariatric surgery)) Hx of acute hypoxemic respiratory failure d/t covid-19               Renal/:  Chronic Renal Disease renal calculi               Hepatic/GI:     GERD, well controlled   Hx of RNY gastric bypass, h/o appendectomy Not Taking GLP-1 Agonists            Musculoskeletal:     Hx of right knee chondromalacia s/p rt knee arthroscopy/ chondroplasty/ meniscectomy; recent RLE achilles tendinitis and associated heel/ankle pain (conservative tx via ortho)            Neurological:  Denies TIA.  Denies CVA.    Seizures                                Endocrine:     Last A1C 5.0 on 6/24/24      Morbid Obesity / BMI > 40  Psych:  Psychiatric History   Hx of panic disorder                    Anesthesia Assessment: Preoperative EQUATION    Planned Procedure: Procedure(s) (LRB):  CYSTOURETEROSCOPY, WITH HOLMIUM LASER LITHOTRIPSY OF URETERAL CALCULUS AND STENT INSERTION (Bilateral)  CYSTOSCOPY, WITH URETERAL STENT REMOVAL (Bilateral)  Requested Anesthesia Type:General  Surgeon: Erica Martinez MD  Service: Urology  Known or anticipated Date of Surgery:3/10/2025    Surgeon notes: reviewed    Electronic QUestionnaire Assessment completed via nurse interview with patient.        Triage considerations:     The  patient has no apparent active cardiac condition (No unstable coronary Syndrome such as severe unstable angina or recent [<1 month] myocardial infarction, decompensated CHF, severe valvular disease or significant arrhythmia)    Previous anesthesia records:GETA and No problems  02/24/25 PYELOGRAM, RETROGRADE (Bilateral: Ureter)   CYSTOSCOPY, WITH URETERAL STENT INSERTION (Bilateral: Ureter) URETEROSCOPY (Right: Ureter), gen anes, ASA 3   Intubation     Date/Time: 2/24/2025 11:23 AM     Performed by: Erica Steen CRNA  Authorized by: Skyler Garcia MD    Intubation:     Induction:  Intravenous    Intubated:  Postinduction    Mask Ventilation:  Moderately difficult with oral airway    Attempts:  1    Attempted By:  Student    Method of Intubation:  Video laryngoscopy    Blade:  Hernandez 3    Laryngeal View Grade: Grade I - full view of cords      Difficult Airway Encountered?: No      Complications:  None    Airway Device:  Oral endotracheal tube    Airway Device Size:  7.5    Style/Cuff Inflation:  Cuffed (inflated to minimal occlusive pressure)    Tube secured:  22    Secured at:  The lips    Placement Verified By:  Capnometry    Complicating Factors:  None    Findings Post-Intubation:  BS equal bilateral    Last PCP note: 6-12 months ago , within [facility]   Subspecialty notes: Urology    Other important co-morbidities: GERD, Morbid Obesity, GAYLE, and bilateral renal stones s/p 2/24/25 bilateral ureteral stent placement/ cystoscopy/retrograde pyelogram, h/o nephrolithiasis, hx of ureterolithiasis, h/o prior uro procedure, h/o RNY gastric bypass with wt loss greater than 100#, h/o acute hypoxemic respiratory failure d/t covid-19, anemia, panic disorder, sz, h/o appy, impaired functional mobility, h/o right knee chondromalacia s/p right knee chondroplasty/ arthroscopy/meniscectomy, recent ortho eval for right heel/ankle pain and achilles tendinitis (conservative tx)       Tests already available:  Available  tests,  within 1 month , 3-6 months ago , 6-12 months ago , within [facility] .   02/24/25 URINE CX  02/22/25 CBC, CMP  10/15/24 EKG (NSR)  06/24/24 A1C (5.0)            Instructions given. (See in Nurse's note)    Optimization:  Anesthesia Preop Clinic Assessment Indicated:    --phone screen      Plan:    Testing:  None Needed     Navigation:  Straight Line to surgery.               No tests, anesthesia preop clinic visit, or consult required.

## 2025-02-26 NOTE — ANESTHESIA POSTPROCEDURE EVALUATION
Anesthesia Post Evaluation    Patient: Surjit Valentine    Procedure(s) Performed: Procedure(s) (LRB):  PYELOGRAM, RETROGRADE (Bilateral)  CYSTOSCOPY, WITH URETERAL STENT INSERTION (Bilateral)  URETEROSCOPY (Right)    Final Anesthesia Type: general      Patient location during evaluation: PACU  Patient participation: Yes- Able to Participate  Level of consciousness: awake and alert  Post-procedure vital signs: reviewed and stable  Pain management: adequate  Airway patency: patent    PONV status at discharge: No PONV  Anesthetic complications: no      Cardiovascular status: blood pressure returned to baseline  Respiratory status: unassisted  Hydration status: euvolemic  Follow-up not needed.              Vitals Value Taken Time   /85 02/24/25 13:00   Temp 36.7 °C (98 °F) 02/24/25 12:25   Pulse 58 02/24/25 13:00   Resp 22 02/24/25 13:00   SpO2 97 % 02/24/25 13:00         No case tracking events are documented in the log.      Pain/Alma Delia Score: No data recorded

## 2025-03-02 ENCOUNTER — NURSE TRIAGE (OUTPATIENT)
Dept: ADMINISTRATIVE | Facility: CLINIC | Age: 45
End: 2025-03-02
Payer: COMMERCIAL

## 2025-03-02 ENCOUNTER — PATIENT MESSAGE (OUTPATIENT)
Dept: UROLOGY | Facility: CLINIC | Age: 45
End: 2025-03-02
Payer: COMMERCIAL

## 2025-03-03 NOTE — TELEPHONE ENCOUNTER
Pt called and stated he had ureteral stents placed on 2/24/25 and today started having groin pain and pain with urination. Care advice recommends see physician within 24 hours. Pt verbalized understanding. Pt requesting call back from urology office tomorrow for sooner appt.   Reason for Disposition   SEVERE burning or pain with passing urine (urination)    Additional Information   Negative: Shock suspected (e.g., cold/pale/clammy skin, too weak to stand, low BP, rapid pulse)   Negative: Sounds like a life-threatening emergency to the triager   Negative: [1] Unable to urinate (or only a few drops) > 4 hours AND [2] bladder feels very full (e.g., palpable bladder or strong urge to urinate)   Negative: [1] SEVERE pain (e.g., excruciating, scale 8-10) AND [2] not improved after pain medicine   Negative: SEVERE vomiting   Negative: Fever > 103 F (39.4 C)   Negative: Severe chills (i.e., feeling extremely cold WITH shaking chills)   Negative: Patient sounds very sick or weak to the triager   Negative: [1] MODERATE pain (e.g., interferes with normal activities) AND [2] constant AND [3] lasting longer than 4 hours AND [4] NOT improved with pain medicine   Negative: Passing blood or large blood clots (i.e., size > a dime)  (Exception: Pink or tea-colored urine, flecks or small strands of blood.)   Negative: Fever > 100.4 F (38.0 C)   Negative: [1] Caller has URGENT question (includes prescribed medication questions) AND [2] triager unable to answer question    Protocols used: Kidney Stone Follow-up Call-A-

## 2025-03-06 ENCOUNTER — LAB VISIT (OUTPATIENT)
Dept: LAB | Facility: HOSPITAL | Age: 45
End: 2025-03-06
Attending: UROLOGY
Payer: COMMERCIAL

## 2025-03-06 DIAGNOSIS — N20.0 BILATERAL RENAL STONES: ICD-10-CM

## 2025-03-06 PROCEDURE — 87086 URINE CULTURE/COLONY COUNT: CPT | Performed by: UROLOGY

## 2025-03-07 ENCOUNTER — TELEPHONE (OUTPATIENT)
Dept: UROLOGY | Facility: CLINIC | Age: 45
End: 2025-03-07
Payer: COMMERCIAL

## 2025-03-07 ENCOUNTER — PATIENT MESSAGE (OUTPATIENT)
Dept: UROLOGY | Facility: CLINIC | Age: 45
End: 2025-03-07
Payer: COMMERCIAL

## 2025-03-07 LAB — BACTERIA UR CULT: NO GROWTH

## 2025-03-10 ENCOUNTER — HOSPITAL ENCOUNTER (OUTPATIENT)
Facility: HOSPITAL | Age: 45
Discharge: HOME | End: 2025-03-10
Attending: UROLOGY | Admitting: UROLOGY
Payer: COMMERCIAL

## 2025-03-10 ENCOUNTER — PATIENT MESSAGE (OUTPATIENT)
Dept: BARIATRICS | Facility: CLINIC | Age: 45
End: 2025-03-10
Payer: COMMERCIAL

## 2025-03-10 ENCOUNTER — ANESTHESIA EVENT (OUTPATIENT)
Dept: SURGERY | Facility: HOSPITAL | Age: 45
End: 2025-03-10
Payer: COMMERCIAL

## 2025-03-10 ENCOUNTER — ANESTHESIA (OUTPATIENT)
Dept: SURGERY | Facility: HOSPITAL | Age: 45
End: 2025-03-10
Payer: COMMERCIAL

## 2025-03-10 VITALS
OXYGEN SATURATION: 93 % | HEART RATE: 55 BPM | WEIGHT: 300 LBS | HEIGHT: 70 IN | TEMPERATURE: 98 F | SYSTOLIC BLOOD PRESSURE: 124 MMHG | RESPIRATION RATE: 13 BRPM | DIASTOLIC BLOOD PRESSURE: 69 MMHG | BODY MASS INDEX: 42.95 KG/M2

## 2025-03-10 DIAGNOSIS — N20.0 NEPHROLITHIASIS: Primary | ICD-10-CM

## 2025-03-10 LAB
SPECIMEN SOURCE: NORMAL
SPECIMEN SOURCE: NORMAL

## 2025-03-10 PROCEDURE — 36000709 HC OR TIME LEV III EA ADD 15 MIN: Performed by: UROLOGY

## 2025-03-10 PROCEDURE — 71000044 HC DOSC ROUTINE RECOVERY FIRST HOUR: Performed by: UROLOGY

## 2025-03-10 PROCEDURE — C1769 GUIDE WIRE: HCPCS | Performed by: UROLOGY

## 2025-03-10 PROCEDURE — C1894 INTRO/SHEATH, NON-LASER: HCPCS | Performed by: UROLOGY

## 2025-03-10 PROCEDURE — 82365 CALCULUS SPECTROSCOPY: CPT | Performed by: UROLOGY

## 2025-03-10 PROCEDURE — 63600175 PHARM REV CODE 636 W HCPCS

## 2025-03-10 PROCEDURE — 37000008 HC ANESTHESIA 1ST 15 MINUTES: Performed by: UROLOGY

## 2025-03-10 PROCEDURE — 36000708 HC OR TIME LEV III 1ST 15 MIN: Performed by: UROLOGY

## 2025-03-10 PROCEDURE — 25000003 PHARM REV CODE 250: Performed by: UROLOGY

## 2025-03-10 PROCEDURE — 71000016 HC POSTOP RECOV ADDL HR: Performed by: UROLOGY

## 2025-03-10 PROCEDURE — C2617 STENT, NON-COR, TEM W/O DEL: HCPCS | Performed by: UROLOGY

## 2025-03-10 PROCEDURE — 63600175 PHARM REV CODE 636 W HCPCS: Mod: JZ,TB | Performed by: ANESTHESIOLOGY

## 2025-03-10 PROCEDURE — 63600175 PHARM REV CODE 636 W HCPCS: Performed by: NURSE ANESTHETIST, CERTIFIED REGISTERED

## 2025-03-10 PROCEDURE — D9220A PRA ANESTHESIA: Mod: ANES,,, | Performed by: ANESTHESIOLOGY

## 2025-03-10 PROCEDURE — 71000015 HC POSTOP RECOV 1ST HR: Performed by: UROLOGY

## 2025-03-10 PROCEDURE — 63600175 PHARM REV CODE 636 W HCPCS: Performed by: ANESTHESIOLOGY

## 2025-03-10 PROCEDURE — 52356 CYSTO/URETERO W/LITHOTRIPSY: CPT | Mod: 50,,, | Performed by: UROLOGY

## 2025-03-10 PROCEDURE — 25000003 PHARM REV CODE 250: Performed by: NURSE ANESTHETIST, CERTIFIED REGISTERED

## 2025-03-10 PROCEDURE — 25000003 PHARM REV CODE 250: Performed by: ANESTHESIOLOGY

## 2025-03-10 PROCEDURE — 37000009 HC ANESTHESIA EA ADD 15 MINS: Performed by: UROLOGY

## 2025-03-10 PROCEDURE — 27201423 OPTIME MED/SURG SUP & DEVICES STERILE SUPPLY: Performed by: UROLOGY

## 2025-03-10 PROCEDURE — D9220A PRA ANESTHESIA: Mod: CRNA,,, | Performed by: NURSE ANESTHETIST, CERTIFIED REGISTERED

## 2025-03-10 DEVICE — STENT URETERAL UNIV 6FR 26CM: Type: IMPLANTABLE DEVICE | Site: URETER | Status: FUNCTIONAL

## 2025-03-10 DEVICE — STENT URETERAL UNIV 6FR 28CM: Type: IMPLANTABLE DEVICE | Site: URETER | Status: FUNCTIONAL

## 2025-03-10 RX ORDER — LIDOCAINE HYDROCHLORIDE 20 MG/ML
INJECTION INTRAVENOUS
Status: DISCONTINUED | OUTPATIENT
Start: 2025-03-10 | End: 2025-03-10

## 2025-03-10 RX ORDER — PHENAZOPYRIDINE HYDROCHLORIDE 200 MG/1
200 TABLET, FILM COATED ORAL 3 TIMES DAILY PRN
Qty: 20 TABLET | Refills: 0 | Status: SHIPPED | OUTPATIENT
Start: 2025-03-10 | End: 2025-03-20

## 2025-03-10 RX ORDER — SULFAMETHOXAZOLE AND TRIMETHOPRIM 400; 80 MG/1; MG/1
1 TABLET ORAL DAILY
Qty: 1 TABLET | Refills: 0 | Status: SHIPPED | OUTPATIENT
Start: 2025-03-14 | End: 2025-03-15

## 2025-03-10 RX ORDER — FENTANYL CITRATE 50 UG/ML
25 INJECTION, SOLUTION INTRAMUSCULAR; INTRAVENOUS EVERY 5 MIN PRN
Status: DISCONTINUED | OUTPATIENT
Start: 2025-03-10 | End: 2025-03-10 | Stop reason: HOSPADM

## 2025-03-10 RX ORDER — KETOROLAC TROMETHAMINE 30 MG/ML
INJECTION, SOLUTION INTRAMUSCULAR; INTRAVENOUS
Status: DISCONTINUED | OUTPATIENT
Start: 2025-03-10 | End: 2025-03-10

## 2025-03-10 RX ORDER — MIDAZOLAM HYDROCHLORIDE 1 MG/ML
INJECTION INTRAMUSCULAR; INTRAVENOUS
Status: DISCONTINUED | OUTPATIENT
Start: 2025-03-10 | End: 2025-03-10

## 2025-03-10 RX ORDER — GLUCAGON 1 MG
1 KIT INJECTION
Status: DISCONTINUED | OUTPATIENT
Start: 2025-03-10 | End: 2025-03-10 | Stop reason: HOSPADM

## 2025-03-10 RX ORDER — PROPOFOL 10 MG/ML
VIAL (ML) INTRAVENOUS
Status: DISCONTINUED | OUTPATIENT
Start: 2025-03-10 | End: 2025-03-10

## 2025-03-10 RX ORDER — ONDANSETRON HYDROCHLORIDE 2 MG/ML
INJECTION, SOLUTION INTRAVENOUS
Status: DISCONTINUED | OUTPATIENT
Start: 2025-03-10 | End: 2025-03-10

## 2025-03-10 RX ORDER — CEFAZOLIN 2 G/1
2 INJECTION, POWDER, FOR SOLUTION INTRAMUSCULAR; INTRAVENOUS
Status: COMPLETED | OUTPATIENT
Start: 2025-03-10 | End: 2025-03-10

## 2025-03-10 RX ORDER — FENTANYL CITRATE 50 UG/ML
INJECTION, SOLUTION INTRAMUSCULAR; INTRAVENOUS
Status: DISCONTINUED | OUTPATIENT
Start: 2025-03-10 | End: 2025-03-10

## 2025-03-10 RX ORDER — DEXMEDETOMIDINE HYDROCHLORIDE 100 UG/ML
INJECTION, SOLUTION INTRAVENOUS
Status: DISCONTINUED | OUTPATIENT
Start: 2025-03-10 | End: 2025-03-10

## 2025-03-10 RX ORDER — OXYCODONE HYDROCHLORIDE 5 MG/1
5 TABLET ORAL
Status: DISCONTINUED | OUTPATIENT
Start: 2025-03-10 | End: 2025-03-10 | Stop reason: HOSPADM

## 2025-03-10 RX ORDER — KETOROLAC TROMETHAMINE 10 MG/1
10 TABLET, FILM COATED ORAL EVERY 6 HOURS
Qty: 20 TABLET | Refills: 0 | Status: SHIPPED | OUTPATIENT
Start: 2025-03-10 | End: 2025-03-15

## 2025-03-10 RX ORDER — ROCURONIUM BROMIDE 10 MG/ML
INJECTION, SOLUTION INTRAVENOUS
Status: DISCONTINUED | OUTPATIENT
Start: 2025-03-10 | End: 2025-03-10

## 2025-03-10 RX ORDER — DEXAMETHASONE SODIUM PHOSPHATE 4 MG/ML
INJECTION, SOLUTION INTRA-ARTICULAR; INTRALESIONAL; INTRAMUSCULAR; INTRAVENOUS; SOFT TISSUE
Status: DISCONTINUED | OUTPATIENT
Start: 2025-03-10 | End: 2025-03-10

## 2025-03-10 RX ORDER — HYDROMORPHONE HYDROCHLORIDE 1 MG/ML
0.2 INJECTION, SOLUTION INTRAMUSCULAR; INTRAVENOUS; SUBCUTANEOUS EVERY 5 MIN PRN
Status: DISCONTINUED | OUTPATIENT
Start: 2025-03-10 | End: 2025-03-10 | Stop reason: HOSPADM

## 2025-03-10 RX ORDER — LIDOCAINE HYDROCHLORIDE 20 MG/ML
JELLY TOPICAL
Status: DISCONTINUED | OUTPATIENT
Start: 2025-03-10 | End: 2025-03-10 | Stop reason: HOSPADM

## 2025-03-10 RX ORDER — ONDANSETRON HYDROCHLORIDE 2 MG/ML
4 INJECTION, SOLUTION INTRAVENOUS DAILY PRN
Status: DISCONTINUED | OUTPATIENT
Start: 2025-03-10 | End: 2025-03-10 | Stop reason: HOSPADM

## 2025-03-10 RX ORDER — HALOPERIDOL LACTATE 5 MG/ML
0.5 INJECTION, SOLUTION INTRAMUSCULAR EVERY 10 MIN PRN
Status: DISCONTINUED | OUTPATIENT
Start: 2025-03-10 | End: 2025-03-10 | Stop reason: HOSPADM

## 2025-03-10 RX ADMIN — ROCURONIUM BROMIDE 20 MG: 10 INJECTION, SOLUTION INTRAVENOUS at 02:03

## 2025-03-10 RX ADMIN — MIDAZOLAM HYDROCHLORIDE 2 MG: 1 INJECTION, SOLUTION INTRAMUSCULAR; INTRAVENOUS at 12:03

## 2025-03-10 RX ADMIN — ROCURONIUM BROMIDE 20 MG: 10 INJECTION, SOLUTION INTRAVENOUS at 03:03

## 2025-03-10 RX ADMIN — KETOROLAC TROMETHAMINE 30 MG: 30 INJECTION, SOLUTION INTRAMUSCULAR; INTRAVENOUS at 03:03

## 2025-03-10 RX ADMIN — SUGAMMADEX 300 MG: 100 INJECTION, SOLUTION INTRAVENOUS at 03:03

## 2025-03-10 RX ADMIN — ROCURONIUM BROMIDE 50 MG: 10 INJECTION, SOLUTION INTRAVENOUS at 01:03

## 2025-03-10 RX ADMIN — ROCURONIUM BROMIDE 20 MG: 10 INJECTION, SOLUTION INTRAVENOUS at 01:03

## 2025-03-10 RX ADMIN — ROCURONIUM BROMIDE 50 MG: 10 INJECTION, SOLUTION INTRAVENOUS at 12:03

## 2025-03-10 RX ADMIN — SUGAMMADEX 100 MG: 100 INJECTION, SOLUTION INTRAVENOUS at 03:03

## 2025-03-10 RX ADMIN — CEFAZOLIN 2 G: 2 INJECTION, POWDER, FOR SOLUTION INTRAMUSCULAR; INTRAVENOUS at 12:03

## 2025-03-10 RX ADMIN — HYDROMORPHONE HYDROCHLORIDE 0.2 MG: 1 INJECTION, SOLUTION INTRAMUSCULAR; INTRAVENOUS; SUBCUTANEOUS at 04:03

## 2025-03-10 RX ADMIN — DEXMEDETOMIDINE 8 MCG: 100 INJECTION, SOLUTION, CONCENTRATE INTRAVENOUS at 03:03

## 2025-03-10 RX ADMIN — ONDANSETRON 4 MG: 2 INJECTION INTRAMUSCULAR; INTRAVENOUS at 03:03

## 2025-03-10 RX ADMIN — PROPOFOL 200 MG: 10 INJECTION, EMULSION INTRAVENOUS at 12:03

## 2025-03-10 RX ADMIN — SODIUM CHLORIDE: 0.9 INJECTION, SOLUTION INTRAVENOUS at 12:03

## 2025-03-10 RX ADMIN — HALOPERIDOL LACTATE 0.5 MG: 5 INJECTION, SOLUTION INTRAMUSCULAR at 04:03

## 2025-03-10 RX ADMIN — DEXAMETHASONE SODIUM PHOSPHATE 4 MG: 4 INJECTION, SOLUTION INTRAMUSCULAR; INTRAVENOUS at 12:03

## 2025-03-10 RX ADMIN — OXYCODONE 5 MG: 5 TABLET ORAL at 03:03

## 2025-03-10 RX ADMIN — ROCURONIUM BROMIDE 10 MG: 10 INJECTION, SOLUTION INTRAVENOUS at 02:03

## 2025-03-10 RX ADMIN — FENTANYL CITRATE 100 MCG: 50 INJECTION, SOLUTION INTRAMUSCULAR; INTRAVENOUS at 12:03

## 2025-03-10 RX ADMIN — LIDOCAINE HYDROCHLORIDE 100 MG: 20 INJECTION INTRAVENOUS at 12:03

## 2025-03-10 NOTE — DISCHARGE INSTRUCTIONS
Please remove your ureteral stent with strings yourself on Friday, 3/14/25. In order to do so, pull the blue string hanging out of your urethra until the entire stent is removed.      What to Expect After a Cystoscopy  For the next 24-48 hours, you may feel a mild burning when you urinate. This burning is normal and expected. Drink plenty of water to dilute the urine to help relieve the burning sensation. You may also see a small amount of blood in your urine after the procedure.    Unless you are already taking antibiotics, you may be given an antibiotic after the test to prevent infection.    Signs and Symptoms to Report  Call the [facility] at [phone] if you develop any of the following:  Fever of 101 degrees or higher  Chills or persistent bleeding  Inability to urinate

## 2025-03-10 NOTE — ANESTHESIA PREPROCEDURE EVALUATION
03/10/2025  Surjit Valentine is a 44 y.o., male.      Pre-op Assessment    I have reviewed the Patient Summary Reports.          Review of Systems  Anesthesia Hx:  No problems with previous Anesthesia                Social:  Non-Smoker       Hematology/Oncology:  Hematology Normal   Oncology Normal                                   EENT/Dental:  EENT/Dental Normal           Cardiovascular:  Cardiovascular Normal                                              Pulmonary:        Sleep Apnea                Renal/:  Chronic Renal Disease, CKD                Hepatic/GI:     GERD                Musculoskeletal:  Musculoskeletal Normal                Neurological:       Seizures                                Endocrine:  Endocrine Normal          Morbid Obesity / BMI > 40  Dermatological:  Skin Normal    Psych:  Psychiatric Normal                Physical Exam  General: Alert and Oriented    Airway:  Mallampati: II / II  Mouth Opening: Normal  TM Distance: Normal  Tongue: Normal  Neck ROM: Normal ROM    Dental:  Intact    Chest/Lungs:  Clear to auscultation, Normal Respiratory Rate    Heart:  Rate: Normal  Rhythm: Regular Rhythm  Sounds: Normal    Anesthesia Plan  Type of Anesthesia, risks & benefits discussed:    Anesthesia Type: Gen ETT, MAC, Gen Natural Airway  Intra-op Monitoring Plan: Standard ASA Monitors  Post Op Pain Control Plan: multimodal analgesia  Airway Plan: Direct  Informed Consent: Informed consent signed with the Patient and all parties understand the risks and agree with anesthesia plan.  All questions answered.   ASA Score: 3    Ready For Surgery From Anesthesia Perspective.   .

## 2025-03-10 NOTE — BRIEF OP NOTE
[facility] - Surgery (1st Fl)  Brief Operative Note    Surgery Date: 3/10/2025     Surgeons and Role:     * Erica Martinez MD - Primary     * Chris Arita MD - Resident - Assisting     * Gisselle Caruso MD - Resident - Assisting        Pre-op Diagnosis:  Bilateral renal stones [N20.0]    Post-op Diagnosis:  Post-Op Diagnosis Codes:     * Bilateral renal stones [N20.0]    Procedure(s) (LRB):  CYSTOSCOPY (N/A)  URETEROSCOPY (Bilateral)  NEPHROSCOPY (Bilateral)  REMOVAL-STENT (Bilateral)  PLACEMENT-STENT (Bilateral)  URETEROSCOPY, WITH LASER LITHOTRIPSY (Bilateral)  REMOVAL, CALCULUS, URETER, URETEROSCOPIC (Bilateral)    Anesthesia: General    Operative Findings: Bilateral URS, LL, stent with strings without complication    Estimated Blood Loss: * No values recorded between 3/10/2025 12:46 PM and 3/10/2025  3:33 PM *         Specimens:   Specimen (24h ago, onward)      None              Discharge Note    OUTCOME: Patient tolerated treatment/procedure well without complication and is now ready for discharge.    DISPOSITION: Home or Self Care    FINAL DIAGNOSIS:  Urolithiasis    FOLLOWUP: In clinic    DISCHARGE INSTRUCTIONS:  No discharge procedures on file.

## 2025-03-10 NOTE — TRANSFER OF CARE
"Anesthesia Transfer of Care Note    Patient: Surjit Valentine    Procedure(s) Performed: Procedure(s) (LRB):  CYSTOSCOPY (N/A)  URETEROSCOPY (Bilateral)  NEPHROSCOPY (Bilateral)  REMOVAL-STENT (Bilateral)  PLACEMENT-STENT (Bilateral)  URETEROSCOPY, WITH LASER LITHOTRIPSY (Bilateral)  REMOVAL, CALCULUS, URETER, URETEROSCOPIC (Bilateral)    Patient location: [facility]    Anesthesia Type: general    Transport from OR: Transported from OR on room air with adequate spontaneous ventilation    Post pain: adequate analgesia    Post assessment: no apparent anesthetic complications and tolerated procedure well    Post vital signs: stable    Level of consciousness: awake and alert    Nausea/Vomiting: no nausea/vomiting    Complications: none    Transfer of care protocol was followed    Last vitals: Visit Vitals  /63   Pulse 62   Temp 36.3 °C (97.3 °F) (Skin)   Resp (!) 21   Ht 5' 10" (1.778 m)   Wt 136.1 kg (300 lb)   SpO2 98%   BMI 43.05 kg/m²     "

## 2025-03-10 NOTE — PLAN OF CARE
Pt Aox4. VSS. No signs of distress. Patient and pt's spouse educated and given discharge instructions at bedside. Tolerating PO liquids. All questions answered. IV removed. Pt ready for discharge once urinates.    1750 Update: Pt urinated 225 mls.

## 2025-03-10 NOTE — OP NOTE
[facility] - Surgery ([facility])  [facility]  Operative Note    Date: 03/10/2025    Pre-Op Diagnosis: Right ureteral stone  Left kidney stone    Patient Active Problem List    Diagnosis Date Noted    Panic disorder 06/20/2024    History of weight loss surgery 06/20/2024    Class 3 severe obesity due to excess calories with serious comorbidity and body mass index (BMI) of 45.0 to 49.9 in adult 07/03/2023    Right renal stone 10/06/2019    GERD without esophagitis     GAYLE (obstructive sleep apnea)        Post-Op Diagnosis: Same    Procedure(s) Performed:   1. Bilateral ureteroscopy with pyeloscopy  2. Laser lithotripsy  3. Stone basket extraction  4. Cystoscopy  5. Bilateral ureteral stent placement  6. Bilateral ureteral stent removal    Specimen(s):  Right Kidney stone  Left Kidney stone    Staff Surgeon: Erica Martinez MD    Assistant Surgeon: MD Gisselle ZENDEJAS MD    Circulator: Juliette Leonard RN; Cat Deng RN  Relief Circulator: Maxi Squires RN     Anesthesia: General endotracheal anesthesia    Indications: Surjit Valentine is a 44 y.o. male with a bilateral kidney stone presenting for definitive stone management. He is pre-stented.    Findings:  1. Stone vsible on  film.  2. Right ureteral stone pushed into kidney. 12/14 access sheath used. Stone lased and basket extracted.Remaining stones were too small to basket.    3. Left 12/14 access sheath. Stone lased and basket extracted. Remaining stones were too small to basket.    5. Bilateral stents with strings    Laser Fiber - holmium  Fiber Diameter - 272    Estimated Blood Loss: Minimal    Drains:   1. Right 6 Fr x 28 cm JJ ureteral stent with strings  Left 6 Fr x 26 cm JJ ureteral stent with strings    Procedure in detail:  After risks, benefits, and possible complications were explained, the patient elected to undergo the procedure and informed consent was obtained. All questions were answered in the  sonia-operative area. The patient was transferred to the cystoscopy suite and placed in the supine position. SCDs were applied and working. Anesthesia was administered. The patient was then placed in the dorsal lithotomy position and prepped and draped in the usual sterile fashion. Time out was performed, and sonia-procedural antibiotics were confirmed.     The stone was visible on  film . A rigid cystoscope in a 22 Fr sheath was introduced into the patient's urethra. This passed easily. The entire urethra was visualized which showed no strictures or masses. Cystoscopy revealed the ureteral orifices in the normal anatomic location bilaterally. There were no bladder tumors, no bladder stones and no diverticula seen.    A motion wire was passed up the right ureteral orifice and up into the kidney. This passed easily and placement was confirmed fluoroscopically. The cystoscope was removed, keeping the wire in place.    The patient's right ureteral stent was grasped with alligator graspers and brought to the urethral meatus.  The stent was was removed keeping the wire in place.    An 8 Fr semirigid ureteroscope was passed into the patient's bladder alongside the wire under direct vision. It was then passed through the right ureteral orifice alongside the wire. A stone was encountered at the level of the proximal ureter.      A stiff glide wire was inserted through the semirigid ureteroscope and into the kidney with fluoroscopic confirmation. This pushed the stone into the kidney. The ureteroscope was removed keeping both wires in place.    A 12/14 Fr 55 cm ureteral access sheath was advanced over the stiff glide wire to the level of the renal pelvis under continuous fluoroscopy. This met resistance at proximal ureter. The inner dilator and wire were removed, keeping the outer sheath in place. An 8 Fr flexible ureteroscope was then advanced through the access sheath and into the kidney.    Formal pyeloscopy was  performed and a stone was encountered in the upper pole. The laser fiber was inserted per the flexible ureteroscope and the stones were dusted. An Ncircle basket was inserted per the scope. Stone fragments were then removed and passed off the sterile field for analysis. Systematic pyeloscopy was repeated and all remaining stone fragments were deemed small enough to pass spontaneously, <1 mm. The flexible ureteroscope and ureteral access sheath were removed, keeping the motion wire in place.    The patient's left ureteral stent was grasped with alligator graspers and brought to the urethral meatus. A motion wire was passed through the stent and into the kidney with fluoroscopic confirmation. The stent was was removed keeping the wire in place.    An 8 Fr semirigid ureteroscope was passed into the patient's bladder alongside the wire under direct vision. It was then passed through the left ureteral orifice alongside the wire. No stones were encountered.     A stiff glide wire was inserted through the semirigid ureteroscope and into the kidney with fluoroscopic confirmation. The ureteroscope was removed keeping both wires in place.    A 12/14 Fr 55 cm ureteral access sheath was advanced over the stiff glide wire to the level of the renal pelvis under continuous fluoroscopy. This passed easily. The inner dilator and wire were removed, keeping the outer sheath in place. An 8 Fr flexible ureteroscope was then advanced through the access sheath and into the kidney.    Formal pyeloscopy was performed and multiple stones located in kidney. The laser fiber was inserted per the flexible ureteroscope and the stones were dusted. An Ncircle basket was inserted per the scope. Stone fragments were then removed and passed off the sterile field for analysis. Systematic pyeloscopy was repeated and all remaining stone fragments were deemed small enough to pass spontaneously, <1 mm. The flexible ureteroscope and ureteral access sheath  were removed, keeping the wire in place.     A right 6 Fr x 28 cm JJ ureteral stent with strings was passed over the wire and up into the renal pelvis using fluoro. When the coil appeared to be in good position in the kidney and the radio-opaque marker of the pusher was at the inferior pubis, the wire was removed under continuous fluoro. Good coils were seen in the kidney and the bladder using fluoro.    A left 6 Fr x 26 cm JJ ureteral stent with strings was passed over the wire and up into the renal pelvis using fluoro. When the coil appeared to be in good position in the kidney and the radio-opaque marker of the pusher was at the inferior pubis, the wire was removed under continuous fluoro. Good coils were seen in the kidney and the bladder using fluoro.    The distal coil of the ureteral stent was observed within the bladder lumen. The bladder was drained and the cystoscope removed. The patient tolerated the procedure well and was transferred to the recovery room in stable condition.    Disposition:  The patient will be discharged home from PACU. He follow up with Dr. Martinez  in 3 month with a renal ultrasound prior. He was given written instructions to self-remove his ureteral stent with strings on Friday, 3/14/2025.    MYRON LAZAR MD

## 2025-03-10 NOTE — INTERVAL H&P NOTE
The patient has been examined and the H&P has been reviewed:    I concur with the findings and no changes have occurred since H&P was written.    Surgery risks, benefits and alternative options discussed and understood by patient/family.    Urine culture 3/6 with no growth. He has been taking bactrim.       There are no hospital problems to display for this patient.

## 2025-03-11 NOTE — ANESTHESIA POSTPROCEDURE EVALUATION
Anesthesia Post Evaluation    Patient: Surjit Valentine    Procedure(s) Performed: Procedure(s) (LRB):  CYSTOSCOPY (N/A)  URETEROSCOPY (Bilateral)  NEPHROSCOPY (Bilateral)  REMOVAL-STENT (Bilateral)  PLACEMENT-STENT (Bilateral)  URETEROSCOPY, WITH LASER LITHOTRIPSY (Bilateral)  REMOVAL, CALCULUS, URETER, URETEROSCOPIC (Bilateral)    Final Anesthesia Type: general      Patient location during evaluation: PACU  Patient participation: Yes- Able to Participate  Level of consciousness: awake and alert  Post-procedure vital signs: reviewed and stable  Pain management: adequate  Airway patency: patent    PONV status at discharge: No PONV  Anesthetic complications: no      Cardiovascular status: hemodynamically stable  Respiratory status: unassisted, spontaneous ventilation and room air  Hydration status: euvolemic  Follow-up not needed.          Vitals Value Taken Time   /69 03/10/25 17:30   Temp 36.6 °C (97.9 °F) 03/10/25 17:30   Pulse 55 03/10/25 17:30   Resp 13 03/10/25 17:30   SpO2 93 % 03/10/25 17:30         No case tracking events are documented in the log.      Pain/Alma Delia Score: Pain Rating Prior to Med Admin: 9 (3/10/2025  4:46 PM)  Alma Delia Score: 10 (3/10/2025  5:30 PM)

## 2025-03-19 ENCOUNTER — RESULTS FOLLOW-UP (OUTPATIENT)
Dept: UROLOGY | Facility: CLINIC | Age: 45
End: 2025-03-19
Payer: COMMERCIAL

## 2025-04-03 ENCOUNTER — OFFICE VISIT (OUTPATIENT)
Dept: INTERNAL MEDICINE | Facility: CLINIC | Age: 45
End: 2025-04-03
Payer: COMMERCIAL

## 2025-04-03 DIAGNOSIS — F41.0 PANIC DISORDER: ICD-10-CM

## 2025-04-03 PROCEDURE — 1159F MED LIST DOCD IN RCRD: CPT | Mod: CPTII,95,, | Performed by: FAMILY MEDICINE

## 2025-04-03 PROCEDURE — 1160F RVW MEDS BY RX/DR IN RCRD: CPT | Mod: CPTII,95,, | Performed by: FAMILY MEDICINE

## 2025-04-03 PROCEDURE — 98004 SYNCH AUDIO-VIDEO EST SF 10: CPT | Mod: 95,,, | Performed by: FAMILY MEDICINE

## 2025-04-03 RX ORDER — BUPROPION HYDROCHLORIDE 150 MG/1
150 TABLET ORAL DAILY
Qty: 60 TABLET | Refills: 0 | Status: SHIPPED | OUTPATIENT
Start: 2025-04-03 | End: 2025-06-02

## 2025-04-03 RX ORDER — ALPRAZOLAM 0.5 MG/1
0.5 TABLET ORAL 3 TIMES DAILY PRN
Qty: 30 TABLET | Refills: 0 | Status: SHIPPED | OUTPATIENT
Start: 2025-04-03 | End: 2025-06-23 | Stop reason: SDUPTHER

## 2025-04-03 NOTE — PROGRESS NOTES
Subjective:     Patient ID: Surjit Valentine is a 44 y.o. male.   Chief Complaint: No chief complaint on file.      The patient location is: [place]  The chief complaint leading to consultation is: medication questions    Visit type: audiovisual    Face to Face time with patient: 6 minutes  10 minutes of total time spent on the encounter, which includes face to face time and non-face to face time preparing to see the patient (eg, review of tests), Obtaining and/or reviewing separately obtained history, Documenting clinical information in the electronic or other health record, Independently interpreting results (not separately reported) and communicating results to the patient/family/caregiver, or Care coordination (not separately reported).         Each patient to whom he or she provides medical services by telemedicine is:  (1) informed of the relationship between the physician and patient and the respective role of any other health care provider with respect to management of the patient; and (2) notified that he or she may decline to receive medical services by telemedicine and may withdraw from such care at any time.    Notes:      HPI:  History of Present Illness    CHIEF COMPLAINT:  Patient presents today for follow up of mental health medications    He reports decreased effectiveness of Lexapro 20 mg with increased mood shifts, including anger and agitation. BuSpar was previously helpful for symptom management but he has run out. He has also exhausted his supply of Xanax.         Review of Systems   Constitutional:  Negative for activity change and unexpected weight change.   HENT:  Negative for hearing loss, rhinorrhea and trouble swallowing.    Eyes:  Negative for discharge and visual disturbance.   Respiratory:  Negative for chest tightness and wheezing.    Cardiovascular:  Negative for chest pain and palpitations.   Gastrointestinal:  Negative for blood in stool, constipation, diarrhea and vomiting.    Endocrine: Negative for polydipsia and polyuria.   Genitourinary:  Negative for difficulty urinating, hematuria and urgency.   Musculoskeletal:  Negative for arthralgias, joint swelling and neck pain.   Neurological:  Negative for weakness and headaches.   Psychiatric/Behavioral:  Positive for dysphoric mood. Negative for confusion.           Objective:      There were no vitals filed for this visit.   Physical Exam  Constitutional:       General: He is not in acute distress.     Appearance: Normal appearance. He is not ill-appearing.   HENT:      Head: Normocephalic and atraumatic.   Pulmonary:      Effort: Pulmonary effort is normal. No respiratory distress.   Neurological:      Mental Status: He is alert and oriented to person, place, and time. Mental status is at baseline.   Psychiatric:         Mood and Affect: Mood normal.         Behavior: Behavior normal.         Thought Content: Thought content normal.         Judgment: Judgment normal.       As this visit was accomplished virtually, physical exam is limited only to what may be observed via the telehealth audiovisual connection.        Assessment/Plan:             ICD-10-CM ICD-9-CM   1. Panic disorder  F41.0 300.01     No orders of the defined types were placed in this encounter.    Assessment & Plan    - Decreased efficacy of Lexapro 20 mg with increased mood shifts.  - Considered options: changing SSRI, changing medication class, or adding adjunct therapy.  - Started Wellbutrin as adjunct therapy to Lexapro due to previous success with Lexapro.  - If unsuccessful, plan to change SSRI before considering SNRI or TCA.    1. Panic disorder  Assessment as above  Add Wellbutrin XR  - buPROPion (WELLBUTRIN XL) 150 MG TB24 tablet; Take 1 tablet (150 mg total) by mouth once daily. (Patient not taking: Reported on 5/27/2025)  Dispense: 60 tablet; Refill: 0      Sourav Fabian MD, FAAFP  Family Medicine Physician  [facility]  07/06/2025      This note was generated with the assistance of ambient listening technology. Verbal consent was obtained by the patient and accompanying visitor(s) for the recording of patient appointment to facilitate this note. I attest to having reviewed and edited the generated note for accuracy, though some syntax or spelling errors may persist. Please contact the author of this note for any clarification.

## 2025-04-08 ENCOUNTER — PATIENT MESSAGE (OUTPATIENT)
Dept: BARIATRICS | Facility: CLINIC | Age: 45
End: 2025-04-08
Payer: COMMERCIAL

## 2025-05-13 ENCOUNTER — PATIENT MESSAGE (OUTPATIENT)
Dept: BARIATRICS | Facility: CLINIC | Age: 45
End: 2025-05-13
Payer: COMMERCIAL

## 2025-05-19 ENCOUNTER — OFFICE VISIT (OUTPATIENT)
Dept: SLEEP MEDICINE | Facility: CLINIC | Age: 45
End: 2025-05-19
Payer: COMMERCIAL

## 2025-05-19 DIAGNOSIS — G47.30 SLEEP APNEA, UNSPECIFIED TYPE: Primary | ICD-10-CM

## 2025-05-19 DIAGNOSIS — G47.00 INSOMNIA, UNSPECIFIED TYPE: ICD-10-CM

## 2025-05-19 PROCEDURE — 98002 SYNCH AUDIO-VIDEO NEW MOD 45: CPT | Mod: 95,,,

## 2025-05-19 NOTE — PATIENT INSTRUCTIONS
SLEEP LAB (Christina or Sathya) will contact you to schedule the sleep study. Their number is [phone] (ext [phone]). Please call them if you do not hear from them in 2 weeks from now.  The [facility] is located on 7th floor of the [facility]; [facility] is located in [facility] ( 3rd floor [facility]).    SLEEP CLINIC (my assistant) will call you when the sleep study results are ready or I will message you through the portal with the results as we have discussed - if you have not heard from us by 2 weeks from the date of the study, or you can use [facility] to contact me.    Our clinic phone number is [phone] (ext 1)       You are advised to abstain from driving should you feel sleepy or drowsy.

## 2025-05-19 NOTE — PROGRESS NOTES
The patient location is: [place]  The chief complaint leading to consultation is: Snoring and Apnea        Visit type: audiovisual    Face to Face time with patient: 10   25 minutes of total time spent on the encounter, which includes face to face time and non-face to face time preparing to see the patient (eg, review of tests), Obtaining and/or reviewing separately obtained history, Documenting clinical information in the electronic or other health record, Independently interpreting results (not separately reported) and communicating results to the patient/family/caregiver, or Care coordination (not separately reported).         Each patient to whom he or she provides medical services by telemedicine is:  (1) informed of the relationship between the physician and patient and the respective role of any other health care provider with respect to management of the patient; and (2) notified that he or she may decline to receive medical services by telemedicine and may withdraw from such care at any time.    CC: Snoring and Apnea     Referred by Self, [facility] for a sleep evaluation.     HPI     Subjective    HPI:  Sleep Apnea:  Symptoms:    [x] Loud snoring[x] Witnessed apneas [x] Gasping [] Choking[x] Interrupted Sleep [x]  Nocturia [x]  Non refreshing sleep [x] Excessive daytime sleepiness   [x]  Morning headaches [x] Nasal Congestion [x] Dry Mouth [] Excessive Daytime Fatigue [] None  Duration:   [] Days  [] Months  [x] Years  Comments: patient previously seen by Dr. Medina in 2021, sleep study ordered but never completed, returning to sleep clinic to establish care and test for obstructive sleep apnea. Reports symptoms have continued, loud snoring, witnessed apneas, interrupted sleep, nocturia, excessive daytime sleepiness, morning headaches, nasal congestion, and dry mouth.         Insomnia  Symptoms: [] Difficulty Falling Asleep [x] Difficulty Staying Asleep [x] Frequent Nocturnal Awakenings [] Poor Sleep  Quality [] Non-Restorative Sleep [] Early Morning Awakening  Current Episode Severity: [] Mild [] Moderate [] Severe  Treatments: [] Medications []  Sleep Hygiene & Stimulus Control []  Sleep Restriction []  CBTI [] Relaxation Techniques  Treatment Effects: [] Decreased Sleep Latency [] Sleep Consolidation [] Improved Sleep Quality []  Medication Side Effects [] No Improvement  Insomnia: [] Improving [] Worsening [] Unchanged [] Stable  Comments: reports difficulty staying asleep with frequent nocturnal awakenings. Waking up multiple times per night for no apparent reason. Takes 30 min to 1 hour to return to sleep sometimes diminishing sleep quality.       Wake up Feeling: [] Refreshed  [x] Non refreshed [] Occasionally Tired  Do You Take Naps: [x] Yes [] No Number of Naps: Would nap if schedule allows.         5/19/2025     4:27 PM 3/23/2023    10:25 AM   EPWORTH SLEEPINESS SCALE TOTAL SCORE    Total score 12 7     (Validated Sleepiness Questionnaire with higher score indicating greater sleepiness (0-24)    STOP BANG Questionnaire  Patient diagnosed with Obstructive Sleep Apnea?: No  Has loud snoring: Yes  Disturbed sleep, daytime fatigue, daytime somnolence: Yes  Observed to have interrupted breathing during sleep: Yes  Takes medication for high blood pressure: No  Not taking BP medication but supposed to be: No  Recent BMI (Calculated): 43  Is BMI greater than 35 kg/m2?: 1=Yes  Age older than 50 years old?: 0=No  Has large neck size >40cm (15.7in., large male shirt size, large male collar size >16): No  Gender - Male: 1=Yes  STOP-Bang Total Score: 5  (Validated Stop Bang Questionnaire with higher score indicating greater risk of GAYLE (0-2, 3-4, 5-8)) Risk: High        5/19/2025     4:29 PM   Sleep Clinic New Patient   What time do you go to bed on a week day? (Give a range) 10   What time do you go to bed on a day off? (Give a range) 10   How long does it take you to fall asleep? (Give a range) 30 minutes   On  average, how many times per night do you wake up? 0   How long does it take you to fall back into sleep? (Give a range) Hour   What time do you wake up to start your day on a week day? (Give a range) 6   What time do you wake up to start your day on a day off? (Give a range) 6   What time do you get out of bed? (Give a range) 7   On average, how many hours do you sleep? 6-8   On average, how many naps do you take per day? 0   Rate your sleep quality from 0 to 5 (0-poor, 5-great). 2   Have you experienced:  N/a   How much weight have you lost or gained (in lbs.) in the last year? Lost 50   On average, how many times per night do you go to the bathroom?  0   Have you ever had a sleep study/CPAP machine/surgery for sleep apnea? No   Have you ever had a CPAP machine for sleep apnea? No   Have you ever had surgery for sleep apnea? No       Current Sleep Medication(s): None     Previous Sleep Medication(s): None         Objective    Records Reviewed:   Lab Results   Component Value Date    TSH 2.750 06/24/2024    CO2 21 (L) 02/22/2025    HGBA1C 5.0 06/24/2024    FERRITIN 896 (H) 12/06/2020      No echocardiogram results found for the past 12 months  Sleep Studies : None    Objective:  Vitals: There were no vitals taken for this visit.   Exam:  Gen: Well Appearing, demonstrates insight  Skin: No rash or lesions on bridge of nose or mouth          Assessment & Plan  Sleep apnea, unspecified type  Diagnostic Testing: Home Sleep Apnea Test (HST) is indicated due to comorbid conditions: Obesity with symptoms: snoring, gasping for air, choking , witnessed apneas, interrupted sleep, nocturia, restless sleep, non refreshing sleep, and excessive daytime sleepiness, ESS: 12/24  Education & Treatment Options:  Discussed the etiology and consequences of untreated GAYLE, including risks of cardiovascular disease, hypertension, stroke, metabolic dysfunction, and excessive daytime sleepiness.  Reviewed treatment options:  Positive Airway  Pressure (PAP) therapy, Weight loss Positional therapy, Oral appliance therapy  Lifestyle Modifications: Advised weight management, alcohol reduction, and optimizing sleep hygiene.  Safety Precautions: Recommended avoiding driving if experiencing excessive daytime sleepiness.  Next Steps:  If GAYLE is confirmed, patient agrees to trial APAP  If PAP therapy is initiated, follow-up within 31-90 days to assess compliance and effectiveness.  Results will be communicated by Mychart   Orders:    Home Sleep Study; Future    Insomnia, unspecified type  Discussed sleep hygiene measures including regular sleep schedule, optimal sleep environment, and relaxing presleep rituals.  Avoid daytime naps.  Recommended daily exercise.  Educational materials distributed.

## 2025-05-21 ENCOUNTER — TELEPHONE (OUTPATIENT)
Dept: SLEEP MEDICINE | Facility: OTHER | Age: 45
End: 2025-05-21
Payer: COMMERCIAL

## 2025-05-27 ENCOUNTER — OFFICE VISIT (OUTPATIENT)
Dept: INTERNAL MEDICINE | Facility: CLINIC | Age: 45
End: 2025-05-27
Payer: COMMERCIAL

## 2025-05-27 DIAGNOSIS — F41.0 PANIC DISORDER: Primary | ICD-10-CM

## 2025-05-27 DIAGNOSIS — E66.01 CLASS 3 SEVERE OBESITY DUE TO EXCESS CALORIES WITH SERIOUS COMORBIDITY AND BODY MASS INDEX (BMI) OF 45.0 TO 49.9 IN ADULT: ICD-10-CM

## 2025-05-27 DIAGNOSIS — R19.7 DIARRHEA, UNSPECIFIED TYPE: ICD-10-CM

## 2025-05-27 DIAGNOSIS — E66.813 CLASS 3 SEVERE OBESITY DUE TO EXCESS CALORIES WITH SERIOUS COMORBIDITY AND BODY MASS INDEX (BMI) OF 45.0 TO 49.9 IN ADULT: ICD-10-CM

## 2025-05-27 PROCEDURE — 1159F MED LIST DOCD IN RCRD: CPT | Mod: CPTII,95,, | Performed by: FAMILY MEDICINE

## 2025-05-27 PROCEDURE — 98004 SYNCH AUDIO-VIDEO EST SF 10: CPT | Mod: 95,,, | Performed by: FAMILY MEDICINE

## 2025-05-27 PROCEDURE — 1160F RVW MEDS BY RX/DR IN RCRD: CPT | Mod: CPTII,95,, | Performed by: FAMILY MEDICINE

## 2025-05-27 RX ORDER — HYOSCYAMINE SULFATE 0.125 MG
125 TABLET ORAL EVERY 4 HOURS PRN
Qty: 120 TABLET | Refills: 0 | Status: SHIPPED | OUTPATIENT
Start: 2025-05-27 | End: 2025-06-26

## 2025-05-27 NOTE — PROGRESS NOTES
Subjective:     Patient ID: Surjit Valentine is a 44 y.o. male.   Chief Complaint: No chief complaint on file.      The patient location is: [place]  The chief complaint leading to consultation is: an    Visit type: audiovisual    Face to Face time with patient: 12 minutes  16 minutes of total time spent on the encounter, which includes face to face time and non-face to face time preparing to see the patient (eg, review of tests), Obtaining and/or reviewing separately obtained history, Documenting clinical information in the electronic or other health record, Independently interpreting results (not separately reported) and communicating results to the patient/family/caregiver, or Care coordination (not separately reported).         Each patient to whom he or she provides medical services by telemedicine is:  (1) informed of the relationship between the physician and patient and the respective role of any other health care provider with respect to management of the patient; and (2) notified that he or she may decline to receive medical services by telemedicine and may withdraw from such care at any time.    Notes:      HPI:  History of Present Illness    CHIEF COMPLAINT:  Patient presents today for referral to psychology and chronic diarrhea.    He continues Lexapro and Xanax for panic disorder. He discontinued Wellbutrin independently due to concerns about efficacy and reports feeling better since discontinuation. He is requesting referral to psychology for talk therapy.    He has a history of gastric bypass surgery and experiences chronic diarrhea immediately after eating. He denies blood or pain associated with diarrhea episodes.         Review of Systems   Constitutional:  Negative for activity change and unexpected weight change.   HENT:  Negative for hearing loss, rhinorrhea and trouble swallowing.    Eyes:  Negative for discharge and visual disturbance.   Respiratory:  Negative for chest tightness and wheezing.     Cardiovascular:  Negative for chest pain and palpitations.   Gastrointestinal:  Negative for blood in stool, constipation, diarrhea and vomiting.   Endocrine: Negative for polydipsia and polyuria.   Genitourinary:  Negative for difficulty urinating, hematuria and urgency.   Musculoskeletal:  Negative for arthralgias, joint swelling and neck pain.   Neurological:  Negative for weakness and headaches.   Psychiatric/Behavioral:  Positive for confusion and dysphoric mood.           Objective:      There were no vitals filed for this visit.   Physical Exam  Constitutional:       General: He is not in acute distress.     Appearance: Normal appearance. He is not ill-appearing.   HENT:      Head: Normocephalic and atraumatic.   Pulmonary:      Effort: Pulmonary effort is normal. No respiratory distress.   Neurological:      Mental Status: He is alert and oriented to person, place, and time. Mental status is at baseline.   Psychiatric:         Mood and Affect: Mood normal.         Behavior: Behavior normal.         Thought Content: Thought content normal.         Judgment: Judgment normal.       As this visit was accomplished virtually, physical exam is limited only to what may be observed via the telehealth audiovisual connection.        Assessment/Plan:             ICD-10-CM ICD-9-CM   1. Panic disorder  F41.0 300.01   2. Diarrhea, unspecified type  R19.7 787.91   3. Class 3 severe obesity due to excess calories with serious comorbidity and body mass index (BMI) of 45.0 to 49.9 in adult  E66.813 278.01    Z68.42 V85.42    E66.01      Orders Placed This Encounter   Procedures    Ambulatory referral/consult to Psychology     Assessment & Plan    - Assessed request for psychology referral for talk therapy, considering current medications (Lexapro and Xanax) for panic disorder.  - Evaluated chronic diarrhea post-gastric bypass surgery as functional diarrhea.  - Considered need for further investigation if symptoms persist  or worsen.    1. Panic disorder (Primary)  Ref to psychology as requested  Continue xanax PRN  Continue lexapro and wellbutrin  - Ambulatory referral/consult to Psychology; Future    2. Diarrhea, unspecified type  Possibly related to increased anxiety  Could also consider sx of IBS  Rx hyoscyamine  - hyoscyamine (ANASPAZ,LEVSIN) 0.125 mg Tab; Take 1 tablet (125 mcg total) by mouth every 4 (four) hours as needed.  Dispense: 120 tablet; Refill: 0    3. Class 3 severe obesity due to excess calories with serious comorbidity and body mass index (BMI) of 45.0 to 49.9 in adult  Encourage healthy diet and exercise habits to optimize health and minimize chance of comorbidity development  Comorbid GAYLE  Noncontributory to panic or chronic diarrhea complaints                   No follow-ups on file.     Sourav Fabian MD, [facility]  Family Medicine Physician  [facility]  06/08/2025      This note was generated with the assistance of ambient listening technology. Verbal consent was obtained by the patient and accompanying visitor(s) for the recording of patient appointment to facilitate this note. I attest to having reviewed and edited the generated note for accuracy, though some syntax or spelling errors may persist. Please contact the author of this note for any clarification.

## 2025-06-03 ENCOUNTER — PATIENT MESSAGE (OUTPATIENT)
Dept: BARIATRICS | Facility: CLINIC | Age: 45
End: 2025-06-03
Payer: COMMERCIAL

## 2025-06-10 ENCOUNTER — OFFICE VISIT (OUTPATIENT)
Dept: PSYCHIATRY | Facility: CLINIC | Age: 45
End: 2025-06-10
Payer: COMMERCIAL

## 2025-06-10 DIAGNOSIS — F41.0 PANIC DISORDER: ICD-10-CM

## 2025-06-10 PROCEDURE — 90791 PSYCH DIAGNOSTIC EVALUATION: CPT | Mod: 95,,,

## 2025-06-10 NOTE — PROGRESS NOTES
Intake Coordination Psychiatry Initial Visit (PhD/LCSW)     Patient Name:  Surjit Valentine  Date: 06/10/2025  Site: The patient location is: [place]     Visit type: audiovisual     Face to Face time with patient: 62  80 minutes of total time spent on the encounter, which includes face to face time and non-face to face time preparing to see the patient (eg, review of tests), Obtaining and/or reviewing separately obtained history, Documenting clinical information in the electronic or other health record, Independently interpreting results (not separately reported) and communicating results to the patient/family/caregiver, or Care coordination (not separately reported).      Each patient to whom he or she provides medical services by telemedicine is:  (1) informed of the relationship between the physician and patient and the respective role of any other health care provider with respect to management of the patient; and (2) notified that he or she may decline to receive medical services by telemedicine and may withdraw from such care at any time.     Notes:      Referral source: Sourav Fabian MD     Chief complaint/reason for encounter: Psychological Evaluation to determine appropriate referrals for psychological services and/or resources   Clinical status of patient: Outpatient  Met with: Patient  CPT Code: 98443     Before this evaluation was initiated, the purposes and process of the assessment and the limits of confidentiality were discussed with the patient who expressed understanding of these issues and verbally consented to proceed with the evaluation.     History of present illness: Surjit Valentine is a 44 y.o. old male presenting with emotional dysregulation, episodic rage, fatigue, episodic panic attacks, and mood instability. He reports a significant period of psychosocial stress beginning in late 2018, including a divorce in October 2018 followed by the death of his mother in January 2019. During  that time, he was consuming alcohol heavily and entered a new relationship with a partner who also drank excessively. He underwent gastric bypass surgery approximately two years ago, which led to a significant reduction in alcohol intake.    Since the surgery, he describes a notable change in emotional coping and behavior. He reports frequent episodes of intense irritability and rage, often triggered by arguments with his wife. These episodes last approximately 20 minutes and can include dissociative features such as blackouts--one instance involved throwing a cup of water, leaving the house, and returning without his shirt or shoes with no recollection of his actions. He reports that while the rage is acute, he feels completely fine the next morning. He is sometimes able to recognize the onset of these episodes and use de-escalation strategies. He attributes the emergence of these behaviors to a loss of prior coping mechanisms (i.e., food and alcohol) post-surgery.    He also reports persistent fatigue, requiring naps during his lunch hour and experiencing difficulty staying awake throughout the day, which is a change from his previously high energy level.  Although there has been no functional impairment at work, his symptoms have significantly impacted his marriage. He denies physical abuse. His wife has suggested narcissistic traits, which he denies, though these accusations appear to trigger distress and panic in him.    He describes episodes consistent with panic attacks, occurring 1-2 times per week. These include sudden onset of crying, shaking, tachycardia, nausea, and a fear of future attacks. Triggers can be specific (e.g., videos or songs about breakups) or occur out of the blue. Recovery from these attacks typically takes about an hour.    Regarding mood symptoms, he denies periods of decreased need for sleep or true euphoric states. However, he does describe periods where he becomes highly energized,  speaks rapidly, and becomes very goal-directed (though he stays on topic and is understandable to others). He has also had episodes of elevated spending--for example, spending several thousand dollars on a weekend trip or vacationing with his son--which he later reflects may have been excessive, but he denies financial harm or impulsivity beyond his means. Depressive episodes are described as short-lived, lasting 2-3 days, during which he feels down but is generally able to mask his symptoms and function externally. He notes that his son can recognize his emotional fluctuations, and coworkers sometimes notice when he is quieter, though his erratic behavior is not typically visible in the workplace.    Provisional Diagnosis:  Bipolar II Disorder - Provisional  The patient meets several criteria consistent with Bipolar II Disorder. Specifically, he describes:  At least one hypomanic episode: Characterized by elevated energy, increased goal-directed activity, rapid speech that remains coherent, and elevated spending behavior. While these episodes do not appear to meet the threshold for natalee (no psychosis or marked impairment), they are distinct from his usual mood and noticeable to others (e.g., family, coworkers).  Depressive episodes: Although brief (2-3 days), they include symptoms of low mood and fatigue, with subjective distress. While the duration falls short of DSM-5 criteria for a major depressive episode, mood cycling and functional fluctuations suggest a bipolar spectrum presentation.  Panic Disorder  The patient meets criteria for Panic Disorder:  Recurrent unexpected panic attacks: Sudden onset episodes involving crying, shaking, heart racing, and nausea.  Worry about future attacks and behavioral changes in response to them (e.g., avoiding triggers like certain media content).  Symptoms cause significant distress and disruption, particularly in his emotional well-being and relationship.    Risk  assessment:      Assessment Summary:       6/10/2025     1:51 PM   Panic Disorder Severity Scale   How many panic and limited symptoms attacks did you have during the week 3   If you had any panic attacks during the past week, how distressing (uncomfortable, frightening) were they while they were happening? 4   During the past week, how much have you worried or felt anxious about when your next panic attack would occur or about fears related to the attacks? 3   During the past week were there any places or situations you avoided, or felt afraid of because of fear of having a panic attack?  1   Are there any other situations that you would have avoided or been afraid of if they had come up during the week, for the same reason?  No   please rate your level of fear and avoidance this past week. 7   During the past week, were there any activities  that you avoided, or felt afraid of because they caused physical sensations like those you feel during panic attacks or that you were afraid might trigger a panic attack? 1   Are there any other activities that you would have avoided or been afraid of if they had come up during the week for that reason?  No   Please rate your level of fear and avoidance of those activities this past week. 5   During the past week,  how much did the above symptoms altogether interfere with your ability to work or carry out your responsibilities at home? 1   During the past week, how much did panic and limited symptom attacks, worry about attacks and fear of situations and activities because of attacks interfere with your social life? 3   PDSS Results Total 16        Patient-reported       Pain scale: 0/10     Medical history:   Past Medical History:   Diagnosis Date    Acute hypoxemic respiratory failure due to COVID-19 12/02/2020    Anemia 06/20/2024    Bilateral nephrolithiasis 10/06/2019    CT Renal 1-  Increase in size and number of bilateral nephrolithiasis without evidence of lower  tract stone or obstruction.  Diverticulosis without evidence of diverticulitis.      BMI 60.0-69.9, adult 08/01/2018    Chondromalacia, right knee 11/20/2023    COVID-19 12/02/2020    Decreased range of motion (ROM) of right knee 11/20/2023    Elevated blood pressure reading in office without diagnosis of hypertension 06/20/2024    GERD without esophagitis     takes OTC nexium PRN    Hay fever 06/20/2024    History of kidney stones     Hyperglycemia 06/20/2024    Impaired functional mobility, balance, gait, and endurance 11/20/2023    Kidney stones 10/06/2019    Morbid obesity with body mass index of 60.0-69.9 in adult     GAYLE (obstructive sleep apnea)     did not tolerate CPAP    Panic disorder 06/20/2024    Quadriceps weakness 11/20/2023    Seizure 06/20/2024    Ureterolithiasis 10/06/2019        Psychiatric symptoms:  Depression: depressed mood, anhedonia, fatigue, impaired memory, panic attacks, and loss of energy/fatigue  Ana/Hypomania: Elevated/irritable mood, Decreased sleep, and Racing thoughts  Anxiety: fatigue, irritable, palpitations, racing thoughts, shortness of breath  Thoughts: did not endorse symptoms of thought dysfunction  PTSD: Denied symptoms of PTSD.   Suicidal thoughts/behaviors:   Self-injury: Denied.        Current psychosocial stressors:   Report of coping skills: Working out   Support system: Friends, sibling     Current and past substance use:  Alcohol: Denied current use. Denied history of abuse or dependency.  Drugs: Denied current use. Denied history of abuse or dependency.  Tobacco: Denied current use.  Caffeine: Denied current use.     Current Psychiatric Treatment:  Medications: Wellbutrin 150mg  Psychotherapy: Denied      Psychiatric history:  Previous diagnosis: [facility] (dx 2023), MDD (dx 2023)  Previous hospitalizations: Hospitalized after missing several days of his medication and transferred to [facility] for four days.   History of outpatient treatment: Denied  Previous suicide  attempt: Denied   Family history of psychiatric illness: Father with Bipolar      Trauma history: Denied symptoms of PTSD.      Social history: Mr. Valentine was born and raised in [place] by his biological and brother along with a sister. He described his childhood as typical. He denied childhood trauma, abuse, and neglect. He completed a GED and is currently in school completing a bachelor's degree in education. He is currently employed as a . He denied  service. He is not on disability. He has been  to his wife for a little years. He has one child (ages 24) and three step children (19,12,11). He currently lives with his wife and three step-children.     Legal history: He denied history of arrests and convictions. He is not currently involved in civil or criminal litigation.     Access to guns: Hunting rifles safely secured.      Mental Status Exam:  General appearance: Appears stated age, neatly dressed, well groomed  Speech: Normal rate, normal tone, normal pitch, normal volume  Level of cooperation: Cooperative  Thought processes: Logical, goal-directed  Mood: Euthymic  Thought content: No illusions, no visual hallucinations, no auditory hallucinations, no delusions, no active or passive homicidal thoughts, no active or passive suicidal ideation, no obsessions, no compulsions, no violence  Affect: Appropriate  Orientation: Oriented to person, place, and date  Memory:  Recent memory: 3 of 3 objects after brief delay  Remote memory: Intact  Attention span and concentration: Spelled HOUSE forward and backwards  Fund of general knowledge: 3 of 3 recent presidents  Abstract reasoning:   Similarities: Abstract  Proverbs: Abstract  Judgment and insight: Fair  Language: Intact     Diagnostic impression:     ICD-10-CM ICD-9-CM   1. Panic disorder  F41.0 300.01            Plan: Surjit Valentine would benefit from admission to the [facility] as a part of the  [facility]. Patient would benefit from the development of interpersonal effectiveness, distress tolerance, and emotion regulation skills. Patient was agreeable to the suggestion. They were provided information about provided information about their treatment options and will be scheduled with an appropriate provider.      Length of Session: 60     Gamal Michaud PsyD  Clinical Psychologist

## 2025-06-22 ENCOUNTER — PATIENT MESSAGE (OUTPATIENT)
Dept: INTERNAL MEDICINE | Facility: CLINIC | Age: 45
End: 2025-06-22
Payer: COMMERCIAL

## 2025-06-22 DIAGNOSIS — F41.0 PANIC DISORDER: ICD-10-CM

## 2025-06-23 ENCOUNTER — TELEPHONE (OUTPATIENT)
Dept: SLEEP MEDICINE | Facility: OTHER | Age: 45
End: 2025-06-23
Payer: COMMERCIAL

## 2025-06-23 ENCOUNTER — HOSPITAL ENCOUNTER (EMERGENCY)
Facility: HOSPITAL | Age: 45
Discharge: HOME | End: 2025-06-24
Attending: STUDENT IN AN ORGANIZED HEALTH CARE EDUCATION/TRAINING PROGRAM
Payer: COMMERCIAL

## 2025-06-23 DIAGNOSIS — R55 SYNCOPE: ICD-10-CM

## 2025-06-23 DIAGNOSIS — M25.512 ACUTE PAIN OF LEFT SHOULDER: Primary | ICD-10-CM

## 2025-06-23 DIAGNOSIS — S06.0X1A CONCUSSION WITH LOSS OF CONSCIOUSNESS OF 30 MINUTES OR LESS, INITIAL ENCOUNTER: ICD-10-CM

## 2025-06-23 DIAGNOSIS — S09.90XA CLOSED HEAD INJURY, INITIAL ENCOUNTER: ICD-10-CM

## 2025-06-23 LAB
ABSOLUTE EOSINOPHIL (OHS): 0.2 K/UL
ABSOLUTE MONOCYTE (OHS): 0.63 K/UL (ref 0.3–1)
ABSOLUTE NEUTROPHIL COUNT (OHS): 4.12 K/UL (ref 1.8–7.7)
ALBUMIN SERPL BCP-MCNC: 4 G/DL (ref 3.5–5.2)
ALP SERPL-CCNC: 94 UNIT/L (ref 40–150)
ALT SERPL W/O P-5'-P-CCNC: 40 UNIT/L (ref 10–44)
ANION GAP (OHS): 10 MMOL/L (ref 8–16)
AST SERPL-CCNC: 39 UNIT/L (ref 11–45)
BASOPHILS # BLD AUTO: 0.05 K/UL
BASOPHILS NFR BLD AUTO: 0.6 %
BILIRUB SERPL-MCNC: 0.2 MG/DL (ref 0.1–1)
BUN SERPL-MCNC: 15 MG/DL (ref 6–20)
CALCIUM SERPL-MCNC: 9.2 MG/DL (ref 8.7–10.5)
CHLORIDE SERPL-SCNC: 110 MMOL/L (ref 95–110)
CO2 SERPL-SCNC: 23 MMOL/L (ref 23–29)
CREAT SERPL-MCNC: 0.9 MG/DL (ref 0.5–1.4)
ERYTHROCYTE [DISTWIDTH] IN BLOOD BY AUTOMATED COUNT: 12.6 % (ref 11.5–14.5)
GFR SERPLBLD CREATININE-BSD FMLA CKD-EPI: >60 ML/MIN/1.73/M2
GLUCOSE SERPL-MCNC: 113 MG/DL (ref 70–110)
HCT VFR BLD AUTO: 38.9 % (ref 40–54)
HGB BLD-MCNC: 13.3 GM/DL (ref 14–18)
IMM GRANULOCYTES # BLD AUTO: 0.02 K/UL (ref 0–0.04)
IMM GRANULOCYTES NFR BLD AUTO: 0.3 % (ref 0–0.5)
LYMPHOCYTES # BLD AUTO: 2.81 K/UL (ref 1–4.8)
MAGNESIUM SERPL-MCNC: 2 MG/DL (ref 1.6–2.6)
MCH RBC QN AUTO: 31.1 PG (ref 27–31)
MCHC RBC AUTO-ENTMCNC: 34.2 G/DL (ref 32–36)
MCV RBC AUTO: 91 FL (ref 82–98)
NUCLEATED RBC (/100WBC) (OHS): 0 /100 WBC
PLATELET # BLD AUTO: 195 K/UL (ref 150–450)
PMV BLD AUTO: 10.8 FL (ref 9.2–12.9)
POTASSIUM SERPL-SCNC: 4 MMOL/L (ref 3.5–5.1)
PROT SERPL-MCNC: 7.3 GM/DL (ref 6–8.4)
RBC # BLD AUTO: 4.28 M/UL (ref 4.6–6.2)
RELATIVE EOSINOPHIL (OHS): 2.6 %
RELATIVE LYMPHOCYTE (OHS): 35.9 % (ref 18–48)
RELATIVE MONOCYTE (OHS): 8 % (ref 4–15)
RELATIVE NEUTROPHIL (OHS): 52.6 % (ref 38–73)
SODIUM SERPL-SCNC: 143 MMOL/L (ref 136–145)
WBC # BLD AUTO: 7.83 K/UL (ref 3.9–12.7)

## 2025-06-23 PROCEDURE — 93010 ELECTROCARDIOGRAM REPORT: CPT | Mod: ,,, | Performed by: STUDENT IN AN ORGANIZED HEALTH CARE EDUCATION/TRAINING PROGRAM

## 2025-06-23 PROCEDURE — 80053 COMPREHEN METABOLIC PANEL: CPT | Performed by: EMERGENCY MEDICINE

## 2025-06-23 PROCEDURE — 85025 COMPLETE CBC W/AUTO DIFF WBC: CPT | Performed by: EMERGENCY MEDICINE

## 2025-06-23 PROCEDURE — 83735 ASSAY OF MAGNESIUM: CPT | Performed by: EMERGENCY MEDICINE

## 2025-06-23 PROCEDURE — 93005 ELECTROCARDIOGRAM TRACING: CPT

## 2025-06-23 PROCEDURE — 99285 EMERGENCY DEPT VISIT HI MDM: CPT | Mod: 25

## 2025-06-23 NOTE — TELEPHONE ENCOUNTER
No care due was identified.  Health [facility] Embedded Care Due Messages. Reference number: [id number].   6/23/2025 10:59:34 AM CDT

## 2025-06-23 NOTE — TELEPHONE ENCOUNTER
LOV with Sourav Fabian MD , 5/27/2025  Last prescribed on 4/3/25 for #30  Will offer virtual to discuss ozempic

## 2025-06-24 VITALS
HEART RATE: 53 BPM | WEIGHT: 315 LBS | SYSTOLIC BLOOD PRESSURE: 136 MMHG | OXYGEN SATURATION: 96 % | HEIGHT: 70 IN | DIASTOLIC BLOOD PRESSURE: 72 MMHG | BODY MASS INDEX: 45.1 KG/M2 | TEMPERATURE: 98 F | RESPIRATION RATE: 16 BRPM

## 2025-06-24 LAB
OHS QRS DURATION: 88 MS
OHS QTC CALCULATION: 405 MS

## 2025-06-24 RX ORDER — ALPRAZOLAM 0.5 MG/1
0.5 TABLET ORAL 3 TIMES DAILY PRN
Qty: 30 TABLET | Refills: 2 | Status: SHIPPED | OUTPATIENT
Start: 2025-06-24 | End: 2025-07-24

## 2025-06-24 NOTE — ED PROVIDER NOTES
Encounter Date: 6/23/2025       History     Chief Complaint   Patient presents with    Loss of Consciousness     C/o of syncopal episode; fell and hit head, pt hx of gastric bypass, ate to fast, went to go vomit when he LOC. Pt denies neck injury. Per family, pt has been altered. Pt is AAOx4.      HPI  The patient is a 44-year-old male with history of anemia, obesity, status post gastric bypass, GERD who presents after a syncopal episode at home.  Patient states that he was eating dinner and ate too fast and it made him nauseated.  He felt like he was going to vomit so he went to the bathroom.  Preceded to vomit but then had a syncopal episode.  He felt lightheaded prior to the event.  He reports hitting his head.  He believes he hit it on the toilet.  He also hit his left shoulder is reporting some mild shoulder pain.  No neck or back pain.  No preceding chest pain, shortness of breath or palpitations.  He did report feeling lightheaded and nauseated and had an episode of vomiting.  No current abdominal pain.  Family reported initially that he seemed briefly confused following the event.  No seizure-like activity.  No bowel or bladder incontinence.  Family reports that he is back to baseline.  Syncopal episode was brief and only lasted a few seconds.  Patient is not on anticoagulation.    Review of patient's allergies indicates:  No Known Allergies  Past Medical History:   Diagnosis Date    Acute hypoxemic respiratory failure due to COVID-19 12/02/2020    Anemia 06/20/2024    Bilateral nephrolithiasis 10/06/2019    CT Renal 1-  Increase in size and number of bilateral nephrolithiasis without evidence of lower tract stone or obstruction.  Diverticulosis without evidence of diverticulitis.      BMI 60.0-69.9, adult 08/01/2018    Chondromalacia, right knee 11/20/2023    COVID-19 12/02/2020    Decreased range of motion (ROM) of right knee 11/20/2023    Elevated blood pressure reading in office without diagnosis  of hypertension 06/20/2024    GERD without esophagitis     takes OTC nexium PRN    Hay fever 06/20/2024    History of kidney stones     Hyperglycemia 06/20/2024    Impaired functional mobility, balance, gait, and endurance 11/20/2023    Kidney stones 10/06/2019    Morbid obesity with body mass index of 60.0-69.9 in adult     GAYLE (obstructive sleep apnea)     did not tolerate CPAP    Panic disorder 06/20/2024    Quadriceps weakness 11/20/2023    Seizure 06/20/2024    Ureterolithiasis 10/06/2019     Past Surgical History:   Procedure Laterality Date    APPENDECTOMY  3/2008    ARTHROSCOPIC CHONDROPLASTY OF KNEE JOINT Right 11/17/2023    Procedure: ARTHROSCOPY, KNEE, WITH CHONDROPLASTY;  Surgeon: Rogers Martinez MD;  Location: [facility] OR 2ND FLR;  Service: Orthopedics;  Laterality: Right;    CYSTOSCOPY  10/11/2019    Procedure: CYSTOSCOPY;  Surgeon: Gamal Sosa MD;  [facility];  Service: Urology;;    CYSTOSCOPY N/A 3/10/2025    Procedure: CYSTOSCOPY;  Surgeon: Erica Martinez MD;  Location: [facility] OR 1ST FLR;  Service: Urology;  Laterality: N/A;    CYSTOSCOPY W/ URETERAL STENT PLACEMENT Left 10/6/2019    Procedure: CYSTOSCOPY, WITH URETERAL STENT INSERTION;  Surgeon: Gamal Sosa MD;  Location: [facility] OR 1ST FLR;  Service: Urology;  Laterality: Left;    CYSTOSCOPY W/ URETERAL STENT PLACEMENT Bilateral 2/24/2025    Procedure: CYSTOSCOPY, WITH URETERAL STENT INSERTION;  Surgeon: Erica Martinez MD;  Location: [facility] OR 1ST FLR;  Service: Urology;  Laterality: Bilateral;    ESOPHAGOGASTRODUODENOSCOPY N/A 7/3/2023    Procedure: EGD (ESOPHAGOGASTRODUODENOSCOPY);  Surgeon: Tay Stephen MD;  Location: [facility] OR 2ND FLR;  Service: General;  Laterality: N/A;    KNEE ARTHROSCOPY W/ MENISCECTOMY Right 11/17/2023    Procedure: ARTHROSCOPY, KNEE, WITH MENISCECTOMY;  Surgeon: Rogers Martinez MD;  Location: [facility] OR 2ND FLR;  Service: Orthopedics;  Laterality: Right;  regional  without catheter    LASER LITHOTRIPSY Left 10/11/2019    Procedure: LITHOTRIPSY, USING LASER;  Surgeon: Gamal Sosa MD;  Location: [facility] OR 1ST FLR;  Service: Urology;  Laterality: Left;    NEPHROSCOPY Bilateral 3/10/2025    Procedure: NEPHROSCOPY;  Surgeon: Erica Martinez MD;  Location: [facility] OR 1ST FLR;  Service: Urology;  Laterality: Bilateral;    PLACEMENT-STENT Bilateral 3/10/2025    Procedure: PLACEMENT-STENT;  Surgeon: Erica Martinez MD;  Location: [facility] OR 1ST FLR;  Service: Urology;  Laterality: Bilateral;    REMOVAL-STENT Bilateral 3/10/2025    Procedure: REMOVAL-STENT;  Surgeon: Erica Martinez MD;  Location: [facility] OR 1ST FLR;  Service: Urology;  Laterality: Bilateral;    RETROGRADE PYELOGRAPHY Left 10/6/2019    Procedure: PYELOGRAM, RETROGRADE;  Surgeon: Gamal Sosa MD;  Location: [facility] OR 1ST FLR;  Service: Urology;  Laterality: Left;    RETROGRADE PYELOGRAPHY Left 10/11/2019    Procedure: PYELOGRAM, RETROGRADE;  Surgeon: Gamal Sosa MD;  Location: [facility] OR 1ST FLR;  Service: Urology;  Laterality: Left;    RETROGRADE PYELOGRAPHY Bilateral 2/24/2025    Procedure: PYELOGRAM, RETROGRADE;  Surgeon: Erica Martinez MD;  Location: [facility] OR 1ST FLR;  Service: Urology;  Laterality: Bilateral;    URETEROSCOPIC REMOVAL OF URETERIC CALCULUS  10/11/2019    Procedure: REMOVAL, CALCULUS, URETER, URETEROSCOPIC;  Surgeon: Gamal Sosa MD;  Location: [facility] OR 1ST FLR;  Service: Urology;;    URETEROSCOPIC REMOVAL OF URETERIC CALCULUS Bilateral 3/10/2025    Procedure: REMOVAL, CALCULUS, URETER, URETEROSCOPIC;  Surgeon: Erica Martinez MD;  Location: [facility] OR 1ST FLR;  Service: Urology;  Laterality: Bilateral;    URETEROSCOPY Left 10/6/2019    Procedure: URETEROSCOPY;  Surgeon: Gamal Sosa MD;  Location: [facility] OR 1ST FLR;  Service: Urology;  Laterality: Left;    URETEROSCOPY Left 10/11/2019    Procedure: URETEROSCOPY;  Surgeon: Gamal Sosa MD;   Location: [facility] OR 1ST FLR;  Service: Urology;  Laterality: Left;  1hr    URETEROSCOPY Right 2/24/2025    Procedure: URETEROSCOPY;  Surgeon: Erica Martinez MD;  Location: [facility] OR 1ST FLR;  Service: Urology;  Laterality: Right;    URETEROSCOPY Bilateral 3/10/2025    Procedure: URETEROSCOPY;  Surgeon: Erica Martinez MD;  [facility];  Service: Urology;  Laterality: Bilateral;    URETEROSCOPY, WITH LASER LITHOTRIPSY Bilateral 3/10/2025    Procedure: URETEROSCOPY, WITH LASER LITHOTRIPSY;  Surgeon: Erica Martinez MD;  [facility];  Service: Urology;  Laterality: Bilateral;    XI ROBOTIC GASTROENTEROSTOMY, TODD-EN-Y N/A 7/3/2023    Procedure: XI ROBOTIC GASTROENTEROSTOMY, TODD-EN-Y (PT is SELF PAY, DO NOT submit to insurance;  Surgeon: Tay Stephen MD;  Location: [facility] OR 2ND FLR;  Service: General;  Laterality: N/A;     Family History   Problem Relation Name Age of Onset    Heart disease Father      Stroke Father      Hypertension Father      Diabetes Maternal Grandmother      Heart disease Maternal Grandfather      Stroke Paternal Grandmother      Heart disease Paternal Grandfather      Diabetes Mother      Obesity Mother      Obesity Sister      Diabetes Brother      Obesity Brother       Social History[1]  Review of Systems  A full review of systems was obtained, see HPI for pertinent positives.    Physical Exam     Initial Vitals [06/23/25 2025]   BP Pulse Resp Temp SpO2   (!) 141/80 66 16 98.1 °F (36.7 °C) 98 %      MAP       --         Physical Exam  Constitutional: No acute distress, well-appearing, nontoxic  HENT: Normocephalic, atraumatic, no hemotympanum, no raccoon eyes, no cosby sign, moist mucous membranes, no tongue lacerations  Neck/spine: Neck is supple with normal range of motion, no midline CT or L-spine tenderness, no step-offs or deformities  Respiratory: Non-labored, lungs clear  Cardiovascular: Well perfused, normal rate, regular  rhythm, no murmur  Gastrointestinal: Soft, non-tender, non-distended  Integumentary: Warm and dry  Musculoskeletal: No deformity, normal range of motion at all joints, mildly tender to palpation over the left shoulder but he still has normal range of motion in his joint, he is neurovascularly intact distally in all extremities with soft compartments throughout  Neurological: Awake and alert, oriented to person place time and situation, 5/5 motor and sensation light touch intact in all extremities, cranial nerves 2-12 grossly intact, normal gait  Psychiatric: Cooperative     ED Course   Procedures  Labs Reviewed   COMPREHENSIVE METABOLIC PANEL - Abnormal       Result Value    Sodium 143      Potassium 4.0      Chloride 110      CO2 23      Glucose 113 (*)     BUN 15      Creatinine 0.9      Calcium 9.2      Protein Total 7.3      Albumin 4.0      Bilirubin Total 0.2      ALP 94      AST 39      ALT 40      Anion Gap 10      eGFR >60     CBC WITH DIFFERENTIAL - Abnormal    WBC 7.83      RBC 4.28 (*)     HGB 13.3 (*)     HCT 38.9 (*)     MCV 91      MCH 31.1 (*)     MCHC 34.2      RDW 12.6      Platelet Count 195      MPV 10.8      Nucleated RBC 0      Neut % 52.6      Lymph % 35.9      Mono % 8.0      Eos % 2.6      Basophil % 0.6      Imm Grans % 0.3      Neut # 4.12      Lymph # 2.81      Mono # 0.63      Eos # 0.20      Baso # 0.05      Imm Grans # 0.02     MAGNESIUM - Normal    Magnesium  2.0     CBC W/ AUTO DIFFERENTIAL    Narrative:     The following orders were created for panel order CBC auto differential.  Procedure                               Abnormality         Status                     ---------                               -----------         ------                     CBC with Differential[[id number]]       Abnormal            Final result                 Please view results for these tests on the individual orders.          Imaging Results              CT Head Without Contrast (Final result)  Result  time 06/23/25 23:46:18      Final result by Joseph Hidalgo MD (06/23/25 23:46:18)                   Impression:      No acute intracranial pathology.  No major vascular distribution infarct or intracranial hemorrhage.    Electronically signed by resident: Jimmy Son  Date:    06/23/2025  Time:    23:31    Electronically signed by: Joseph Hidalgo MD  Date:    06/23/2025  Time:    23:46               Narrative:    EXAMINATION:  CT HEAD WITHOUT CONTRAST    CLINICAL HISTORY:  Head trauma, moderate-severe    TECHNIQUE:  Low dose axial CT images obtained throughout the head without the use of intravenous contrast.  Axial, sagittal and coronal reconstructions were performed.    COMPARISON:  CTA head neck 10/25/2021 and MRI brain 05/16/2011.    FINDINGS:  Exam mildly degraded by streak artifact.    The brain parenchyma appears unremarkable.  No new hemorrhage, edema, or acute major vascular distribution infarct.  No mass effect or midline shift. Pineal gland calcification.    Ventricles are unremarkable in size and configuration.  No hydrocephalus.    No new extra-axial blood or fluid collections.    No displaced calvarial fracture.    Mastoid air cells and paranasal sinuses are essentially clear.                        Preliminary result by Jimmy Son MD (06/23/25 23:36:28)                   Impression:      No acute intracranial pathology.  No major vascular distribution infarct or intracranial hemorrhage.    Electronically signed by resident: Jimmy Son  Date:    06/23/2025  Time:    23:31                 Narrative:    EXAMINATION:  CT HEAD WITHOUT CONTRAST    CLINICAL HISTORY:  Head trauma, moderate-severe;    TECHNIQUE:  Low dose axial CT images obtained throughout the head without the use of intravenous contrast.  Axial, sagittal and coronal reconstructions were performed.    COMPARISON:  CTA head neck 10/25/2021, MRI brain 05/16/2011    FINDINGS:  Exam mildly degraded by streak artifact.    The  brain parenchyma appears unremarkable.  No new hemorrhage, edema, or acute major vascular distribution infarct.  No mass effect or midline shift. Pineal gland calcification.    Ventricles are unremarkable in size and configuration.  No hydrocephalus.    No new extra-axial blood or fluid collections.    No displaced calvarial fracture.    Mastoid air cells and paranasal sinuses are essentially clear.                                       X-Ray Shoulder 2 or More Views Left (Final result)  Result time 06/24/25 01:59:17      Final result by Elfego Donovan DO (06/24/25 01:59:17)                   Impression:      No acute osseous abnormality of the shoulder.      Electronically signed by: Elfego Donovan  Date:    06/24/2025  Time:    01:59               Narrative:    EXAMINATION:  XR SHOULDER COMPLETE 2 OR MORE VIEWS LEFT    CLINICAL HISTORY:  syncope, fall, left shoulder pain;    TECHNIQUE:  Two or three views of the left shoulder were performed.    COMPARISON:  None    FINDINGS:  There is no acute fracture or dislocation of the shoulder.  Alignment is normal.  Joint spaces are preserved.  Remaining osseous structures are intact.                                       Medications - No data to display  Medical Decision Making  The patient is a 44-year-old male here after a syncopal episode at home.  He did strike his head.  No headache currently.  No abdominal pain, nausea or vomiting currently.  Symptoms were preceded by an episode of vomiting at home after he ate too quickly which is normal given his history of gastric bypass per patient.  His abdominal exam is benign.  Syncope was preceded by lightheadedness and an episode of vomiting.  Sounds vasovagal in etiology.  EKG here reassuring.  No arrhythmia or ischemic changes.  Will obtain CT head given head injury and x-ray the left shoulder.  C-spine cleared by nexus criteria.  Patient is neurologically intact and has a reassuring exam.  Will also check labs.   "Anticipate discharge if workup reassuring.    Labs and imaging reviewed and reassuring.  Syncope sounds vasovagal in etiology.  He remains at baseline with a normal neurologic exam.  Counseled on closed head injury and concussion precautions.  Will refer to Neurology.  He is to follow up with his primary care doctor as soon as possible.  Return precautions discussed at discharge.    Amount and/or Complexity of Data Reviewed  Radiology: ordered.               ED Course as of 06/24/25 0250 Mon Jun 23, 2025   2226 EKG with sinus rhythm, rate 63, no STEMI [NN]   2338 CT head negative [NN]      ED Course User Index  [NN] Fannie Tejada MD                           Clinical Impression:  Final diagnoses:  [R55] Syncope  [M25.512] Acute pain of left shoulder (Primary)  [S09.90XA] Closed head injury, initial encounter  [S06.0X1A] Concussion with loss of consciousness of 30 minutes or less, initial encounter          ED Disposition Condition    Discharge Stable          ED Prescriptions    None       Follow-up Information       Follow up With Specialties Details Why Contact Info    Sourav Fabian MD Internal Medicine Schedule an appointment as soon as possible for a visit   [address]  [place] [address]  [phone]      [facility] - Emergency Dept Emergency Medicine  As needed, If symptoms worsen [address] [facility] [id number] [phone]                   [1]   Social History  Tobacco Use    Smoking status: Never     Passive exposure: Never    Smokeless tobacco: Current     Types: Chew    Tobacco comments:     2 cans/ week   Substance Use Topics    Alcohol use: Not Currently     Alcohol/week: 3.0 standard drinks of alcohol     Types: 3 Cans of beer per week     Comment: "socially but not usually"    Drug use: No        Fannie Tejada MD  06/24/25 0250    "

## 2025-06-24 NOTE — DISCHARGE INSTRUCTIONS
You were seen today after you had an episode where he passed out and hit your head.  Your workup today is overall reassuring.  Your head CT does not show any acute traumatic injury.  You are labs also reassuring.  It is important that you follow up with your primary care doctor.  It is also likely that you have a concussion.  Please do not engage in any activities that could cause you to have a repeat head injury.  You have been referred to Neurology for concussion clinic.  Please follow up as soon as possible.  If you redevelop any episodes where you pass out, develop chest pain, shortness of breath, difficulty breathing, worsening headache, vision changes, weakness or numbness or any other concerns, please return to the emergency department for re-evaluation.

## 2025-06-24 NOTE — FIRST PROVIDER EVALUATION
Medical screening examination initiated.  I have conducted a focused provider triage encounter, findings are as follows:    Brief history of present illness:  recent bariatric surgery.  Had to go vomit and then passed out.  Hit his head.  Was confused ealier    There were no vitals filed for this visit.    Pertinent physical exam:  alert, oriented, moves arms and legs well.  No c-spine tenderness    Brief workup plan:  labs, EKG, head ct    Preliminary workup initiated; this workup will be continued and followed by the physician or advanced practice provider that is assigned to the patient when roomed.

## 2025-07-07 ENCOUNTER — PATIENT MESSAGE (OUTPATIENT)
Dept: BARIATRICS | Facility: CLINIC | Age: 45
End: 2025-07-07
Payer: COMMERCIAL

## 2025-07-08 ENCOUNTER — PATIENT MESSAGE (OUTPATIENT)
Dept: BARIATRICS | Facility: CLINIC | Age: 45
End: 2025-07-08
Payer: COMMERCIAL

## 2025-08-12 ENCOUNTER — PATIENT MESSAGE (OUTPATIENT)
Dept: BARIATRICS | Facility: CLINIC | Age: 45
End: 2025-08-12
Payer: COMMERCIAL

## 2025-08-21 ENCOUNTER — OFFICE VISIT (OUTPATIENT)
Dept: INTERNAL MEDICINE | Facility: CLINIC | Age: 45
End: 2025-08-21
Payer: COMMERCIAL

## 2025-08-21 VITALS
BODY MASS INDEX: 45.1 KG/M2 | SYSTOLIC BLOOD PRESSURE: 122 MMHG | OXYGEN SATURATION: 96 % | WEIGHT: 315 LBS | HEIGHT: 70 IN | HEART RATE: 67 BPM | DIASTOLIC BLOOD PRESSURE: 88 MMHG

## 2025-08-21 DIAGNOSIS — E66.813 CLASS 3 SEVERE OBESITY DUE TO EXCESS CALORIES WITH SERIOUS COMORBIDITY AND BODY MASS INDEX (BMI) OF 50.0 TO 59.9 IN ADULT: ICD-10-CM

## 2025-08-21 DIAGNOSIS — K58.0 IRRITABLE BOWEL SYNDROME WITH DIARRHEA: ICD-10-CM

## 2025-08-21 DIAGNOSIS — F41.0 PANIC DISORDER: ICD-10-CM

## 2025-08-21 DIAGNOSIS — Z00.00 ENCOUNTER FOR ANNUAL GENERAL MEDICAL EXAMINATION WITHOUT ABNORMAL FINDINGS IN ADULT: Primary | ICD-10-CM

## 2025-08-21 PROBLEM — D23.5 OTHER BENIGN NEOPLASM OF SKIN OF TRUNK: Status: RESOLVED | Noted: 2025-08-21 | Resolved: 2025-08-21

## 2025-08-21 PROBLEM — R20.3 HYPERESTHESIA: Status: RESOLVED | Noted: 2025-08-21 | Resolved: 2025-08-21

## 2025-08-21 PROBLEM — L30.4 INTERTRIGO: Status: RESOLVED | Noted: 2025-08-21 | Resolved: 2025-08-21

## 2025-08-21 PROBLEM — N20.0 RIGHT RENAL STONE: Status: RESOLVED | Noted: 2019-10-06 | Resolved: 2025-08-21

## 2025-08-21 PROCEDURE — 3008F BODY MASS INDEX DOCD: CPT | Mod: CPTII,S$GLB,, | Performed by: FAMILY MEDICINE

## 2025-08-21 PROCEDURE — 99999 PR PBB SHADOW E&M-EST. PATIENT-LVL IV: CPT | Mod: PBBFAC,,, | Performed by: FAMILY MEDICINE

## 2025-08-21 PROCEDURE — 99396 PREV VISIT EST AGE 40-64: CPT | Mod: S$GLB,,, | Performed by: FAMILY MEDICINE

## 2025-08-21 PROCEDURE — 3079F DIAST BP 80-89 MM HG: CPT | Mod: CPTII,S$GLB,, | Performed by: FAMILY MEDICINE

## 2025-08-21 PROCEDURE — 3074F SYST BP LT 130 MM HG: CPT | Mod: CPTII,S$GLB,, | Performed by: FAMILY MEDICINE

## 2025-08-21 PROCEDURE — 1160F RVW MEDS BY RX/DR IN RCRD: CPT | Mod: CPTII,S$GLB,, | Performed by: FAMILY MEDICINE

## 2025-08-21 PROCEDURE — 1159F MED LIST DOCD IN RCRD: CPT | Mod: CPTII,S$GLB,, | Performed by: FAMILY MEDICINE

## 2025-08-21 RX ORDER — ALPRAZOLAM 1 MG/1
1 TABLET ORAL 3 TIMES DAILY PRN
Qty: 30 TABLET | Refills: 2 | Status: SHIPPED | OUTPATIENT
Start: 2025-08-21 | End: 2025-09-20

## 2025-08-21 RX ORDER — ALPRAZOLAM 0.5 MG/1
1 TABLET ORAL 3 TIMES DAILY PRN
Qty: 30 TABLET | Refills: 2 | Status: SHIPPED | OUTPATIENT
Start: 2025-08-21 | End: 2025-08-21

## 2025-08-21 RX ORDER — ESCITALOPRAM OXALATE 20 MG/1
20 TABLET ORAL DAILY
Qty: 90 TABLET | Refills: 3 | Status: SHIPPED | OUTPATIENT
Start: 2025-08-21 | End: 2026-08-16

## 2025-08-28 ENCOUNTER — OFFICE VISIT (OUTPATIENT)
Dept: GASTROENTEROLOGY | Facility: CLINIC | Age: 45
End: 2025-08-28
Payer: COMMERCIAL

## 2025-08-28 DIAGNOSIS — K58.0 IRRITABLE BOWEL SYNDROME WITH DIARRHEA: Primary | ICD-10-CM

## 2025-08-28 DIAGNOSIS — K92.1 MELENA: ICD-10-CM

## 2025-08-28 DIAGNOSIS — K62.5 RECTAL BLEEDING: ICD-10-CM

## 2025-08-28 RX ORDER — DICYCLOMINE HYDROCHLORIDE 10 MG/1
10 CAPSULE ORAL
Qty: 90 CAPSULE | Refills: 0 | Status: SHIPPED | OUTPATIENT
Start: 2025-08-28

## 2025-08-29 ENCOUNTER — TELEPHONE (OUTPATIENT)
Dept: GASTROENTEROLOGY | Facility: CLINIC | Age: 45
End: 2025-08-29
Payer: COMMERCIAL

## 2025-08-29 ENCOUNTER — TELEPHONE (OUTPATIENT)
Dept: ENDOSCOPY | Facility: HOSPITAL | Age: 45
End: 2025-08-29
Payer: COMMERCIAL

## 2025-08-29 DIAGNOSIS — Z12.11 SPECIAL SCREENING FOR MALIGNANT NEOPLASMS, COLON: ICD-10-CM

## 2025-08-29 DIAGNOSIS — R19.7 DIARRHEA, UNSPECIFIED TYPE: Primary | ICD-10-CM

## 2025-08-29 DIAGNOSIS — K92.1 MELENA: ICD-10-CM

## 2025-08-29 DIAGNOSIS — K62.5 RECTAL BLEED: ICD-10-CM

## 2025-08-29 DIAGNOSIS — K21.9 GASTROESOPHAGEAL REFLUX DISEASE, UNSPECIFIED WHETHER ESOPHAGITIS PRESENT: ICD-10-CM

## 2025-08-29 RX ORDER — SODIUM, POTASSIUM,MAG SULFATES 17.5-3.13G
1 SOLUTION, RECONSTITUTED, ORAL ORAL DAILY
Qty: 1 KIT | Refills: 0 | Status: SHIPPED | OUTPATIENT
Start: 2025-08-29 | End: 2025-08-31

## 2025-09-02 ENCOUNTER — PATIENT MESSAGE (OUTPATIENT)
Dept: SPORTS MEDICINE | Facility: CLINIC | Age: 45
End: 2025-09-02
Payer: COMMERCIAL

## 2025-09-02 DIAGNOSIS — M76.61 ACHILLES TENDINITIS OF RIGHT LOWER EXTREMITY: ICD-10-CM

## 2025-09-02 DIAGNOSIS — M25.571 ACUTE RIGHT ANKLE PAIN: Primary | ICD-10-CM

## 2025-09-02 RX ORDER — CELECOXIB 200 MG/1
200 CAPSULE ORAL 2 TIMES DAILY
Qty: 60 CAPSULE | Refills: 1 | Status: SHIPPED | OUTPATIENT
Start: 2025-09-02

## (undated) DEVICE — TUBE SET SINGLE LUMEN FILTERED

## (undated) DEVICE — GUIDE WIRE SENSOR .035 150

## (undated) DEVICE — TRAY CYSTO BASIN

## (undated) DEVICE — SOL NS 1000CC

## (undated) DEVICE — RELOAD SUREFORM 60 2.5 WHT 6R

## (undated) DEVICE — SHEATH NAVIGATOR 11/13F 46CM

## (undated) DEVICE — GUIDE WIRE MOTION .035 X 150CM

## (undated) DEVICE — UNDERGLOVES BIOGEL PI SZ 6 LF

## (undated) DEVICE — GUIDEWIRE STR TIP HIWIRE 150CM

## (undated) DEVICE — SOL IRR WATER STRL 3000 ML

## (undated) DEVICE — CATH 5FR OPEN END URETERAL

## (undated) DEVICE — DRESSING XEROFORM NONADH 1X8IN

## (undated) DEVICE — DRAPE SCOPE PILLOW WARMER

## (undated) DEVICE — SOL ELECTROLUBE ANTI-STIC

## (undated) DEVICE — EXTRACTOR TIPLESS 2.4FRX1115CM

## (undated) DEVICE — PACK CYSTO

## (undated) DEVICE — CHLORAPREP W TINT 26ML APPL

## (undated) DEVICE — SOL STRL WATER INJ 1000ML BG

## (undated) DEVICE — ADAPTER HOSE 10FT 8MM

## (undated) DEVICE — UNDERGLOVES BIOGEL PI SZ 7 LF

## (undated) DEVICE — FIBER QUANTA OPT SDT 272UM

## (undated) DEVICE — SOL NACL IRR 3000ML

## (undated) DEVICE — Device

## (undated) DEVICE — UNDERGLOVES BIOGEL PI SIZE 8

## (undated) DEVICE — SYS SMOKE EVACUATION LAP

## (undated) DEVICE — ELECTRODE 90 DEGREE ANGLE

## (undated) DEVICE — SUT VICRYL+ 27 UR-6 VIOL

## (undated) DEVICE — BLADE SHAVER LANZA 4.2X13CM

## (undated) DEVICE — GOWN SMARTGOWN LVL4 X-LONG XL

## (undated) DEVICE — TRAY CYSTO BASIN OMC

## (undated) DEVICE — SOL IRRI STRL WATER 1000ML

## (undated) DEVICE — SHEATH URET FLEXOR 12FR 45CM

## (undated) DEVICE — DRAPE ARTHSCP FLD CTRL POUCH

## (undated) DEVICE — COVER TIP CURVED SCISSORS XI

## (undated) DEVICE — DRESSING TRANS 4X4 TEGADERM

## (undated) DEVICE — DRAPE ARM DAVINCI XI

## (undated) DEVICE — GAUZE AVANT SPNG 4PLY STRL 4X4

## (undated) DEVICE — TRAY MINOR GEN SURG OMC

## (undated) DEVICE — PAD ELECTRODE STER 1.5X3

## (undated) DEVICE — OBTURATOR BLADELESS 8MM XI CLR

## (undated) DEVICE — DEVICE SYNCHROSEAL DA VINCI

## (undated) DEVICE — UNDERGLOVES BIOGEL PI SIZE 7.5

## (undated) DEVICE — SET IRR URLGY 2LINE UNIV SPIKE

## (undated) DEVICE — PAD COLD THERAPY KNEE WRAP ON

## (undated) DEVICE — DRAPE TOP 53X102IN

## (undated) DEVICE — SOL IRR NACL .9% 3000ML

## (undated) DEVICE — BLADE SURG CARBON STEEL SZ11

## (undated) DEVICE — FIBER MOSES 200 DFL

## (undated) DEVICE — SEAL UNIVERSAL 5MM-8MM XI

## (undated) DEVICE — STAPLER SUREFORM 60 SPU

## (undated) DEVICE — SOL 9P NACL IRR PIC IL

## (undated) DEVICE — SEAL CANNULA DA VINCI 12MM

## (undated) DEVICE — PACK CYSTOSCOPY III SIRUS

## (undated) DEVICE — UNDERGLOVE BIOGEL PI SZ 6.5 LF

## (undated) DEVICE — ELECTRODE REM PLYHSV RETURN 9

## (undated) DEVICE — RELOAD SUREFORM 60 3.5 BLU 6R

## (undated) DEVICE — SOL NORMAL USPCA 0.9%

## (undated) DEVICE — COVER LIGHT HANDLE

## (undated) DEVICE — NDL HYPO REG 25G X 1 1/2

## (undated) DEVICE — SYR 10CC LUER LOCK

## (undated) DEVICE — TUBE SET INFLOW/OUTFLOW

## (undated) DEVICE — DRAPE STERI U-SHAPED 47X51IN

## (undated) DEVICE — CANNULA REDUCER 12-8MM

## (undated) DEVICE — SYR ONLY LUER LOCK 20CC

## (undated) DEVICE — TOWEL OR DISP STRL BLUE 4/PK

## (undated) DEVICE — SHEATH FLEXOR URET 10.7FRX35CM

## (undated) DEVICE — SHEATH URETERAL FLEXOR 12X55CM

## (undated) DEVICE — DRAPE COLUMN DAVINCI XI

## (undated) DEVICE — IRRIGATOR ENDOSCOPY DISP.

## (undated) DEVICE — SUT VLOC 90 3-0 V-20 NDL 6

## (undated) DEVICE — SUT MONOCRYL 4-0 PS-2

## (undated) DEVICE — ADHESIVE DERMABOND ADVANCED

## (undated) DEVICE — STRIP MEDI WND CLSR 1/2X4IN

## (undated) DEVICE — BANDAGE ACE ELASTIC 6"

## (undated) DEVICE — SUT GUT PL. 4-0 27 FS-2

## (undated) DEVICE — TOURNIQUET SB QC DP 34X4IN

## (undated) DEVICE — GLOVE BIOGEL SKINSENSE PI 8.0

## (undated) DEVICE — GOWN POLY REINF BRTH SLV XL

## (undated) DEVICE — KIT ANTIFOG W/SPONG & FLUID

## (undated) DEVICE — PAD CAST 2 IN X 4YDS STERILE

## (undated) DEVICE — PAD ABDOMINAL STERILE 8X10IN

## (undated) DEVICE — SPONGE COTTON TRAY 4X4IN

## (undated) DEVICE — SUT 2/0 30IN SILK BLK BRAI

## (undated) DEVICE — CLOSURE SKIN STERI STRIP 1/2X4

## (undated) DEVICE — SHEATH FLEXOR URETERAL 45CM

## (undated) DEVICE — ADHESIVE MASTISOL VIAL 48/BX